# Patient Record
Sex: MALE | Race: WHITE | NOT HISPANIC OR LATINO | Employment: OTHER | URBAN - METROPOLITAN AREA
[De-identification: names, ages, dates, MRNs, and addresses within clinical notes are randomized per-mention and may not be internally consistent; named-entity substitution may affect disease eponyms.]

---

## 2017-03-01 ENCOUNTER — TELEPHONE (OUTPATIENT)
Dept: OTHER | Facility: CLINIC | Age: 52
End: 2017-03-01

## 2017-03-01 NOTE — TELEPHONE ENCOUNTER
3/1/2017    Call Regarding Onboarding [employer]    Attempt 1    Message on voicemail     Comments: SPOUSE      Outreach   KV

## 2017-03-07 NOTE — TELEPHONE ENCOUNTER
3/7/2017    Call Regarding Onboarding [employer]    Attempt 2    Message on voicemail     Comments: spouse        Outreach   [place]

## 2017-03-31 NOTE — TELEPHONE ENCOUNTER
3/31/2017    Call Regarding Onboarding [employer]    Attempt 3    Message on voicemail     Comments: Spouse      Outreach   KV

## 2020-06-26 ENCOUNTER — TRANSFERRED RECORDS (OUTPATIENT)
Dept: HEALTH INFORMATION MANAGEMENT | Facility: CLINIC | Age: 55
End: 2020-06-26

## 2020-08-17 ENCOUNTER — TRANSFERRED RECORDS (OUTPATIENT)
Dept: HEALTH INFORMATION MANAGEMENT | Facility: CLINIC | Age: 55
End: 2020-08-17

## 2021-08-20 ENCOUNTER — PATIENT OUTREACH (OUTPATIENT)
Dept: OTOLARYNGOLOGY | Facility: CLINIC | Age: 56
End: 2021-08-20

## 2021-08-20 DIAGNOSIS — C41.1 SQUAMOUS CELL CARCINOMA OF MANDIBLE (H): Primary | ICD-10-CM

## 2021-08-20 NOTE — PROGRESS NOTES
Called and left message for patient indicating that he is scheduled for Consult on Monday 8/23 at 12:00pm with a PET scan prior at 7:30am.     Left information regarding PET scan including date, time, location, and prep information. Left direct line for return call to confirm and with further questions or concerns.       Natalya Crowder, RN, BSN

## 2021-08-20 NOTE — TELEPHONE ENCOUNTER
FUTURE VISIT INFORMATION      FUTURE VISIT INFORMATION:    Date: 2021    Time: 12PM    [place] [facility] [place]  REFERRAL INFORMATION:    Referring provider:  Christ Stewart MD     Referring providers clinic:  [place] [address] [facility], [facility], and Thro[facility]      Reason for visit/diagnosis  HX: SCCA of the left mandible Christ Stewart referral     RECORDS REQUESTED FROM:       Clinic name Comments Records Status Imaging Status   [place] [address] [facility], [facility]   2021 note from Christ Stewart MD  Care everywhere    [facility] [place] [facility]  2020 note from Emily Summers MD  2020 Op report    Care everywhere    [facility]  [address]  [facility] [address]  Phone: [phone] 2021 Left cheek, mass, fine needle aspiration (Case: [id number]  )    *Trackin   Report in care everywhere    Path req 21 - received 21    [facility]   Tel [phone]   Fax [phone] 2021 CT Neck   2020 PET CT   2020 CT Neck   2020 FL Video Swallow    Care everywhere req 21 - [facility] 2020 PET Head NEck   2020 CT Neck , CT Maxial FAcial, CT Angio Abd, CT Thorax  Care [facility]  [facility]   [facility]  [address] [facility] [address]  Phone: [phone] 2020 mandible and lymph node  (Case: [id number] )    *trackin Report in care everywhere    Path req 21  - received 21     [facility]    [address]    [place], [place] [address] [phone]        2021 note from Dre Tarango MBBS   Care everywhere             2021 2:23PM sent an urgent fax to [facility] and [facility] for images to be pushed. Fax sent for path send out - Amay   *3:39PM received images from [facility], waiting for HP images - amay   2021 8AM images from  [facility] in PACS, waiting for path - Amay

## 2021-08-23 ENCOUNTER — TELEPHONE (OUTPATIENT)
Dept: OTOLARYNGOLOGY | Facility: CLINIC | Age: 56
End: 2021-08-23

## 2021-08-23 ENCOUNTER — PRE VISIT (OUTPATIENT)
Dept: OTOLARYNGOLOGY | Facility: CLINIC | Age: 56
End: 2021-08-23

## 2021-08-23 NOTE — TELEPHONE ENCOUNTER
[facility] Call Center    Phone Message    May a detailed message be left on voicemail: yes     Reason for Call: Other: Pt calling to reschedule appt to see Dr. Swartz for squamous cell carcinoma due to need of rescheduling 8/23 PET scan. Writer scheduled for first available, but sending TE as it is outside the recommended 5 business days. Pt would be comofrtable being seen Thursday afternoon or Friday following his PET scan. Please call Pt to reschedule if possible.      Action Taken: Message routed to:  [facility] ENT    Travel Screening: Not Applicable

## 2021-08-24 NOTE — TELEPHONE ENCOUNTER
Spoke with patient.Appointment on 8/30 is Dr. Swartz's next day in clinic - so patient has soonest available. Dr. Swartz is comfortable with this timing.    The patient is in understanding and agreement to this plan. Patient has no further questions at this time.    Rhea Segal RN on 8/24/2021 at 8:32 AM

## 2021-08-26 ENCOUNTER — HOSPITAL ENCOUNTER (OUTPATIENT)
Dept: PET IMAGING | Facility: CLINIC | Age: 56
End: 2021-08-26
Attending: STUDENT IN AN ORGANIZED HEALTH CARE EDUCATION/TRAINING PROGRAM
Payer: COMMERCIAL

## 2021-08-26 ENCOUNTER — LAB (OUTPATIENT)
Dept: LAB | Facility: CLINIC | Age: 56
End: 2021-08-26
Attending: STUDENT IN AN ORGANIZED HEALTH CARE EDUCATION/TRAINING PROGRAM
Payer: COMMERCIAL

## 2021-08-26 DIAGNOSIS — C41.1 SQUAMOUS CELL CARCINOMA OF MANDIBLE (H): ICD-10-CM

## 2021-08-26 DIAGNOSIS — C44.329 SQUAMOUS CELL CARCINOMA OF CHEEK: Primary | ICD-10-CM

## 2021-08-26 PROCEDURE — 70491 CT SOFT TISSUE NECK W/DYE: CPT

## 2021-08-26 PROCEDURE — 70491 CT SOFT TISSUE NECK W/DYE: CPT | Mod: 26 | Performed by: STUDENT IN AN ORGANIZED HEALTH CARE EDUCATION/TRAINING PROGRAM

## 2021-08-26 PROCEDURE — 74177 CT ABD & PELVIS W/CONTRAST: CPT

## 2021-08-26 PROCEDURE — 88321 CONSLTJ&REPRT SLD PREP ELSWR: CPT | Performed by: PATHOLOGY

## 2021-08-26 PROCEDURE — 71260 CT THORAX DX C+: CPT | Mod: 26

## 2021-08-26 PROCEDURE — 343N000001 HC RX 343: Performed by: STUDENT IN AN ORGANIZED HEALTH CARE EDUCATION/TRAINING PROGRAM

## 2021-08-26 PROCEDURE — A9552 F18 FDG: HCPCS | Performed by: STUDENT IN AN ORGANIZED HEALTH CARE EDUCATION/TRAINING PROGRAM

## 2021-08-26 PROCEDURE — 78816 PET IMAGE W/CT FULL BODY: CPT | Mod: 26

## 2021-08-26 PROCEDURE — 250N000011 HC RX IP 250 OP 636: Performed by: STUDENT IN AN ORGANIZED HEALTH CARE EDUCATION/TRAINING PROGRAM

## 2021-08-26 PROCEDURE — 78816 PET IMAGE W/CT FULL BODY: CPT | Mod: PS

## 2021-08-26 PROCEDURE — 74177 CT ABD & PELVIS W/CONTRAST: CPT | Mod: 26

## 2021-08-26 RX ORDER — IOPAMIDOL 755 MG/ML
116 INJECTION, SOLUTION INTRAVASCULAR ONCE
Status: COMPLETED | OUTPATIENT
Start: 2021-08-26 | End: 2021-08-26

## 2021-08-26 RX ADMIN — IOPAMIDOL 116 ML: 755 INJECTION, SOLUTION INTRAVENOUS at 15:15

## 2021-08-26 RX ADMIN — FLUDEOXYGLUCOSE F-18 11.3 MCI.: 500 INJECTION, SOLUTION INTRAVENOUS at 15:15

## 2021-08-30 ENCOUNTER — OFFICE VISIT (OUTPATIENT)
Dept: OTOLARYNGOLOGY | Facility: CLINIC | Age: 56
End: 2021-08-30
Payer: COMMERCIAL

## 2021-08-30 ENCOUNTER — LAB (OUTPATIENT)
Dept: LAB | Facility: CLINIC | Age: 56
End: 2021-08-30
Payer: COMMERCIAL

## 2021-08-30 ENCOUNTER — ALLIED HEALTH/NURSE VISIT (OUTPATIENT)
Dept: SPEECH THERAPY | Facility: CLINIC | Age: 56
End: 2021-08-30

## 2021-08-30 VITALS
OXYGEN SATURATION: 97 % | BODY MASS INDEX: 27.49 KG/M2 | TEMPERATURE: 97.1 F | HEIGHT: 70 IN | HEART RATE: 93 BPM | WEIGHT: 192.02 LBS

## 2021-08-30 DIAGNOSIS — C41.1 SQUAMOUS CELL CARCINOMA OF MANDIBLE (H): Primary | ICD-10-CM

## 2021-08-30 LAB
PATH REPORT.COMMENTS IMP SPEC: NORMAL
PATH REPORT.FINAL DX SPEC: NORMAL
PATH REPORT.FINAL DX SPEC: NORMAL
PATH REPORT.GROSS SPEC: NORMAL
PATH REPORT.GROSS SPEC: NORMAL
PATH REPORT.MICROSCOPIC SPEC OTHER STN: NORMAL
PATH REPORT.MICROSCOPIC SPEC OTHER STN: NORMAL
PATH REPORT.RELEVANT HX SPEC: NORMAL
PATH REPORT.SITE OF ORIGIN SPEC: NORMAL
PATH REPORT.SITE OF ORIGIN SPEC: NORMAL

## 2021-08-30 PROCEDURE — 31575 DIAGNOSTIC LARYNGOSCOPY: CPT | Performed by: STUDENT IN AN ORGANIZED HEALTH CARE EDUCATION/TRAINING PROGRAM

## 2021-08-30 PROCEDURE — 88321 CONSLTJ&REPRT SLD PREP ELSWR: CPT | Performed by: PATHOLOGY

## 2021-08-30 PROCEDURE — 99205 OFFICE O/P NEW HI 60 MIN: CPT | Mod: 25 | Performed by: STUDENT IN AN ORGANIZED HEALTH CARE EDUCATION/TRAINING PROGRAM

## 2021-08-30 RX ORDER — ACETAMINOPHEN 500 MG
500-1000 TABLET ORAL EVERY 6 HOURS PRN
COMMUNITY
End: 2023-01-01

## 2021-08-30 ASSESSMENT — PAIN SCALES - GENERAL: PAINLEVEL: NO PAIN (0)

## 2021-08-30 ASSESSMENT — MIFFLIN-ST. JEOR: SCORE: 1702.25

## 2021-08-30 NOTE — PROGRESS NOTES
August 30, 2021      Christ Stewart MD  [facility] [address] [place] [address]      Dear Dr. Stewart:    I had the pleasure of meeting Mr. Sharpe today in clinic.    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  He underwent a left marginal mandibulectomy, left buccal resection, left posterior maxillectomy, left neck dissection and reconstruction with a right radial forearm free flap by Ganga Reece and Gino Obando on April 28, 2020.  I have had a chance to review their operative note.  They had an intraoperative positive margin which was subsequently cleared.  His surgical pathology demonstrated a T4a tumor.  He had five of 33 lymph nodes positive for tumor with extranodal extension and perineural invasion.  He subsequently received adjuvant chemoradiation therapy at [facility].  He received 66 gray as well as high dose cisplatin in three doses.  He finished his chemoradiation therapy in August of 2020.  He reports that shortly after his operation, he felt a mass in the left cheek that has been persistent.  More recently, he has noticed drainage from the skin.  He was seen by Dr. Oconnell recently, and a fine needle aspirate was performed that has been reported as suspicious for squamous cell carcinoma.  He had a PET/CT scan for staging last week that actually demonstrated two sites of recurrent disease.  There is a 3.4 x 2.4 cm area of FDG avidity within the subcutaneous aspect of the left cheek abutting the masseter muscle and likely involving the skin.  There is also a second 2.8 x 2.3 cm hyperbolic mass in the left infratemporal fossa medial to the zygoma.  There was also suspicion of involvement of the zygomatic arch.  There was no evidence of regional or distant metastases.    PAST MEDICAL HISTORY:    1.  Testicular cancer.  2.  Oral cancer.    PAST  SURGICAL HISTORY:   1.  Oral resection.  2.  Orchiectomy.    MEDICATIONS:  Tylenol.    ALLERGIES:  No known drug allergies.    SOCIAL HISTORY:  He works in the [employer] industry.  He smokes about a pack and half a day and has done so for most of his life.  He drinks 6-10 beers a day.  He does not use chewing tobacco.    FAMILY HISTORY:  None.    REVIEW OF SYSTEMS:  A 10-point review of systems was performed and is negative aside from HPI.    PHYSICAL EXAMINATION:  He is alert, in no acute distress.  He has a firm, fixed mass in the left cheek with a pinpoint area of drainage.  I cannot feel the infratemporal fossa mass.  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  I do not feel any cervical lymphadenopathy.  On intraoral examination, he has a well-healed flap along the left mandibular alveolus, left buccal mucosa and posterior maxilla as well as the lateral pharynx.  I do not see any suspicious lesions in the oral cavity or oropharynx.  He has good ulnar flow in the left arm.  However, he does not have good radial flow.  His thigh is appropriate for free tissue transfer.    PROCEDURE:  Fiberoptic laryngoscopy was performed after the nose was topically decongested and anesthetized.  The scope was advanced in the nasal cavity.  The nasopharynx was clear.  The base of tongue and vallecula was clear.  The epiglottis was sharp without lesions.  The piriform sinuses were clear.  The vocal cords are mobile bilaterally.    IMAGING:  I reviewed his PET/CT with him today.    ASSESSMENT AND PLAN:  Mr. Sharpe is a 55-year-old male presenting with an aggressive early recurrence of an oral cancer.  He has two separate sites of recurrence within a year of completing chemoradiation therapy and surgery.  One lesion involves the subcutaneous aspect of the left cheek and one involves the infratemporal fossa.  At this time, I am not completely sure that this is surgically resectable.  We will obtain an MRI to further evaluate  this.  I did clearly discuss with the patient that this is an aggressive malignancy and carries a poor prognosis.  If this is not surgically resectable, we would refer him on for palliative treatment.  I answered his questions to the best of my ability today, and I will keep him updated on his scans.  We will review him at our Head and Neck Multidisciplinary Conference. His PET scan shows some findings in the stomach and rectum and we will refer him to gastroenterology for these.     Thank you for allowing me to participate in the care of this patient.  If you have any questions or concerns, please do not hesitate to contact us.    Sincerely,    Aditya Swartz MD    Otolaryngology-Head and Neck Surgery  [facility]        60 minutes spent on the date of the encounter in chart review, patient visit, review of tests, documentation and/or discussion with other providers about the issues documented above.

## 2021-08-30 NOTE — PROGRESS NOTES
Antonio Sharpe was seen for allied health care provider visit for introduction of self and SLP services. Pt has a hx of a oral cavity cancer s/p surgery and chemoXRT in Aug 2020, who now presents with an oral cavity recurrence. Swallowing is functional at this time. He participated in video swallow study at outside facility in July 2020 which was WFL. Formal swallow evaluation may be indicated pending POC. Time spent with patient 3 minutes.     JUANPABLO Knowles MA (music), CCC-SLP   Speech Language Pathologist  [place] Trained Vocologist   [facility]  Dept. of Otolaryngology  Department of Rehabilitation Services  [address] [place] [id number]  Email: [email or web address]   Phone: [phone]  Pronouns: she/her/hers

## 2021-08-30 NOTE — TUMOR CONFERENCE
Head & Neck Tumor Conference Note     Status: New  Staff: Dr. Swartz    Tumor Site: Left mandible/alveolar ridge/buccal mucosa (oral cavity)  Tumor Pathology: [id number] SCC  Tumor Stage: [id number]  Tumor Treatment:   (1) Left marginal mandibulectomy, WLE of the left buccal mucosa, left posterior maxillectomy, left partial pharyngectomy, left selective neck dissection, right radial forearm free flap, mandibular plating, tracheostomy, and feeding tube placement (4/28/2020)  (2) Adjuvant chemoradiation (6600 cGy of radiation to the left oral cavity and neck, completed 8/17/2020 with 3-doses of high-dose cisplatin)    Reason for Review: Review imaging, path, and POC.    Brief History: Antonio Sharpe is a 55-year-old man with history of a pT4aN3 [id number] SCC of the left mandible/alveolar ridge/buccal mucosa. He initially developed a lesion of the left buccal mucosa, left jaw pain, and trismus in March 2020. A biopsy by an oral surgeon was consistent with invasive SCC. Evaluation by Dr. Reece ([facility], ENT) showed an 8 cm x 5 cm lesion involving the left buccal mucosa, retromolar trigone, and mandibular alveolus. PET/CT imaging showed increased FDG uptake along the left danilo-mandible involving the anterior aspect of the oral cavity, buccal mucosa at the level of the angle of the mandible, and retromolar trigone. There was no locoregional or distant disease. On 04/28/2020, he underwent a left marginal mandibulectomy, wide resection of the carcinoma involving the buccal mucosa, left posterior maxillectomy, left partial pharyngectomy, left selective neck dissection, right radial forearm free flap, mandibular plating, tracheostomy, and feeding tube placement. Pathology showed a 5.7 cm [id number] moderately differentiated squamous cell carcinoma. No LVI and final margins negative. +5/33 LN were positive for tumor, with largest deposit 0.8 cm without evidence of MARIA ELENA. He was subsequently treated with adjuvant  chemoradiation. He received 6600 cGy of radiation to the left oral cavity and neck between 6/30 and 08/17/2020. He received 3 doses of high-dose cisplatin at 100 milligrams/meter sq on 6/30, 7/20 and 08/10/2020 respectively. He did experience some tinnitus, some difficulty swallowing and some oropharyngeal pain, but did not develop any renal insufficiency or major electrolyte imbalance. His PET scan from 11/25/2020 revealed extensive post radiation and postsurgical changes but no hypermetabolic activity in the neck, and no evidence of distant metastases.    He recently underwent a CT scan due to swelling and drainage from the left cheek that has developed over the past months. This showed a non-specific ill-defined heterogeneous mass within the left cheek within an area of extensive prior postsurgical and postradiation changes with flap reconstruction measuring approximately 3 x 2.8 x 3.6 cm in size. FNA was performed concerning for SCC.      Pertinent PMH: Prior testicular cancer (s/p left orchiectomy, postoperative radiation).    Smoking Hx: 1/2 ppd for 35 years.    Imaging:   CT (8/11/2021):  INDICATION: History of SCCA and extensive surgical reconstruction elsewhere.  Having swelling and drainage out of the left cutaneous cheek.     COMPARISON: PET/CT 11/25/2020 11/06/2020 TECHNIQUE:  Images of the neck were obtained following the administration of 75 mL IOPAMIDOL 61 % IV SOLN.    FINDINGS: Ill-defined heterogeneous mass measuring approximately 3 x 2.8 x 3.6 cm in size at the site of a reconstruction flap within the subcutaneous fat of the left cheek abutting the area of the masseter muscle medially and abutting the skin laterally. Loss of normal fat planes with nonspecific stranding of the adjacent fat. Lesion also within the area of the accessory lobe of the parotid gland which is no longer distinctly identified. Again noted loss of normal fat planes within the  space and carotid space on the left.  History of postradiation changes and neck dissection on the left. Extensive postsurgical changes again noted with partial resection of the mandible and maxilla on the left. Resection and of the submandibular gland on the left. Unchanged prominence to the right submandibular gland. Effacement of the piriform sinus on the left which appears similar to the prior study. Approximately 8 mm nodule within the thyroid on the left which appears similar to the prior study. No definite enlarged lymph nodes within the right neck. Emphysematous changes lung apices. Advanced degenerative changes cervical spine unchanged severe neural foraminal narrowing on the right at C3-C4 largely related to advanced facet arthropathy. Unchanged severe neural foraminal narrowing bilaterally at C5-C6 and C6-C7 largely related to uncinate spurring at least moderate central stenosis. Probable small mucous retention cyst left maxillary sinus.    IMPRESSION:  1. Nonspecific ill-defined heterogeneous mass within the left cheek within an area of extensive prior postsurgical and postradiation changes with flap reconstruction measuring approximately 3 x 2.8 x 3.6 cm in size. Accessory lobe of the parotid on the left no longer clearly defined. Mass may be related originating from accessory lobe. Finding most worrisome for tumor. Differential would also include inflammatory/infectious process. Consider follow-up PET/CT.      Pathology:   CASE FROM [facility], [place], [place] ([id number], OBTAINED 04/28/2020):  A.  Left mandible medial margin:  -Negative for dysplasia or malignancy  B.  Left mandible lateral margin:  -Negative for dysplasia or malignancy  C.  Left mandible anterior margin:  -Negative for dysplasia or malignancy  D.  Left mandible posterior margin:  -Negative for dysplasia or malignancy  E.  Left mandible deep margin:  -Negative for malignancy  F.  Lymph node, left level 1B, excision:  -Metastatic squamous cell carcinoma  in 1 of 1 lymph node  G.  Lymph nodes, left neck dissection:  -Metastatic squamous cell carcinoma in 4 of 28 lymph nodes  -Benign salivary tissue  H. Lymph nodes, level 2B dissection:  -No malignancy identified in 4 lymph nodes  I.  Left mandible, composite resection:  -Invasive moderately differentiated keratinizing squamous cell carcinoma  -Tumor size: 5.7 cm  -Tumor focality: Unifocal  -The depth of invasion: 1.5 cm  -Lymphovascular invasion: Not identified  -Perineural invasion: Identified  -The tumor is focally present at the inked lateral and medial margins in this specimen, final surgical resection margins free of tumor (see parts A-E)  -Metastatic squamous cell carcinoma in 5 of 33 lymph nodes  -The size of largest metastatic deposit: 0.8 cm  -Extranodal extension: Present, >2 mm    Left cheek, mass, fine needle aspiration (8/16/21):    Highly suspicious for squamous cell carcinoma    Tumor Board Recommendation:   Discussion: This is a 55-year-old man with history of a pT4aN3 [id number] SCC of the left mandible/alveolar ridge/buccal mucosa status post left marginal mandibulectomy, WLE of the left buccal mucosa, left posterior maxillectomy, left partial pharyngectomy, left selective neck dissection, right radial forearm free flap, mandibular plating, tracheostomy, and feeding tube placement (4/28/2020) followed by adjuvant chemoradiation (6600 cGy of radiation to the left oral cavity and neck, completed 8/17/2020 with 3-doses of high-dose cisplatin). He is now referred to the [place] due to concern for recurrent disease. His PET/CT and MRI are reviewed. There is a mass in the masseteric muscle and a second one deeper in the  space. One extends to the skin. The mass abuts the mandibular condyle with a small amount of signal change concerning for early invasion. There is also potential perineural invasion. Atrophic denervation changes are seen in the  space as well, which are not FDG  callie.    Plan: Repeat biopsy next week to confirm recurrence. Will get PDL-1 on new biopsy specimen. Induction chemotherapy is potentially an option, but felt not likely to get clear surgical margins with the use of induction chemotherapy.     Erinn Dai MD PGY-4  Otolaryngology - Head and Neck Surgery    Documentation / Disclaimer Cancer Tumor Board Note  Cancer tumor board recommendations do not override what is determined to be reasonable care and treatment, which is dependent on the circumstances of a patient's case; the patient's medical, social, and personal concerns; and the clinical judgment of the oncologist [physician].

## 2021-08-30 NOTE — PATIENT INSTRUCTIONS
1. Please schedule MRI scan ASAP. We will review at tumor board and call you with recommendations.   2. Please call the ENT clinic with any questions,concerns, new or worsening symptoms.    -Clinic number is [phone]   - Natalya's direct line (Dr. Swartz's nurse) [phone]

## 2021-08-30 NOTE — LETTER
8/30/2021       RE: Antonio Sharpe  [address] [place] [address]     Dear Colleague,    Thank you for referring your patient, Antonio Sharpe, to the [facility] [place] [facility]. Please see a copy of my visit note below.    August 30, 2021      Christ Stewart MD  [facility] [address] [place]   [address]      Dear Dr. Stewart:    I had the pleasure of meeting Mr. Sharpe today in clinic.    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  He underwent a left marginal mandibulectomy, left buccal resection, left posterior maxillectomy, left neck dissection and reconstruction with a right radial forearm free flap by Ganga Reece and Gino Obando on April 28, 2020.  I have had a chance to review their operative note.  They had an intraoperative positive margin which was subsequently cleared.  His surgical pathology demonstrated a T4a tumor.  He had five of 33 lymph nodes positive for tumor with extranodal extension and perineural invasion.  He subsequently received adjuvant chemoradiation therapy at [facility].  He received 66 gray as well as high dose cisplatin in three doses.  He finished his chemoradiation therapy in August of 2020.  He reports that shortly after his operation, he felt a mass in the left cheek that has been persistent.  More recently, he has noticed drainage from the skin.  He was seen by Dr. Oconnell recently, and a fine needle aspirate was performed that has been reported as suspicious for squamous cell carcinoma.  He had a PET/CT scan for staging last week that actually demonstrated two sites of recurrent disease.  There is a 3.4 x 2.4 cm area of FDG avidity within the subcutaneous aspect of the left cheek abutting the masseter muscle and likely  involving the skin.  There is also a second 2.8 x 2.3 cm hyperbolic mass in the left infratemporal fossa medial to the zygoma.  There was also suspicion of involvement of the zygomatic arch.  There was no evidence of regional or distant metastases.    PAST MEDICAL HISTORY:    1.  Testicular cancer.  2.  Oral cancer.    PAST SURGICAL HISTORY:   1.  Oral resection.  2.  Orchiectomy.    MEDICATIONS:  Tylenol.    ALLERGIES:  No known drug allergies.    SOCIAL HISTORY:  He works in the [employer] industry.  He smokes about a pack and half a day and has done so for most of his life.  He drinks 6-10 beers a day.  He does not use chewing tobacco.    FAMILY HISTORY:  None.    REVIEW OF SYSTEMS:  A 10-point review of systems was performed and is negative aside from HPI.    PHYSICAL EXAMINATION:  He is alert, in no acute distress.  He has a firm, fixed mass in the left cheek with a pinpoint area of drainage.  I cannot feel the infratemporal fossa mass.  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  I do not feel any cervical lymphadenopathy.  On intraoral examination, he has a well-healed flap along the left mandibular alveolus, left buccal mucosa and posterior maxilla as well as the lateral pharynx.  I do not see any suspicious lesions in the oral cavity or oropharynx.  He has good ulnar flow in the left arm.  However, he does not have good radial flow.  His thigh is appropriate for free tissue transfer.    PROCEDURE:  Fiberoptic laryngoscopy was performed after the nose was topically decongested and anesthetized.  The scope was advanced in the nasal cavity.  The nasopharynx was clear.  The base of tongue and vallecula was clear.  The epiglottis was sharp without lesions.  The piriform sinuses were clear.  The vocal cords are mobile bilaterally.    IMAGING:  I reviewed his PET/CT with him today.    ASSESSMENT AND PLAN:  Mr. Sharpe is a 55-year-old male presenting with an aggressive early recurrence of an oral cancer.  He  has two separate sites of recurrence within a year of completing chemoradiation therapy and surgery.  One lesion involves the subcutaneous aspect of the left cheek and one involves the infratemporal fossa.  At this time, I am not completely sure that this is surgically resectable.  We will obtain an MRI to further evaluate this.  I did clearly discuss with the patient that this is an aggressive malignancy and carries a poor prognosis.  If this is not surgically resectable, we would refer him on for palliative treatment.  I answered his questions to the best of my ability today, and I will keep him updated on his scans.  We will review him at our Head and Neck Multidisciplinary Conference. His PET scan shows some findings in the stomach and rectum and we will refer him to gastroenterology for these.     Thank you for allowing me to participate in the care of this patient.  If you have any questions or concerns, please do not hesitate to contact us.    Sincerely,    Aditya Swartz MD    Otolaryngology-Head and Neck Surgery  [facility]        60 minutes spent on the date of the encounter in chart review, patient visit, review of tests, documentation and/or discussion with other providers about the issues documented above.         Again, thank you for allowing me to participate in the care of your patient.      Sincerely,    Aditya Swartz MD

## 2021-09-01 ENCOUNTER — HOSPITAL ENCOUNTER (OUTPATIENT)
Dept: MRI IMAGING | Facility: CLINIC | Age: 56
Discharge: HOME | End: 2021-09-01
Attending: STUDENT IN AN ORGANIZED HEALTH CARE EDUCATION/TRAINING PROGRAM | Admitting: STUDENT IN AN ORGANIZED HEALTH CARE EDUCATION/TRAINING PROGRAM
Payer: COMMERCIAL

## 2021-09-01 DIAGNOSIS — C41.1 SQUAMOUS CELL CARCINOMA OF MANDIBLE (H): ICD-10-CM

## 2021-09-01 PROCEDURE — 255N000002 HC RX 255 OP 636: Performed by: STUDENT IN AN ORGANIZED HEALTH CARE EDUCATION/TRAINING PROGRAM

## 2021-09-01 PROCEDURE — 70543 MRI ORBT/FAC/NCK W/O &W/DYE: CPT

## 2021-09-01 PROCEDURE — A9585 GADOBUTROL INJECTION: HCPCS | Performed by: STUDENT IN AN ORGANIZED HEALTH CARE EDUCATION/TRAINING PROGRAM

## 2021-09-01 RX ORDER — GADOBUTROL 604.72 MG/ML
8 INJECTION INTRAVENOUS ONCE
Status: DISCONTINUED | OUTPATIENT
Start: 2021-09-01 | End: 2021-09-02 | Stop reason: HOSPADM

## 2021-09-01 RX ORDER — GADOBUTROL 604.72 MG/ML
8 INJECTION INTRAVENOUS ONCE
Status: COMPLETED | OUTPATIENT
Start: 2021-09-01 | End: 2021-09-01

## 2021-09-01 RX ADMIN — GADOBUTROL 8 ML: 604.72 INJECTION INTRAVENOUS at 15:14

## 2021-09-02 ENCOUNTER — PATIENT OUTREACH (OUTPATIENT)
Dept: OTOLARYNGOLOGY | Facility: CLINIC | Age: 56
End: 2021-09-02

## 2021-09-02 NOTE — PROGRESS NOTES
Called to touch base with patient regarding arranging repeat biopsy of left cheek mass as the FNA done at [facility] was re-read here and results were:    A: LEFT CHEEK, MASS, FINE NEEDLE ASPIRATION:  Atypical cells present  See comment.   Electronically signed by Domingo Bernstein III, MD on 8/30/2021 at  5:07 PM   Comment    The cytological evaluation and cell block material show squamous cells and both atypical and mature keratinocytes. There is mild acute and chronic inflammation with keratinous debris. There is absence of marked cytological atypia, pleomorphism, or mitosis.     Based on morphology this represents a cystic squamous lesion, the differential could be a benign squamous lined cyst, but a cystic well differentiated squamous cell carcinoma cannot be completely ruled out. Clinical and radiologic correlation is recommended.     Discussed with patient that Dr. Swartz would like to repeat FNA in clinic on Friday am. Patient indicates that he is planning on going out of town and would like to wait until next week to complete this. Writer will review with Dr. Swartz to plan for repeat FNA in clinic with pathology early next week.     Also discussed the need to see GI for Focal wall thickening of the cardiac fundus and focal uptake to the anorectal region. Patient indicates that he would like to wait on this until he figures out if his head and neck cancer is resectable and then would decide on proceeding with GI.     Patient requested results of MRI. The following results were reviewed:    IMPRESSION:  1. Multiple masses within the left  space consistent with  recurrent tumor.  2. No convincing cervical lymphadenopathy.    Advised patient that we will review MRI in detail at tumor board tomorrow and will contact him with recommendations. Patient verbalized understanding and was encouraged to call with further questions.     Natalya Crowder, RN, BSN

## 2021-09-03 ENCOUNTER — TUMOR CONFERENCE (OUTPATIENT)
Dept: ONCOLOGY | Facility: CLINIC | Age: 56
End: 2021-09-03
Payer: COMMERCIAL

## 2021-09-03 NOTE — PROGRESS NOTES
Called and spoke with patient regarding tumor board discussion. Reviewed that Dr. Swartz would be in touch to further discuss. Patient verbalized understanding and will await this call.     Patient will return for repeat FNA with pathology on Wednesday 9/8 at 2:00pm.     Patient encouraged to call with further questions or concerns.     Natalya Crowder RN, BSN

## 2021-09-08 ENCOUNTER — ALLIED HEALTH/NURSE VISIT (OUTPATIENT)
Dept: OTOLARYNGOLOGY | Facility: CLINIC | Age: 56
End: 2021-09-08
Payer: COMMERCIAL

## 2021-09-08 DIAGNOSIS — C41.1 SQUAMOUS CELL CARCINOMA OF MANDIBLE (H): Primary | ICD-10-CM

## 2021-09-08 DIAGNOSIS — R22.0 CHEEK MASS: ICD-10-CM

## 2021-09-08 PROCEDURE — 88305 TISSUE EXAM BY PATHOLOGIST: CPT | Mod: 26 | Performed by: PATHOLOGY

## 2021-09-08 PROCEDURE — 88305 TISSUE EXAM BY PATHOLOGIST: CPT | Mod: TC | Performed by: STUDENT IN AN ORGANIZED HEALTH CARE EDUCATION/TRAINING PROGRAM

## 2021-09-08 PROCEDURE — 99207 PR NO CHARGE NURSE ONLY: CPT

## 2021-09-08 PROCEDURE — 88173 CYTOPATH EVAL FNA REPORT: CPT | Mod: 26 | Performed by: PATHOLOGY

## 2021-09-08 PROCEDURE — 88172 CYTP DX EVAL FNA 1ST EA SITE: CPT | Mod: 26 | Performed by: PATHOLOGY

## 2021-09-08 PROCEDURE — 10021 FNA BX W/O IMG GDN 1ST LES: CPT | Mod: GC | Performed by: PATHOLOGY

## 2021-09-08 NOTE — PROGRESS NOTES
Patient here for repeat FNA of left cheek lesion completed by pathology. FNA was completed and pathology confirmed this is positive for squamous cell carcinoma. Discussed with patient and update sent to Dr. Swartz. We will await final results and contact patient to discuss next steps.     Patient was encouraged to call with further questions or concerns.       Natalya Crowder RN, BSN

## 2021-09-13 ENCOUNTER — PATIENT OUTREACH (OUTPATIENT)
Dept: OTOLARYNGOLOGY | Facility: CLINIC | Age: 56
End: 2021-09-13

## 2021-09-13 DIAGNOSIS — C41.1 SQUAMOUS CELL CARCINOMA OF MANDIBLE (H): Primary | ICD-10-CM

## 2021-09-13 NOTE — PROGRESS NOTES
----- Message from Alirio Villarreal MD sent at 11/6/2019 11:35 AM CST -----  All normal except globulin level is higher:  Check EMMANUELLE and serum protein electrophoresis   Received a call from patient who indicates that after talking with Dr. Swartz he would like to proceed with palliative chemotherapy option and not surgery. Discussed with patient that we will place referral for medical oncology consult and he will be called to arrange asap.     Patient was agreeable to plan and was encouraged to call with further questions or concerns.     Message sent to RN in medical oncology to help facilitate referral.      Natalya Crowder, RN, BSN

## 2021-09-17 ENCOUNTER — TELEPHONE (OUTPATIENT)
Dept: ONCOLOGY | Facility: CLINIC | Age: 56
End: 2021-09-17

## 2021-09-17 DIAGNOSIS — E83.42 HYPOMAGNESEMIA: ICD-10-CM

## 2021-09-17 DIAGNOSIS — C06.9 SQUAMOUS CELL CARCINOMA OF ORAL CAVITY (H): Primary | ICD-10-CM

## 2021-09-17 DIAGNOSIS — Z13.29 SCREENING FOR HYPOTHYROIDISM: ICD-10-CM

## 2021-09-17 NOTE — TELEPHONE ENCOUNTER
I received a message from Dr. Buckley that she would like to have PD_L1 testing done on Jeremi's tissue. Lab called and they pulled the order to start the process.

## 2021-09-21 NOTE — TELEPHONE ENCOUNTER
RECORDS STATUS - ALL OTHER DIAGNOSIS      RECORDS RECEIVED FROM: Epic, [facility]   DATE RECEIVED: 9/21   NOTES STATUS DETAILS   OFFICE NOTE from referring provider Aditya Cortes MD in [facility] ENT: 8/30/21   OFFICE NOTE from medical oncologist     DISCHARGE SUMMARY from hospital [facility] 4/28/20   DISCHARGE REPORT from the ER NA    OPERATIVE REPORT [facility] 4/28/20: Composite Resection Left Mandible   MEDICATION LIST [facility]    CLINICAL TRIAL TREATMENTS TO DATE     LABS     PATHOLOGY REPORTS [facility] 9/8/21: FNA  8/30/21, 8/26/21: Path Consult   ANYTHING RELATED TO DIAGNOSIS Epic 9/17/21   GENONOMIC TESTING     TYPE: Epic 9/20/21: PDL-1   IMAGING (NEED IMAGES & REPORT)     CT SCANS PACS [facility]   MRI     MAMMO     ULTRASOUND     PET PACS [facility]

## 2021-09-22 ENCOUNTER — ONCOLOGY VISIT (OUTPATIENT)
Dept: ONCOLOGY | Facility: CLINIC | Age: 56
End: 2021-09-22
Attending: STUDENT IN AN ORGANIZED HEALTH CARE EDUCATION/TRAINING PROGRAM
Payer: COMMERCIAL

## 2021-09-22 ENCOUNTER — LAB (OUTPATIENT)
Dept: LAB | Facility: CLINIC | Age: 56
End: 2021-09-22
Attending: INTERNAL MEDICINE
Payer: COMMERCIAL

## 2021-09-22 ENCOUNTER — PRE VISIT (OUTPATIENT)
Dept: ONCOLOGY | Facility: CLINIC | Age: 56
End: 2021-09-22

## 2021-09-22 VITALS
OXYGEN SATURATION: 99 % | HEART RATE: 77 BPM | HEIGHT: 70 IN | RESPIRATION RATE: 16 BRPM | WEIGHT: 190.8 LBS | SYSTOLIC BLOOD PRESSURE: 175 MMHG | TEMPERATURE: 97.6 F | DIASTOLIC BLOOD PRESSURE: 90 MMHG | BODY MASS INDEX: 27.32 KG/M2

## 2021-09-22 DIAGNOSIS — Z13.29 SCREENING FOR HYPOTHYROIDISM: ICD-10-CM

## 2021-09-22 DIAGNOSIS — T45.1X5A CHEMOTHERAPY-INDUCED NEUTROPENIA (H): ICD-10-CM

## 2021-09-22 DIAGNOSIS — E83.42 HYPOMAGNESEMIA: ICD-10-CM

## 2021-09-22 DIAGNOSIS — D70.1 CHEMOTHERAPY-INDUCED NEUTROPENIA (H): ICD-10-CM

## 2021-09-22 DIAGNOSIS — C06.9 SQUAMOUS CELL CARCINOMA OF ORAL CAVITY (H): ICD-10-CM

## 2021-09-22 DIAGNOSIS — C41.1 SQUAMOUS CELL CARCINOMA OF MANDIBLE (H): Primary | ICD-10-CM

## 2021-09-22 LAB
ALBUMIN SERPL-MCNC: 3.6 G/DL (ref 3.4–5)
ALP SERPL-CCNC: 68 U/L (ref 40–150)
ALT SERPL W P-5'-P-CCNC: 41 U/L (ref 0–70)
ANION GAP SERPL CALCULATED.3IONS-SCNC: 5 MMOL/L (ref 3–14)
AST SERPL W P-5'-P-CCNC: 29 U/L (ref 0–45)
BASOPHILS # BLD AUTO: 0.1 10E3/UL (ref 0–0.2)
BASOPHILS NFR BLD AUTO: 0 %
BILIRUB SERPL-MCNC: 0.3 MG/DL (ref 0.2–1.3)
BUN SERPL-MCNC: 27 MG/DL (ref 7–30)
CALCIUM SERPL-MCNC: 9.6 MG/DL (ref 8.5–10.1)
CHLORIDE BLD-SCNC: 98 MMOL/L (ref 94–109)
CO2 SERPL-SCNC: 30 MMOL/L (ref 20–32)
CREAT SERPL-MCNC: 0.95 MG/DL (ref 0.66–1.25)
EOSINOPHIL # BLD AUTO: 0 10E3/UL (ref 0–0.7)
EOSINOPHIL NFR BLD AUTO: 0 %
ERYTHROCYTE [DISTWIDTH] IN BLOOD BY AUTOMATED COUNT: 13.3 % (ref 10–15)
GFR SERPL CREATININE-BSD FRML MDRD: 88 ML/MIN/1.73M2
GLUCOSE BLD-MCNC: 105 MG/DL (ref 70–99)
HCT VFR BLD AUTO: 45.7 % (ref 40–53)
HGB BLD-MCNC: 15.3 G/DL (ref 13.3–17.7)
IMM GRANULOCYTES # BLD: 0.1 10E3/UL
IMM GRANULOCYTES NFR BLD: 1 %
LYMPHOCYTES # BLD AUTO: 0.7 10E3/UL (ref 0.8–5.3)
LYMPHOCYTES NFR BLD AUTO: 6 %
MAGNESIUM SERPL-MCNC: 2.2 MG/DL (ref 1.6–2.3)
MCH RBC QN AUTO: 32.4 PG (ref 26.5–33)
MCHC RBC AUTO-ENTMCNC: 33.5 G/DL (ref 31.5–36.5)
MCV RBC AUTO: 97 FL (ref 78–100)
MONOCYTES # BLD AUTO: 0.8 10E3/UL (ref 0–1.3)
MONOCYTES NFR BLD AUTO: 7 %
NEUTROPHILS # BLD AUTO: 9.9 10E3/UL (ref 1.6–8.3)
NEUTROPHILS NFR BLD AUTO: 86 %
NRBC # BLD AUTO: 0 10E3/UL
NRBC BLD AUTO-RTO: 0 /100
PLATELET # BLD AUTO: 256 10E3/UL (ref 150–450)
POTASSIUM BLD-SCNC: 5.1 MMOL/L (ref 3.4–5.3)
PROT SERPL-MCNC: 8.2 G/DL (ref 6.8–8.8)
RBC # BLD AUTO: 4.72 10E6/UL (ref 4.4–5.9)
SODIUM SERPL-SCNC: 133 MMOL/L (ref 133–144)
T4 FREE SERPL-MCNC: 0.78 NG/DL (ref 0.76–1.46)
TSH SERPL DL<=0.005 MIU/L-ACNC: 1.54 MU/L (ref 0.4–4)
WBC # BLD AUTO: 11.5 10E3/UL (ref 4–11)

## 2021-09-22 PROCEDURE — 36415 COLL VENOUS BLD VENIPUNCTURE: CPT | Performed by: PATHOLOGY

## 2021-09-22 PROCEDURE — 99205 OFFICE O/P NEW HI 60 MIN: CPT | Performed by: INTERNAL MEDICINE

## 2021-09-22 PROCEDURE — G0463 HOSPITAL OUTPT CLINIC VISIT: HCPCS

## 2021-09-22 PROCEDURE — 99417 PROLNG OP E/M EACH 15 MIN: CPT | Performed by: INTERNAL MEDICINE

## 2021-09-22 PROCEDURE — 84439 ASSAY OF FREE THYROXINE: CPT | Performed by: PATHOLOGY

## 2021-09-22 PROCEDURE — 80050 GENERAL HEALTH PANEL: CPT | Performed by: PATHOLOGY

## 2021-09-22 PROCEDURE — 83735 ASSAY OF MAGNESIUM: CPT | Performed by: PATHOLOGY

## 2021-09-22 ASSESSMENT — PAIN SCALES - GENERAL: PAINLEVEL: MILD PAIN (3)

## 2021-09-22 ASSESSMENT — MIFFLIN-ST. JEOR: SCORE: 1691.71

## 2021-09-22 NOTE — NURSING NOTE
"Oncology Rooming Note    September 22, 2021 10:52 AM   Antonio Sharpe is a 56 year old male who presents for:    Chief Complaint   Patient presents with     Oncology Clinic Visit     [facility] NEW - SCC MANDIBLE     Initial Vitals: BP (!) 175/90 (BP Location: Right arm, Patient Position: Chair, Cuff Size: Adult Regular)   Pulse 77   Temp 97.6  F (36.4  C)   Resp 16   Ht 1.778 m (5' 10\")   Wt 86.5 kg (190 lb 12.8 oz)   SpO2 99%   BMI 27.38 kg/m   Estimated body mass index is 27.38 kg/m  as calculated from the following:    Height as of this encounter: 1.778 m (5' 10\").    Weight as of this encounter: 86.5 kg (190 lb 12.8 oz). Body surface area is 2.07 meters squared.  Mild Pain (3) Comment: Data Unavailable   No LMP for male patient.  Allergies reviewed: Yes  Medications reviewed: Yes    Medications: Medication refills not needed today.  Pharmacy name entered into EPIC: Data Unavailable    Clinical concerns: No new concerns. Ina was notified.      Jaydon Moore LPN            "

## 2021-09-22 NOTE — LETTER
9/22/2021         RE: Antonio Sharpe  [address] [place] [address]        Dear Colleague,    Thank you for referring your patient, Antonio Sharpe, to the [facility]. Please see a copy of my visit note below.       [facility]  NEW PATIENT VISIT NOTE    PATIENT NAME: Antonio Sharpe  MRN # [id number]   DATE OF VISIT: September 21, 2021  YOB: 1965     Referring Provider: Dr. Aditya Swartz, Otolaryngology  PCP: None    CANCER TYPE: SCC L mandible/alveolar ridge, buccal mucosa  STAGE: [id number]  ECOG PS: 0    Cancer Staging  No matching staging information was found for the patient.     PD-L1: pending  NGS:     SUMMARY  4/28/20 Left marginal mandibulectomy, WLE of the left buccal mucosa, left posterior maxillectomy, left partial pharyngectomy, left selective neck dissection, right radial forearm free flap, mandibular plating, tracheostomy, and feeding tube placement (Dr. Triana at [facility]). Path: SCC, 5.7 cm moderately differentiated, DOI 1.5 cm, + PNI, no LVI, negative margins, 5/33 nodes positive, + MARIA ELENA  6/30~8/17/20 Chemoradiation 6600 cGy with HD cisplatin  8/11/21 CT neck. 3 x 2.8 x 3.6 cm mass at the reconstruction flap within the subcutaneous fat of the L cheek abutting masseter medially and skin laterally, extending to parotid, abutting carotid.   8/16/21 FNA L cheek. Path: Highly suspicious for SCC  9/8/21 FNA L cheek lesion (Dr. Swartz). Path: SCC    All prior care was at [facility] with Dr. Tarango, Dr. Christ Stewart (ENT), Dr. Kong Reece (Otolaryngology at [facility]), and Dr. Serna (Radiation Oncology)    ASSESSMENT AND PLAN   SCC oral cavity, recurrent, TPS/CPS pending: Reviewed course to date, rationale for no surgery or radiation at this time, starting with systemic therapy with palliative intent. Reviewed images with them. Dr. Amezquita reviewed the prior radiation; the recurrence on the cheek was in the prior radiation field, but the  infratemporal lesion was not, so radiation to that lesion could be possible in the future. Discussed carboplatin + 5FU + pembro vs pembro alone depending on the TPS/CPS, and reviewed the significance of PD-L1 expression in SCCHN. Reviewed the schedule and logistics of both regimens, need for a port for 5FU, etc. Reviewed potential side effects, which we will go into more detail once the plan is finalized. Would ideally like to start in the next week or two. Will schedule TTE for next week in case we need to include chemo. Will contact thoracic surgery for port, but he could also get a port through IR if needed.     Gastric and anal FDG uptake: No symptoms. EGD. Anal uptake likely hemorrhoid, will re-evaluate later.     Hearing loss: Chronic. Precludes cisplatin    Tumor erosion through the skin: Monitor for infection    Tobacco dependence, ETOH: Spent time discussing the critical importance of cessation, impact of ongoing tobacco and ETOH on carcinogenesis, treatment response and treatment tolerance, data showing that people who cannot or don't want to quit do worse than people who aren't smoking. Gave information for state's quit plan for when he's ready. Right now in the contemplative stage.     Covid vaccination: Not vaccinated, just wasn't particularly interested. Discussed that he falls into the category of one who is at high risk for serious illness should he get covid, and additionally, the vaccine will not work as well if he waits until he's on treatment to get the vaccine. He's going to think about it.     Review of the result(s) of each unique test - CMP, CBC pd, TSH  Independent interpretation of a test performed by another physician/other qualified health care professional (not separately reported) - PET/CT    100 minutes spent on the date of the encounter doing chart review, history and exam, documentation and further activities per the note     Delores Buckley MD   of  "Medicine  Hematology, Oncology and Transplantation      SUBJECTIVE  Mr. Sharpe is a 55 yo male who presents today to establish care for newly diagnosed, unresectable recurrent SCC oral cavity. He is accompanied by his wife. He's doing ok. Has really minimal symptoms from the cancer. No significant trismus although ROM is somewhat limited. No pain with eating. Feeling well otherwise. Breathing ok. Is quite active, has a physical job that requires him to be on his feet almost all day. Appetite ok. No significant weight loss. No other aches or pains. No numbness/tingling in the hands/feet. Has chronic hearing loss. No upset stomach, constipation, diarrhea, N/V. No bleeding. Urinating ok.     Didn't have too much trouble with chemoradiation except for radiation dermatitis and mucositis. Didn't think chemo caused any problems.     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 Treated at [facility] spine  Appy 1971  Hearing loss L ear after radiation s/p myringotomy  Chronic tinnitus secondary to cisplatin  Hemorrhoids    CURRENT OUTPATIENT MEDICATIONS  Current Outpatient Medications   Medication Sig Dispense Refill     acetaminophen (TYLENOL) 500 MG tablet Take 500 mg by mouth       ALLERGIES  No Known Allergies     SOCIAL HISTORY: Currently operates a gas station. Smokes, ~ 1 1/2 ~ 2 ppd. H/o regular ETOH, none recently    FAMILY HISTORY: No family history on file.     REVIEW OF SYSTEMS  As above in the HPI, o/w complete 12-point ROS was negative.    PHYSICAL EXAM  BP (!) 175/90 (BP Location: Right arm, Patient Position: Chair, Cuff Size: Adult Regular)   Pulse 77   Temp 97.6  F (36.4  C)   Resp 16   Ht 1.778 m (5' 10\")   Wt 86.5 kg (190 lb 12.8 oz)   SpO2 99%   BMI 27.38 kg/m    Wt Readings from Last 3 Encounters:   05/25/21 75 kg (165 lb 6.4 oz)   05/05/21 74.4 kg (164 lb)   05/05/21 74.8 kg " (164 lb 14.4 oz)     GEN: NAD  HEENT: EOMI, no icterus, injection or pallor. Oropharynx shows flap, cancer visible on the left. Bandage on his face over where tumor has eroded through the skin  LUNGS: clear bilaterally, distant  CV: regular, no murmurs, rubs, or gallops  ABDOMEN: soft, non-tender, non-distended  EXT: no edema  NEURO: alert  SKIN: no rashes    LABORATORY AND IMAGING STUDIES  Results for FAY LAWSON (MRN [id number]) as of 9/23/2021 12:36   9/22/2021 10:39   Sodium 133   Potassium 5.1   Chloride 98   Carbon Dioxide 30   Urea Nitrogen 27   Creatinine 0.95   GFR Estimate 88   Calcium 9.6   Anion Gap 5   Magnesium 2.2   Albumin 3.6   Protein Total 8.2   Bilirubin Total 0.3   Alkaline Phosphatase 68   ALT 41   AST 29   T4 Free 0.78   TSH 1.54   Glucose 105 (H)   WBC 11.5 (H)   Hemoglobin 15.3   Hematocrit 45.7   Platelet Count 256   RBC Count 4.72   MCV 97   MCH 32.4   MCHC 33.5   RDW 13.3   % Neutrophils 86   % Lymphocytes 6   % Monocytes 7   % Eosinophils 0   Absolute Basophils 0.1   % Basophils 0   Absolute Eosinophils 0.0   Absolute Immature Granulocytes 0.1 (H)   Absolute Lymphocytes 0.7 (L)   Absolute Monocytes 0.8   % Immature Granulocytes 1   Absolute Neutrophils 9.9 (H)   Absolute NRBCs 0.0   NRBCs per 100 WBC 0     Labs were independently reviewed and interpreted by me    MR Soft Tissue Neck w/o & w Contrast  Narrative: MRI OF THE NECK WITHOUT AND WITH CONTRAST September 1, 2021 3:12 PM     HISTORY: SCC left mandible. Squamous cell carcinoma of mandible (H).    TECHNIQUE: Multiplanar, multisequence MRI of the neck. 8 mL Gadavist.    COMPARISON: PET/CT 8/26/2021, Neck CT 8/26/2021.    FINDINGS: Multiple irregular enhancing masses are present within the  left  space. The largest is a superficial mass within the  subcutaneous tissues of the left cheek extending involving the  masseter muscle measuring up to 3.7 cm. A smaller mass (potentially in  continuity with the more superficial  mass) is present more superiorly  and medially along the inferomedial margin of the left zygomatic arch  involving the temporalis muscle.    Postoperative change of left neck dissection and soft  tissue/mandibular reconstruction. No definite abnormality involving  the anterior tongue. Slight asymmetric enhancement of the right  lateral oropharyngeal wall (series 11 image 17), presumably  inflammatory. The oral cavity, oropharynx, hypopharynx, larynx are  otherwise unremarkable. The right-sided salivary glands are  unremarkable. Left submandibular gland is absent. Left parotid gland  is partially involved by the superficial mass. There is engorgement of  the left pterygoid venous plexus.    Cervical spine degenerative change. Trace left greater than right  mastoid cavity opacification, likely inflammatory. Trace paranasal  sinus mucosal thickening.  Impression: IMPRESSION:  1. Multiple masses within the left  space consistent with  recurrent tumor.  2. No convincing cervical lymphadenopathy.    MARINA GARCIA MD         SYSTEM ID:  [id number]     PET CT fusion examination 8/26/2021 4:01 PM  1. Neck CT with contrast  2. PET study of the neck  3. PET CT fusion study of the neck     History: Squamous cell carcinoma of mandible (H).     Comparison: CT neck 8/11/2021.     Technique: Please refer to the accompanying whole body PET-CT for  report of the dose and whole body PET-CT findings.  Regarding the neck, axial images were obtained after nonionic  intravenous contrast administration, with sagittal and coronal  reconstructions performed. Neck CT images were reviewed in bone, soft  tissue, and lung windows, with review of the fused PET-CT images as  well in multiple planes.     Findings:  Postsurgical changes of the left mandible resection with free flap  reconstruction. Postradiation changes and neck dissection on the left  neck. Left submandibular and sublingual glands are surgically absent.     There is a  3.4 x 2.4 cm solid hypermetabolic mass (SUV max of 14.6) at  the site of a reconstruction flap within the subcutaneous fat of the  left cheek abutting the area of the masseter muscle?medially and  abutting the skin laterally (series 4 image 92). Additional 2.8 x 2.3  cm solid hypermetabolic mass in the left masseter/left infrazygomatic  region with SUV max of 12 (series 4 image 75) with thinning of the  adjacent bone. These are compatible with recurrent tumor.     There are foci of FDG uptake without corresponding abnormality on CT  images along the right ventral tongue or immediate anterior buccal  mucosa with SUV max of 6.3 (Series 4 image 110), and right  palatoglossal arch medial to the right mandibular neck with SUV max of  5.8 (Series 4 image 101), indeterminate.     New development of lucency about the mental portion of mandible with  increased FDG uptake in the soft tissues immediately anterior to this  lucency with heterogeneity of the soft tissues with max SUV of 8.3,  may represent early osteonecrosis versus osteomyelitis with  superimposed infection/inflammation of the adjacent mucosa (Series 4  image 122).     There are two adjacent mildly metabolic right level IIA nodes but  within normal limits by size criteria, largest measures 1 cm with SUV  max of 3.4 (Series 4 image 111) favored as reactive.     Evaluation of the mucosal space demonstrates no abnormality or  abnormal metabolic uptake on PET CT in the nasopharynx, oropharynx, or  hypopharynx. The tongue base appears normal. Unchanged 8 mm nodule  within the left thyroid without suspicious FDG uptake.     Advanced degenerative changes cervical spine with severe neural  foraminal narrowing on the right at C3-C4 largely related to advanced  facet arthropathy. Severe neural foraminal narrowing bilaterally at  C5-C6 and C6-C7 largely related to uncinate spurring with moderate  central stenosis     Small mucosal retention cysts in the left maxillary  sinus, otherwise,  the visualized paranasal sinuses and mastoid air cells are clear. The  major vascular structures in the neck are patent.     Punctate FDG uptake without CT correlate medial to the left mandibular  condyle. On noncorrected images, this finding was resolved, therefore  this is favored to represent an attenuation correction artifact.                                                                      Impression:   Primary: 4} Two recurrent hypermetabolic tumor at the site of a  reconstruction flap, the larger one within the subcutaneous fat of the  left cheek  and the smaller one medial to the left zygomatic arch with  erosion of the adjacent zygomatic bone.  Nodes: 2} Two adjacent prominent mildly metabolic right level IIA  nodes, indeterminate, favorably reactive in nature, recommend short  interval follow-up CT or PET.  Neck:   a) 2} There are 2 foci of abnormal FDG uptake to the right ventral  tongue/right ventral buccal mucosa, and right palatoglossal arch,  indeterminate, could represent radiation related mucositis versus  malignancy. Recommend direct visualization.     b) 2} new lucency in the mental portion of the mandible with  heterogeneity of the ventral adjacent soft tissue planes with FDG  uptake. Finding is indeterminant, most likely represents superimposed  radiation necrosis versus osteomyelitis with inflammation of the  adjacent soft tissues.     Please refer to the whole body PET CT performed as a separate report,  for the findings of the remainder of the body.     CECT Surveillance Legend:     Primary  1: No evidence of recurrence: routine surveillance  2: Low suspicion    a) Superficial abnormality (skin, mucosal surface): direct visual  inspection    b) Ill-defined deep abnormality: short interval follow-up* or PET  3: High suspicion (new or enlarging discrete nodule/mass): biopsy  4: Definitive recurrence (path proven or clinical progression): no  biopsy needed     Nodes  1: No  evidence of recurrence: routine surveillance  2: Low suspicion (ill-defined): short interval follow-up or PET  3: High suspicion (new or enlarging lymph node): biopsy if clinically  needed  4: Definitive recurrence (path proven or clinical progression): no  biopsy needed     *short interval follow-up: 3 months at our institution     I have personally reviewed the examination and initial interpretation  and I agree with the findings.     Combined Report of: PET and CT on  8/26/2021 4:01 PM :     1. PET of the neck, chest, abdomen, and pelvis.  2. PET CT Fusion for Attenuation Correction and Anatomical  Localization:    3. Diagnostic CT scan of the chest, abdomen, and pelvis with  intravenous contrast for interpretation.  3. CT of the chest, abdomen and pelvis obtained for diagnostic  interpretation.  4. 3D MIP and PET-CT fused images were processed on an independent  workstation and archived to PACS and reviewed by a radiologist.     Technique:     1. PET: The patient received 11.3 mCi of F-18-FDG; the serum glucose  was 111 prior to administration, body weight was 86.4 kg. Images were  evaluated in the axial, sagittal, and coronal planes as well as the  rotational whole body MIP. Images were acquired from the Vertex to the  Feet.     UPTAKE WAS MEASURED AT 68 MINUTES.      BACKGROUND:  Liver SUV max= 3.68,   Aorta Blood SUV Max: 2.62.      2. CT: Volumetric acquisition for clinical interpretation of the  chest, abdomen, and pelvis acquired at 3 mm sections . The chest,  abdomen, and pelvis were evaluated at 5 mm sections in bone, soft  tissue, and lung windows.       The patient received 116 cc of Isovue 370 intravenously for the  examination.       3. 3D MIP and PET-CT fused images were processed on an independent  workstation and archived to PACS and reviewed by a radiologist.     INDICATION: 55 year old male with SCCA of the left mandible; Squamous  cell carcinoma of mandible (H)     ADDITIONAL INFORMATION  OBTAINED FROM EMR: Squamous cell carcinoma of  mandibular alveolar ridge.     COMPARISON: PET outside 11/25/2020.     FINDINGS:      HEAD/NECK:  Please see separate dictation for the high resolution PET-CT of the  head and neck performed at the same time for discussion of findings.     CHEST:  There is no suspicious FDG uptake in the chest.      There are no pathologically enlarged mediastinal, hilar or axillary  lymph nodes. Mild paraseptal emphysema.  There are no suspicious lung nodules or evidence for infection.  Moderate atherosclerosis of the coronary arteries.     There is no significant pericardial or pleural effusions. Mild hiatal  hernia.     ABDOMEN AND PELVIS:  Focal wall thickening of the cardiac fundus with associated mild FDG  uptake (SUV max of 6.7) (Series 5 image 266).      Focal uptake to the anorectal region without corresponding soft tissue  lesion in the CT images with SUV max of 8 (series 5 image 433), likely  inflammatory/physiological in etiology but recommend direct  visualization to exclude malignancy.     There are no suspicious hepatic lesions. Scattered hypodense foci in  the liver, likely representing hepatic cysts. There is no splenomegaly  or evidence for splenic or pancreatic mass lesion.  There are no suspicious adrenal mass lesions or opaque gallbladder  calculi. Right greater than left pelvocaliectasis without dilatation  of the ureters. There is symmetric nephrographic renal phase.     There is no evidence for diverticulitis, bowel obstruction or free  fluid. Prostatomegaly. Atherosclerotic calcifications of the abdominal  aorta and major branches.     LOWER EXTREMITIES:   No abnormal masses or hypermetabolic lesions.     BONES:   There are no suspicious lytic or blastic osseous lesions.  There is no  abnormal FDG uptake in the skeleton.                                                                   IMPRESSION: In this patient with recurrent squamous cell carcinoma of  the  mandible;  1. No evidence of distant metastasis in the body.  2. Focal wall thickening of the cardiac fundus with associated mild  FDG uptake, may represent chronic gastritis, ulcer, primary gastric  malignancy versus less likely metastasis. Consider direct  visualization.  3. Focal uptake to the anorectal region without corresponding soft  tissue lesion in the CT images, likely inflammatory/physiological  though recommend direct visualization to exclude primary malignancy.  4. Right greater than left pelvocaliectasis, more prominent compared  to prior PET 11/25/2020. Consider Lasix Renogram if there is suspicion  of functional renal impairment.     I have personally reviewed the examination and initial interpretation  and I agree with the findings.     ELIOT MARTÍNEZ MD     PET/CT was personally reviewed and interpreted by me    Confirmed PD-L1 is in progress         Again, thank you for allowing me to participate in the care of your patient.        Sincerely,        Delores Buckley MD

## 2021-09-22 NOTE — PROGRESS NOTES
[facility] CANCER [facility]  NEW PATIENT VISIT NOTE    PATIENT NAME: Antonio Sharpe  MRN # [id number]   DATE OF VISIT: September 21, 2021  YOB: 1965     Referring Provider: Dr. Aditya Swartz, Otolaryngology  PCP: None    CANCER TYPE: SCC L mandible/alveolar ridge, buccal mucosa  STAGE: [id number]  ECOG PS: 0    Cancer Staging  No matching staging information was found for the patient.     PD-L1: pending  NGS:     SUMMARY  4/28/20 Left marginal mandibulectomy, WLE of the left buccal mucosa, left posterior maxillectomy, left partial pharyngectomy, left selective neck dissection, right radial forearm free flap, mandibular plating, tracheostomy, and feeding tube placement (Dr. Triana at [facility]). Path: SCC, 5.7 cm moderately differentiated, DOI 1.5 cm, + PNI, no LVI, negative margins, 5/33 nodes positive, + MARIA ELENA  6/30~8/17/20 Chemoradiation 6600 cGy with HD cisplatin  8/11/21 CT neck. 3 x 2.8 x 3.6 cm mass at the reconstruction flap within the subcutaneous fat of the L cheek abutting masseter medially and skin laterally, extending to parotid, abutting carotid.   8/16/21 FNA L cheek. Path: Highly suspicious for SCC  9/8/21 FNA L cheek lesion (Dr. Swartz). Path: SCC    All prior care was at [facility] with Dr. Tarango, Dr. Christ Stewart (ENT), Dr. Kong Reece (Otolaryngology at [facility]), and Dr. Serna (Radiation Oncology)    ASSESSMENT AND PLAN   SCC oral cavity, recurrent, TPS/CPS pending: Reviewed course to date, rationale for no surgery or radiation at this time, starting with systemic therapy with palliative intent. Reviewed images with them. Dr. Amezuqita reviewed the prior radiation; the recurrence on the cheek was in the prior radiation field, but the infratemporal lesion was not, so radiation to that lesion could be possible in the future. Discussed carboplatin + 5FU + pembro vs pembro alone depending on the TPS/CPS, and reviewed the significance of PD-L1 expression in SCCHN. Reviewed the schedule and  logistics of both regimens, need for a port for 5FU, etc. Reviewed potential side effects, which we will go into more detail once the plan is finalized. Would ideally like to start in the next week or two. Will schedule TTE for next week in case we need to include chemo. Will contact thoracic surgery for port, but he could also get a port through IR if needed.     Gastric and anal FDG uptake: No symptoms. EGD. Anal uptake likely hemorrhoid, will re-evaluate later.     Hearing loss: Chronic. Precludes cisplatin    Tumor erosion through the skin: Monitor for infection    Tobacco dependence, ETOH: Spent time discussing the critical importance of cessation, impact of ongoing tobacco and ETOH on carcinogenesis, treatment response and treatment tolerance, data showing that people who cannot or don't want to quit do worse than people who aren't smoking. Gave information for state's quit plan for when he's ready. Right now in the contemplative stage.     Covid vaccination: Not vaccinated, just wasn't particularly interested. Discussed that he falls into the category of one who is at high risk for serious illness should he get covid, and additionally, the vaccine will not work as well if he waits until he's on treatment to get the vaccine. He's going to think about it.     Review of the result(s) of each unique test - CMP, CBC pd, TSH  Independent interpretation of a test performed by another physician/other qualified health care professional (not separately reported) - PET/CT    100 minutes spent on the date of the encounter doing chart review, history and exam, documentation and further activities per the note     Delores Buckley MD  Associate Professor of Medicine  Hematology, Oncology and Transplantation      SUBJECTIVE  Mr. Sharpe is a 55 yo male who presents today to establish care for newly diagnosed, unresectable recurrent SCC oral cavity. He is accompanied by his wife. He's doing ok. Has really minimal symptoms from  "the cancer. No significant trismus although ROM is somewhat limited. No pain with eating. Feeling well otherwise. Breathing ok. Is quite active, has a physical job that requires him to be on his feet almost all day. Appetite ok. No significant weight loss. No other aches or pains. No numbness/tingling in the hands/feet. Has chronic hearing loss. No upset stomach, constipation, diarrhea, N/V. No bleeding. Urinating ok.     Didn't have too much trouble with chemoradiation except for radiation dermatitis and mucositis. Didn't think chemo caused any problems.     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 Treated at [facility] spine  Appy 1971  Hearing loss L ear after radiation s/p myringotomy  Chronic tinnitus secondary to cisplatin  Hemorrhoids    CURRENT OUTPATIENT MEDICATIONS  Current Outpatient Medications   Medication Sig Dispense Refill     acetaminophen (TYLENOL) 500 MG tablet Take 500 mg by mouth       ALLERGIES  No Known Allergies     SOCIAL HISTORY: Currently operates a gas station. Smokes, ~ 1 1/2 ~ 2 ppd. H/o regular ETOH, none recently    FAMILY HISTORY: No family history on file.     REVIEW OF SYSTEMS  As above in the HPI, o/w complete 12-point ROS was negative.    PHYSICAL EXAM  BP (!) 175/90 (BP Location: Right arm, Patient Position: Chair, Cuff Size: Adult Regular)   Pulse 77   Temp 97.6  F (36.4  C)   Resp 16   Ht 1.778 m (5' 10\")   Wt 86.5 kg (190 lb 12.8 oz)   SpO2 99%   BMI 27.38 kg/m    Wt Readings from Last 3 Encounters:   05/25/21 75 kg (165 lb 6.4 oz)   05/05/21 74.4 kg (164 lb)   05/05/21 74.8 kg (164 lb 14.4 oz)     GEN: NAD  HEENT: EOMI, no icterus, injection or pallor. Oropharynx shows flap, cancer visible on the left. Bandage on his face over where tumor has eroded through the skin  LUNGS: clear bilaterally, distant  CV: regular, no murmurs, rubs, or " gallops  ABDOMEN: soft, non-tender, non-distended  EXT: no edema  NEURO: alert  SKIN: no rashes    LABORATORY AND IMAGING STUDIES  Results for FAY LAWSON (MRN [id number]) as of 9/23/2021 12:36   9/22/2021 10:39   Sodium 133   Potassium 5.1   Chloride 98   Carbon Dioxide 30   Urea Nitrogen 27   Creatinine 0.95   GFR Estimate 88   Calcium 9.6   Anion Gap 5   Magnesium 2.2   Albumin 3.6   Protein Total 8.2   Bilirubin Total 0.3   Alkaline Phosphatase 68   ALT 41   AST 29   T4 Free 0.78   TSH 1.54   Glucose 105 (H)   WBC 11.5 (H)   Hemoglobin 15.3   Hematocrit 45.7   Platelet Count 256   RBC Count 4.72   MCV 97   MCH 32.4   MCHC 33.5   RDW 13.3   % Neutrophils 86   % Lymphocytes 6   % Monocytes 7   % Eosinophils 0   Absolute Basophils 0.1   % Basophils 0   Absolute Eosinophils 0.0   Absolute Immature Granulocytes 0.1 (H)   Absolute Lymphocytes 0.7 (L)   Absolute Monocytes 0.8   % Immature Granulocytes 1   Absolute Neutrophils 9.9 (H)   Absolute NRBCs 0.0   NRBCs per 100 WBC 0     Labs were independently reviewed and interpreted by me    MR Soft Tissue Neck w/o & w Contrast  Narrative: MRI OF THE NECK WITHOUT AND WITH CONTRAST September 1, 2021 3:12 PM     HISTORY: SCC left mandible. Squamous cell carcinoma of mandible (H).    TECHNIQUE: Multiplanar, multisequence MRI of the neck. 8 mL Gadavist.    COMPARISON: PET/CT 8/26/2021, Neck CT 8/26/2021.    FINDINGS: Multiple irregular enhancing masses are present within the  left  space. The largest is a superficial mass within the  subcutaneous tissues of the left cheek extending involving the  masseter muscle measuring up to 3.7 cm. A smaller mass (potentially in  continuity with the more superficial mass) is present more superiorly  and medially along the inferomedial margin of the left zygomatic arch  involving the temporalis muscle.    Postoperative change of left neck dissection and soft  tissue/mandibular reconstruction. No definite abnormality  involving  the anterior tongue. Slight asymmetric enhancement of the right  lateral oropharyngeal wall (series 11 image 17), presumably  inflammatory. The oral cavity, oropharynx, hypopharynx, larynx are  otherwise unremarkable. The right-sided salivary glands are  unremarkable. Left submandibular gland is absent. Left parotid gland  is partially involved by the superficial mass. There is engorgement of  the left pterygoid venous plexus.    Cervical spine degenerative change. Trace left greater than right  mastoid cavity opacification, likely inflammatory. Trace paranasal  sinus mucosal thickening.  Impression: IMPRESSION:  1. Multiple masses within the left  space consistent with  recurrent tumor.  2. No convincing cervical lymphadenopathy.    MARINA GARCIA MD         SYSTEM ID:  [id number]     PET CT fusion examination 8/26/2021 4:01 PM  1. Neck CT with contrast  2. PET study of the neck  3. PET CT fusion study of the neck     History: Squamous cell carcinoma of mandible (H).     Comparison: CT neck 8/11/2021.     Technique: Please refer to the accompanying whole body PET-CT for  report of the dose and whole body PET-CT findings.  Regarding the neck, axial images were obtained after nonionic  intravenous contrast administration, with sagittal and coronal  reconstructions performed. Neck CT images were reviewed in bone, soft  tissue, and lung windows, with review of the fused PET-CT images as  well in multiple planes.     Findings:  Postsurgical changes of the left mandible resection with free flap  reconstruction. Postradiation changes and neck dissection on the left  neck. Left submandibular and sublingual glands are surgically absent.     There is a 3.4 x 2.4 cm solid hypermetabolic mass (SUV max of 14.6) at  the site of a reconstruction flap within the subcutaneous fat of the  left cheek abutting the area of the masseter muscle?medially and  abutting the skin laterally (series 4 image 92). Additional  2.8 x 2.3  cm solid hypermetabolic mass in the left masseter/left infrazygomatic  region with SUV max of 12 (series 4 image 75) with thinning of the  adjacent bone. These are compatible with recurrent tumor.     There are foci of FDG uptake without corresponding abnormality on CT  images along the right ventral tongue or immediate anterior buccal  mucosa with SUV max of 6.3 (Series 4 image 110), and right  palatoglossal arch medial to the right mandibular neck with SUV max of  5.8 (Series 4 image 101), indeterminate.     New development of lucency about the mental portion of mandible with  increased FDG uptake in the soft tissues immediately anterior to this  lucency with heterogeneity of the soft tissues with max SUV of 8.3,  may represent early osteonecrosis versus osteomyelitis with  superimposed infection/inflammation of the adjacent mucosa (Series 4  image 122).     There are two adjacent mildly metabolic right level IIA nodes but  within normal limits by size criteria, largest measures 1 cm with SUV  max of 3.4 (Series 4 image 111) favored as reactive.     Evaluation of the mucosal space demonstrates no abnormality or  abnormal metabolic uptake on PET CT in the nasopharynx, oropharynx, or  hypopharynx. The tongue base appears normal. Unchanged 8 mm nodule  within the left thyroid without suspicious FDG uptake.     Advanced degenerative changes cervical spine with severe neural  foraminal narrowing on the right at C3-C4 largely related to advanced  facet arthropathy. Severe neural foraminal narrowing bilaterally at  C5-C6 and C6-C7 largely related to uncinate spurring with moderate  central stenosis     Small mucosal retention cysts in the left maxillary sinus, otherwise,  the visualized paranasal sinuses and mastoid air cells are clear. The  major vascular structures in the neck are patent.     Punctate FDG uptake without CT correlate medial to the left mandibular  condyle. On noncorrected images, this finding  was resolved, therefore  this is favored to represent an attenuation correction artifact.                                                                      Impression:   Primary: 4} Two recurrent hypermetabolic tumor at the site of a  reconstruction flap, the larger one within the subcutaneous fat of the  left cheek  and the smaller one medial to the left zygomatic arch with  erosion of the adjacent zygomatic bone.  Nodes: 2} Two adjacent prominent mildly metabolic right level IIA  nodes, indeterminate, favorably reactive in nature, recommend short  interval follow-up CT or PET.  Neck:   a) 2} There are 2 foci of abnormal FDG uptake to the right ventral  tongue/right ventral buccal mucosa, and right palatoglossal arch,  indeterminate, could represent radiation related mucositis versus  malignancy. Recommend direct visualization.     b) 2} new lucency in the mental portion of the mandible with  heterogeneity of the ventral adjacent soft tissue planes with FDG  uptake. Finding is indeterminant, most likely represents superimposed  radiation necrosis versus osteomyelitis with inflammation of the  adjacent soft tissues.     Please refer to the whole body PET CT performed as a separate report,  for the findings of the remainder of the body.     CECT Surveillance Legend:     Primary  1: No evidence of recurrence: routine surveillance  2: Low suspicion    a) Superficial abnormality (skin, mucosal surface): direct visual  inspection    b) Ill-defined deep abnormality: short interval follow-up* or PET  3: High suspicion (new or enlarging discrete nodule/mass): biopsy  4: Definitive recurrence (path proven or clinical progression): no  biopsy needed     Nodes  1: No evidence of recurrence: routine surveillance  2: Low suspicion (ill-defined): short interval follow-up or PET  3: High suspicion (new or enlarging lymph node): biopsy if clinically  needed  4: Definitive recurrence (path proven or clinical progression):  no  biopsy needed     *short interval follow-up: 3 months at our institution     I have personally reviewed the examination and initial interpretation  and I agree with the findings.     Combined Report of: PET and CT on  8/26/2021 4:01 PM :     1. PET of the neck, chest, abdomen, and pelvis.  2. PET CT Fusion for Attenuation Correction and Anatomical  Localization:    3. Diagnostic CT scan of the chest, abdomen, and pelvis with  intravenous contrast for interpretation.  3. CT of the chest, abdomen and pelvis obtained for diagnostic  interpretation.  4. 3D MIP and PET-CT fused images were processed on an independent  workstation and archived to PACS and reviewed by a radiologist.     Technique:     1. PET: The patient received 11.3 mCi of F-18-FDG; the serum glucose  was 111 prior to administration, body weight was 86.4 kg. Images were  evaluated in the axial, sagittal, and coronal planes as well as the  rotational whole body MIP. Images were acquired from the Vertex to the  Feet.     UPTAKE WAS MEASURED AT 68 MINUTES.      BACKGROUND:  Liver SUV max= 3.68,   Aorta Blood SUV Max: 2.62.      2. CT: Volumetric acquisition for clinical interpretation of the  chest, abdomen, and pelvis acquired at 3 mm sections . The chest,  abdomen, and pelvis were evaluated at 5 mm sections in bone, soft  tissue, and lung windows.       The patient received 116 cc of Isovue 370 intravenously for the  examination.       3. 3D MIP and PET-CT fused images were processed on an independent  workstation and archived to PACS and reviewed by a radiologist.     INDICATION: 55 year old male with SCCA of the left mandible; Squamous  cell carcinoma of mandible (H)     ADDITIONAL INFORMATION OBTAINED FROM EMR: Squamous cell carcinoma of  mandibular alveolar ridge.     COMPARISON: PET outside 11/25/2020.     FINDINGS:      HEAD/NECK:  Please see separate dictation for the high resolution PET-CT of the  head and neck performed at the same time for  discussion of findings.     CHEST:  There is no suspicious FDG uptake in the chest.      There are no pathologically enlarged mediastinal, hilar or axillary  lymph nodes. Mild paraseptal emphysema.  There are no suspicious lung nodules or evidence for infection.  Moderate atherosclerosis of the coronary arteries.     There is no significant pericardial or pleural effusions. Mild hiatal  hernia.     ABDOMEN AND PELVIS:  Focal wall thickening of the cardiac fundus with associated mild FDG  uptake (SUV max of 6.7) (Series 5 image 266).      Focal uptake to the anorectal region without corresponding soft tissue  lesion in the CT images with SUV max of 8 (series 5 image 433), likely  inflammatory/physiological in etiology but recommend direct  visualization to exclude malignancy.     There are no suspicious hepatic lesions. Scattered hypodense foci in  the liver, likely representing hepatic cysts. There is no splenomegaly  or evidence for splenic or pancreatic mass lesion.  There are no suspicious adrenal mass lesions or opaque gallbladder  calculi. Right greater than left pelvocaliectasis without dilatation  of the ureters. There is symmetric nephrographic renal phase.     There is no evidence for diverticulitis, bowel obstruction or free  fluid. Prostatomegaly. Atherosclerotic calcifications of the abdominal  aorta and major branches.     LOWER EXTREMITIES:   No abnormal masses or hypermetabolic lesions.     BONES:   There are no suspicious lytic or blastic osseous lesions.  There is no  abnormal FDG uptake in the skeleton.                                                                   IMPRESSION: In this patient with recurrent squamous cell carcinoma of  the mandible;  1. No evidence of distant metastasis in the body.  2. Focal wall thickening of the cardiac fundus with associated mild  FDG uptake, may represent chronic gastritis, ulcer, primary gastric  malignancy versus less likely metastasis. Consider  direct  visualization.  3. Focal uptake to the anorectal region without corresponding soft  tissue lesion in the CT images, likely inflammatory/physiological  though recommend direct visualization to exclude primary malignancy.  4. Right greater than left pelvocaliectasis, more prominent compared  to prior PET 11/25/2020. Consider Lasix Renogram if there is suspicion  of functional renal impairment.     I have personally reviewed the examination and initial interpretation  and I agree with the findings.     ELIOT MARTÍNEZ MD     PET/CT was personally reviewed and interpreted by me    Confirmed PD-L1 is in progress

## 2021-09-23 PROBLEM — D70.1 CHEMOTHERAPY-INDUCED NEUTROPENIA (H): Status: ACTIVE | Noted: 2021-09-23

## 2021-09-23 PROBLEM — Z13.29 SCREENING FOR HYPOTHYROIDISM: Status: ACTIVE | Noted: 2021-09-23

## 2021-09-23 PROBLEM — T45.1X5A CHEMOTHERAPY-INDUCED NEUTROPENIA (H): Status: ACTIVE | Noted: 2021-09-23

## 2021-09-27 DIAGNOSIS — C41.1 SQUAMOUS CELL CARCINOMA OF MANDIBLE (H): Primary | ICD-10-CM

## 2021-09-27 DIAGNOSIS — Z13.29 SCREENING FOR HYPOTHYROIDISM: ICD-10-CM

## 2021-09-27 DIAGNOSIS — D70.1 CHEMOTHERAPY-INDUCED NEUTROPENIA (H): ICD-10-CM

## 2021-09-27 DIAGNOSIS — T45.1X5A CHEMOTHERAPY-INDUCED NEUTROPENIA (H): ICD-10-CM

## 2021-09-27 LAB
PATH REPORT.ADDENDUM SPEC: ABNORMAL
PATH REPORT.COMMENTS IMP SPEC: ABNORMAL
PATH REPORT.COMMENTS IMP SPEC: YES
PATH REPORT.COMMENTS IMP SPEC: YES
PATH REPORT.FINAL DX SPEC: ABNORMAL
PATH REPORT.GROSS SPEC: ABNORMAL
PATH REPORT.MICROSCOPIC SPEC OTHER STN: ABNORMAL
PATH REPORT.RELEVANT HX SPEC: ABNORMAL

## 2021-09-27 PROCEDURE — 88360 TUMOR IMMUNOHISTOCHEM/MANUAL: CPT | Mod: 26 | Performed by: PATHOLOGY

## 2021-09-27 PROCEDURE — 88360 TUMOR IMMUNOHISTOCHEM/MANUAL: CPT | Mod: TC | Performed by: STUDENT IN AN ORGANIZED HEALTH CARE EDUCATION/TRAINING PROGRAM

## 2021-09-27 RX ORDER — LORAZEPAM 2 MG/ML
0.5 INJECTION INTRAMUSCULAR EVERY 4 HOURS PRN
Status: CANCELLED | OUTPATIENT
Start: 2021-10-05

## 2021-09-27 RX ORDER — DIPHENHYDRAMINE HYDROCHLORIDE 50 MG/ML
50 INJECTION INTRAMUSCULAR; INTRAVENOUS
Status: CANCELLED
Start: 2021-10-05

## 2021-09-27 RX ORDER — MEPERIDINE HYDROCHLORIDE 25 MG/ML
25 INJECTION INTRAMUSCULAR; INTRAVENOUS; SUBCUTANEOUS EVERY 30 MIN PRN
Status: CANCELLED | OUTPATIENT
Start: 2021-10-05

## 2021-09-27 RX ORDER — ALBUTEROL SULFATE 90 UG/1
1-2 AEROSOL, METERED RESPIRATORY (INHALATION)
Status: CANCELLED
Start: 2021-10-05

## 2021-09-27 RX ORDER — PALONOSETRON 0.05 MG/ML
0.25 INJECTION, SOLUTION INTRAVENOUS ONCE
Status: CANCELLED
Start: 2021-10-05

## 2021-09-27 RX ORDER — ALBUTEROL SULFATE 0.83 MG/ML
2.5 SOLUTION RESPIRATORY (INHALATION)
Status: CANCELLED | OUTPATIENT
Start: 2021-10-05

## 2021-09-27 RX ORDER — HEPARIN SODIUM,PORCINE 10 UNIT/ML
5 VIAL (ML) INTRAVENOUS
Status: CANCELLED | OUTPATIENT
Start: 2021-10-09

## 2021-09-27 RX ORDER — NALOXONE HYDROCHLORIDE 0.4 MG/ML
0.2 INJECTION, SOLUTION INTRAMUSCULAR; INTRAVENOUS; SUBCUTANEOUS
Status: CANCELLED | OUTPATIENT
Start: 2021-10-05

## 2021-09-27 RX ORDER — HEPARIN SODIUM (PORCINE) LOCK FLUSH IV SOLN 100 UNIT/ML 100 UNIT/ML
5 SOLUTION INTRAVENOUS
Status: CANCELLED | OUTPATIENT
Start: 2021-10-09

## 2021-09-27 RX ORDER — HEPARIN SODIUM,PORCINE 10 UNIT/ML
5 VIAL (ML) INTRAVENOUS
Status: CANCELLED | OUTPATIENT
Start: 2021-10-05

## 2021-09-27 RX ORDER — HEPARIN SODIUM (PORCINE) LOCK FLUSH IV SOLN 100 UNIT/ML 100 UNIT/ML
5 SOLUTION INTRAVENOUS
Status: CANCELLED | OUTPATIENT
Start: 2021-10-05

## 2021-09-27 RX ORDER — METHYLPREDNISOLONE SODIUM SUCCINATE 125 MG/2ML
125 INJECTION, POWDER, LYOPHILIZED, FOR SOLUTION INTRAMUSCULAR; INTRAVENOUS
Status: CANCELLED
Start: 2021-10-05

## 2021-09-27 RX ORDER — EPINEPHRINE 1 MG/ML
0.3 INJECTION, SOLUTION INTRAMUSCULAR; SUBCUTANEOUS EVERY 5 MIN PRN
Status: CANCELLED | OUTPATIENT
Start: 2021-10-05

## 2021-09-28 ENCOUNTER — HOSPITAL ENCOUNTER (OUTPATIENT)
Facility: AMBULATORY SURGERY CENTER | Age: 56
End: 2021-09-28
Attending: PHYSICIAN ASSISTANT
Payer: COMMERCIAL

## 2021-09-28 ENCOUNTER — LAB (OUTPATIENT)
Dept: LAB | Facility: CLINIC | Age: 56
End: 2021-09-28
Attending: INTERNAL MEDICINE
Payer: COMMERCIAL

## 2021-09-28 DIAGNOSIS — C41.1 SQUAMOUS CELL CARCINOMA OF MANDIBLE (H): Primary | ICD-10-CM

## 2021-09-28 DIAGNOSIS — C41.1 SQUAMOUS CELL CARCINOMA OF MANDIBLE (H): ICD-10-CM

## 2021-09-28 PROCEDURE — U0003 INFECTIOUS AGENT DETECTION BY NUCLEIC ACID (DNA OR RNA); SEVERE ACUTE RESPIRATORY SYNDROME CORONAVIRUS 2 (SARS-COV-2) (CORONAVIRUS DISEASE [COVID-19]), AMPLIFIED PROBE TECHNIQUE, MAKING USE OF HIGH THROUGHPUT TECHNOLOGIES AS DESCRIBED BY CMS-2020-01-R: HCPCS

## 2021-09-28 PROCEDURE — U0005 INFEC AGEN DETEC AMPLI PROBE: HCPCS

## 2021-09-29 ENCOUNTER — ANESTHESIA EVENT (OUTPATIENT)
Dept: SURGERY | Facility: AMBULATORY SURGERY CENTER | Age: 56
End: 2021-09-29

## 2021-09-29 ENCOUNTER — PATIENT OUTREACH (OUTPATIENT)
Dept: ONCOLOGY | Facility: CLINIC | Age: 56
End: 2021-09-29

## 2021-09-29 LAB — SARS-COV-2 RNA RESP QL NAA+PROBE: NEGATIVE

## 2021-09-29 RX ORDER — LIDOCAINE 40 MG/G
CREAM TOPICAL
Status: CANCELLED | OUTPATIENT
Start: 2021-09-29

## 2021-09-29 RX ORDER — CEFAZOLIN SODIUM 2 G/50ML
2 SOLUTION INTRAVENOUS
Status: CANCELLED | OUTPATIENT
Start: 2021-09-29

## 2021-09-29 NOTE — PROGRESS NOTES
Patient was initially sent to the [facility] by Christ Stewart M.D. ENT at [facility].  No formal referral was ever done through his insurance company.  As of this writing, patient does not have insurance coverage at the [facility].  We did try to secure coverage by working through financial services (Triston White).  Triston called the insurance company and was advised that [facility] needs to send a referral and prior authorization to [facility].  This will then be reviewed and a determination made regarding whether or not patient can be seen at the [facility].  Note:  We cannot do anything to secure coverage, it must be done by the referring provider's office.  Patient is aware of issue and is very angry at [facility] for sending him here without doing any referral work.  At this time, patient declines to pay out of pocket and this RN has recommended that he see an oncologist within his insurance network asap.  All appointments have been canceled.  Patient thanks us for our effort on his behalf.  This note will be updated if anything changes.      Anne SOTOA, MSN, RN, ONC  RN Care Coordinator  [facility]

## 2021-09-30 ENCOUNTER — TELEPHONE (OUTPATIENT)
Dept: GASTROENTEROLOGY | Facility: CLINIC | Age: 56
End: 2021-09-30

## 2021-09-30 ENCOUNTER — ANESTHESIA (OUTPATIENT)
Dept: SURGERY | Facility: AMBULATORY SURGERY CENTER | Age: 56
End: 2021-09-30

## 2021-09-30 NOTE — ANESTHESIA PREPROCEDURE EVALUATION
Anesthesia Pre-Procedure Evaluation    Patient: Antonio Sharpe   MRN: [id number] : 1965        Preoperative Diagnosis: Malignant neoplasm of mandible (H) [C41.1]   Procedure : Procedure(s):  INSERTION, VASCULAR ACCESS PORT     No past medical history on file.   No past surgical history on file.   No Known Allergies   Social History     Tobacco Use     Smoking status: Not on file   Substance Use Topics     Alcohol use: Not on file      Wt Readings from Last 1 Encounters:   21 86.5 kg (190 lb 12.8 oz)        Anesthesia Evaluation            ROS/MED HX  ENT/Pulmonary:       Neurologic:       Cardiovascular:       METS/Exercise Tolerance:     Hematologic:       Musculoskeletal:       GI/Hepatic:       Renal/Genitourinary:       Endo:       Psychiatric/Substance Use:       Infectious Disease:       Malignancy:   (+) Malignancy,     Other:               OUTSIDE LABS:  CBC:   Lab Results   Component Value Date    WBC 11.5 (H) 2021    HGB 15.3 2021    HCT 45.7 2021     2021     BMP:   Lab Results   Component Value Date     2021    POTASSIUM 5.1 2021    CHLORIDE 98 2021    CO2 30 2021    BUN 27 2021    CR 0.95 2021     (H) 2021     COAGS: No results found for: PTT, INR, FIBR  POC: No results found for: BGM, HCG, HCGS  HEPATIC:   Lab Results   Component Value Date    ALBUMIN 3.6 2021    PROTTOTAL 8.2 2021    ALT 41 2021    AST 29 2021    ALKPHOS 68 2021    BILITOTAL 0.3 2021     OTHER:   Lab Results   Component Value Date    PRINCE 9.6 2021    MAG 2.2 2021    TSH 1.54 2021    T4 0.78 2021       Anesthesia Plan    ASA Status:  2   NPO Status:  NPO Appropriate    Anesthesia Type: MAC.              Consents    Anesthesia Plan(s) and associated risks, benefits, and realistic alternatives discussed. Questions answered and patient/representative(s) expressed understanding.      - Discussed with:  Patient         Postoperative Care            Comments:                Gil Mcghee MD

## 2021-10-01 DIAGNOSIS — E83.42 HYPOMAGNESEMIA: ICD-10-CM

## 2021-10-01 DIAGNOSIS — T45.1X5A CHEMOTHERAPY-INDUCED NEUTROPENIA (H): ICD-10-CM

## 2021-10-01 DIAGNOSIS — C41.1 SQUAMOUS CELL CARCINOMA OF MANDIBLE (H): Primary | ICD-10-CM

## 2021-10-01 DIAGNOSIS — D70.1 CHEMOTHERAPY-INDUCED NEUTROPENIA (H): ICD-10-CM

## 2021-10-01 DIAGNOSIS — Z11.59 ENCOUNTER FOR SCREENING FOR OTHER VIRAL DISEASES: ICD-10-CM

## 2021-10-01 DIAGNOSIS — Z13.29 SCREENING FOR HYPOTHYROIDISM: ICD-10-CM

## 2021-10-04 ENCOUNTER — ANESTHESIA EVENT (OUTPATIENT)
Dept: SURGERY | Facility: CLINIC | Age: 56
End: 2021-10-04

## 2021-10-04 ENCOUNTER — HOSPITAL ENCOUNTER (OUTPATIENT)
Facility: AMBULATORY SURGERY CENTER | Age: 56
End: 2021-10-04
Attending: PHYSICIAN ASSISTANT
Payer: COMMERCIAL

## 2021-10-04 RX ORDER — SODIUM CHLORIDE, SODIUM LACTATE, POTASSIUM CHLORIDE, CALCIUM CHLORIDE 600; 310; 30; 20 MG/100ML; MG/100ML; MG/100ML; MG/100ML
INJECTION, SOLUTION INTRAVENOUS CONTINUOUS
Status: CANCELLED | OUTPATIENT
Start: 2021-10-04

## 2021-10-04 RX ORDER — ONDANSETRON 4 MG/1
4 TABLET, ORALLY DISINTEGRATING ORAL EVERY 30 MIN PRN
Status: CANCELLED | OUTPATIENT
Start: 2021-10-04

## 2021-10-04 RX ORDER — OXYCODONE HYDROCHLORIDE 5 MG/1
5 TABLET ORAL EVERY 4 HOURS PRN
Status: CANCELLED | OUTPATIENT
Start: 2021-10-04

## 2021-10-04 RX ORDER — FENTANYL CITRATE 50 UG/ML
25 INJECTION, SOLUTION INTRAMUSCULAR; INTRAVENOUS EVERY 5 MIN PRN
Status: CANCELLED | OUTPATIENT
Start: 2021-10-04

## 2021-10-04 RX ORDER — ONDANSETRON 2 MG/ML
4 INJECTION INTRAMUSCULAR; INTRAVENOUS EVERY 30 MIN PRN
Status: CANCELLED | OUTPATIENT
Start: 2021-10-04

## 2021-10-04 RX ORDER — HYDROMORPHONE HYDROCHLORIDE 1 MG/ML
0.2 INJECTION, SOLUTION INTRAMUSCULAR; INTRAVENOUS; SUBCUTANEOUS EVERY 5 MIN PRN
Status: CANCELLED | OUTPATIENT
Start: 2021-10-04

## 2021-10-04 RX ORDER — FENTANYL CITRATE 50 UG/ML
25 INJECTION, SOLUTION INTRAMUSCULAR; INTRAVENOUS
Status: CANCELLED | OUTPATIENT
Start: 2021-10-04

## 2021-10-04 RX ORDER — MEPERIDINE HYDROCHLORIDE 25 MG/ML
12.5 INJECTION INTRAMUSCULAR; INTRAVENOUS; SUBCUTANEOUS
Status: CANCELLED | OUTPATIENT
Start: 2021-10-04

## 2021-10-06 ENCOUNTER — ANESTHESIA (OUTPATIENT)
Dept: SURGERY | Facility: CLINIC | Age: 56
End: 2021-10-06

## 2021-10-06 LAB — BKR LAB AP ADDL TEST(S) ADDED: NORMAL

## 2021-10-07 ENCOUNTER — TELEPHONE (OUTPATIENT)
Dept: GASTROENTEROLOGY | Facility: CLINIC | Age: 56
End: 2021-10-07

## 2021-10-07 NOTE — TELEPHONE ENCOUNTER
"2nd attempt.   Caller: Left message for Antonio Sharpe    Procedure: EGD    Date of Procedure Cancelled:     Ordering Provider:Delores Buckley MD    Reason for cancel:     Any Staff messages sent regarding case?: YES                  Recipient and Sender: lefty dwyer & Valorie Burgos   *                  Message Details: \"Can you schedule this pt for an EGD?  There is an order entered from Dr. Buckley- let me know if you need anything else.   Thank you, Valorie--   \"I spoke with Antonio today and he is working with his insurance to see which location is going to be covered. He requested that we call back in 1 week. I will follow up with him to see what he decides. \"--Diana Hyatt          Rescheduled: Not at the time of the call     If rescheduled:    Date:    Location:    Note any change or update to original order/sedation:                    "

## 2022-01-28 ENCOUNTER — PATIENT OUTREACH (OUTPATIENT)
Dept: OTOLARYNGOLOGY | Facility: CLINIC | Age: 57
End: 2022-01-28
Payer: COMMERCIAL

## 2022-01-28 NOTE — PROGRESS NOTES
Received a call from patient's cancer clinic requesting follow-up with Dr. Swartz to discuss possible resectability after a few cycles of treatment.     Writer call and left message for patient indicating that Dr. Swartz can see him on 2/7 at 3:00pm. Left direct line for return call to confirm appointment.     Message sent to records team to gather new records from cancer clinic and request updated scans.     Natalya Crowder RN, BSN

## 2022-01-31 ENCOUNTER — TELEPHONE (OUTPATIENT)
Dept: OTOLARYNGOLOGY | Facility: CLINIC | Age: 57
End: 2022-01-31
Payer: COMMERCIAL

## 2022-01-31 NOTE — TELEPHONE ENCOUNTER
Records Requested  01/31/22    [facility] [phone]   Outcome Imaging reports in care everywhere, sent a req for images:  12/29/21 CT Neck   12/29/21 CT Chest Abd Pelvis   12/29/21 MR Neck   11/2/21 CT Neck     9:34AM images received in PACS - [facility]

## 2022-02-03 NOTE — PROGRESS NOTES
Head & Neck Tumor Conference Note        Status: New  Staff: Dr. Swartz     Tumor Site: Left mandible/alveolar ridge/buccal mucosa (oral cavity)  Tumor Pathology: [id number] SCC  Tumor Stage: [id number]  Tumor Treatment:   (1) Left marginal mandibulectomy, WLE of the left buccal mucosa, left posterior maxillectomy, left partial pharyngectomy, left selective neck dissection, right radial forearm free flap, mandibular plating, tracheostomy, and feeding tube placement (4/28/2020)  (2) Adjuvant chemoradiation (6600 cGy of radiation to the left oral cavity and neck, completed 8/17/2020 with 3-doses of high-dose cisplatin)  (3) Palliative chemotherapy 10/21/21 - Present    - Cycle 1: carboplatin, keytruda and 5 Follow-up    - Cycle 2: No carbo or 5 Follow-up due to severe mucositis    - Cycle 3: 5-Follow-up at 50% dose, normal doses of carboplatin and keytruda    - Cycle 5: 5-Follow-up discontinued. Continued with keytruda and carboplatin     Reason for Review: Review imaging, path, and POC.     Brief History: Antonio Sharpe is a 55-year-old man with history of a pT4aN3 [id number] SCC of the left mandible/alveolar ridge/buccal mucosa. He initially developed a lesion of the left buccal mucosa, left jaw pain, and trismus in March 2020. A biopsy by an oral surgeon was consistent with invasive SCC. Evaluation by Dr. Reece ([facility], ENT) showed an 8 cm x 5 cm lesion involving the left buccal mucosa, retromolar trigone, and mandibular alveolus. PET/CT imaging showed increased FDG uptake along the left danilo-mandible involving the anterior aspect of the oral cavity, buccal mucosa at the level of the angle of the mandible, and retromolar trigone. There was no locoregional or distant disease. On 04/28/2020, he underwent a left marginal mandibulectomy, wide resection of the carcinoma involving the buccal mucosa, left posterior maxillectomy, left partial pharyngectomy, left selective neck dissection, right radial forearm free  flap, mandibular plating, tracheostomy, and feeding tube placement. Pathology showed a 5.7 cm [id number] moderately differentiated squamous cell carcinoma. No LVI and final margins negative. +5/33 LN were positive for tumor, with largest deposit 0.8 cm without evidence of MARIA ELENA. He was subsequently treated with adjuvant chemoradiation. He received 6600 cGy of radiation to the left oral cavity and neck between 6/30 and 08/17/2020. He received 3 doses of high-dose cisplatin at 100 milligrams/meter sq on 6/30, 7/20 and 08/10/2020 respectively. He did experience some tinnitus, some difficulty swallowing and some oropharyngeal pain, but did not develop any renal insufficiency or major electrolyte imbalance. His PET scan from 11/25/2020 revealed extensive post radiation and postsurgical changes but no hypermetabolic activity in the neck, and no evidence of distant metastases.     He underwent a  CT scan due to swelling and drainage from the left cheek that has developed over the past months. This showed a non-specific ill-defined heterogeneous mass within the left cheek within an area of extensive prior postsurgical and postradiation changes with flap reconstruction measuring approximately 3 x 2.8 x 3.6 cm in size. FNA was performed concerning for SCC. He was referred the [facility] due to concerns for recurrence. He was discussed at tumor conference on 9/3/21 with imaging concerning for a mass in the masseteric muscle and a second one deeper in the  space. One extends to the skin. The mass abuts the mandibular condyle with a small amount of signal change concerning for early invasion. There is also potential perineural invasion. Atrophic denervation changes are seen in the  space as well, which are not FDG avid. It was the boards recommendation at that time to repeat a biopsy and get PDL-1 on the new biopsy. A repeat FNA of his cheek lesion was positive for squamous cell carcinoma PDL1 expression.  After  discussions with Dr. Swartz he opted to proceed with palliative chemotherapy. Due to insurance coverage issues this could not be performed at [facility] so he opted to receive care at [facility]. He underwent treatment and had new imaging performed on 12/29/21.  He is being presented today at tumor conference to review this new imaging and determine the resectability of his cancer s/p a few cycles of treatment.         Pertinent PMH: Prior testicular cancer (s/p left orchiectomy, postoperative radiation).     Smoking Hx: 1/2 ppd for 35 years.     Imaging:   CT neck and chest 12/29/21.  IMPRESSION:   1. Posttreatment related changes secondary to SCC resection/chemoradiation treatment are present.   2. Previously demonstrated heterogeneous, peripherally enhancing focus primarily involving the left  space and perizygomatic/perimandibular region has decreased in size during the interim.   3. No gross cervical lymphadenopathy.      IMPRESSION:     1. No convincing evidence for metastatic disease in the chest, abdomen, or pelvis.   2. Mildly prominent but not pathologically enlarged bilateral axillary lymph nodes are slightly increased in size compared to 11/25/2020. These are of doubtful clinical significance, but technically indeterminate. Recommend attention on follow-up imaging.    MR neck soft tissue   IMPRESSION:     1. Posttreatment related changes secondary to previous SCC resection and chemoradiation treatment are again noted.   2. Previously demonstrated peripherally enhancing focus involving the left  space and perizygomatic/perimandibular region has decreased in size during the interim.   3. No gross cervical lymphadenopathy.   4. The aerodigestive tract appears patent.      Pathology:   CASE FROM [facility], [place], [place] ([id number], OBTAINED 04/28/2020):  A.  Left mandible medial margin:  -Negative for dysplasia or malignancy  B.  Left mandible lateral margin:  -Negative  for dysplasia or malignancy  C.  Left mandible anterior margin:  -Negative for dysplasia or malignancy  D.  Left mandible posterior margin:  -Negative for dysplasia or malignancy  E.  Left mandible deep margin:  -Negative for malignancy  F.  Lymph node, left level 1B, excision:  -Metastatic squamous cell carcinoma in 1 of 1 lymph node  G.  Lymph nodes, left neck dissection:  -Metastatic squamous cell carcinoma in 4 of 28 lymph nodes  -Benign salivary tissue  H. Lymph nodes, level 2B dissection:  -No malignancy identified in 4 lymph nodes  I.  Left mandible, composite resection:  -Invasive moderately differentiated keratinizing squamous cell carcinoma  -Tumor size: 5.7 cm  -Tumor focality: Unifocal  -The depth of invasion: 1.5 cm  -Lymphovascular invasion: Not identified  -Perineural invasion: Identified  -The tumor is focally present at the inked lateral and medial margins in this specimen, final surgical resection margins free of tumor (see parts A-E)  -Metastatic squamous cell carcinoma in 5 of 33 lymph nodes  -The size of largest metastatic deposit: 0.8 cm  -Extranodal extension: Present, >2 mm     Left cheek, mass, fine needle aspiration (8/16/21):    Highly suspicious for squamous cell carcinoma     Tumor Board Recommendation:   Discussion: 57yo male with history of left mandibular alveolus/buccal mucosa SCC s/p Left marginal mandibulectomy, WLE of the left buccal mucosa, left posterior maxillectomy, left partial pharyngectomy, left selective neck dissection, right radial forearm free flap (04/2020) and adjuvant chemoradiation (08/2020) who developed a recurrence in the left buccal mucosa and infratemporal fossa who has been on palliative chemotherapy since 10/2021.     Surveillance imaging shows a centrally necrotic mass around the zygoma. There is erosion of the left maxillary tubercle that has been present on prior studies and is unlikely to be due to tumor erosion, especially as the mass does not extend to  that region. The prior disease in the buccal mucosa is not visible.    Plan:  -Follow-up PET to define disease extent  -Will discuss merits of excision of the residual disease    Anthony Em PGY4  Otolaryngology- Head and Neck Surgery    Documentation / Disclaimer Cancer Tumor Board Note  Cancer tumor board recommendations do not override what is determined to be reasonable care and treatment, which is dependent on the circumstances of a patient's case; the patient's medical, social, and personal concerns; and the clinical judgment of the oncologist [physician].

## 2022-02-04 ENCOUNTER — TUMOR CONFERENCE (OUTPATIENT)
Dept: ONCOLOGY | Facility: CLINIC | Age: 57
End: 2022-02-04
Payer: COMMERCIAL

## 2022-02-07 ENCOUNTER — OFFICE VISIT (OUTPATIENT)
Dept: OTOLARYNGOLOGY | Facility: CLINIC | Age: 57
End: 2022-02-07
Payer: COMMERCIAL

## 2022-02-07 VITALS
SYSTOLIC BLOOD PRESSURE: 171 MMHG | TEMPERATURE: 97.2 F | OXYGEN SATURATION: 100 % | HEART RATE: 82 BPM | HEIGHT: 70 IN | DIASTOLIC BLOOD PRESSURE: 93 MMHG | WEIGHT: 190 LBS | BODY MASS INDEX: 27.2 KG/M2

## 2022-02-07 DIAGNOSIS — C06.9 MALIGNANT NEOPLASM OF MOUTH (H): Primary | ICD-10-CM

## 2022-02-07 PROCEDURE — 99215 OFFICE O/P EST HI 40 MIN: CPT | Performed by: STUDENT IN AN ORGANIZED HEALTH CARE EDUCATION/TRAINING PROGRAM

## 2022-02-07 ASSESSMENT — MIFFLIN-ST. JEOR: SCORE: 1688.08

## 2022-02-07 ASSESSMENT — PAIN SCALES - GENERAL: PAINLEVEL: NO PAIN (0)

## 2022-02-07 NOTE — LETTER
2/7/2022       RE: Antonio Sharpe  [address] [place] [address]     Dear Colleague,    Thank you for referring your patient, Antonio Sharpe, to the [facility] [place] at [facility]. Please see a copy of my visit note below.    February 7, 2022      Dre Tarango M.D.   [facility] [address]  [address]      Dear Dr. Tarango:     I had the pleasure of meeting Mr. Sharpe back today in clinic.    HISTORY OF PRESENT ILLNESS:  As you know, he is a pleasant 56-year-old male who I met in the fall of 2021.  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  He underwent a left marginal mandibulectomy, left buccal resection, left posterior maxillectomy, left neck dissection and reconstruction with a right radial forearm free flap by Dr. Kong Reece and Dr. Gino Obando on 04/28/2020.  He had an intraoperative positive margin, which was subsequently cleared and had aggressive kenn disease with five positive nodes out of 33 lymph nodes in the specimen, some of which had external extension and perineural invasion.  He received adjuvant chemoradiation therapy at [facility] which he completed in August 2020.  Shortly after that operation, he felt a mass in the left cheek that had persisted.  On his three three-month PET scan, there were two areas of recurrent disease.  One was within the cutaneous and subcutaneous aspect of the left cheek abutting the masseter muscle and a second mass in the left infratemporal fossa with concern for invasion of the zygomatic arch.  At that time, I discussed with him and the low likelihood of cure given the early recurrence.  We reviewed the anticipated surgical approach, which would be a resection of the left cheek and infratemporal fossa with certain resulting facial nerve paralysis  and need for free tissue reconstruction.  He elected not to go forward with this operation and has been maintained on palliative chemotherapy.  He was initially prescribed 5-FU, pembrolizumab and carboplatin.  Due side effects to 5-FU and carboplatin, have been omitted or reduced on several of the cycles.  He presents again today for reevaluation regarding surgical resectability.  His most recent imaging was on 12/29/2021.  The 12/29/2021 scan shows what appears to have resolution of the left cheek portion of the disease.  However, there is significant subcutaneous stranding, which makes it difficult to interpret.  There continues to be disease present in the infratemporal fossa.    PHYSICAL EXAMINATION:  On examination, he is alert.  His prior area of cancer on the left cheek is no longer draining and has epithelialized.  There is no obvious malignancy seen on exam today.  I am not able to feel the infratemporal fossa segment of the disease.  There is no palpable neck masses.    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  He presented with early recurrence/residual disease and given the lower rate of cure, elected to proceed with palliative chemotherapy.      At this point, I think we should restage him with a PET/CT.  This will allow for evaluation of any distant metastases and also hopefully allow us to more clearly delineate the residual area of malignancy.  He will proceed with cycle 6 of his chemotherapy and we will obtain a PET/CT approximately one month after this.  Once we have that scan, we can further discuss surgical management of this disease and if that is an option he would like to pursue.  I will see him back sometime in mid March.    Thank you for allowing me to participate in the care of this patient. If you have any further  questions, please do not hesitate to contact me.      Sincerely,      Aditya Swartz M.D.      Head and Neck Surgical Oncology and Microvascular Reconstruction  Department of Otolaryngology - Head and Neck Surgery  [facility]        40 minutes spent on the date of the encounter in chart review, patient visit, review of tests, documentation and/or discussion with other providers about the issues documented above.         Again, thank you for allowing me to participate in the care of your patient.      Sincerely,    Aditya Swartz MD

## 2022-02-07 NOTE — NURSING NOTE
"Chief Complaint   Patient presents with     RECHECK     discuss possiblity of resection       Blood pressure (!) 171/93, pulse 82, temperature 97.2  F (36.2  C), temperature source Temporal, height 1.778 m (5' 10\"), weight 86.2 kg (190 lb), SpO2 100 %.    Melanie Wilkerson, EMT    "

## 2022-02-07 NOTE — PATIENT INSTRUCTIONS
1. We will discuss with oncologist to determine timing of PET scan and contact you to arrange.   2. Please call the ENT clinic with any questions,concerns, new or worsening symptoms.    -Clinic number is [phone]   - Natalya's direct line (Dr. Swartz's nurse) [phone]

## 2022-02-14 NOTE — PROGRESS NOTES
February 7, 2022      Dre Tarango M.D.   [facility]  [address] [place] [address]      Dear Dr. Tarango:     I had the pleasure of meeting Mr. Sharpe back today in clinic.    HISTORY OF PRESENT ILLNESS:  As you know, he is a pleasant 56-year-old male who I met in the fall of 2021.  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  He underwent a left marginal mandibulectomy, left buccal resection, left posterior maxillectomy, left neck dissection and reconstruction with a right radial forearm free flap by Dr. Kong Reece and Dr. Gino Obando on 04/28/2020.  He had an intraoperative positive margin, which was subsequently cleared and had aggressive kenn disease with five positive nodes out of 33 lymph nodes in the specimen, some of which had external extension and perineural invasion.  He received adjuvant chemoradiation therapy at [facility] which he completed in August 2020.  Shortly after that operation, he felt a mass in the left cheek that had persisted.  On his three three-month PET scan, there were two areas of recurrent disease.  One was within the cutaneous and subcutaneous aspect of the left cheek abutting the masseter muscle and a second mass in the left infratemporal fossa with concern for invasion of the zygomatic arch.  At that time, I discussed with him and the low likelihood of cure given the early recurrence.  We reviewed the anticipated surgical approach, which would be a resection of the left cheek and infratemporal fossa with certain resulting facial nerve paralysis and need for free tissue reconstruction.  He elected not to go forward with this operation and has been maintained on palliative chemotherapy.  He was initially prescribed 5-FU, pembrolizumab and carboplatin.  Due side effects to 5-FU and carboplatin, have been omitted or reduced on several of the cycles.  He presents  again today for reevaluation regarding surgical resectability.  His most recent imaging was on 12/29/2021.  The 12/29/2021 scan shows what appears to have resolution of the left cheek portion of the disease.  However, there is significant subcutaneous stranding, which makes it difficult to interpret.  There continues to be disease present in the infratemporal fossa.    PHYSICAL EXAMINATION:  On examination, he is alert.  His prior area of cancer on the left cheek is no longer draining and has epithelialized.  There is no obvious malignancy seen on exam today.  I am not able to feel the infratemporal fossa segment of the disease.  There is no palpable neck masses.    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  He presented with early recurrence/residual disease and given the lower rate of cure, elected to proceed with palliative chemotherapy.      At this point, I think we should restage him with a PET/CT.  This will allow for evaluation of any distant metastases and also hopefully allow us to more clearly delineate the residual area of malignancy.  He will proceed with cycle 6 of his chemotherapy and we will obtain a PET/CT approximately one month after this.  Once we have that scan, we can further discuss surgical management of this disease and if that is an option he would like to pursue.  I will see him back sometime in mid March.    Thank you for allowing me to participate in the care of this patient. If you have any further questions, please do not hesitate to contact me.      Sincerely,      Aditya Swartz M.D.      Head and Neck Surgical Oncology and Microvascular Reconstruction  Department of Otolaryngology - Head and Neck Surgery  [facility]        40 minutes spent on the date of the encounter in chart  review, patient visit, review of tests, documentation and/or discussion with other providers about the issues documented above.

## 2022-02-15 ENCOUNTER — TRANSFERRED RECORDS (OUTPATIENT)
Dept: HEALTH INFORMATION MANAGEMENT | Facility: CLINIC | Age: 57
End: 2022-02-15
Payer: COMMERCIAL

## 2022-02-18 DIAGNOSIS — C06.0 BUCCAL MUCOSA SQUAMOUS CELL CARCINOMA (H): Primary | ICD-10-CM

## 2022-02-18 NOTE — PROGRESS NOTES
Patient advised to schedule PET for Mid March. Schedulers will call patient to arrange date.     Natalya Crowder, RN, BSN

## 2022-02-21 ENCOUNTER — TELEPHONE (OUTPATIENT)
Dept: OTOLARYNGOLOGY | Facility: CLINIC | Age: 57
End: 2022-02-21
Payer: COMMERCIAL

## 2022-03-08 ENCOUNTER — HOSPITAL ENCOUNTER (OUTPATIENT)
Dept: PET IMAGING | Facility: CLINIC | Age: 57
Setting detail: NUCLEAR MEDICINE
Discharge: HOME | End: 2022-03-08
Attending: STUDENT IN AN ORGANIZED HEALTH CARE EDUCATION/TRAINING PROGRAM | Admitting: STUDENT IN AN ORGANIZED HEALTH CARE EDUCATION/TRAINING PROGRAM
Payer: COMMERCIAL

## 2022-03-08 DIAGNOSIS — C06.0 BUCCAL MUCOSA SQUAMOUS CELL CARCINOMA (H): ICD-10-CM

## 2022-03-08 PROCEDURE — 250N000011 HC RX IP 250 OP 636: Performed by: STUDENT IN AN ORGANIZED HEALTH CARE EDUCATION/TRAINING PROGRAM

## 2022-03-08 PROCEDURE — 343N000001 HC RX 343: Performed by: STUDENT IN AN ORGANIZED HEALTH CARE EDUCATION/TRAINING PROGRAM

## 2022-03-08 PROCEDURE — 74177 CT ABD & PELVIS W/CONTRAST: CPT | Mod: 26 | Performed by: RADIOLOGY

## 2022-03-08 PROCEDURE — 70491 CT SOFT TISSUE NECK W/DYE: CPT | Mod: 26 | Performed by: RADIOLOGY

## 2022-03-08 PROCEDURE — 74177 CT ABD & PELVIS W/CONTRAST: CPT | Mod: PS

## 2022-03-08 PROCEDURE — 70491 CT SOFT TISSUE NECK W/DYE: CPT

## 2022-03-08 PROCEDURE — A9552 F18 FDG: HCPCS | Performed by: STUDENT IN AN ORGANIZED HEALTH CARE EDUCATION/TRAINING PROGRAM

## 2022-03-08 PROCEDURE — 78816 PET IMAGE W/CT FULL BODY: CPT | Mod: 26 | Performed by: RADIOLOGY

## 2022-03-08 PROCEDURE — 71260 CT THORAX DX C+: CPT | Mod: 26 | Performed by: RADIOLOGY

## 2022-03-08 RX ORDER — IOPAMIDOL 755 MG/ML
116 INJECTION, SOLUTION INTRAVASCULAR ONCE
Status: COMPLETED | OUTPATIENT
Start: 2022-03-08 | End: 2022-03-08

## 2022-03-08 RX ADMIN — IOPAMIDOL 116 ML: 755 INJECTION, SOLUTION INTRAVENOUS at 13:34

## 2022-03-08 RX ADMIN — FLUDEOXYGLUCOSE F-18 11.38 MCI.: 500 INJECTION, SOLUTION INTRAVENOUS at 13:33

## 2022-03-10 ENCOUNTER — TELEPHONE (OUTPATIENT)
Dept: OTOLARYNGOLOGY | Facility: CLINIC | Age: 57
End: 2022-03-10
Payer: COMMERCIAL

## 2022-03-10 NOTE — TELEPHONE ENCOUNTER
[facility]    Phone Message    May a detailed message be left on voicemail: yes     Reason for Call: Requesting Results   Name/type of test: CT and PET scan  Date of test: 3/8/22  Was test done at a location other than [facility] (Please fill in the location if not [facility])?: No    Please call pt with CT and PET scan results. Thank you.      Action Taken: Message routed to:  [facility] ([facility] ENT    Travel Screening: Not Applicable

## 2022-03-11 NOTE — TELEPHONE ENCOUNTER
Dr. Swartz called and spoke with patient regarding PET scan results. We will plan to review at tumor board on Friday to discuss next steps and plan.     Natalya Crowder, RN, BSN

## 2022-03-18 ENCOUNTER — TUMOR CONFERENCE (OUTPATIENT)
Dept: ONCOLOGY | Facility: CLINIC | Age: 57
End: 2022-03-18
Payer: COMMERCIAL

## 2022-03-18 NOTE — TUMOR CONFERENCE
Head & Neck Tumor Conference Note     Status:Esatblished (last presented 2/4/2022)   Staff: Dr. Swartz     Tumor Site: Left mandible/alveolar ridge/buccal mucosa (oral cavity)  Tumor Pathology: [id number] SCC  Tumor Stage: [id number]  Tumor Treatment:   - Left marginal mandibulectomy, WLE of the left buccal mucosa, left posterior maxillectomy, left partial pharyngectomy, left selective neck dissection, right radial forearm free flap, mandibular plating, tracheostomy, and feeding tube placement (4/28/2020)  - Adjuvant chemoradiation (6600 cGy of radiation to the left oral cavity and neck, completed 8/17/2020 with 3-doses of high-dose cisplatin)  - Palliative chemotherapy 10/21/21 - Present                   Reason for Review: Review imaging and POC.     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 He initially developed a lesion of the left buccal mucosa, left jaw pain, and trismus in March 2020. A biopsy by an oral surgeon was consistent with invasive SCC. Evaluation by Dr. Reece ([facility], ENT) showed an 8 cm x 5 cm lesion involving the left buccal mucosa, retromolar trigone, and mandibular alveolus. PET/CT imaging showed increased FDG uptake along the left danilo-mandible involving the anterior aspect of the oral cavity, buccal mucosa at the level of the angle of the mandible, and retromolar trigone. There was no locoregional or distant disease. On 04/28/2020, he underwent a left marginal mandibulectomy, wide resection of the carcinoma involving the buccal mucosa, left posterior maxillectomy, left partial pharyngectomy, left selective neck dissection, right radial forearm free flap, mandibular plating, tracheostomy, and feeding tube placement. Pathology showed a 5.7 cm [id number] moderately differentiated squamous cell carcinoma. No LVI and final margins negative. +5/33 LN were positive for tumor, with largest  deposit 0.8 cm without evidence of MARIA ELENA. He was subsequently treated with adjuvant chemoradiation. He received 6600 cGy of radiation to the left oral cavity and neck between 6/30 and 08/17/2020. He received 3 doses of high-dose cisplatin at 100 milligrams/meter sq on 6/30, 7/20 and 08/10/2020 respectively. He did experience some tinnitus, some difficulty swallowing and some oropharyngeal pain, but did not develop any renal insufficiency or major electrolyte imbalance. His PET scan from 11/25/2020 revealed extensive post radiation and postsurgical changes but no hypermetabolic activity in the neck, and no evidence of distant metastases.     He underwent a  CT scan due to swelling and drainage from the left cheek that has developed over the past months. This showed a non-specific ill-defined heterogeneous mass within the left cheek within an area of extensive prior postsurgical and postradiation changes with flap reconstruction measuring approximately 3 x 2.8 x 3.6 cm in size. FNA was performed concerning for SCC. He was referred the [facility] due to concerns for recurrence. He was discussed at tumor conference on 9/3/21 with imaging concerning for a mass in the masseteric muscle and a second one deeper in the  space. One extends to the skin. The mass abuts the mandibular condyle with a small amount of signal change concerning for early invasion. There is also potential perineural invasion. Atrophic denervation changes are seen in the  space as well, which are not FDG avid. It was the boards recommendation at that time to repeat a biopsy and get PDL-1 on the new biopsy. A repeat FNA of his cheek lesion was positive for squamous cell carcinoma PDL1 expression.  After discussions with Dr. Swartz he opted to proceed with palliative chemotherapy. Due to insurance coverage issues this could not be performed at [facility] so he opted to receive care at [facility]. He underwent treatment and had new imaging  performed on 12/29/21.  He is being presented today at tumor conference to review this new imaging and determine the resectability of his cancer s/p a few cycles of treatment.     - 5FU prembuliu      Pertinent PMH: Prior testicular cancer (s/p left orchiectomy, postoperative radiation).  Smoking Hx: 1/2 ppd for 35 years.    Imaging:   PET Oncology Whole Body  3/8/2022     COMPARISON: PET/CT 8/26/2021.    FINDINGS:    HEAD/NECK:  See dedicated neuroradiology report for the results of the high  resolution PET CT of the neck.    CHEST:  There is no suspicious FDG uptake in the chest.    There are no pathologically enlarged mediastinal, hilar or axillary  lymph nodes.  There are no suspicious lung nodules or evidence for infection. Mild  paraseptal emphysema.    There is no significant pericardial or pleural effusions. Moderate  atherosclerosis of the coronary arteries. Mild hiatal hernia. Right  chest wall Port-A-Cath with its tip in the cavoatrial junction.    ABDOMEN AND PELVIS:  There is no suspicious FDG uptake in the abdomen or pelvis.    Diffuse FDG uptake by the gastric fundus wall (SUV max of 7.1) (Series  3 image 173).    Focal uptake to the anorectal region without corresponding soft tissue  lesion in the CT images with SUV max of 13.8 (series 3 image 286),  likely inflammatory/physiological in etiology but recommend direct  visualization to exclude malignancy.    There are no suspicious hepatic lesions. Scattered hypodense foci in  the liver, likely representing hepatic cysts. There is no splenomegaly  or evidence for splenic or pancreatic mass lesion.  There are no suspicious adrenal mass lesions or opaque gallbladder  calculi. There is symmetric nephrographic renal phase without  hydronephrosis. Right extrarenal pelvis.  There is no evidence for diverticulitis, bowel obstruction or free  fluid. Prostatomegaly. Mild right hydrocele.    LOWER EXTREMITIES:  No abnormal masses or hypermetabolic  lesions.    BONES:  There are no suspicious lytic or blastic osseous lesions.  There is no  abnormal FDG uptake in the skeleton.    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 No evidence of distant metastasis in the body.  2. Gastric fundus FDG avid wall thickening can be seen with gastritis.  Consider direct visualization.  3. Focal uptake to the anorectal region without corresponding soft  tissue lesion in the CT images, probably inflammatory/physiologic.  Clinical correlation is advised.  4. See dedicated neuroradiology report for the results of the high  resolution PET CT of the neck.    I have personally reviewed the examination and initial interpretation  and I agree with the findings.    LANCE NEWMAN MD      Pathology:   No new pathology     Tumor Board Recommendation:   Discussion: This is a 56 year old male with [id number] SCC of Left mandible/alveolar ridge/buccal mucosa (oral cavity).    Imaging was reviewed and demonstrates:  PET/CT 3/8/2022 vs 12/2021  - Cheek mass has decreased in size   - Infratemporal mass 1.4 x 1.5cm   - There is residual FDG mass deep to zygomatic arch along temporalis muscle     Discussion:  - The disease has decreased in size.  - Chemotherapy may loose efficacy overtime. So the question is do we  surgically resect infratemporal or cheek mass while it is at it current smaller size.   - patient did not agree with surgery before and may not agree now.   - Even with surgical resection there is still a high risk of local and regional recurrence.   - Radiation can be a treatment modality to mass in  infratemporal fossa. This area was not previously treated with radiation.     Plan:   - Discuss with patient treatment options     Suri Butterfield MD, PGY-3  Otolaryngology- Head and Neck Surgery    Documentation / Disclaimer Cancer Tumor Board Note  Cancer tumor board recommendations do not override what is  determined to be reasonable care and treatment, which is dependent on the circumstances of a patient's case; the patient's medical, social, and personal concerns; and the clinical judgment of the oncologist [physician].

## 2022-03-22 NOTE — TUMOR CONFERENCE
Dr. Swartz called and spoke with patient regarding tumor board discussion and recommendations. Dr. Swartz and Dr. Amezquita spoke and patient is not interested in surgery at this time. He would like to proceed with radiation. Dr. Amezquita's team will reach out to arrange.     Natalya Crowder, RN, BSN

## 2022-03-29 NOTE — PROGRESS NOTES
Attending addendum:   I saw and examined the patient with the resident and agree with the documented plan of care.    Briefly, Mr. 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 He subsequently developed a local disease failure at the superior margin of the previous radiation field which has responded nicely to systemic therapy with a small residual tumor along the temporalis muscle. He has previously talked with Dr. Swartz about salvage options and has declined to proceed with surgical resection. He is referred to me today to discuss salvage radiation therapy with potential curative intent.    I proposed a treatment plan consisting of conventionally fractionated radiotherapy to approximately 70 Gy encompassing both the residual disease as well as the more inferior pre-chemo disease extent along the left cheek. Given that he is less than a year out from his previous chemoradiation, I did discuss that he would be at increased risk of toxicities with additional radiation therapy given the overlap with my intended field. These potential side effects were discussed at length with Mr. Sharpe and he ultimately was amenable to proceeding with therapy, signed informed consent to that effect.    I will get Mr. Sharpe set up with Dr. Buckley whom he has seen in the past to discuss the role of possible concurrent chemotherapy for radiosensitization purposes. I have also placed a referral to our dental colleagues for pre-radiation therapy evaluation and clearance especially important given that Mr. Sharpe's reports that he has not seen the dentist in some time and is not currently on any fluoride prophylaxis following his previous radiation therapy treatment course. Finally, I will arrange for Mr. Sharpe to connect with our oncology dietitian and SLP teams here at the [facility] for  cares during and following head and neck reirradiation.    Bc Amezquita MD/PhD    Dept of Radiation Oncology  [facility]             Department of Radiation Oncology  [address] [address]  [phone]       Consultation Note    Name: Antonio Sharpe MRN: [id number]   : 1965   Date of Service: Mar 31, 2022 Referring: Dr. 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 He previously has received care outside [facility] at [facility] and [facility].     The patient reportedly had had an oral lesion for about a year beginning in . 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 He underwent left marginal mandibulectomy, left buccal resection, left posterior maxillectomy, left neck dissection, and reconstruction with right radial forearm free flap on 2020. Pathology demonstrated a 5.7 cm focus of poorly differentiated squamous cell carcinoma eroding the mandible. Depth of invasion was reported at 15 mm. The lateral and medial margins were noted to be involved, however additional margin resections were negative for tumor. There was perineural invasion but no lymphovascular space invasion. A total of 5/33 lymph nodes were positive for tumor (3/6 level I, 0/8 level II, 2/10 level III, 0/9 level IV), the largest measuring 0.8 cm. There was 2.0 mm MARIA ELENA present.  Pathologic staging was therefore pT4a N3b M0.    He underwent adjuvant chemoradiotherapy under the care of Dr. Devyn Serna at [facility], 6600 cGy to the left oral cavity and neck  from 6/30/2020 to 8/17/2020. He received 3 cycles of high-dose cisplatin. Treatment was tolerated as expected. The patient reportedly noticed a persistent mass in his left cheek shortly after surgery. Surveillance imaging demonstrated postsurgical and post radiation changes that were stable over time.     CT neck on 8/11/2021 demonstrated an ill-defined heterogenous mass measuring 3 x 2.8 x 3.6 cm at the site of the reconstruction flap within the subcutaneous fat of the left cheek abutting the masseter muscle and skin with loss of fat planes. There was also a lesion within the accessory lobe of the left parotid gland with loss of normal fat planes in the  space and carotid space. Outside FNA of the left cheek mass on 8/16/2021 was highly suspicious for squamous cell carcinoma.    He was seen by Dr. Swartz in head and neck surgery at [facility] on 8/30/2021. He recommended repeat biopsy with multidisciplinary discussion. FNA at [facility] of the left cheek mass on 9/8/2021 was consistent with squamous cell carcinoma. The patient declined resection and saw Dr. Buckley in medical oncology. Dr. Buckley recommended palliative chemotherapy, but due to insurance reasons he received to this at [facility]. He received pembrolizumab, 5-fluorouracil, and carboplatin with dose reductions due to toxicity. The patient reports sometime after starting chemotherapy he had spontaneous drainage of his left cheek mass.    PET/CT and CT neck on 3/8/2022 demonstrated decreased size and FDG uptake of the mass along the temporalis muscle (no measuring 1.5 x 1.2 cm) and resolution of the other more inferior mass. The patient continued to decline surgery, so he was referred to radiation oncology for consideration of palliative radiotherapy.    On interview, he reports chronic knee pain is his biggest complaint. He reports ongoing fibrosis in his left neck, inability to fully close his lips, xerostomia, taste changes, tinnitus, and decreased  sensation on his left chin since his prior surgery and adjuvant radiotherapy. He denies new lumps or bumps, weight loss, pains, or other concerns. He continues to smoke one pack of cigarettes daily and drink about 4 beers daily.     Past Medical History:    Testicular cancer status post orchiectomy, radiotherapy to periaortic and left pelvic nodes in 1996    Oral cavity cancer as per HPI    Hearing loss    Tobacco use    Degenerative disk disease    Past Surgical History:    Left oral cavity resection, neck dissection, and reconstruction as per HPI 4/28/2020    Appendectomy    Chemotherapy History:  As per HPI    Radiation History:  1. Radiation to the periaortic and L pelvic nodes, 2550 cGy at [facility] in 1996  2. 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 Pertinent findings are noted in the HPI.    Physical Exam   ECOG Status: 1    Vitals:  BP (!) 164/92   Pulse 76   Resp 16   Wt 87.5 kg (193 lb)   SpO2 98%   BMI 27.69 kg/m      Gen: Alert, in NAD  Head: NC/AT. Small epithelialized opening on left cheek to prior cystic area, no drainage or tenderness  Eyes: PERRL, EOMI, sclera anicteric  Ears: No external auricular lesions  Nose/sinus: No rhinorrhea or epistaxis  Oral cavity/oropharynx: Most teeth have been extracted. Mucous membranes dry, well-healed flap in left oral cavity. No visible or palpable nodularity   Neck: Moderate fibrosis in the left neck without palpable adenopathy  Skin: Mild hyperpigmentation of left  neck  Neuro: Motor grossly intact  Cranial Nerve Exam    I: Not tested  II: Not tested  III/IV/VI: PERRL. EOMI.   V: Diminished sensation to light touch in left V3 distribution  VII: Mild weakness of the left marginal mandibular branch. HB II/VI.  VIII: Hearing is grossly intact and symmetric.  IX/X: Palate elevates symmetrically. Normal phonation.  XI: Strength is 5/5 in bilateral trapezius musculature.  XII: Tongue protrudes in the midline. No atrophy or fasciculations.    Imaging/Path/Labs   Imaging:   CT Neck 3/8/2022  Impression:  Primary: 4} One of the 2 previously seen FDG avid masses along the left side of the face is resolved. Interval decrease in the size and FDG metabolism of the more cranially located mass seen along the temporalis muscle deep to the left zygomatic arch. The residual mass at this location measures 1.5 x 1.2 cm and demonstrates hypermetabolism.   Neck: 1}  No abnormal lymph node.     PET/CT 3/8/2022  IMPRESSION: In this patient with recurrent squamous cell carcinoma of the mandible;  1. No evidence of distant metastasis in the body.  2. Gastric fundus FDG avid wall thickening can be seen with gastritis. Consider direct visualization.  3. Focal uptake to the anorectal region without corresponding soft tissue lesion in the CT images, probably inflammatory/physiologic. Clinical correlation is advised.   4. See dedicated neuroradiology report for the results of the high resolution PET CT of the neck.     Path: Reviewed outside pathology reports from 2020 and FNA from 9/8/2021    Labs: Reviewed    Assessment    Mr. 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 He is status post salvage chemotherapy with decreased size and uptake of the two apparent lesions: one within the subcutaneous space of the left cheek and one in the left  and carotid space. The former lies within his previous  radiotherapy field, but the latter did not receive a significant amount of radiotherapy per review.    We discussed his diagnosis and management options. The patient would still like to aggressively pursue local control and potentially curative options. Therefore, we discussed local radiotherapy to the gross residual tumors with a small amount of margin with potentially curative intent. We discussed that he has at high risk of relapse given the aggressive nature of his disease, but that we would limit the radiotherapy field to decrease potential toxicities of reirradiation. We discussed that decreasing radiotherapy dose would decrease the anticipated local control, so we recommended 70 Gy in 35 fractions. We also discussed the potential for concurrent chemotherapy, and he was amenable to referral to Dr. Buckley. He reports that his previous insurance issues with [facility] have been resolved.    His questions were answered to his stated satisfaction, and informed consent was obtained.    Plan   CT simulation for radiotherapy planning performed today  Tentatively plan to begin radiotherapy on 4/11/2022, 7000 cGy in 35 daily fractions  Medical oncology will be contacted to discuss potential concurrent systemic therapy options  Referral made to [facility] dentistry for preradiotherapy evaluation    The patient was seen and evaluated with staff, Dr. Amezquita.    Benito Finley MD PGY-5  Radiation Oncology, [facility] [place]  [phone] clinic  Pager [phone]

## 2022-03-31 ENCOUNTER — OFFICE VISIT (OUTPATIENT)
Dept: RADIATION ONCOLOGY | Facility: CLINIC | Age: 57
End: 2022-03-31
Attending: RADIOLOGY
Payer: COMMERCIAL

## 2022-03-31 VITALS
BODY MASS INDEX: 27.69 KG/M2 | RESPIRATION RATE: 16 BRPM | WEIGHT: 193 LBS | OXYGEN SATURATION: 98 % | DIASTOLIC BLOOD PRESSURE: 92 MMHG | SYSTOLIC BLOOD PRESSURE: 164 MMHG | HEART RATE: 76 BPM

## 2022-03-31 DIAGNOSIS — C41.1 SQUAMOUS CELL CARCINOMA OF MANDIBLE (H): Primary | ICD-10-CM

## 2022-03-31 DIAGNOSIS — C14.8 CANCER OF LIP AND ORAL CAVITY (H): ICD-10-CM

## 2022-03-31 DIAGNOSIS — R13.10 DYSPHAGIA: ICD-10-CM

## 2022-03-31 PROCEDURE — G0463 HOSPITAL OUTPT CLINIC VISIT: HCPCS | Mod: 25 | Performed by: RADIOLOGY

## 2022-03-31 PROCEDURE — 77334 RADIATION TREATMENT AID(S): CPT | Mod: 26 | Performed by: RADIOLOGY

## 2022-03-31 PROCEDURE — 77334 RADIATION TREATMENT AID(S): CPT | Performed by: RADIOLOGY

## 2022-03-31 PROCEDURE — 77263 THER RADIOLOGY TX PLNG CPLX: CPT | Performed by: RADIOLOGY

## 2022-03-31 ASSESSMENT — ENCOUNTER SYMPTOMS
NAUSEA: 1
DEPRESSION: 1
SENSORY CHANGE: 1
HEADACHES: 1

## 2022-03-31 ASSESSMENT — PAIN SCALES - GENERAL: PAINLEVEL: EXTREME PAIN (9)

## 2022-03-31 NOTE — PROGRESS NOTES
Radiation Therapy Patient Education    Person involved with teaching: Patient    Patient educational needs for self management of treatment-related side effects assessment completed.  [facility] Patient Ed tab contains Patient Learning Assessment    Education Materials Given  Radiation Therapy for Head and Neck    Educational Topics Discussed  Side effects expected, Pain management, Skin care, Activity, Nutrition and weight loss and When to call MD/RN    Response To Teaching  Verbalizes understanding    GYN Only  Vaginal Dilator-given and educated: N/A    Referrals sent: Dental and Nutrition    Chemotherapy?  No- referral to [facility]

## 2022-03-31 NOTE — PROGRESS NOTES
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 Devyn Serna at [facility],  Dates: 6/30/2020 to 8/17/2020  Dose:6600 cGy    Prior chemotherapy: yes    Prior hormonal therapy:No    Pain Eval:  Denies    Psychosocial  Living arrangements: with wife  Fall Risk: independent   referral needs: Not needed    Advanced Directive: No  Implantable Cardiac Device? No    LMP: No LMP for male patient.    Nurse face-to-face time: Level 3:  10 min face to face time          Review of Systems   HENT: Positive for hearing loss.         Xerostomia     Gastrointestinal: Positive for nausea.   Musculoskeletal: Positive for joint pain.   Neurological: Positive for sensory change and headaches.   Psychiatric/Behavioral: Positive for depression.

## 2022-03-31 NOTE — LETTER
3/31/2022         RE: Antonio Sharpe  [address] [place] [address]        Dear Colleague,    Thank you for referring your patient, Antonio Sharpe, to the [facility] RADIATION ONCOLOGY. Please see a copy of my visit note below.    Radiation Therapy Patient Education    Person involved with teaching: Patient    Patient educational needs for self management of treatment-related side effects assessment completed.  [facility] Patient Ed tab contains Patient Learning Assessment    Education Materials Given  Radiation Therapy for Head and Neck    Educational Topics Discussed  Side effects expected, Pain management, Skin care, Activity, Nutrition and weight loss and When to call MD/RN    Response To Teaching  Verbalizes understanding    GYN Only  Vaginal Dilator-given and educated: N/A    Referrals sent: Dental and Nutrition    Chemotherapy?  No- referral to [facility]           Again, thank you for allowing me to participate in the care of your patient.        Sincerely,        Bc Amezquita MD

## 2022-03-31 NOTE — LETTER
3/31/2022     RE: Antonio Sharpe  [address] [place] [address]    Dear Colleague,    Thank you for referring your patient, Antonio Sharpe, to the [facility] RADIATION ONCOLOGY. Please see a copy of my visit note below.    Attending addendum:   I saw and examined the patient with the resident and agree with the documented plan of care.    Briefly, Mr. 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 He subsequently developed a local disease failure at the superior margin of the previous radiation field which has responded nicely to systemic therapy with a small residual tumor along the temporalis muscle. He has previously talked with Dr. Swartz about salvage options and has declined to proceed with surgical resection. He is referred to me today to discuss salvage radiation therapy with potential curative intent.    I proposed a treatment plan consisting of conventionally fractionated radiotherapy to approximately 70 Gy encompassing both the residual disease as well as the more inferior pre-chemo disease extent along the left cheek. Given that he is less than a year out from his previous chemoradiation, I did discuss that he would be at increased risk of toxicities with additional radiation therapy given the overlap with my intended field. These potential side effects were discussed at length with Mr. Sharpe and he ultimately was amenable to proceeding with therapy, signed informed consent to that effect.    I will get Mr. Sharpe set up with Dr. Buckley whom he has seen in the past to discuss the role of possible concurrent chemotherapy for radiosensitization purposes. I have also placed a referral to our dental colleagues for pre-radiation therapy evaluation and clearance especially important given that Mr. Sharpe's reports that he has not seen the  dentist in some time and is not currently on any fluoride prophylaxis following his previous radiation therapy treatment course. Finally, I will arrange for Mr. Sharpe to connect with our oncology dietitian and [facility] teams [facility] for cares during and following head and neck reirradiation.    Bc Amezquita MD/PhD    Dept of Radiation Oncology  [facility]             Department of Radiation Oncology  [address] [address]  [phone]       Consultation Note    Name: Antonio Sharpe MRN: [id number]   : 1965   Date of Service: Mar 31, 2022 Referring: Dr. 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 He previously has received care outside [facility] at [facility] and [facility].     The patient reportedly had had an oral lesion for about a year beginning in . 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 He underwent left marginal mandibulectomy, left buccal resection, left posterior maxillectomy, left neck dissection, and reconstruction with right radial forearm free flap on 2020. Pathology demonstrated a 5.7 cm focus of poorly differentiated squamous cell carcinoma eroding the mandible. Depth of invasion was reported at 15 mm. The lateral and medial margins were noted to be involved, however additional margin resections were negative for tumor. There was perineural invasion but no lymphovascular space invasion. A total of 5/33 lymph nodes were positive for tumor (3/6 level I, 0/8 level II, 2/10 level III, 0/9 level IV),  the largest measuring 0.8 cm. There was 2.0 mm MARIA ELENA present.  Pathologic staging was therefore pT4a N3b M0.    He underwent adjuvant chemoradiotherapy under the care of Dr. Devyn Serna at [facility], 6600 cGy to the left oral cavity and neck from 6/30/2020 to 8/17/2020. He received 3 cycles of high-dose cisplatin. Treatment was tolerated as expected. The patient reportedly noticed a persistent mass in his left cheek shortly after surgery. Surveillance imaging demonstrated postsurgical and post radiation changes that were stable over time.     CT neck on 8/11/2021 demonstrated an ill-defined heterogenous mass measuring 3 x 2.8 x 3.6 cm at the site of the reconstruction flap within the subcutaneous fat of the left cheek abutting the masseter muscle and skin with loss of fat planes. There was also a lesion within the accessory lobe of the left parotid gland with loss of normal fat planes in the  space and carotid space. Outside FNA of the left cheek mass on 8/16/2021 was highly suspicious for squamous cell carcinoma.    He was seen by Dr. Swartz in head and neck surgery at [facility] on 8/30/2021. He recommended repeat biopsy with multidisciplinary discussion. FNA at [facility] of the left cheek mass on 9/8/2021 was consistent with squamous cell carcinoma. The patient declined resection and saw Dr. Buckley in medical oncology. Dr. Buckley recommended palliative chemotherapy, but due to insurance reasons he received to this at [facility]. He received pembrolizumab, 5-fluorouracil, and carboplatin with dose reductions due to toxicity. The patient reports sometime after starting chemotherapy he had spontaneous drainage of his left cheek mass.    PET/CT and CT neck on 3/8/2022 demonstrated decreased size and FDG uptake of the mass along the temporalis muscle (no measuring 1.5 x 1.2 cm) and resolution of the other more inferior mass. The patient continued to decline surgery, so he was referred to radiation oncology  for consideration of palliative radiotherapy.    On interview, he reports chronic knee pain is his biggest complaint. He reports ongoing fibrosis in his left neck, inability to fully close his lips, xerostomia, taste changes, tinnitus, and decreased sensation on his left chin since his prior surgery and adjuvant radiotherapy. He denies new lumps or bumps, weight loss, pains, or other concerns. He continues to smoke one pack of cigarettes daily and drink about 4 beers daily.     Past Medical History:    Testicular cancer status post orchiectomy, radiotherapy to periaortic and left pelvic nodes in 1996    Oral cavity cancer as per HPI    Hearing loss    Tobacco use    Degenerative disk disease    Past Surgical History:    Left oral cavity resection, neck dissection, and reconstruction as per HPI 4/28/2020    Appendectomy    Chemotherapy History:  As per HPI    Radiation History:  1. Radiation to the periaortic and L pelvic nodes, 2550 cGy at [facility] in 1996  2. 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 Pertinent findings are noted in the HPI.    Physical Exam   ECOG Status: 1    Vitals:  BP (!) 164/92   Pulse 76   Resp 16   Wt 87.5 kg (193 lb)   SpO2 98%   BMI 27.69 kg/m      Gen: Alert, in NAD  Head: NC/AT. Small epithelialized opening on left cheek to prior cystic area, no drainage or tenderness  Eyes: PERRL, EOMI, sclera anicteric  Ears: No external auricular lesions  Nose/sinus: No rhinorrhea or epistaxis  Oral  cavity/oropharynx: Most teeth have been extracted. Mucous membranes dry, well-healed flap in left oral cavity. No visible or palpable nodularity   Neck: Moderate fibrosis in the left neck without palpable adenopathy  Skin: Mild hyperpigmentation of left neck  Neuro: Motor grossly intact  Cranial Nerve Exam    I: Not tested  II: Not tested  III/IV/VI: PERRL. EOMI.   V: Diminished sensation to light touch in left V3 distribution  VII: Mild weakness of the left marginal mandibular branch. HB II/VI.  VIII: Hearing is grossly intact and symmetric.  IX/X: Palate elevates symmetrically. Normal phonation.  XI: Strength is 5/5 in bilateral trapezius musculature.  XII: Tongue protrudes in the midline. No atrophy or fasciculations.    Imaging/Path/Labs   Imaging:   CT Neck 3/8/2022  Impression:  Primary: 4} One of the 2 previously seen FDG avid masses along the left side of the face is resolved. Interval decrease in the size and FDG metabolism of the more cranially located mass seen along the temporalis muscle deep to the left zygomatic arch. The residual mass at this location measures 1.5 x 1.2 cm and demonstrates hypermetabolism.   Neck: 1}  No abnormal lymph node.     PET/CT 3/8/2022  IMPRESSION: In this patient with recurrent squamous cell carcinoma of the mandible;  1. No evidence of distant metastasis in the body.  2. Gastric fundus FDG avid wall thickening can be seen with gastritis. Consider direct visualization.  3. Focal uptake to the anorectal region without corresponding soft tissue lesion in the CT images, probably inflammatory/physiologic. Clinical correlation is advised.   4. See dedicated neuroradiology report for the results of the high resolution PET CT of the neck.     Path: Reviewed outside pathology reports from 2020 and FNA from 9/8/2021    Labs: Reviewed    Assessment    Mr. Sharpe is a 56 year old male with recurrent squamous cell carcinoma of the left oral cavity, presenting for discussion of salvage  radiotherapy. He is status post salvage chemotherapy with decreased size and uptake of the two apparent lesions: one within the subcutaneous space of the left cheek and one in the left  and carotid space. The former lies within his previous radiotherapy field, but the latter did not receive a significant amount of radiotherapy per review.    We discussed his diagnosis and management options. The patient would still like to aggressively pursue local control and potentially curative options. Therefore, we discussed local radiotherapy to the gross residual tumors with a small amount of margin with potentially curative intent. We discussed that he has at high risk of relapse given the aggressive nature of his disease, but that we would limit the radiotherapy field to decrease potential toxicities of reirradiation. We discussed that decreasing radiotherapy dose would decrease the anticipated local control, so we recommended 70 Gy in 35 fractions. We also discussed the potential for concurrent chemotherapy, and he was amenable to referral to Dr. Buckley. He reports that his previous insurance issues with [facility] have been resolved.    His questions were answered to his stated satisfaction, and informed consent was obtained.    Plan   CT simulation for radiotherapy planning performed today  Tentatively plan to begin radiotherapy on 4/11/2022, 7000 cGy in 35 daily fractions  Medical oncology will be contacted to discuss potential concurrent systemic therapy options  Referral made to [facility] dentistry for preradiotherapy evaluation    The patient was seen and evaluated with staff, Dr. Amezquita.    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 Shepard  [facility] at [facility],  Dates: 6/30/2020 to 8/17/2020  Dose:6600 cGy    Prior chemotherapy: yes    Prior hormonal therapy:No    Pain Eval:  Denies    Psychosocial  Living arrangements: with wife  Fall Risk: independent   referral needs: Not needed    Advanced Directive: No  Implantable Cardiac Device? No    LMP: No LMP for male patient.    Nurse face-to-face time: Level 3:  10 min face to face time          Review of Systems   HENT: Positive for hearing loss.         Xerostomia     Gastrointestinal: Positive for nausea.   Musculoskeletal: Positive for joint pain.   Neurological: Positive for sensory change and headaches.   Psychiatric/Behavioral: Positive for depression.     Again, thank you for allowing me to participate in the care of your patient.        Sincerely,        Bc Amezquita MD

## 2022-04-07 ENCOUNTER — ONCOLOGY VISIT (OUTPATIENT)
Dept: ONCOLOGY | Facility: CLINIC | Age: 57
End: 2022-04-07
Attending: INTERNAL MEDICINE
Payer: COMMERCIAL

## 2022-04-07 VITALS
RESPIRATION RATE: 16 BRPM | OXYGEN SATURATION: 100 % | TEMPERATURE: 98.2 F | DIASTOLIC BLOOD PRESSURE: 83 MMHG | WEIGHT: 195.3 LBS | HEIGHT: 70 IN | SYSTOLIC BLOOD PRESSURE: 171 MMHG | HEART RATE: 78 BPM | BODY MASS INDEX: 27.96 KG/M2

## 2022-04-07 DIAGNOSIS — C41.1 SQUAMOUS CELL CARCINOMA OF MANDIBLE (H): Primary | ICD-10-CM

## 2022-04-07 PROCEDURE — G0463 HOSPITAL OUTPT CLINIC VISIT: HCPCS

## 2022-04-07 PROCEDURE — 99215 OFFICE O/P EST HI 40 MIN: CPT | Performed by: INTERNAL MEDICINE

## 2022-04-07 ASSESSMENT — PAIN SCALES - GENERAL: PAINLEVEL: NO PAIN (0)

## 2022-04-07 NOTE — NURSING NOTE
"Oncology Rooming Note    April 7, 2022 11:24 AM   Antonio Sharpe is a 56 year old male who presents for:    Chief Complaint   Patient presents with     Oncology Clinic Visit     [facility] RETURN - [facility] MANDIBLE     Initial Vitals: BP (!) 171/83   Pulse 78   Temp 98.2  F (36.8  C) (Oral)   Resp 16   Ht 1.778 m (5' 10\")   Wt 88.6 kg (195 lb 4.8 oz)   SpO2 100%   BMI 28.02 kg/m   Estimated body mass index is 28.02 kg/m  as calculated from the following:    Height as of this encounter: 1.778 m (5' 10\").    Weight as of this encounter: 88.6 kg (195 lb 4.8 oz). Body surface area is 2.09 meters squared.  No Pain (0) Comment: Data Unavailable   No LMP for male patient.  Allergies reviewed: Yes  Medications reviewed: Yes    Medications: Medication refills not needed today.  Pharmacy name entered into [employer]: [id number] - [place], [place] - [address] [facility] AT [facility] [place]    Clinical concerns: No new concerns. Ina was notified.      Jaydon Moore LPN            "

## 2022-04-07 NOTE — PROGRESS NOTES
[facility] CANCER [facility]    PATIENT NAME: Antonio Sharpe  MRN # [id number]   DATE OF VISIT: April 7, 2022  YOB: 1965     Referring Provider: Dr. Aditya Swartz, Otolaryngology  PCP: None    CANCER TYPE: SCC L mandible/alveolar ridge, buccal mucosa  STAGE: [id number]  ECOG PS: 0    PD-L1: 2% on L cheek lesion 9/2021  NGS:     SUMMARY  4/28/20 Left marginal mandibulectomy, WLE of the left buccal mucosa, left posterior maxillectomy, left partial pharyngectomy, left selective neck dissection, right radial forearm free flap, mandibular plating, tracheostomy, and feeding tube placement (Dr. Triana at [facility]). Path: SCC, 5.7 cm moderately differentiated, DOI 1.5 cm, + PNI, no LVI, negative margins, 5/33 nodes positive, + MARIA ELENA  6/30~8/17/20 Chemoradiation 6600 cGy with HD cisplatin (Dr. Serna at [facility])  8/11/21 CT neck. 3 x 2.8 x 3.6 cm mass at the reconstruction flap within the subcutaneous fat of the L cheek abutting masseter medially and skin laterally, extending to parotid, abutting carotid.   8/16/21 FNA L cheek. Path: Highly suspicious for SCC  8/27/21 PET/CT.   9/2/21 MRI neck.   9/8/21 FNA L cheek lesion (Dr. Swartz). Path: SCC  10/21/21 C1 carboplatin 5FU pembrolizumab c/b severe mucositis. C2 pembro alone due to ongoing resolving mucositis. DYPD testing negative  12/14/21~1/1/22 C3-4 carboplatin 5FU pembrolizumab. 5FU dose reduced 50%  1/25~2/15/22 C5-6 carboplatin pembrolizumab, no FU  3/8/22 PET/CT. Decreased size and FDG uptake of the mass along the temporalis muscle, deep to the L zygomatic arch, 1.5  1.3 cm (SUV 10.7), peristent linear lucency on CT immediately underneath the anterior cortical bone of the anterior mandible at the  Midline (SUV 10.5), extending to the medullary cavity of the anterior mandible where there is subtle corresponding lucency, similar to prior, likely post surgical. Unchanged thyroid nodule. Gastric fundus thickening which can be seen with gastritis, recommend  direct visualization, focal anorectal uptake, likely physiologic, attention on follow up    All prior care was at [facility] with Dr. Tarango, Dr. Christ Stewart (ENT), Dr. Kong Reece (Otolaryngology at [facility]), and Dr. Serna (Radiation Oncology)    ASSESSMENT AND PLAN   SCC oral cavity, recurrent: SD. Plan for concurrent chemoradiation to the residual disease. Dr. Amezquita's note reviewed. Not a candidate for cisplatin. Will go with weekly carboplatin paclitaxel. Potential side effects reviewed, including rare but very serious and even potentially life threatening infusion reactions. Discussed necessity of using relatively high doses of dexamethosone, which was a huge problem with insomnia before. Discussed we can try to decrease it prior to the 3rd dose assuming no infusion reactions before that. I will see    Gastric and anal FDG uptake: No symptoms. EGD. Anal uptake likely hemorrhoid, will re-evaluate later.     Hearing loss: Chronic. Precludes cisplatin    Tumor erosion through the skin: Monitor for infection    Tobacco dependence, ETOH: Spent time discussing the critical importance of cessation, impact of ongoing tobacco and ETOH on carcinogenesis, treatment response and treatment tolerance, data showing that people who cannot or don't want to quit do worse than people who aren't smoking. Gave information for state's quit plan for when he's ready. Right now in the contemplative stage.                   Delores Buckley MD  Associate Professor of Medicine  Hematology, Oncology and Transplantation      KESHIA Alvarez returns for discussion of chemo in conjunction with repeat radiation - one of the lesions was in the prior treatment field, the other not.    Has chronic hearing loss. No better than after chemoradiation. Ringing  Has port  Went to [place] in early [place] - beautiful, had a good time.   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 Treated at [facility] C spine  Appy 1971  Hearing loss L ear after radiation s/p myringotomy  Chronic tinnitus secondary to cisplatin  Hemorrhoids    CURRENT OUTPATIENT MEDICATIONS  Current Outpatient Medications   Medication Sig Dispense Refill     acetaminophen (TYLENOL) 500 MG tablet Take 500 mg by mouth (Patient not taking: Reported on 2/7/2022)       ALLERGIES  No Known Allergies     REVIEW OF SYSTEMS  As above in the HPI, o/w complete 12-point ROS was negative.    PHYSICAL EXAM  BP (!) 171/83   Pulse 78   Temp 98.2  F (36.8  C) (Oral)   Resp 16   Ht 1.778 m (5' 10\")   Wt 88.6 kg (195 lb 4.8 oz)   SpO2 100%   BMI 28.02 kg/m    GEN: NAD  HEENT: EOMI, no icterus, injection or pallor  LUNGS: clear bilaterally  CV: regular, no murmurs, rubs, or gallops  EXT: no edema  NEURO: alert    LABORATORY AND IMAGING STUDIES  Labs from [facility] reviewed and independently interpreted by me    CT Soft Tissue Neck w Contrast  Narrative: PET CT fusion examination 3/8/2022 2:13 PM  1. Neck CT with contrast  2. PET study of the neck  3. PET CT fusion study of the neck    History:  Buccal mucosa squamous cell carcinoma.    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 He presented with early  recurrence/residual disease and given the lower rate of cure, elected  to proceed with palliative chemotherapy. ?    Comparison: Neck CT 8/26/2021; outside neck CT 12/29/2021, 11/2/2021;  outside neck MRI 12/29/2021.    Technique: Please refer to the accompanying whole body PET-CT for  report of the dose and whole body PET-CT findings.  Regarding the neck, axial " images were obtained after nonionic  intravenous contrast administration, with sagittal and coronal  reconstructions performed. Neck CT images were reviewed in bone, soft  tissue, and lung windows, with review of the fused PET-CT images as  well in multiple planes.    Findings:  Postsurgical changes of left mandible resection with free flap  reconstruction and left neck dissection. Postradiation changes. Left  submandibular gland is surgically absent. Left parotid gland is  atrophic. Right parotid gland and right submandibular gland are  normal. Thyroid gland is normal.    Resolution of the previously seen hypermetabolic mass overlying the  left masseter muscle.     Decreased size and FDG uptake of mass along the temporalis muscle,  deep to left zygomatic arch. It now measures 1.5 x 1.3 cm an with max  SUV of 10.7, previously 2.7 cm x 2.1 cm with max SUV of 11.9.  On CT there is persistent linear lucency on CT immediately underneath  the anterior cortical bone of the anterior mandible at the midline  with corresponding FDG uptake of 10.5 (Series 4, image 81). Uptake  extends to the subjacent medullary cavity of the anterior mandible  where there is subtle corresponding lucency. This appearance also is  similar to prior imaging and is felt to be postsurgical in etiology.  No associated soft tissue component.    Evaluation of the mucosal space demonstrates no abnormality or  abnormal metabolic uptake on PET CT in the nasopharynx, oropharynx,  hypopharynx or the glottis. The tongue base appears normal. Unchanged  non-FDG avid 0.6 cm hypoattenuating thyroid nodule within the left  thyroid lobe. There is no evident cervical lymphadenopathy, and the  cervical lymph nodes are within normal limits by size criteria.     Limited evaluation of the cervical vertebral column demonstrates no  evident spinal canal stenosis. The visualized paranasal sinuses and  mastoid air cells are clear. The major vascular structures in the  neck  are patent.    The visualized lung apices appear clear. Mild paraseptal emphysema and  minimal dependent bibasilar atelectasis. Partially visualized right IJ  Port-A-Cath.    Please refer to the whole body PET CT performed as a separate report,  for the findings of the remainder of the body.   Impression: Impression:    Primary: 4} One of the 2 previously seen FDG avid masses along the  left side of the face is resolved. Interval decrease in the size and  FDG metabolism of the more cranially located mass seen along the  temporalis muscle deep to the left zygomatic arch. The residual mass  at this location measures 1.5 x 1.2 cm and demonstrates  hypermetabolism.     Neck: 1}  No abnormal lymph node.     CECT Surveillance Legend:    Primary  1: No evidence of recurrence: routine surveillance  2: Low suspicion    a) Superficial abnormality (skin, mucosal surface): direct visual  inspection    b) Ill-defined deep abnormality: short interval follow-up* or PET  3: High suspicion (new or enlarging discrete nodule/mass): biopsy  4: Definitive recurrence (path proven or clinical progression): no  biopsy needed    Nodes  1: No evidence of recurrence: routine surveillance  2: Low suspicion (ill-defined): short interval follow-up or PET  3: High suspicion (new or enlarging lymph node): biopsy if clinically  needed  4: Definitive recurrence (path proven or clinical progression): no  biopsy needed    *short interval follow-up: 3 months at our institution    I have personally reviewed the examination and initial interpretation  and I agree with the findings.    LANCE NEWMAN MD         SYSTEM ID:  [id number]  PET Oncology Whole Body  Narrative: Combined Report of:    PET and CT on  3/8/2022 2:09 PM :    1. PET of the neck, chest, abdomen, and pelvis.  2. PET CT Fusion for Attenuation Correction and Anatomical  Localization:    3. Diagnostic CT scan of the chest, abdomen, and pelvis with  intravenous contrast for  interpretation.  3. CT of the chest, abdomen and pelvis obtained for diagnostic  interpretation.  4. 3D MIP and PET-CT fused images were processed on an independent  workstation and archived to PACS and reviewed by a radiologist.    Technique:    1. PET: The patient received 11.38 mCi of F-18-FDG; the serum glucose  was 102 prior to administration, body weight was 86.2 kg. Images were  evaluated in the axial, sagittal, and coronal planes as well as the  rotational whole body MIP. Images were acquired from the Vertex to the  Feet.    UPTAKE WAS MEASURED AT 67 MINUTES.     BACKGROUND:  Liver SUV max= 3.65,   Aorta Blood SUV Max: 2.58.     2. CT: Volumetric acquisition for clinical interpretation of the  chest, abdomen, and pelvis acquired at 3 mm sections . The chest,  abdomen, and pelvis were evaluated at 5 mm sections in bone, soft  tissue, and lung windows.      The patient received 116 cc of Isovue 370 intravenously for the  examination.      3. 3D MIP and PET-CT fused images were processed on an independent  workstation and archived to PACS and reviewed by a radiologist.    INDICATION: T4a N3b squamous cell carcinoma of the left buccal mucosa  and alveolus; Buccal mucosa squamous cell carcinoma (H)    ADDITIONAL INFORMATION OBTAINED FROM EMR:     He had a prior history of T4a N3b squamous cell carcinoma of the left  buccal mucosa and alveolus. He underwent a left marginal  mandibulectomy, left buccal resection, left posterior maxillectomy,  left neck dissection and reconstruction with a right radial forearm  free flap. ?He had an intraoperative positive margin, which was  subsequently cleared and had aggressive kenn disease with five  positive nodes out of 33 lymph nodes in the specimen, some of which  had external extension and perineural invasion. ?He received adjuvant  chemoradiation therapy at [facility] which he completed in August 2020. ?Shortly after that operation, he felt a mass in the left  cheek  that had persisted. ?On his three three-month PET scan, there were two  areas of recurrent disease. ?One was within the cutaneous and  subcutaneous aspect of the left cheek abutting the masseter muscle and  a second mass in the left infratemporal fossa with concern for  invasion of the zygomatic arch. We reviewed the anticipated surgical  approach, which would be a resection of the left cheek and  infratemporal fossa with certain resulting facial nerve paralysis and  need for free tissue reconstruction. ?He elected not to go forward  with this operation and has been maintained on palliative  chemotherapy. ?He was initially prescribed 5-FU, pembrolizumab and  carboplatin. ?Due side effects to 5-FU and carboplatin, have been  omitted or reduced on several of the cycles. ?He presents again today  for reevaluation regarding surgical resectability.     COMPARISON: PET/CT 8/26/2021.    FINDINGS:     HEAD/NECK:  See dedicated neuroradiology report for the results of the high  resolution PET CT of the neck.     CHEST:  There is no suspicious FDG uptake in the chest.     There are no pathologically enlarged mediastinal, hilar or axillary  lymph nodes.  There are no suspicious lung nodules or evidence for infection. Mild  paraseptal emphysema.     There is no significant pericardial or pleural effusions. Moderate  atherosclerosis of the coronary arteries. Mild hiatal hernia. Right  chest wall Port-A-Cath with its tip in the cavoatrial junction.    ABDOMEN AND PELVIS:  There is no suspicious FDG uptake in the abdomen or pelvis.    Diffuse FDG uptake by the gastric fundus wall (SUV max of 7.1) (Series  3 image 173).    Focal uptake to the anorectal region without corresponding soft tissue  lesion in the CT images with SUV max of 13.8 (series 3 image 286),  likely inflammatory/physiological in etiology but recommend direct  visualization to exclude malignancy.    There are no suspicious hepatic lesions. Scattered  hypodense foci in  the liver, likely representing hepatic cysts. There is no splenomegaly  or evidence for splenic or pancreatic mass lesion.  There are no suspicious adrenal mass lesions or opaque gallbladder  calculi. There is symmetric nephrographic renal phase without  hydronephrosis. Right extrarenal pelvis.   There is no evidence for diverticulitis, bowel obstruction or free  fluid. Prostatomegaly. Mild right hydrocele.    LOWER EXTREMITIES:   No abnormal masses or hypermetabolic lesions.    BONES:   There are no suspicious lytic or blastic osseous lesions.  There is no  abnormal FDG uptake in the skeleton.  Impression: IMPRESSION: In this patient with recurrent squamous cell carcinoma of  the mandible;  1. No evidence of distant metastasis in the body.  2. Gastric fundus FDG avid wall thickening can be seen with gastritis.  Consider direct visualization.  3. Focal uptake to the anorectal region without corresponding soft  tissue lesion in the CT images, probably inflammatory/physiologic.  Clinical correlation is advised.   4. See dedicated neuroradiology report for the results of the high  resolution PET CT of the neck.     I have personally reviewed the examination and initial interpretation  and I agree with the findings.    LANCE NEWMAN MD         SYSTEM ID:  [id number]     Imaging was personally reviewed and interpreted by me

## 2022-04-07 NOTE — LETTER
4/7/2022         RE: Antonio Sharpe  [address] [place] [address]        Dear Colleague,    Thank you for referring your patient, Antonio Sharpe, to the [facility]. Please see a copy of my visit note below.       [facility]    PATIENT NAME: Antonio Sharpe  MRN # [id number]   DATE OF VISIT: April 7, 2022  YOB: 1965     Referring Provider: Dr. Aditya Swartz, Otolaryngology  PCP: None    CANCER TYPE: SCC L mandible/alveolar ridge, buccal mucosa  STAGE: [id number]  ECOG PS: 0    PD-L1: 2% on L cheek lesion 9/2021  NGS:     SUMMARY  4/28/20 Left marginal mandibulectomy, WLE of the left buccal mucosa, left posterior maxillectomy, left partial pharyngectomy, left selective neck dissection, right radial forearm free flap, mandibular plating, tracheostomy, and feeding tube placement (Dr. Triana at [facility]). Path: SCC, 5.7 cm moderately differentiated, DOI 1.5 cm, + PNI, no LVI, negative margins, 5/33 nodes positive, + MARIA ELENA  6/30~8/17/20 Chemoradiation 6600 cGy with HD cisplatin (Dr. Serna at [facility])  8/11/21 CT neck. 3 x 2.8 x 3.6 cm mass at the reconstruction flap within the subcutaneous fat of the L cheek abutting masseter medially and skin laterally, extending to parotid, abutting carotid.   8/16/21 FNA L cheek. Path: Highly suspicious for SCC  8/27/21 PET/CT.   9/2/21 MRI neck.   9/8/21 FNA L cheek lesion (Dr. Swartz). Path: SCC  10/21/21 C1 carboplatin 5FU pembrolizumab c/b severe mucositis. C2 pembro alone due to ongoing resolving mucositis. DYPD testing negative  12/14/21~1/1/22 C3-4 carboplatin 5FU pembrolizumab. 5FU dose reduced 50%  1/25~2/15/22 C5-6 carboplatin pembrolizumab, no FU  3/8/22 PET/CT. Decreased size and FDG uptake of the mass along the temporalis muscle, deep to the L zygomatic arch, 1.5  1.3 cm (SUV 10.7), peristent linear lucency on CT immediately underneath the anterior cortical bone of the anterior mandible at the  Midline  (SUV 10.5), extending to the medullary cavity of the anterior mandible where there is subtle corresponding lucency, similar to prior, likely post surgical. Unchanged thyroid nodule. Gastric fundus thickening which can be seen with gastritis, recommend direct visualization, focal anorectal uptake, likely physiologic, attention on follow up    All prior care was at [facility] with Dr. Tarango, Dr. Christ Stewart (ENT), Dr. Kong Reece (Otolaryngology at [facility]), and Dr. Serna (Radiation Oncology)    ASSESSMENT AND PLAN   SCC oral cavity, recurrent: SD. Plan for concurrent chemoradiation to the residual disease. Dr. Amezquita's note reviewed. Not a candidate for cisplatin. Will go with weekly carboplatin paclitaxel. Potential side effects reviewed, including rare but very serious and even potentially life threatening infusion reactions. Discussed necessity of using relatively high doses of dexamethosone, which was a huge problem with insomnia before. Discussed we can try to decrease it prior to the 3rd dose assuming no infusion reactions before that. I will see    Gastric and anal FDG uptake: No symptoms. EGD. Anal uptake likely hemorrhoid, will re-evaluate later.     Hearing loss: Chronic. Precludes cisplatin    Tumor erosion through the skin: Monitor for infection    Tobacco dependence, ETOH: Spent time discussing the critical importance of cessation, impact of ongoing tobacco and ETOH on carcinogenesis, treatment response and treatment tolerance, data showing that people who cannot or don't want to quit do worse than people who aren't smoking. Gave information for state's quit plan for when he's ready. Right now in the contemplative stage.                   Delores Buckley MD  Associate Professor of Medicine  Hematology, Oncology and Transplantation      [facility] nena for discussion of chemo in conjunction with repeat radiation - one of the lesions was in the prior treatment field, the other not.  "   Has chronic hearing loss. No better than after chemoradiation. Ringing  Has port  Went to [place] in early [place] - beautiful, had a good time.   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 Treated at [facility] C spine  Appy 1971  Hearing loss L ear after radiation s/p myringotomy  Chronic tinnitus secondary to cisplatin  Hemorrhoids    CURRENT OUTPATIENT MEDICATIONS  Current Outpatient Medications   Medication Sig Dispense Refill     acetaminophen (TYLENOL) 500 MG tablet Take 500 mg by mouth (Patient not taking: Reported on 2/7/2022)       ALLERGIES  No Known Allergies     REVIEW OF SYSTEMS  As above in the HPI, o/w complete 12-point ROS was negative.    PHYSICAL EXAM  BP (!) 171/83   Pulse 78   Temp 98.2  F (36.8  C) (Oral)   Resp 16   Ht 1.778 m (5' 10\")   Wt 88.6 kg (195 lb 4.8 oz)   SpO2 100%   BMI 28.02 kg/m    GEN: NAD  HEENT: EOMI, no icterus, injection or pallor  LUNGS: clear bilaterally  CV: regular, no murmurs, rubs, or gallops  EXT: no edema  NEURO: alert    LABORATORY AND IMAGING STUDIES  Labs from [facility] reviewed and independently interpreted by me    CT Soft Tissue Neck w Contrast  Narrative: PET CT fusion examination 3/8/2022 2:13 PM  1. Neck CT with contrast  2. PET study of the neck  3. PET CT fusion study of the neck    History:  Buccal mucosa squamous cell carcinoma.    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 He presented with early  recurrence/residual disease and given the lower rate of cure, elected  to proceed with palliative chemotherapy. " ?    Comparison: Neck CT 8/26/2021; outside neck CT 12/29/2021, 11/2/2021;  outside neck MRI 12/29/2021.    Technique: Please refer to the accompanying whole body PET-CT for  report of the dose and whole body PET-CT findings.  Regarding the neck, axial images were obtained after nonionic  intravenous contrast administration, with sagittal and coronal  reconstructions performed. Neck CT images were reviewed in bone, soft  tissue, and lung windows, with review of the fused PET-CT images as  well in multiple planes.    Findings:  Postsurgical changes of left mandible resection with free flap  reconstruction and left neck dissection. Postradiation changes. Left  submandibular gland is surgically absent. Left parotid gland is  atrophic. Right parotid gland and right submandibular gland are  normal. Thyroid gland is normal.    Resolution of the previously seen hypermetabolic mass overlying the  left masseter muscle.     Decreased size and FDG uptake of mass along the temporalis muscle,  deep to left zygomatic arch. It now measures 1.5 x 1.3 cm an with max  SUV of 10.7, previously 2.7 cm x 2.1 cm with max SUV of 11.9.  On CT there is persistent linear lucency on CT immediately underneath  the anterior cortical bone of the anterior mandible at the midline  with corresponding FDG uptake of 10.5 (Series 4, image 81). Uptake  extends to the subjacent medullary cavity of the anterior mandible  where there is subtle corresponding lucency. This appearance also is  similar to prior imaging and is felt to be postsurgical in etiology.  No associated soft tissue component.    Evaluation of the mucosal space demonstrates no abnormality or  abnormal metabolic uptake on PET CT in the nasopharynx, oropharynx,  hypopharynx or the glottis. The tongue base appears normal. Unchanged  non-FDG avid 0.6 cm hypoattenuating thyroid nodule within the left  thyroid lobe. There is no evident cervical lymphadenopathy, and the  cervical lymph nodes  are within normal limits by size criteria.     Limited evaluation of the cervical vertebral column demonstrates no  evident spinal canal stenosis. The visualized paranasal sinuses and  mastoid air cells are clear. The major vascular structures in the neck  are patent.    The visualized lung apices appear clear. Mild paraseptal emphysema and  minimal dependent bibasilar atelectasis. Partially visualized right IJ  Port-A-Cath.    Please refer to the whole body PET CT performed as a separate report,  for the findings of the remainder of the body.   Impression: Impression:    Primary: 4} One of the 2 previously seen FDG avid masses along the  left side of the face is resolved. Interval decrease in the size and  FDG metabolism of the more cranially located mass seen along the  temporalis muscle deep to the left zygomatic arch. The residual mass  at this location measures 1.5 x 1.2 cm and demonstrates  hypermetabolism.     Neck: 1}  No abnormal lymph node.     CECT Surveillance Legend:    Primary  1: No evidence of recurrence: routine surveillance  2: Low suspicion    a) Superficial abnormality (skin, mucosal surface): direct visual  inspection    b) Ill-defined deep abnormality: short interval follow-up* or PET  3: High suspicion (new or enlarging discrete nodule/mass): biopsy  4: Definitive recurrence (path proven or clinical progression): no  biopsy needed    Nodes  1: No evidence of recurrence: routine surveillance  2: Low suspicion (ill-defined): short interval follow-up or PET  3: High suspicion (new or enlarging lymph node): biopsy if clinically  needed  4: Definitive recurrence (path proven or clinical progression): no  biopsy needed    *short interval follow-up: 3 months at our institution    I have personally reviewed the examination and initial interpretation  and I agree with the findings.    LANCE NEWMAN MD         SYSTEM ID:  [id number]  PET Oncology Whole Body  Narrative: Combined Report of:    PET and CT on   3/8/2022 2:09 PM :    1. PET of the neck, chest, abdomen, and pelvis.  2. PET CT Fusion for Attenuation Correction and Anatomical  Localization:    3. Diagnostic CT scan of the chest, abdomen, and pelvis with  intravenous contrast for interpretation.  3. CT of the chest, abdomen and pelvis obtained for diagnostic  interpretation.  4. 3D MIP and PET-CT fused images were processed on an independent  workstation and archived to PACS and reviewed by a radiologist.    Technique:    1. PET: The patient received 11.38 mCi of F-18-FDG; the serum glucose  was 102 prior to administration, body weight was 86.2 kg. Images were  evaluated in the axial, sagittal, and coronal planes as well as the  rotational whole body MIP. Images were acquired from the Vertex to the  Feet.    UPTAKE WAS MEASURED AT 67 MINUTES.     BACKGROUND:  Liver SUV max= 3.65,   Aorta Blood SUV Max: 2.58.     2. CT: Volumetric acquisition for clinical interpretation of the  chest, abdomen, and pelvis acquired at 3 mm sections . The chest,  abdomen, and pelvis were evaluated at 5 mm sections in bone, soft  tissue, and lung windows.      The patient received 116 cc of Isovue 370 intravenously for the  examination.      3. 3D MIP and PET-CT fused images were processed on an independent  workstation and archived to PACS and reviewed by a radiologist.    INDICATION: T4a N3b squamous cell carcinoma of the left buccal mucosa  and alveolus; Buccal mucosa squamous cell carcinoma (H)    ADDITIONAL INFORMATION OBTAINED FROM EMR:     He had a prior history of T4a N3b squamous cell carcinoma of the left  buccal mucosa and alveolus. He underwent a left marginal  mandibulectomy, left buccal resection, left posterior maxillectomy,  left neck dissection and reconstruction with a right radial forearm  free flap. ?He had an intraoperative positive margin, which was  subsequently cleared and had aggressive kenn disease with five  positive nodes out of 33 lymph nodes in the  specimen, some of which  had external extension and perineural invasion. ?He received adjuvant  chemoradiation therapy at [facility] which he completed in August 2020. ?Shortly after that operation, he felt a mass in the left cheek  that had persisted. ?On his three three-month PET scan, there were two  areas of recurrent disease. ?One was within the cutaneous and  subcutaneous aspect of the left cheek abutting the masseter muscle and  a second mass in the left infratemporal fossa with concern for  invasion of the zygomatic arch. We reviewed the anticipated surgical  approach, which would be a resection of the left cheek and  infratemporal fossa with certain resulting facial nerve paralysis and  need for free tissue reconstruction. ?He elected not to go forward  with this operation and has been maintained on palliative  chemotherapy. ?He was initially prescribed 5-FU, pembrolizumab and  carboplatin. ?Due side effects to 5-FU and carboplatin, have been  omitted or reduced on several of the cycles. ?He presents again today  for reevaluation regarding surgical resectability.     COMPARISON: PET/CT 8/26/2021.    FINDINGS:     HEAD/NECK:  See dedicated neuroradiology report for the results of the high  resolution PET CT of the neck.     CHEST:  There is no suspicious FDG uptake in the chest.     There are no pathologically enlarged mediastinal, hilar or axillary  lymph nodes.  There are no suspicious lung nodules or evidence for infection. Mild  paraseptal emphysema.     There is no significant pericardial or pleural effusions. Moderate  atherosclerosis of the coronary arteries. Mild hiatal hernia. Right  chest wall Port-A-Cath with its tip in the cavoatrial junction.    ABDOMEN AND PELVIS:  There is no suspicious FDG uptake in the abdomen or pelvis.    Diffuse FDG uptake by the gastric fundus wall (SUV max of 7.1) (Series  3 image 173).    Focal uptake to the anorectal region without corresponding soft  tissue  lesion in the CT images with SUV max of 13.8 (series 3 image 286),  likely inflammatory/physiological in etiology but recommend direct  visualization to exclude malignancy.    There are no suspicious hepatic lesions. Scattered hypodense foci in  the liver, likely representing hepatic cysts. There is no splenomegaly  or evidence for splenic or pancreatic mass lesion.  There are no suspicious adrenal mass lesions or opaque gallbladder  calculi. There is symmetric nephrographic renal phase without  hydronephrosis. Right extrarenal pelvis.   There is no evidence for diverticulitis, bowel obstruction or free  fluid. Prostatomegaly. Mild right hydrocele.    LOWER EXTREMITIES:   No abnormal masses or hypermetabolic lesions.    BONES:   There are no suspicious lytic or blastic osseous lesions.  There is no  abnormal FDG uptake in the skeleton.  Impression: IMPRESSION: In this patient with recurrent squamous cell carcinoma of  the mandible;  1. No evidence of distant metastasis in the body.  2. Gastric fundus FDG avid wall thickening can be seen with gastritis.  Consider direct visualization.  3. Focal uptake to the anorectal region without corresponding soft  tissue lesion in the CT images, probably inflammatory/physiologic.  Clinical correlation is advised.   4. See dedicated neuroradiology report for the results of the high  resolution PET CT of the neck.     I have personally reviewed the examination and initial interpretation  and I agree with the findings.    LANCE NEWMAN MD     SYSTEM ID:  [id number]     Imaging was personally reviewed and interpreted by me         Again, thank you for allowing me to participate in the care of your patient.      Sincerely,    Delores Buckley MD

## 2022-04-10 RX ORDER — LORAZEPAM 0.5 MG/1
0.5 TABLET ORAL EVERY 6 HOURS PRN
Qty: 30 TABLET | Refills: 0 | Status: SHIPPED | OUTPATIENT
Start: 2022-04-10 | End: 2022-04-11

## 2022-04-10 RX ORDER — ONDANSETRON 8 MG/1
8 TABLET, FILM COATED ORAL EVERY 8 HOURS PRN
Qty: 30 TABLET | Refills: 11 | Status: SHIPPED | OUTPATIENT
Start: 2022-04-10 | End: 2022-04-11

## 2022-04-10 RX ORDER — PROCHLORPERAZINE MALEATE 10 MG
10 TABLET ORAL EVERY 6 HOURS PRN
Qty: 30 TABLET | Refills: 5 | Status: SHIPPED | OUTPATIENT
Start: 2022-04-10 | End: 2022-04-11

## 2022-04-11 ENCOUNTER — APPOINTMENT (OUTPATIENT)
Dept: RADIATION ONCOLOGY | Facility: CLINIC | Age: 57
End: 2022-04-11
Attending: RADIOLOGY
Payer: COMMERCIAL

## 2022-04-11 ENCOUNTER — TELEPHONE (OUTPATIENT)
Dept: ONCOLOGY | Facility: CLINIC | Age: 57
End: 2022-04-11
Payer: COMMERCIAL

## 2022-04-11 DIAGNOSIS — D70.1 CHEMOTHERAPY-INDUCED NEUTROPENIA (H): ICD-10-CM

## 2022-04-11 DIAGNOSIS — Z13.29 SCREENING FOR HYPOTHYROIDISM: ICD-10-CM

## 2022-04-11 DIAGNOSIS — C41.1 SQUAMOUS CELL CARCINOMA OF MANDIBLE (H): Primary | ICD-10-CM

## 2022-04-11 DIAGNOSIS — T45.1X5A CHEMOTHERAPY-INDUCED NEUTROPENIA (H): ICD-10-CM

## 2022-04-11 PROCEDURE — 77386 HC IMRT TREATMENT DELIVERY, COMPLEX: CPT | Performed by: RADIOLOGY

## 2022-04-11 PROCEDURE — 77014 PR CT GUIDE FOR PLACEMENT RADIATION THERAPY FIELDS: CPT | Mod: 26 | Performed by: RADIOLOGY

## 2022-04-11 PROCEDURE — 77333 RADIATION TREATMENT AID(S): CPT | Mod: 26 | Performed by: RADIOLOGY

## 2022-04-11 PROCEDURE — 77333 RADIATION TREATMENT AID(S): CPT | Performed by: RADIOLOGY

## 2022-04-11 RX ORDER — LORAZEPAM 2 MG/ML
0.5 INJECTION INTRAMUSCULAR EVERY 4 HOURS PRN
Status: CANCELLED | OUTPATIENT
Start: 2022-04-12

## 2022-04-11 RX ORDER — EPINEPHRINE 1 MG/ML
0.3 INJECTION, SOLUTION INTRAMUSCULAR; SUBCUTANEOUS EVERY 5 MIN PRN
Status: CANCELLED | OUTPATIENT
Start: 2022-04-12

## 2022-04-11 RX ORDER — NALOXONE HYDROCHLORIDE 0.4 MG/ML
0.2 INJECTION, SOLUTION INTRAMUSCULAR; INTRAVENOUS; SUBCUTANEOUS
Status: CANCELLED | OUTPATIENT
Start: 2022-04-12

## 2022-04-11 RX ORDER — ALBUTEROL SULFATE 90 UG/1
1-2 AEROSOL, METERED RESPIRATORY (INHALATION)
Status: CANCELLED
Start: 2022-04-12

## 2022-04-11 RX ORDER — ALBUTEROL SULFATE 0.83 MG/ML
2.5 SOLUTION RESPIRATORY (INHALATION)
Status: CANCELLED | OUTPATIENT
Start: 2022-04-12

## 2022-04-11 RX ORDER — DIPHENHYDRAMINE HYDROCHLORIDE 50 MG/ML
50 INJECTION INTRAMUSCULAR; INTRAVENOUS
Status: CANCELLED
Start: 2022-04-12

## 2022-04-11 RX ORDER — MEPERIDINE HYDROCHLORIDE 25 MG/ML
25 INJECTION INTRAMUSCULAR; INTRAVENOUS; SUBCUTANEOUS EVERY 30 MIN PRN
Status: CANCELLED | OUTPATIENT
Start: 2022-04-12

## 2022-04-11 RX ORDER — DIPHENHYDRAMINE HCL 25 MG
50 CAPSULE ORAL ONCE
Status: CANCELLED
Start: 2022-04-12

## 2022-04-11 RX ORDER — HEPARIN SODIUM (PORCINE) LOCK FLUSH IV SOLN 100 UNIT/ML 100 UNIT/ML
5 SOLUTION INTRAVENOUS
Status: CANCELLED | OUTPATIENT
Start: 2022-04-12

## 2022-04-11 RX ORDER — HEPARIN SODIUM,PORCINE 10 UNIT/ML
5 VIAL (ML) INTRAVENOUS
Status: CANCELLED | OUTPATIENT
Start: 2022-04-12

## 2022-04-11 RX ORDER — METHYLPREDNISOLONE SODIUM SUCCINATE 125 MG/2ML
125 INJECTION, POWDER, LYOPHILIZED, FOR SOLUTION INTRAMUSCULAR; INTRAVENOUS
Status: CANCELLED
Start: 2022-04-12

## 2022-04-11 NOTE — TELEPHONE ENCOUNTER
PA Initiation    Medication:   Insurance Company: [employer] - Phone [phone] Fax [phone]  Pharmacy Filling the Rx: [facility] [place], [place] - [address] [place] [address] [phone]  Filling Pharmacy Phone:    Filling Pharmacy Fax:    Start Date: 4/11/2022

## 2022-04-12 ENCOUNTER — APPOINTMENT (OUTPATIENT)
Dept: RADIATION ONCOLOGY | Facility: CLINIC | Age: 57
End: 2022-04-12
Attending: RADIOLOGY
Payer: COMMERCIAL

## 2022-04-12 ENCOUNTER — INFUSION THERAPY VISIT (OUTPATIENT)
Dept: ONCOLOGY | Facility: CLINIC | Age: 57
End: 2022-04-12
Attending: INTERNAL MEDICINE
Payer: COMMERCIAL

## 2022-04-12 ENCOUNTER — APPOINTMENT (OUTPATIENT)
Dept: LAB | Facility: CLINIC | Age: 57
End: 2022-04-12
Attending: INTERNAL MEDICINE
Payer: COMMERCIAL

## 2022-04-12 ENCOUNTER — PATIENT OUTREACH (OUTPATIENT)
Dept: ONCOLOGY | Facility: CLINIC | Age: 57
End: 2022-04-12

## 2022-04-12 VITALS
WEIGHT: 193.6 LBS | HEART RATE: 80 BPM | SYSTOLIC BLOOD PRESSURE: 152 MMHG | BODY MASS INDEX: 27.78 KG/M2 | RESPIRATION RATE: 14 BRPM | OXYGEN SATURATION: 98 % | DIASTOLIC BLOOD PRESSURE: 73 MMHG | TEMPERATURE: 97.7 F

## 2022-04-12 DIAGNOSIS — C41.1 SQUAMOUS CELL CARCINOMA OF MANDIBLE (H): Primary | ICD-10-CM

## 2022-04-12 DIAGNOSIS — E83.42 HYPOMAGNESEMIA: ICD-10-CM

## 2022-04-12 DIAGNOSIS — D70.1 CHEMOTHERAPY-INDUCED NEUTROPENIA (H): ICD-10-CM

## 2022-04-12 DIAGNOSIS — T45.1X5A CHEMOTHERAPY-INDUCED NEUTROPENIA (H): ICD-10-CM

## 2022-04-12 DIAGNOSIS — Z13.29 SCREENING FOR HYPOTHYROIDISM: ICD-10-CM

## 2022-04-12 LAB
ALBUMIN SERPL-MCNC: 3.4 G/DL (ref 3.4–5)
ALP SERPL-CCNC: 61 U/L (ref 40–150)
ALT SERPL W P-5'-P-CCNC: 54 U/L (ref 0–70)
ANION GAP SERPL CALCULATED.3IONS-SCNC: 4 MMOL/L (ref 3–14)
AST SERPL W P-5'-P-CCNC: 41 U/L (ref 0–45)
BASOPHILS # BLD AUTO: 0.1 10E3/UL (ref 0–0.2)
BASOPHILS NFR BLD AUTO: 1 %
BILIRUB SERPL-MCNC: 0.4 MG/DL (ref 0.2–1.3)
BUN SERPL-MCNC: 22 MG/DL (ref 7–30)
CALCIUM SERPL-MCNC: 9.1 MG/DL (ref 8.5–10.1)
CHLORIDE BLD-SCNC: 105 MMOL/L (ref 94–109)
CO2 SERPL-SCNC: 28 MMOL/L (ref 20–32)
CREAT SERPL-MCNC: 0.9 MG/DL (ref 0.66–1.25)
EOSINOPHIL # BLD AUTO: 0.1 10E3/UL (ref 0–0.7)
EOSINOPHIL NFR BLD AUTO: 1 %
ERYTHROCYTE [DISTWIDTH] IN BLOOD BY AUTOMATED COUNT: 13.3 % (ref 10–15)
GFR SERPL CREATININE-BSD FRML MDRD: >90 ML/MIN/1.73M2
GLUCOSE BLD-MCNC: 98 MG/DL (ref 70–99)
HCT VFR BLD AUTO: 42.5 % (ref 40–53)
HGB BLD-MCNC: 14.1 G/DL (ref 13.3–17.7)
IMM GRANULOCYTES # BLD: 0.1 10E3/UL
IMM GRANULOCYTES NFR BLD: 1 %
LYMPHOCYTES # BLD AUTO: 0.8 10E3/UL (ref 0.8–5.3)
LYMPHOCYTES NFR BLD AUTO: 9 %
MAGNESIUM SERPL-MCNC: 2 MG/DL (ref 1.6–2.3)
MCH RBC QN AUTO: 34.6 PG (ref 26.5–33)
MCHC RBC AUTO-ENTMCNC: 33.2 G/DL (ref 31.5–36.5)
MCV RBC AUTO: 104 FL (ref 78–100)
MONOCYTES # BLD AUTO: 0.9 10E3/UL (ref 0–1.3)
MONOCYTES NFR BLD AUTO: 10 %
NEUTROPHILS # BLD AUTO: 6.5 10E3/UL (ref 1.6–8.3)
NEUTROPHILS NFR BLD AUTO: 78 %
NRBC # BLD AUTO: 0 10E3/UL
NRBC BLD AUTO-RTO: 0 /100
PLATELET # BLD AUTO: 194 10E3/UL (ref 150–450)
POTASSIUM BLD-SCNC: 4.6 MMOL/L (ref 3.4–5.3)
PROT SERPL-MCNC: 7 G/DL (ref 6.8–8.8)
RBC # BLD AUTO: 4.08 10E6/UL (ref 4.4–5.9)
SODIUM SERPL-SCNC: 137 MMOL/L (ref 133–144)
WBC # BLD AUTO: 8.4 10E3/UL (ref 4–11)

## 2022-04-12 PROCEDURE — 96367 TX/PROPH/DG ADDL SEQ IV INF: CPT

## 2022-04-12 PROCEDURE — 80053 COMPREHEN METABOLIC PANEL: CPT

## 2022-04-12 PROCEDURE — 250N000011 HC RX IP 250 OP 636

## 2022-04-12 PROCEDURE — 250N000009 HC RX 250: Performed by: INTERNAL MEDICINE

## 2022-04-12 PROCEDURE — 77386 HC IMRT TREATMENT DELIVERY, COMPLEX: CPT | Performed by: RADIOLOGY

## 2022-04-12 PROCEDURE — 85004 AUTOMATED DIFF WBC COUNT: CPT

## 2022-04-12 PROCEDURE — 36415 COLL VENOUS BLD VENIPUNCTURE: CPT

## 2022-04-12 PROCEDURE — 36593 DECLOT VASCULAR DEVICE: CPT

## 2022-04-12 PROCEDURE — 96375 TX/PRO/DX INJ NEW DRUG ADDON: CPT

## 2022-04-12 PROCEDURE — 258N000003 HC RX IP 258 OP 636: Performed by: INTERNAL MEDICINE

## 2022-04-12 PROCEDURE — 96413 CHEMO IV INFUSION 1 HR: CPT

## 2022-04-12 PROCEDURE — 83735 ASSAY OF MAGNESIUM: CPT

## 2022-04-12 PROCEDURE — 250N000011 HC RX IP 250 OP 636: Performed by: INTERNAL MEDICINE

## 2022-04-12 PROCEDURE — 96417 CHEMO IV INFUS EACH ADDL SEQ: CPT

## 2022-04-12 PROCEDURE — 258N000003 HC RX IP 258 OP 636

## 2022-04-12 RX ORDER — PROCHLORPERAZINE MALEATE 10 MG
10 TABLET ORAL EVERY 6 HOURS PRN
Qty: 30 TABLET | Refills: 11 | Status: SHIPPED | OUTPATIENT
Start: 2022-04-12 | End: 2022-12-19

## 2022-04-12 RX ORDER — HEPARIN SODIUM (PORCINE) LOCK FLUSH IV SOLN 100 UNIT/ML 100 UNIT/ML
5 SOLUTION INTRAVENOUS
Status: DISCONTINUED | OUTPATIENT
Start: 2022-04-12 | End: 2022-04-12 | Stop reason: HOSPADM

## 2022-04-12 RX ORDER — ONDANSETRON 8 MG/1
8 TABLET, FILM COATED ORAL EVERY 8 HOURS PRN
Qty: 30 TABLET | Refills: 11 | Status: SHIPPED | OUTPATIENT
Start: 2022-04-12 | End: 2022-12-19

## 2022-04-12 RX ORDER — IBUPROFEN 200 MG
600 TABLET ORAL EVERY 6 HOURS PRN
Status: ON HOLD | COMMUNITY
End: 2024-01-01

## 2022-04-12 RX ADMIN — ALTEPLASE 2 MG: 2.2 INJECTION, POWDER, LYOPHILIZED, FOR SOLUTION INTRAVENOUS at 07:55

## 2022-04-12 RX ADMIN — FAMOTIDINE 20 MG: 10 INJECTION INTRAVENOUS at 08:42

## 2022-04-12 RX ADMIN — DEXAMETHASONE SODIUM PHOSPHATE: 10 INJECTION, SOLUTION INTRAMUSCULAR; INTRAVENOUS at 08:44

## 2022-04-12 RX ADMIN — PACLITAXEL 94 MG: 6 INJECTION, SOLUTION INTRAVENOUS at 09:42

## 2022-04-12 RX ADMIN — CARBOPLATIN 200 MG: 10 INJECTION, SOLUTION INTRAVENOUS at 10:44

## 2022-04-12 RX ADMIN — Medication 5 ML: at 11:18

## 2022-04-12 RX ADMIN — SODIUM CHLORIDE 250 ML: 9 INJECTION, SOLUTION INTRAVENOUS at 08:41

## 2022-04-12 ASSESSMENT — PAIN SCALES - GENERAL: PAINLEVEL: NO PAIN (0)

## 2022-04-12 NOTE — TELEPHONE ENCOUNTER
Prior Authorization Approval    Authorization Effective Date: 3/12/2022  Authorization Expiration Date: 10/8/2022  Medication:   Approved Dose/Quantity: 30/10ds  Reference #: Key: [id number]   Insurance Company: [employer] - Phone [phone] Fax [phone]  Expected CoPay:       CoPay Card Available:      Foundation Assistance Needed:    Which Pharmacy is filling the prescription (Not needed for infusion/clinic administered): [facility] [place], [place] - [address] [place] [address] [phone]  Pharmacy Notified: Yes  Patient Notified: Yes

## 2022-04-12 NOTE — NURSING NOTE
Chief Complaint   Patient presents with     Port Draw     Labs drawn via  by RN in lab. Port access by RN, no blood return. VS taken.       Port accessed using 20g flat needle. No blood return. Line flushed and Heparin locked. Labs drawn via  by RN in lab. Vital signs taken. Checked into next appointment.     Mahogany Treadwell RN

## 2022-04-12 NOTE — PATIENT INSTRUCTIONS
[facility] [place] Main Line: [phone]    Call triage nurse with chills and/or temperature greater than or equal to 100.4, uncontrolled nausea/vomiting, diarrhea, constipation, dizziness, shortness of breath, chest pain, bleeding, unexplained bruising, or any new/concerning symptoms, questions/concerns.   If you are having any concerning symptoms or wish to speak to a provider before your next infusion visit, please call your care coordinator or triage to notify them so we can adequately serve you.   Nurse Triage line:  [phone]    If after hours, weekends, or holidays, call main hospital  and ask for Oncology doctor on call @ [phone]      Latest Reference Range & Units 04/12/22 07:24   Sodium 133 - 144 mmol/L 137   Potassium 3.4 - 5.3 mmol/L 4.6   Chloride 94 - 109 mmol/L 105   Carbon Dioxide 20 - 32 mmol/L 28   Urea Nitrogen 7 - 30 mg/dL 22   Creatinine 0.66 - 1.25 mg/dL 0.90   GFR Estimate >60 mL/min/1.73m2 >90 [1]   Calcium 8.5 - 10.1 mg/dL 9.1   Anion Gap 3 - 14 mmol/L 4   Magnesium 1.6 - 2.3 mg/dL 2.0   Albumin 3.4 - 5.0 g/dL 3.4   Protein Total 6.8 - 8.8 g/dL 7.0   Bilirubin Total 0.2 - 1.3 mg/dL 0.4   Alkaline Phosphatase 40 - 150 U/L 61   ALT 0 - 70 U/L 54   AST 0 - 45 U/L 41   Glucose 70 - 99 mg/dL 98   WBC 4.0 - 11.0 10e3/uL 8.4   Hemoglobin 13.3 - 17.7 g/dL 14.1   Hematocrit 40.0 - 53.0 % 42.5   Platelet Count 150 - 450 10e3/uL 194   RBC Count 4.40 - 5.90 10e6/uL 4.08 (L)   MCV 78 - 100 fL 104 (H)   MCH 26.5 - 33.0 pg 34.6 (H)   MCHC 31.5 - 36.5 g/dL 33.2   RDW 10.0 - 15.0 % 13.3   % Neutrophils % 78   % Lymphocytes % 9   % Monocytes % 10   % Eosinophils % 1   % Basophils % 1   Absolute Basophils 0.0 - 0.2 10e3/uL 0.1   Absolute Eosinophils 0.0 - 0.7 10e3/uL 0.1   Absolute Immature Granulocytes <=0.4 10e3/uL 0.1   Absolute Lymphocytes 0.8 - 5.3 10e3/uL 0.8   Absolute Monocytes 0.0 - 1.3 10e3/uL 0.9   % Immature Granulocytes % 1   Absolute Neutrophils 1.6 - 8.3 10e3/uL 6.5    Absolute NRBCs 10e3/uL 0.0   NRBCs per 100 WBC <1 /100 0   (L): Data is abnormally low  (H): Data is abnormally high  [1] Effective December 21, 2021 eGFRcr in adults is calculated using the 2021 CKD-EPI creatinine equation which includes age and gender (Dorothy glasgow al., NEJM, DOI: 10.1056/MNQDij4588844)

## 2022-04-12 NOTE — PROGRESS NOTES
[facility]: Cancer Care Plan of Care Education Note                                    Discussion with Patient:                                                      Carbo/taxol     Antiemetics: N/V  Education concerns: Fever     Goals        General     Being Active (pt-stated)           Assessment:                                                      Assessment completed with:: Patient         Plan of Care Education   Yearly learning assessment completed?: Yes (see Education tab)  Diagnosis:: Head and neck cancer  Does patient understand diagnosis?: Yes  Tx plan/regimen:: Carbo/taxol  Does patient understand treatment plan/regimen?: Yes  Preparing for treatment:: Reviewed treatment preparation information with patient (vascular access, day of chemo, visitor policy, what to bring, etc.)  Side effect education:: Diarrhea/Constipation, Fatigue, Hair loss, Infection, Lab value monitoring (anemia, neutropenia, thrombocytopenia), Mouth sores, Nausea/Vomiting, Neuropathy  Transportation means:: Accessible car  Plan of Care:: Treatment schedule  When to call provider:: Increased shortness of breath, New/worsening pain, Shaking chills, Temperature >100.4F, Uncontrolled diarrhea/constipation, Uncontrolled nausea/vomiting  Reasons for deferring treatment reviewed with patient:: Yes    Evaluation of Learning  Patient Education Provided: Yes  Readiness:: Acceptance  Method:: Explanation  Response:: Verbalizes understanding        Intervention/Education provided during outreach:                                                       Chemo teaching completed. Questions answered.    4/14.    Signature:  Anne SOTOA, MSN, RN, ONC  RN Care Coordinator  [facility]

## 2022-04-12 NOTE — PROGRESS NOTES
Infusion Nursing Note:  Antonio Sharpe presents today for C1D1 Taxol/Carbo (#7 had at another site).    Patient seen by provider today: No   present during visit today: Not Applicable.    Note: Patient reported to clinic today with no new complaints or concerns.    Patient new to oncology infusion room and is receiving Taxol for the first time and Carboplatin #7. Pt oriented to infusion room and call light. Chemotherapy teaching done previously by RNCC.  Reinforced chemotherapy teaching/side effects and schedule during infusion visit.     Copy of AVS reviewed with patient. Pt instructed to call care coordinator, triage (or MD on call if after hours/weekends) with chills/temp >=100.4, questions/concerns. Pt stated understanding of plan.     Patient also has received in the past at  3 high dose Cisplatin.    Intravenous Access:  Implanted Port.  Labs drawn peripherally unable to get blood return from port.  Port TPA'd at 0755 for 39 minutes    Treatment Conditions:  Lab Results   Component Value Date    HGB 14.1 04/12/2022    WBC 8.4 04/12/2022    ANEUTAUTO 6.5 04/12/2022     04/12/2022      Lab Results   Component Value Date     04/12/2022    POTASSIUM 4.6 04/12/2022    MAG 2.0 04/12/2022    CR 0.90 04/12/2022    PRINCE 9.1 04/12/2022    BILITOTAL 0.4 04/12/2022    ALBUMIN 3.4 04/12/2022    ALT 54 04/12/2022    AST 41 04/12/2022     Results reviewed, labs MET treatment parameters, ok to proceed with treatment.    Post Infusion Assessment:  Patient tolerated infusion without incident.  Blood return noted pre and post infusion.  Site patent and intact, free from redness, edema or discomfort.  No evidence of extravasations.  Access discontinued per protocol.     Discharge Plan:   Prescription refills given for Compazine and Zofran.  Discharge instructions reviewed with: Patient.  Patient and/or family verbalized understanding of discharge instructions and all questions answered.  Copy of AVS  reviewed with patient and/or family.  Patient will return 4/19/22 for next appointment.  Patient discharged in stable condition accompanied by: self.  Departure Mode: Ambulatory.  Face to Face time: 20+ minutes.    Amilcar Sr RN

## 2022-04-13 ENCOUNTER — APPOINTMENT (OUTPATIENT)
Dept: RADIATION ONCOLOGY | Facility: CLINIC | Age: 57
End: 2022-04-13
Attending: RADIOLOGY
Payer: COMMERCIAL

## 2022-04-13 PROCEDURE — 77014 PR CT GUIDE FOR PLACEMENT RADIATION THERAPY FIELDS: CPT | Mod: 26 | Performed by: RADIOLOGY

## 2022-04-13 PROCEDURE — 77386 HC IMRT TREATMENT DELIVERY, COMPLEX: CPT | Performed by: RADIOLOGY

## 2022-04-14 ENCOUNTER — OFFICE VISIT (OUTPATIENT)
Dept: RADIATION ONCOLOGY | Facility: CLINIC | Age: 57
End: 2022-04-14
Attending: RADIOLOGY
Payer: COMMERCIAL

## 2022-04-14 VITALS
DIASTOLIC BLOOD PRESSURE: 83 MMHG | SYSTOLIC BLOOD PRESSURE: 164 MMHG | HEART RATE: 79 BPM | BODY MASS INDEX: 28.47 KG/M2 | WEIGHT: 198.4 LBS

## 2022-04-14 DIAGNOSIS — C41.1 SQUAMOUS CELL CARCINOMA OF MANDIBLE (H): Primary | ICD-10-CM

## 2022-04-14 PROCEDURE — 77386 HC IMRT TREATMENT DELIVERY, COMPLEX: CPT | Performed by: RADIOLOGY

## 2022-04-14 NOTE — PROGRESS NOTES
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 Current dose of 800 cGy. Tolerating treatment well. No issues.     ED visits/Hospitalizations:  None    Missed Treatments:  None    Subjective: Mr. Sharpe presents to clinic today for his weekly on-treatment visit. He has tolerated the initiation of salvage chemoradiotherapy very well and has no pressing concerns or complaints on examination. He denies any significant oral cavity or oropharyngeal pain and is eating a regular diet without difficulty. His remaining ROS are likewise negative.    ROS:   Constitutional  Pain (0-10): 3 (mouth), 0 (throat), 0 (skin)   Fatigue: None    CNS  Headaches: None    ENT  Mucositis: None    Cardiopulmonary  Dyspnea: None    GI  Nausea/vomiting: None    Nutrition  PEG: No  Diet: Regular    Integumentary  Dermatitis: None    Objective:   Current weight: 90.0 kg    BP: 164/83 (sitting), 148/86 (standing)  Pulse: 79 (sitting), 82 (standing)    General: 56-year-old gentleman seated comfortably in an examination chair in no acute distress  HEENT: NC/AT. EOMI. No rhinorrhea or epistaxis. Moist mucous membranes. No visible oral cavity lesions or thrush. No palpable abnormalities along the left buccal mucosa.  Pulmonary: No wheezing, stridor or respiratory distress  Skin: Normal color and turgor. No erythema.    Treatment-related toxicities (CTCAE v5.0):  None    Assessment:    Mr. Sharpe is a 56 year old male with a  recurrent squamous cell carcinoma of the left buccal mucosa. He is receiving salvage chemoradiotherapy with curative intent. He is tolerating treatment well with no acute radiation-induced toxicities.    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

## 2022-04-15 ENCOUNTER — APPOINTMENT (OUTPATIENT)
Dept: RADIATION ONCOLOGY | Facility: CLINIC | Age: 57
End: 2022-04-15
Attending: RADIOLOGY
Payer: COMMERCIAL

## 2022-04-15 PROCEDURE — 77336 RADIATION PHYSICS CONSULT: CPT | Performed by: RADIOLOGY

## 2022-04-15 PROCEDURE — 77386 HC IMRT TREATMENT DELIVERY, COMPLEX: CPT | Performed by: RADIOLOGY

## 2022-04-15 PROCEDURE — 77427 RADIATION TX MANAGEMENT X5: CPT | Performed by: RADIOLOGY

## 2022-04-15 PROCEDURE — 77014 PR CT GUIDE FOR PLACEMENT RADIATION THERAPY FIELDS: CPT | Mod: 26 | Performed by: RADIOLOGY

## 2022-04-18 ENCOUNTER — APPOINTMENT (OUTPATIENT)
Dept: RADIATION ONCOLOGY | Facility: CLINIC | Age: 57
End: 2022-04-18
Attending: RADIOLOGY
Payer: COMMERCIAL

## 2022-04-18 PROCEDURE — 77014 PR CT GUIDE FOR PLACEMENT RADIATION THERAPY FIELDS: CPT | Mod: 26 | Performed by: RADIOLOGY

## 2022-04-18 PROCEDURE — 77386 HC IMRT TREATMENT DELIVERY, COMPLEX: CPT | Performed by: RADIOLOGY

## 2022-04-18 NOTE — PROGRESS NOTES
[facility] CANCER [facility]    PATIENT NAME: Antonio Sharpe  MRN # [id number]   DATE OF VISIT: April 19, 2022  YOB: 1965     Referring Provider: Dr. Aditya Swartz, Otolaryngology  PCP: None    CANCER TYPE: SCC L mandible/alveolar ridge, buccal mucosa  STAGE: [id number]  ECOG PS: 0    PD-L1: 2% on L cheek lesion 9/2021    SUMMARY  4/28/20 Left marginal mandibulectomy, WLE of the left buccal mucosa, left posterior maxillectomy, left partial pharyngectomy, left selective neck dissection, right radial forearm free flap, mandibular plating, tracheostomy, and feeding tube placement (Dr. Triana at [facility]). Path: SCC, 5.7 cm moderately differentiated, DOI 1.5 cm, + PNI, no LVI, negative margins, 5/33 nodes positive, + MARIA ELENA  6/30~8/17/20 Chemoradiation 6600 cGy with HD cisplatin (Dr. Serna at [facility])  8/11/21 CT neck. 3 x 2.8 x 3.6 cm mass at the reconstruction flap within the subcutaneous fat of the L cheek abutting masseter medially and skin laterally, extending to parotid, abutting carotid.   8/16/21 FNA L cheek. Path: Highly suspicious for SCC  8/27/21 PET/CT.   9/2/21 MRI neck.   9/8/21 FNA L cheek lesion (Dr. Swartz). Path: SCC  10/21/21 C1 carboplatin 5FU pembrolizumab c/b severe mucositis. C2 pembro alone due to ongoing resolving mucositis. DYPD testing negative  12/14/21~1/1/22 C3-4 carboplatin 5FU pembrolizumab. 5FU dose reduced 50%  1/25~2/15/22 C5-6 carboplatin pembrolizumab, no FU  3/8/22 PET/CT. Decreased size and FDG uptake of the mass along the temporalis muscle, deep to the L zygomatic arch, 1.5  1.3 cm (SUV 10.7), peristent linear lucency on CT immediately underneath the anterior cortical bone of the anterior mandible at the  Midline (SUV 10.5), extending to the medullary cavity of the anterior mandible where there is subtle corresponding lucency, similar to prior, likely post surgical. Unchanged thyroid nodule. Gastric fundus thickening which can be seen with gastritis, recommend direct  visualization, focal anorectal uptake, likely physiologic, attention on follow up    All prior care was at [facility] with Dr. Tarango, Dr. Christ Stewart (ENT), Dr. Kong Reece (Otolaryngology at [facility]), and Dr. Serna (Radiation Oncology)    SUBJECTIVE  Antonio returns for visit prior to week two chemo (carbo/taxol) in conjunction with repeat radiation. He has no new concerns today. Denies any SE from chemo. Notes dex leaves him with insomnia. Eating and drinking well. Still drinking and smoking but has cut back. Denies neuropathy/f/c.     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  Chronic tinnitus secondary to cisplatin  Hemorrhoids    CURRENT OUTPATIENT MEDICATIONS  Current Outpatient Medications   Medication Sig Dispense Refill     acetaminophen (TYLENOL) 500 MG tablet Take 500 mg by mouth (Patient not taking: No sig reported)       ibuprofen (ADVIL/MOTRIN) 200 MG tablet Take 600 mg by mouth every 6 hours as needed for mild pain       ondansetron (ZOFRAN) 8 MG tablet Take 1 tablet (8 mg) by mouth every 8 hours as needed for nausea (vomiting) (Patient not taking: Reported on 4/12/2022) 30 tablet 11     prochlorperazine (COMPAZINE) 10 MG tablet Take 1 tablet (10 mg) by mouth every 6 hours as needed (Nausea/Vomiting) (Patient not taking: Reported on 4/12/2022) 30 tablet 11     ALLERGIES  No Known Allergies     REVIEW OF SYSTEMS  As above in the HPI, o/w complete 12-point ROS was negative.    PHYSICAL EXAM  There were no vitals taken for this visit.  GEN: NAD  HEENT: EOMI, no icterus, injection or pallor  LUNGS: clear bilaterally  CV: regular, no murmurs, rubs, or gallops  EXT: no edema  NEURO: alert    LABORATORY AND IMAGING STUDIES  Labs from [facility] reviewed and independently  interpreted by me    CT Soft Tissue Neck w Contrast  Narrative: PET CT fusion examination 3/8/2022 2:13 PM  1. Neck CT with contrast  2. PET study of the neck  3. PET CT fusion study of the neck    History:  Buccal mucosa squamous cell carcinoma.    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 He presented with early  recurrence/residual disease and given the lower rate of cure, elected  to proceed with palliative chemotherapy. ?    Comparison: Neck CT 8/26/2021; outside neck CT 12/29/2021, 11/2/2021;  outside neck MRI 12/29/2021.    Technique: Please refer to the accompanying whole body PET-CT for  report of the dose and whole body PET-CT findings.  Regarding the neck, axial images were obtained after nonionic  intravenous contrast administration, with sagittal and coronal  reconstructions performed. Neck CT images were reviewed in bone, soft  tissue, and lung windows, with review of the fused PET-CT images as  well in multiple planes.    Findings:  Postsurgical changes of left mandible resection with free flap  reconstruction and left neck dissection. Postradiation changes. Left  submandibular gland is surgically absent. Left parotid gland is  atrophic. Right parotid gland and right submandibular gland are  normal. Thyroid gland is normal.    Resolution of the previously seen hypermetabolic mass overlying the  left masseter muscle.     Decreased size and FDG uptake of mass along the temporalis muscle,  deep to left zygomatic arch. It now measures 1.5 x 1.3 cm an with max  SUV of 10.7, previously 2.7 cm x 2.1 cm with max SUV of 11.9.  On CT there is persistent linear lucency on CT immediately underneath  the anterior cortical bone of the anterior mandible at the midline  with corresponding FDG uptake of 10.5 (Series 4,  image 81). Uptake  extends to the subjacent medullary cavity of the anterior mandible  where there is subtle corresponding lucency. This appearance also is  similar to prior imaging and is felt to be postsurgical in etiology.  No associated soft tissue component.    Evaluation of the mucosal space demonstrates no abnormality or  abnormal metabolic uptake on PET CT in the nasopharynx, oropharynx,  hypopharynx or the glottis. The tongue base appears normal. Unchanged  non-FDG avid 0.6 cm hypoattenuating thyroid nodule within the left  thyroid lobe. There is no evident cervical lymphadenopathy, and the  cervical lymph nodes are within normal limits by size criteria.     Limited evaluation of the cervical vertebral column demonstrates no  evident spinal canal stenosis. The visualized paranasal sinuses and  mastoid air cells are clear. The major vascular structures in the neck  are patent.    The visualized lung apices appear clear. Mild paraseptal emphysema and  minimal dependent bibasilar atelectasis. Partially visualized right IJ  Port-A-Cath.    Please refer to the whole body PET CT performed as a separate report,  for the findings of the remainder of the body.   Impression: Impression:    Primary: 4} One of the 2 previously seen FDG avid masses along the  left side of the face is resolved. Interval decrease in the size and  FDG metabolism of the more cranially located mass seen along the  temporalis muscle deep to the left zygomatic arch. The residual mass  at this location measures 1.5 x 1.2 cm and demonstrates  hypermetabolism.     Neck: 1}  No abnormal lymph node.     CECT Surveillance Legend:    Primary  1: No evidence of recurrence: routine surveillance  2: Low suspicion    a) Superficial abnormality (skin, mucosal surface): direct visual  inspection    b) Ill-defined deep abnormality: short interval follow-up* or PET  3: High suspicion (new or enlarging discrete nodule/mass): biopsy  4: Definitive recurrence  (path proven or clinical progression): no  biopsy needed    Nodes  1: No evidence of recurrence: routine surveillance  2: Low suspicion (ill-defined): short interval follow-up or PET  3: High suspicion (new or enlarging lymph node): biopsy if clinically  needed  4: Definitive recurrence (path proven or clinical progression): no  biopsy needed    *short interval follow-up: 3 months at our institution    I have personally reviewed the examination and initial interpretation  and I agree with the findings.    LANCE NEWMAN MD         SYSTEM ID:  [id number]  PET Oncology Whole Body  Narrative: Combined Report of:    PET and CT on  3/8/2022 2:09 PM :    1. PET of the neck, chest, abdomen, and pelvis.  2. PET CT Fusion for Attenuation Correction and Anatomical  Localization:    3. Diagnostic CT scan of the chest, abdomen, and pelvis with  intravenous contrast for interpretation.  3. CT of the chest, abdomen and pelvis obtained for diagnostic  interpretation.  4. 3D MIP and PET-CT fused images were processed on an independent  workstation and archived to PACS and reviewed by a radiologist.    Technique:    1. PET: The patient received 11.38 mCi of F-18-FDG; the serum glucose  was 102 prior to administration, body weight was 86.2 kg. Images were  evaluated in the axial, sagittal, and coronal planes as well as the  rotational whole body MIP. Images were acquired from the Vertex to the  Feet.    UPTAKE WAS MEASURED AT 67 MINUTES.     BACKGROUND:  Liver SUV max= 3.65,   Aorta Blood SUV Max: 2.58.     2. CT: Volumetric acquisition for clinical interpretation of the  chest, abdomen, and pelvis acquired at 3 mm sections . The chest,  abdomen, and pelvis were evaluated at 5 mm sections in bone, soft  tissue, and lung windows.      The patient received 116 cc of Isovue 370 intravenously for the  examination.      3. 3D MIP and PET-CT fused images were processed on an independent  workstation and archived to PACS and reviewed by a  radiologist.    INDICATION: T4a N3b squamous cell carcinoma of the left buccal mucosa  and alveolus; Buccal mucosa squamous cell carcinoma (H)    ADDITIONAL INFORMATION OBTAINED FROM EMR:     He had a prior history of T4a N3b squamous cell carcinoma of the left  buccal mucosa and alveolus. He underwent a left marginal  mandibulectomy, left buccal resection, left posterior maxillectomy,  left neck dissection and reconstruction with a right radial forearm  free flap. ?He had an intraoperative positive margin, which was  subsequently cleared and had aggressive kenn disease with five  positive nodes out of 33 lymph nodes in the specimen, some of which  had external extension and perineural invasion. ?He received adjuvant  chemoradiation therapy at [facility] which he completed in August 2020. ?Shortly after that operation, he felt a mass in the left cheek  that had persisted. ?On his three three-month PET scan, there were two  areas of recurrent disease. ?One was within the cutaneous and  subcutaneous aspect of the left cheek abutting the masseter muscle and  a second mass in the left infratemporal fossa with concern for  invasion of the zygomatic arch. We reviewed the anticipated surgical  approach, which would be a resection of the left cheek and  infratemporal fossa with certain resulting facial nerve paralysis and  need for free tissue reconstruction. ?He elected not to go forward  with this operation and has been maintained on palliative  chemotherapy. ?He was initially prescribed 5-FU, pembrolizumab and  carboplatin. ?Due side effects to 5-FU and carboplatin, have been  omitted or reduced on several of the cycles. ?He presents again today  for reevaluation regarding surgical resectability.     COMPARISON: PET/CT 8/26/2021.    FINDINGS:     HEAD/NECK:  See dedicated neuroradiology report for the results of the high  resolution PET CT of the neck.     CHEST:  There is no suspicious FDG uptake in the chest.      There are no pathologically enlarged mediastinal, hilar or axillary  lymph nodes.  There are no suspicious lung nodules or evidence for infection. Mild  paraseptal emphysema.     There is no significant pericardial or pleural effusions. Moderate  atherosclerosis of the coronary arteries. Mild hiatal hernia. Right  chest wall Port-A-Cath with its tip in the cavoatrial junction.    ABDOMEN AND PELVIS:  There is no suspicious FDG uptake in the abdomen or pelvis.    Diffuse FDG uptake by the gastric fundus wall (SUV max of 7.1) (Series  3 image 173).    Focal uptake to the anorectal region without corresponding soft tissue  lesion in the CT images with SUV max of 13.8 (series 3 image 286),  likely inflammatory/physiological in etiology but recommend direct  visualization to exclude malignancy.    There are no suspicious hepatic lesions. Scattered hypodense foci in  the liver, likely representing hepatic cysts. There is no splenomegaly  or evidence for splenic or pancreatic mass lesion.  There are no suspicious adrenal mass lesions or opaque gallbladder  calculi. There is symmetric nephrographic renal phase without  hydronephrosis. Right extrarenal pelvis.   There is no evidence for diverticulitis, bowel obstruction or free  fluid. Prostatomegaly. Mild right hydrocele.    LOWER EXTREMITIES:   No abnormal masses or hypermetabolic lesions.    BONES:   There are no suspicious lytic or blastic osseous lesions.  There is no  abnormal FDG uptake in the skeleton.  Impression: IMPRESSION: In this patient with recurrent squamous cell carcinoma of  the mandible;  1. No evidence of distant metastasis in the body.  2. Gastric fundus FDG avid wall thickening can be seen with gastritis.  Consider direct visualization.  3. Focal uptake to the anorectal region without corresponding soft  tissue lesion in the CT images, probably inflammatory/physiologic.  Clinical correlation is advised.   4. See dedicated neuroradiology report for  the results of the high  resolution PET CT of the neck.     I have personally reviewed the examination and initial interpretation  and I agree with the findings.    LANCE NEWMAN MD         SYSTEM ID:  [id number]     Imaging was personally reviewed and interpreted by me     ASSESSMENT AND PLAN   SCC oral cavity, recurrent: Plan for concurrent chemoradiation to the residual disease. Tolerating weekly carboplatin paclitaxel thus far, today is week 2. Given severe insomnia with dexamethasone, will try to decrease it prior to the 3rd dose assuming no infusion reactions before that.   -RTC weekly    Gastric and anal FDG uptake: No symptoms. EGD. Anal uptake likely hemorrhoid, will re-evaluate later.     Hearing loss: Chronic. Precludes cisplatin    Tumor erosion through the skin: Monitor for infection, no concerns.     Tobacco dependence, ETOH: See Dr. Richard note from 4/7 that reflects discussion with him. States he has cut back, which I applauded him for.     30 minutes spent on the date of the encounter doing chart review, review of test results, interpretation of tests, patient visit, documentation and discussion with other provider(s)     Mahogany Torres CNP on 4/19/2022 at 9:06 AM

## 2022-04-19 ENCOUNTER — ONCOLOGY VISIT (OUTPATIENT)
Dept: ONCOLOGY | Facility: CLINIC | Age: 57
End: 2022-04-19
Attending: INTERNAL MEDICINE
Payer: COMMERCIAL

## 2022-04-19 ENCOUNTER — APPOINTMENT (OUTPATIENT)
Dept: LAB | Facility: CLINIC | Age: 57
End: 2022-04-19
Attending: INTERNAL MEDICINE
Payer: COMMERCIAL

## 2022-04-19 ENCOUNTER — APPOINTMENT (OUTPATIENT)
Dept: RADIATION ONCOLOGY | Facility: CLINIC | Age: 57
End: 2022-04-19
Attending: RADIOLOGY
Payer: COMMERCIAL

## 2022-04-19 VITALS
HEART RATE: 79 BPM | OXYGEN SATURATION: 99 % | RESPIRATION RATE: 16 BRPM | WEIGHT: 193.3 LBS | TEMPERATURE: 98.2 F | SYSTOLIC BLOOD PRESSURE: 146 MMHG | DIASTOLIC BLOOD PRESSURE: 72 MMHG | BODY MASS INDEX: 27.74 KG/M2

## 2022-04-19 DIAGNOSIS — T45.1X5A CHEMOTHERAPY-INDUCED NEUTROPENIA (H): ICD-10-CM

## 2022-04-19 DIAGNOSIS — D70.1 CHEMOTHERAPY-INDUCED NEUTROPENIA (H): ICD-10-CM

## 2022-04-19 DIAGNOSIS — C41.1 SQUAMOUS CELL CARCINOMA OF MANDIBLE (H): Primary | ICD-10-CM

## 2022-04-19 DIAGNOSIS — Z13.29 SCREENING FOR HYPOTHYROIDISM: ICD-10-CM

## 2022-04-19 DIAGNOSIS — E83.42 HYPOMAGNESEMIA: ICD-10-CM

## 2022-04-19 LAB
ALBUMIN SERPL-MCNC: 3.3 G/DL (ref 3.4–5)
ALP SERPL-CCNC: 60 U/L (ref 40–150)
ALT SERPL W P-5'-P-CCNC: 54 U/L (ref 0–70)
ANION GAP SERPL CALCULATED.3IONS-SCNC: 6 MMOL/L (ref 3–14)
AST SERPL W P-5'-P-CCNC: 38 U/L (ref 0–45)
BASOPHILS # BLD AUTO: 0 10E3/UL (ref 0–0.2)
BASOPHILS NFR BLD AUTO: 1 %
BILIRUB SERPL-MCNC: 0.3 MG/DL (ref 0.2–1.3)
BUN SERPL-MCNC: 20 MG/DL (ref 7–30)
CALCIUM SERPL-MCNC: 9.3 MG/DL (ref 8.5–10.1)
CHLORIDE BLD-SCNC: 102 MMOL/L (ref 94–109)
CO2 SERPL-SCNC: 27 MMOL/L (ref 20–32)
CREAT SERPL-MCNC: 0.84 MG/DL (ref 0.66–1.25)
EOSINOPHIL # BLD AUTO: 0.1 10E3/UL (ref 0–0.7)
EOSINOPHIL NFR BLD AUTO: 3 %
ERYTHROCYTE [DISTWIDTH] IN BLOOD BY AUTOMATED COUNT: 12.6 % (ref 10–15)
GFR SERPL CREATININE-BSD FRML MDRD: >90 ML/MIN/1.73M2
GLUCOSE BLD-MCNC: 100 MG/DL (ref 70–99)
HCT VFR BLD AUTO: 41.1 % (ref 40–53)
HGB BLD-MCNC: 14 G/DL (ref 13.3–17.7)
IMM GRANULOCYTES # BLD: 0.1 10E3/UL
IMM GRANULOCYTES NFR BLD: 1 %
LYMPHOCYTES # BLD AUTO: 0.5 10E3/UL (ref 0.8–5.3)
LYMPHOCYTES NFR BLD AUTO: 10 %
MAGNESIUM SERPL-MCNC: 2 MG/DL (ref 1.6–2.3)
MCH RBC QN AUTO: 35.4 PG (ref 26.5–33)
MCHC RBC AUTO-ENTMCNC: 34.1 G/DL (ref 31.5–36.5)
MCV RBC AUTO: 104 FL (ref 78–100)
MONOCYTES # BLD AUTO: 0.6 10E3/UL (ref 0–1.3)
MONOCYTES NFR BLD AUTO: 11 %
NEUTROPHILS # BLD AUTO: 4.3 10E3/UL (ref 1.6–8.3)
NEUTROPHILS NFR BLD AUTO: 74 %
NRBC # BLD AUTO: 0 10E3/UL
NRBC BLD AUTO-RTO: 0 /100
PLATELET # BLD AUTO: 177 10E3/UL (ref 150–450)
POTASSIUM BLD-SCNC: 4.6 MMOL/L (ref 3.4–5.3)
PROT SERPL-MCNC: 6.8 G/DL (ref 6.8–8.8)
RBC # BLD AUTO: 3.95 10E6/UL (ref 4.4–5.9)
SODIUM SERPL-SCNC: 135 MMOL/L (ref 133–144)
WBC # BLD AUTO: 5.6 10E3/UL (ref 4–11)

## 2022-04-19 PROCEDURE — 99214 OFFICE O/P EST MOD 30 MIN: CPT | Performed by: NURSE PRACTITIONER

## 2022-04-19 PROCEDURE — 85004 AUTOMATED DIFF WBC COUNT: CPT | Performed by: NURSE PRACTITIONER

## 2022-04-19 PROCEDURE — 83735 ASSAY OF MAGNESIUM: CPT

## 2022-04-19 PROCEDURE — 96415 CHEMO IV INFUSION ADDL HR: CPT

## 2022-04-19 PROCEDURE — 250N000009 HC RX 250: Performed by: NURSE PRACTITIONER

## 2022-04-19 PROCEDURE — 80053 COMPREHEN METABOLIC PANEL: CPT

## 2022-04-19 PROCEDURE — 96375 TX/PRO/DX INJ NEW DRUG ADDON: CPT

## 2022-04-19 PROCEDURE — 250N000011 HC RX IP 250 OP 636: Performed by: NURSE PRACTITIONER

## 2022-04-19 PROCEDURE — 96413 CHEMO IV INFUSION 1 HR: CPT

## 2022-04-19 PROCEDURE — 96367 TX/PROPH/DG ADDL SEQ IV INF: CPT

## 2022-04-19 PROCEDURE — 250N000013 HC RX MED GY IP 250 OP 250 PS 637: Performed by: NURSE PRACTITIONER

## 2022-04-19 PROCEDURE — 77386 HC IMRT TREATMENT DELIVERY, COMPLEX: CPT | Performed by: RADIOLOGY

## 2022-04-19 PROCEDURE — 96417 CHEMO IV INFUS EACH ADDL SEQ: CPT

## 2022-04-19 PROCEDURE — 36591 DRAW BLOOD OFF VENOUS DEVICE: CPT

## 2022-04-19 PROCEDURE — 258N000003 HC RX IP 258 OP 636: Performed by: NURSE PRACTITIONER

## 2022-04-19 RX ORDER — HEPARIN SODIUM,PORCINE 10 UNIT/ML
5 VIAL (ML) INTRAVENOUS
Status: CANCELLED | OUTPATIENT
Start: 2022-04-19

## 2022-04-19 RX ORDER — DIPHENHYDRAMINE HCL 25 MG
50 CAPSULE ORAL ONCE
Status: COMPLETED | OUTPATIENT
Start: 2022-04-19 | End: 2022-04-19

## 2022-04-19 RX ORDER — HEPARIN SODIUM (PORCINE) LOCK FLUSH IV SOLN 100 UNIT/ML 100 UNIT/ML
5 SOLUTION INTRAVENOUS
Status: CANCELLED | OUTPATIENT
Start: 2022-04-19

## 2022-04-19 RX ORDER — METHYLPREDNISOLONE SODIUM SUCCINATE 125 MG/2ML
125 INJECTION, POWDER, LYOPHILIZED, FOR SOLUTION INTRAMUSCULAR; INTRAVENOUS
Status: CANCELLED
Start: 2022-04-19

## 2022-04-19 RX ORDER — DIPHENHYDRAMINE HYDROCHLORIDE 50 MG/ML
50 INJECTION INTRAMUSCULAR; INTRAVENOUS
Status: CANCELLED
Start: 2022-04-19

## 2022-04-19 RX ORDER — MEPERIDINE HYDROCHLORIDE 25 MG/ML
25 INJECTION INTRAMUSCULAR; INTRAVENOUS; SUBCUTANEOUS EVERY 30 MIN PRN
Status: CANCELLED | OUTPATIENT
Start: 2022-04-19

## 2022-04-19 RX ORDER — ALBUTEROL SULFATE 0.83 MG/ML
2.5 SOLUTION RESPIRATORY (INHALATION)
Status: CANCELLED | OUTPATIENT
Start: 2022-04-19

## 2022-04-19 RX ORDER — NALOXONE HYDROCHLORIDE 0.4 MG/ML
0.2 INJECTION, SOLUTION INTRAMUSCULAR; INTRAVENOUS; SUBCUTANEOUS
Status: CANCELLED | OUTPATIENT
Start: 2022-04-19

## 2022-04-19 RX ORDER — LORAZEPAM 2 MG/ML
0.5 INJECTION INTRAMUSCULAR EVERY 4 HOURS PRN
Status: CANCELLED | OUTPATIENT
Start: 2022-04-19

## 2022-04-19 RX ORDER — DIPHENHYDRAMINE HCL 25 MG
50 CAPSULE ORAL ONCE
Status: CANCELLED
Start: 2022-04-19

## 2022-04-19 RX ORDER — HEPARIN SODIUM (PORCINE) LOCK FLUSH IV SOLN 100 UNIT/ML 100 UNIT/ML
5 SOLUTION INTRAVENOUS
Status: DISCONTINUED | OUTPATIENT
Start: 2022-04-19 | End: 2022-04-19 | Stop reason: HOSPADM

## 2022-04-19 RX ORDER — EPINEPHRINE 1 MG/ML
0.3 INJECTION, SOLUTION INTRAMUSCULAR; SUBCUTANEOUS EVERY 5 MIN PRN
Status: CANCELLED | OUTPATIENT
Start: 2022-04-19

## 2022-04-19 RX ORDER — ALBUTEROL SULFATE 90 UG/1
1-2 AEROSOL, METERED RESPIRATORY (INHALATION)
Status: CANCELLED
Start: 2022-04-19

## 2022-04-19 RX ADMIN — SODIUM CHLORIDE 94 MG: 9 INJECTION, SOLUTION INTRAVENOUS at 09:49

## 2022-04-19 RX ADMIN — DIPHENHYDRAMINE HYDROCHLORIDE 50 MG: 25 CAPSULE ORAL at 09:28

## 2022-04-19 RX ADMIN — Medication 5 ML: at 11:33

## 2022-04-19 RX ADMIN — SODIUM CHLORIDE 250 ML: 9 INJECTION, SOLUTION INTRAVENOUS at 09:33

## 2022-04-19 RX ADMIN — DEXAMETHASONE SODIUM PHOSPHATE: 10 INJECTION, SOLUTION INTRAMUSCULAR; INTRAVENOUS at 09:28

## 2022-04-19 RX ADMIN — CARBOPLATIN 200 MG: 10 INJECTION, SOLUTION INTRAVENOUS at 10:59

## 2022-04-19 RX ADMIN — FAMOTIDINE 20 MG: 10 INJECTION INTRAVENOUS at 09:28

## 2022-04-19 ASSESSMENT — PAIN SCALES - GENERAL: PAINLEVEL: NO PAIN (0)

## 2022-04-19 NOTE — PROGRESS NOTES
Infusion Nursing Note:  Antonio Sharpe presents today for C1D8 Taxol/Carboplatin.    Patient seen by provider today: Yes: Mahogany Torres, RAMON in infusion   present during visit today: Not Applicable.    Note: Pt denied any questions or concerns following visit with provider. Ready to proceed with treatment today.    Of note, pt has received carboplatin for previous chemotherapy treatment.    Intravenous Access:  Implanted Port.    Treatment Conditions:  Lab Results   Component Value Date    HGB 14.0 04/19/2022    WBC 5.6 04/19/2022    ANEUTAUTO 4.3 04/19/2022     04/19/2022      Lab Results   Component Value Date     04/19/2022    POTASSIUM 4.6 04/19/2022    MAG 2.0 04/19/2022    CR 0.84 04/19/2022    PRINCE 9.3 04/19/2022    BILITOTAL 0.3 04/19/2022    ALBUMIN 3.3 (L) 04/19/2022    ALT 54 04/19/2022    AST 38 04/19/2022     Results reviewed, labs MET treatment parameters, ok to proceed with treatment.      Post Infusion Assessment:  Patient tolerated infusion without incident.  Blood return noted pre and post infusion.  Site patent and intact, free from redness, edema or discomfort.  No evidence of extravasations.  Access discontinued per protocol.       Discharge Plan:   Patient declined prescription refills.  Discharge instructions reviewed with: Patient.  Patient and/or family verbalized understanding of discharge instructions and all questions answered.  Copy of AVS reviewed with patient and/or family.  Patient will return 4/26 for next appointment. [employer] not yet activated for patient. Code resent to pt's email per his request.  Patient discharged in stable condition accompanied by: self.  Departure Mode: Ambulatory.  Face to Face time: 0.      Fauzia Khan RN

## 2022-04-19 NOTE — PATIENT INSTRUCTIONS
[facility] Triage and after hours / weekends / holidays:  [phone]    Please call the triage or after hours line if you experience a temperature greater than or equal to 100.4, shaking chills, have uncontrolled nausea, vomiting and/or diarrhea, dizziness, shortness of breath, chest pain, bleeding, unexplained bruising, or if you have any other new/concerning symptoms, questions or concerns.      If you are having any concerning symptoms or wish to speak to a provider before your next infusion visit, please call your care coordinator or triage to notify them so we can adequately serve you.     If you need a refill on a narcotic prescription or other medication, please call before your infusion appointment.            Latest Reference Range & Units 04/19/22 08:48   Sodium 133 - 144 mmol/L 135   Potassium 3.4 - 5.3 mmol/L 4.6   Chloride 94 - 109 mmol/L 102   Carbon Dioxide 20 - 32 mmol/L 27   Urea Nitrogen 7 - 30 mg/dL 20   Creatinine 0.66 - 1.25 mg/dL 0.84   GFR Estimate >60 mL/min/1.73m2 >90 [1]   Calcium 8.5 - 10.1 mg/dL 9.3   Anion Gap 3 - 14 mmol/L 6   Magnesium 1.6 - 2.3 mg/dL 2.0   Albumin 3.4 - 5.0 g/dL 3.3 (L)   Protein Total 6.8 - 8.8 g/dL 6.8   Bilirubin Total 0.2 - 1.3 mg/dL 0.3   Alkaline Phosphatase 40 - 150 U/L 60   ALT 0 - 70 U/L 54   AST 0 - 45 U/L 38   Glucose 70 - 99 mg/dL 100 (H)   WBC 4.0 - 11.0 10e3/uL 5.6   Hemoglobin 13.3 - 17.7 g/dL 14.0   Hematocrit 40.0 - 53.0 % 41.1   Platelet Count 150 - 450 10e3/uL 177   RBC Count 4.40 - 5.90 10e6/uL 3.95 (L)   MCV 78 - 100 fL 104 (H)   MCH 26.5 - 33.0 pg 35.4 (H)   MCHC 31.5 - 36.5 g/dL 34.1   RDW 10.0 - 15.0 % 12.6   % Neutrophils % 74   % Lymphocytes % 10   % Monocytes % 11   % Eosinophils % 3   % Basophils % 1   Absolute Basophils 0.0 - 0.2 10e3/uL 0.0   Absolute Eosinophils 0.0 - 0.7 10e3/uL 0.1   Absolute Immature Granulocytes <=0.4 10e3/uL 0.1   Absolute Lymphocytes 0.8 - 5.3 10e3/uL 0.5 (L)   Absolute Monocytes 0.0 - 1.3 10e3/uL 0.6   % Immature  Granulocytes % 1   Absolute Neutrophils 1.6 - 8.3 10e3/uL 4.3   Absolute NRBCs 10e3/uL 0.0   NRBCs per 100 WBC <1 /100 0

## 2022-04-20 ENCOUNTER — APPOINTMENT (OUTPATIENT)
Dept: RADIATION ONCOLOGY | Facility: CLINIC | Age: 57
End: 2022-04-20
Attending: RADIOLOGY
Payer: COMMERCIAL

## 2022-04-20 PROCEDURE — 77386 HC IMRT TREATMENT DELIVERY, COMPLEX: CPT | Performed by: RADIOLOGY

## 2022-04-20 PROCEDURE — 77014 PR CT GUIDE FOR PLACEMENT RADIATION THERAPY FIELDS: CPT | Mod: 26 | Performed by: RADIOLOGY

## 2022-04-21 ENCOUNTER — APPOINTMENT (OUTPATIENT)
Dept: RADIATION ONCOLOGY | Facility: CLINIC | Age: 57
End: 2022-04-21
Attending: RADIOLOGY
Payer: COMMERCIAL

## 2022-04-21 ENCOUNTER — DOCUMENTATION ONLY (OUTPATIENT)
Dept: RADIATION ONCOLOGY | Facility: CLINIC | Age: 57
End: 2022-04-21
Payer: COMMERCIAL

## 2022-04-21 VITALS
SYSTOLIC BLOOD PRESSURE: 129 MMHG | BODY MASS INDEX: 27.26 KG/M2 | WEIGHT: 190 LBS | HEART RATE: 75 BPM | DIASTOLIC BLOOD PRESSURE: 68 MMHG

## 2022-04-21 DIAGNOSIS — C41.1 SQUAMOUS CELL CARCINOMA OF MANDIBLE (H): Primary | ICD-10-CM

## 2022-04-21 PROCEDURE — 77386 HC IMRT TREATMENT DELIVERY, COMPLEX: CPT | Performed by: RADIOLOGY

## 2022-04-21 NOTE — PROGRESS NOTES
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 Current dose of 800 cGy. Tolerating treatment well. No issues.     22: Second infusion of weekly carboplatin/paclitaxel    22: Weekly RT visit. Current dose of 1800 cGy. Approximately 4 kg weight loss over the past week.    ED visits/Hospitalizations:  None    Missed Treatments:  None    Subjective: Mr. Sharpe presents to clinic today for his weekly on-treatment visit. Overall, he reports he is doing well and has no pressing concerns or complaints. His ROS are positive for mild headaches as well as mild intermittent nausea without vomiting which are both not bothersome to him. He is eating regular diet without difficulty although does his caloric intake is slightly suppressed with his weight down approximately 4 kg over the past week.    ROS:   Constitutional  Pain (0-10): 3 (mouth), 0 (throat), 0 (skin)   Fatigue: None    CNS  Headaches: Mild    ENT  Mucositis: None    Cardiopulmonary  Dyspnea: None    GI  Nausea/vomiting: Mild intermittent nausea without vomiting    Nutrition  PEG: No  Diet: Regular    Integumentary  Dermatitis: None    Objective:   Current weight: 86.2 kg  Last week's weight: 90 kg  Starting weight: 90 kg    BP: 129/68 (sitting), 141/82 (standing)  Pulse: 75 (sitting), 82 (standing)     General: 56-year-old gentleman seated in a chair in no acute distress  HEENT: NC/AT. EOMI. No rhinorrhea or epistaxis. Moist  mucous membranes. No mucositis or thrush.  Pulmonary: No wheezing, stridor or respiratory distress  Skin: Normal color and turgor. No erythema.    Treatment-related toxicities (CTCAE v5.0):  None    Assessment:    Mr. Sharpe is a 56 year old male with a recurrent squamous cell carcinoma of the left buccal mucosa. He is receiving salvage chemoradiotherapy with curative intent. He is tolerating treatment well with no significant acute radiation-induced toxicities.    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

## 2022-04-21 NOTE — PROGRESS NOTES
Oncology Nutrition Services:   Patient refused to see RD today.     RD available upon request from RNCC and patient.     Jessy Mallory RD, [facility]  [phone]

## 2022-04-22 ENCOUNTER — APPOINTMENT (OUTPATIENT)
Dept: RADIATION ONCOLOGY | Facility: CLINIC | Age: 57
End: 2022-04-22
Attending: RADIOLOGY
Payer: COMMERCIAL

## 2022-04-22 PROCEDURE — 77336 RADIATION PHYSICS CONSULT: CPT | Performed by: RADIOLOGY

## 2022-04-22 PROCEDURE — 77014 PR CT GUIDE FOR PLACEMENT RADIATION THERAPY FIELDS: CPT | Mod: 26 | Performed by: RADIOLOGY

## 2022-04-22 PROCEDURE — 77386 HC IMRT TREATMENT DELIVERY, COMPLEX: CPT | Performed by: RADIOLOGY

## 2022-04-22 PROCEDURE — 77427 RADIATION TX MANAGEMENT X5: CPT | Performed by: RADIOLOGY

## 2022-04-24 NOTE — PROGRESS NOTES
[facility] CANCER [facility]    PATIENT NAME: Antonio Sharpe  MRN # [id number]   DATE OF VISIT: April 25, 2022  YOB: 1965     Referring Provider: Dr. Aditya Swartz, Otolaryngology  PCP: None    CANCER TYPE: SCC L mandible/alveolar ridge, buccal mucosa  STAGE: [id number]  ECOG PS: 0    PD-L1: 2% on L cheek lesion 9/2021    SUMMARY  4/28/20 Left marginal mandibulectomy, WLE of the left buccal mucosa, left posterior maxillectomy, left partial pharyngectomy, left selective neck dissection, right radial forearm free flap, mandibular plating, tracheostomy, and feeding tube placement (Dr. Triana at [facility]). Path: SCC, 5.7 cm moderately differentiated, DOI 1.5 cm, + PNI, no LVI, negative margins, 5/33 nodes positive, + MARIA ELENA  6/30~8/17/20 Chemoradiation 6600 cGy with HD cisplatin (Dr. Serna at [facility])  8/11/21 CT neck. 3 x 2.8 x 3.6 cm mass at the reconstruction flap within the subcutaneous fat of the L cheek abutting masseter medially and skin laterally, extending to parotid, abutting carotid.   8/16/21 FNA L cheek. Path: Highly suspicious for SCC  8/27/21 PET/CT.   9/2/21 MRI neck.   9/8/21 FNA L cheek lesion (Dr. Swartz). Path: SCC  10/21/21 C1 carboplatin 5FU pembrolizumab c/b severe mucositis. C2 pembro alone due to ongoing resolving mucositis. DYPD testing negative  12/14/21~1/1/22 C3-4 carboplatin 5FU pembrolizumab. 5FU dose reduced 50%  1/25~2/15/22 C5-6 carboplatin pembrolizumab, no FU  3/8/22 PET/CT. Decreased size and FDG uptake of the mass along the temporalis muscle, deep to the L zygomatic arch, 1.5  1.3 cm (SUV 10.7), peristent linear lucency on CT immediately underneath the anterior cortical bone of the anterior mandible at the  Midline (SUV 10.5), extending to the medullary cavity of the anterior mandible where there is subtle corresponding lucency, similar to prior, likely post surgical. Unchanged thyroid nodule. Gastric fundus thickening which can be seen with gastritis, recommend direct  visualization, focal anorectal uptake, likely physiologic, attention on follow up    All prior care was at [facility] with Dr. Tarango, Dr. Christ Stewart (ENT), Dr. Kong Reece (Otolaryngology at [facility]), and Dr. Serna (Radiation Oncology)    SUBJECTIVE  Antonio is seen prior to day 15 carbo/taxol (scheduled tomorrow, 4/26). He's having increased burning to his left lateral tongue. Not taking anything for pain. Continues to use frequent salt/soda rinses. No issues with eating/swallowing. Dexamethasone again caused insomnia following week 2 for 2-3 days following infusion. Nausea was very minimal. Did not require antiemetics at home.     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  Chronic tinnitus secondary to cisplatin  Hemorrhoids    CURRENT OUTPATIENT MEDICATIONS  Current Outpatient Medications   Medication Sig Dispense Refill     acetaminophen (TYLENOL) 500 MG tablet Take 500 mg by mouth       ibuprofen (ADVIL/MOTRIN) 200 MG tablet Take 600 mg by mouth every 6 hours as needed for mild pain       ondansetron (ZOFRAN) 8 MG tablet Take 1 tablet (8 mg) by mouth every 8 hours as needed for nausea (vomiting) (Patient not taking: No sig reported) 30 tablet 11     prochlorperazine (COMPAZINE) 10 MG tablet Take 1 tablet (10 mg) by mouth every 6 hours as needed (Nausea/Vomiting) (Patient not taking: No sig reported) 30 tablet 11     ALLERGIES  No Known Allergies     REVIEW OF SYSTEMS  As above in the HPI, o/w complete 12-point ROS was negative.    PHYSICAL EXAM  There were no vitals taken for this visit.  GEN: NAD  HEENT: EOMI, no icterus, injection or pallor, oral mucosa moist, erythematous to left lateral tongue and posterior oropharynx  LUNGS: clear bilaterally  CV: regular, no murmurs, rubs, or  gallops  EXT: no edema  NEURO: alert, no focal deficits    LABORATORY AND IMAGING STUDIES   Latest Reference Range & Units 04/25/22 10:12   Sodium 133 - 144 mmol/L 135   Potassium 3.4 - 5.3 mmol/L 4.4   Chloride 94 - 109 mmol/L 101   Carbon Dioxide 20 - 32 mmol/L 26   Urea Nitrogen 7 - 30 mg/dL 18   Creatinine 0.66 - 1.25 mg/dL 0.84   GFR Estimate >60 mL/min/1.73m2 >90 [1]   Calcium 8.5 - 10.1 mg/dL 9.0   Anion Gap 3 - 14 mmol/L 8   Magnesium 1.6 - 2.3 mg/dL 1.9   Albumin 3.4 - 5.0 g/dL 3.4   Protein Total 6.8 - 8.8 g/dL 7.0   Bilirubin Total 0.2 - 1.3 mg/dL 0.5   Alkaline Phosphatase 40 - 150 U/L 60   ALT 0 - 70 U/L 40   AST 0 - 45 U/L 29   Glucose 70 - 99 mg/dL 133 (H)   WBC 4.0 - 11.0 10e3/uL 4.5   Hemoglobin 13.3 - 17.7 g/dL 13.9   Hematocrit 40.0 - 53.0 % 40.5   Platelet Count 150 - 450 10e3/uL 183   RBC Count 4.40 - 5.90 10e6/uL 3.93 (L)   MCV 78 - 100 fL 103 (H)   MCH 26.5 - 33.0 pg 35.4 (H)   MCHC 31.5 - 36.5 g/dL 34.3   RDW 10.0 - 15.0 % 12.3   % Neutrophils % 71   % Lymphocytes % 14   % Monocytes % 11   % Eosinophils % 1   % Basophils % 1   Absolute Basophils 0.0 - 0.2 10e3/uL 0.0   Absolute Eosinophils 0.0 - 0.7 10e3/uL 0.1   Absolute Immature Granulocytes <=0.4 10e3/uL 0.1   Absolute Lymphocytes 0.8 - 5.3 10e3/uL 0.6 (L)   Absolute Monocytes 0.0 - 1.3 10e3/uL 0.5   % Immature Granulocytes % 2   Absolute Neutrophils 1.6 - 8.3 10e3/uL 3.2   Absolute NRBCs 10e3/uL 0.0   NRBCs per 100 WBC <1 /100 0   Labs independently reviewed and interpreted by me today.    ASSESSMENT AND PLAN   SCC oral cavity, recurrent: Plan for concurrent chemoradiation to the residual disease. Tolerating weekly carboplatin paclitaxel thus far. Given severe insomnia with dexamethasone and no issues with reactions after the first 2 doses, will decrease dexamethasone pre-med to 12 mg with week 3 infusion tomorrow  -RTC weekly    Mucositis: continue salt/soda rinses    Gastric and anal FDG uptake: No symptoms. EGD. Anal uptake likely  hemorrhoid, will re-evaluate later.     Hearing loss: Chronic. Precludes cisplatin    Tumor erosion through the skin: Monitor for infection, no concerns.     Tobacco dependence, ETOH: See Dr. Richard note from 4/7 that reflects discussion with him. Did not discuss in detail today.     23 minutes spent on the date of the encounter doing chart review, review of test results, interpretation of tests, patient visit and documentation     Yaquelin Tovar CNP  [facility]  [address] [place] [address] [id number] [phone]

## 2022-04-25 ENCOUNTER — ONCOLOGY VISIT (OUTPATIENT)
Dept: ONCOLOGY | Facility: CLINIC | Age: 57
End: 2022-04-25
Attending: INTERNAL MEDICINE
Payer: COMMERCIAL

## 2022-04-25 ENCOUNTER — APPOINTMENT (OUTPATIENT)
Dept: RADIATION ONCOLOGY | Facility: CLINIC | Age: 57
End: 2022-04-25
Attending: RADIOLOGY
Payer: COMMERCIAL

## 2022-04-25 ENCOUNTER — APPOINTMENT (OUTPATIENT)
Dept: LAB | Facility: CLINIC | Age: 57
End: 2022-04-25
Attending: INTERNAL MEDICINE
Payer: COMMERCIAL

## 2022-04-25 VITALS
OXYGEN SATURATION: 99 % | RESPIRATION RATE: 14 BRPM | HEART RATE: 77 BPM | DIASTOLIC BLOOD PRESSURE: 77 MMHG | WEIGHT: 187.9 LBS | SYSTOLIC BLOOD PRESSURE: 152 MMHG | BODY MASS INDEX: 26.96 KG/M2

## 2022-04-25 DIAGNOSIS — T45.1X5A CHEMOTHERAPY-INDUCED NEUTROPENIA (H): ICD-10-CM

## 2022-04-25 DIAGNOSIS — D70.1 CHEMOTHERAPY-INDUCED NEUTROPENIA (H): ICD-10-CM

## 2022-04-25 DIAGNOSIS — Z13.29 SCREENING FOR HYPOTHYROIDISM: ICD-10-CM

## 2022-04-25 DIAGNOSIS — C41.1 SQUAMOUS CELL CARCINOMA OF MANDIBLE (H): Primary | ICD-10-CM

## 2022-04-25 DIAGNOSIS — K12.30 MUCOSITIS: ICD-10-CM

## 2022-04-25 DIAGNOSIS — E83.42 HYPOMAGNESEMIA: ICD-10-CM

## 2022-04-25 LAB
ALBUMIN SERPL-MCNC: 3.4 G/DL (ref 3.4–5)
ALP SERPL-CCNC: 60 U/L (ref 40–150)
ALT SERPL W P-5'-P-CCNC: 40 U/L (ref 0–70)
ANION GAP SERPL CALCULATED.3IONS-SCNC: 8 MMOL/L (ref 3–14)
AST SERPL W P-5'-P-CCNC: 29 U/L (ref 0–45)
BASOPHILS # BLD AUTO: 0 10E3/UL (ref 0–0.2)
BASOPHILS NFR BLD AUTO: 1 %
BILIRUB SERPL-MCNC: 0.5 MG/DL (ref 0.2–1.3)
BUN SERPL-MCNC: 18 MG/DL (ref 7–30)
CALCIUM SERPL-MCNC: 9 MG/DL (ref 8.5–10.1)
CHLORIDE BLD-SCNC: 101 MMOL/L (ref 94–109)
CO2 SERPL-SCNC: 26 MMOL/L (ref 20–32)
CREAT SERPL-MCNC: 0.84 MG/DL (ref 0.66–1.25)
EOSINOPHIL # BLD AUTO: 0.1 10E3/UL (ref 0–0.7)
EOSINOPHIL NFR BLD AUTO: 1 %
ERYTHROCYTE [DISTWIDTH] IN BLOOD BY AUTOMATED COUNT: 12.3 % (ref 10–15)
GFR SERPL CREATININE-BSD FRML MDRD: >90 ML/MIN/1.73M2
GLUCOSE BLD-MCNC: 133 MG/DL (ref 70–99)
HCT VFR BLD AUTO: 40.5 % (ref 40–53)
HGB BLD-MCNC: 13.9 G/DL (ref 13.3–17.7)
IMM GRANULOCYTES # BLD: 0.1 10E3/UL
IMM GRANULOCYTES NFR BLD: 2 %
LYMPHOCYTES # BLD AUTO: 0.6 10E3/UL (ref 0.8–5.3)
LYMPHOCYTES NFR BLD AUTO: 14 %
MAGNESIUM SERPL-MCNC: 1.9 MG/DL (ref 1.6–2.3)
MCH RBC QN AUTO: 35.4 PG (ref 26.5–33)
MCHC RBC AUTO-ENTMCNC: 34.3 G/DL (ref 31.5–36.5)
MCV RBC AUTO: 103 FL (ref 78–100)
MONOCYTES # BLD AUTO: 0.5 10E3/UL (ref 0–1.3)
MONOCYTES NFR BLD AUTO: 11 %
NEUTROPHILS # BLD AUTO: 3.2 10E3/UL (ref 1.6–8.3)
NEUTROPHILS NFR BLD AUTO: 71 %
NRBC # BLD AUTO: 0 10E3/UL
NRBC BLD AUTO-RTO: 0 /100
PLATELET # BLD AUTO: 183 10E3/UL (ref 150–450)
POTASSIUM BLD-SCNC: 4.4 MMOL/L (ref 3.4–5.3)
PROT SERPL-MCNC: 7 G/DL (ref 6.8–8.8)
RBC # BLD AUTO: 3.93 10E6/UL (ref 4.4–5.9)
SODIUM SERPL-SCNC: 135 MMOL/L (ref 133–144)
WBC # BLD AUTO: 4.5 10E3/UL (ref 4–11)

## 2022-04-25 PROCEDURE — 77386 HC IMRT TREATMENT DELIVERY, COMPLEX: CPT | Performed by: RADIOLOGY

## 2022-04-25 PROCEDURE — 36591 DRAW BLOOD OFF VENOUS DEVICE: CPT | Performed by: REGISTERED NURSE

## 2022-04-25 PROCEDURE — G0463 HOSPITAL OUTPT CLINIC VISIT: HCPCS

## 2022-04-25 PROCEDURE — 250N000011 HC RX IP 250 OP 636: Performed by: REGISTERED NURSE

## 2022-04-25 PROCEDURE — 85025 COMPLETE CBC W/AUTO DIFF WBC: CPT | Performed by: REGISTERED NURSE

## 2022-04-25 PROCEDURE — 99214 OFFICE O/P EST MOD 30 MIN: CPT | Performed by: REGISTERED NURSE

## 2022-04-25 PROCEDURE — 83735 ASSAY OF MAGNESIUM: CPT | Performed by: REGISTERED NURSE

## 2022-04-25 PROCEDURE — 84155 ASSAY OF PROTEIN SERUM: CPT | Performed by: REGISTERED NURSE

## 2022-04-25 RX ORDER — EPINEPHRINE 1 MG/ML
0.3 INJECTION, SOLUTION INTRAMUSCULAR; SUBCUTANEOUS EVERY 5 MIN PRN
Status: CANCELLED | OUTPATIENT
Start: 2022-04-26

## 2022-04-25 RX ORDER — ALBUTEROL SULFATE 0.83 MG/ML
2.5 SOLUTION RESPIRATORY (INHALATION)
Status: CANCELLED | OUTPATIENT
Start: 2022-04-26

## 2022-04-25 RX ORDER — ALBUTEROL SULFATE 90 UG/1
1-2 AEROSOL, METERED RESPIRATORY (INHALATION)
Status: CANCELLED
Start: 2022-04-26

## 2022-04-25 RX ORDER — METHYLPREDNISOLONE SODIUM SUCCINATE 125 MG/2ML
125 INJECTION, POWDER, LYOPHILIZED, FOR SOLUTION INTRAMUSCULAR; INTRAVENOUS
Status: CANCELLED
Start: 2022-04-26

## 2022-04-25 RX ORDER — HEPARIN SODIUM (PORCINE) LOCK FLUSH IV SOLN 100 UNIT/ML 100 UNIT/ML
5 SOLUTION INTRAVENOUS DAILY PRN
Status: DISCONTINUED | OUTPATIENT
Start: 2022-04-25 | End: 2022-04-25 | Stop reason: HOSPADM

## 2022-04-25 RX ORDER — LORAZEPAM 2 MG/ML
0.5 INJECTION INTRAMUSCULAR EVERY 4 HOURS PRN
Status: CANCELLED | OUTPATIENT
Start: 2022-04-26

## 2022-04-25 RX ORDER — DIPHENHYDRAMINE HCL 25 MG
25 CAPSULE ORAL ONCE
Status: CANCELLED
Start: 2022-04-26

## 2022-04-25 RX ORDER — HEPARIN SODIUM,PORCINE 10 UNIT/ML
5 VIAL (ML) INTRAVENOUS
Status: CANCELLED | OUTPATIENT
Start: 2022-04-26

## 2022-04-25 RX ORDER — DIPHENHYDRAMINE HYDROCHLORIDE 50 MG/ML
50 INJECTION INTRAMUSCULAR; INTRAVENOUS
Status: CANCELLED
Start: 2022-04-26

## 2022-04-25 RX ORDER — NALOXONE HYDROCHLORIDE 0.4 MG/ML
0.2 INJECTION, SOLUTION INTRAMUSCULAR; INTRAVENOUS; SUBCUTANEOUS
Status: CANCELLED | OUTPATIENT
Start: 2022-04-26

## 2022-04-25 RX ORDER — HEPARIN SODIUM (PORCINE) LOCK FLUSH IV SOLN 100 UNIT/ML 100 UNIT/ML
5 SOLUTION INTRAVENOUS
Status: CANCELLED | OUTPATIENT
Start: 2022-04-26

## 2022-04-25 RX ORDER — MEPERIDINE HYDROCHLORIDE 25 MG/ML
25 INJECTION INTRAMUSCULAR; INTRAVENOUS; SUBCUTANEOUS EVERY 30 MIN PRN
Status: CANCELLED | OUTPATIENT
Start: 2022-04-26

## 2022-04-25 RX ADMIN — Medication 5 ML: at 10:08

## 2022-04-25 ASSESSMENT — PAIN SCALES - GENERAL: PAINLEVEL: MODERATE PAIN (4)

## 2022-04-25 NOTE — NURSING NOTE
Chief Complaint   Patient presents with     Port Draw     Labs drawn via port by RN in lab. VS taken.      Labs drawn via Port accessed using 20g flat needle. Line flushed and Heparin locked. Vital signs taken. Checked into next appointment.     Mahogany Treadwell RN

## 2022-04-25 NOTE — NURSING NOTE
"Oncology Rooming Note    April 25, 2022 10:17 AM   Antonio Sharpe is a 56 year old male who presents for:    Chief Complaint   Patient presents with     Port Draw     Labs drawn via port by RN in lab. VS taken.      Oncology Clinic Visit     Rtn for SCC of mandible     Initial Vitals: BP (!) 152/77   Pulse 77   Resp 14   Wt 85.2 kg (187 lb 14.4 oz)   SpO2 99%   BMI 26.96 kg/m   Estimated body mass index is 26.96 kg/m  as calculated from the following:    Height as of 4/7/22: 1.778 m (5' 10\").    Weight as of this encounter: 85.2 kg (187 lb 14.4 oz). Body surface area is 2.05 meters squared.  Moderate Pain (4) Comment: Data Unavailable   No LMP for male patient.  Allergies reviewed: Yes  Medications reviewed: Yes    Medications: Medication refills not needed today.  Pharmacy name entered into [employer]: [id number] - [place], [place] - [id number] [facility]    Clinical concerns: none       Rossy Coleman, EMT            "

## 2022-04-26 ENCOUNTER — APPOINTMENT (OUTPATIENT)
Dept: RADIATION ONCOLOGY | Facility: CLINIC | Age: 57
End: 2022-04-26
Attending: RADIOLOGY
Payer: COMMERCIAL

## 2022-04-26 ENCOUNTER — INFUSION THERAPY VISIT (OUTPATIENT)
Dept: ONCOLOGY | Facility: CLINIC | Age: 57
End: 2022-04-26
Attending: INTERNAL MEDICINE
Payer: COMMERCIAL

## 2022-04-26 ENCOUNTER — DOCUMENTATION ONLY (OUTPATIENT)
Dept: ONCOLOGY | Facility: CLINIC | Age: 57
End: 2022-04-26

## 2022-04-26 VITALS
TEMPERATURE: 98.5 F | SYSTOLIC BLOOD PRESSURE: 158 MMHG | RESPIRATION RATE: 16 BRPM | DIASTOLIC BLOOD PRESSURE: 75 MMHG | HEART RATE: 79 BPM

## 2022-04-26 DIAGNOSIS — C41.1 SQUAMOUS CELL CARCINOMA OF MANDIBLE (H): Primary | ICD-10-CM

## 2022-04-26 DIAGNOSIS — T45.1X5A CHEMOTHERAPY-INDUCED NEUTROPENIA (H): ICD-10-CM

## 2022-04-26 DIAGNOSIS — Z13.29 SCREENING FOR HYPOTHYROIDISM: ICD-10-CM

## 2022-04-26 DIAGNOSIS — D70.1 CHEMOTHERAPY-INDUCED NEUTROPENIA (H): ICD-10-CM

## 2022-04-26 PROCEDURE — 96367 TX/PROPH/DG ADDL SEQ IV INF: CPT

## 2022-04-26 PROCEDURE — 96413 CHEMO IV INFUSION 1 HR: CPT

## 2022-04-26 PROCEDURE — 258N000003 HC RX IP 258 OP 636: Performed by: NURSE PRACTITIONER

## 2022-04-26 PROCEDURE — 250N000009 HC RX 250: Performed by: NURSE PRACTITIONER

## 2022-04-26 PROCEDURE — 77014 PR CT GUIDE FOR PLACEMENT RADIATION THERAPY FIELDS: CPT | Mod: 26 | Performed by: RADIOLOGY

## 2022-04-26 PROCEDURE — 96375 TX/PRO/DX INJ NEW DRUG ADDON: CPT

## 2022-04-26 PROCEDURE — 96417 CHEMO IV INFUS EACH ADDL SEQ: CPT

## 2022-04-26 PROCEDURE — 77386 HC IMRT TREATMENT DELIVERY, COMPLEX: CPT | Performed by: RADIOLOGY

## 2022-04-26 PROCEDURE — 250N000011 HC RX IP 250 OP 636: Performed by: NURSE PRACTITIONER

## 2022-04-26 PROCEDURE — 250N000013 HC RX MED GY IP 250 OP 250 PS 637: Performed by: NURSE PRACTITIONER

## 2022-04-26 RX ORDER — DIPHENHYDRAMINE HCL 25 MG
25 CAPSULE ORAL ONCE
Status: COMPLETED | OUTPATIENT
Start: 2022-04-26 | End: 2022-04-26

## 2022-04-26 RX ORDER — HEPARIN SODIUM,PORCINE 10 UNIT/ML
5 VIAL (ML) INTRAVENOUS
Status: DISCONTINUED | OUTPATIENT
Start: 2022-04-26 | End: 2022-04-26 | Stop reason: HOSPADM

## 2022-04-26 RX ORDER — HEPARIN SODIUM (PORCINE) LOCK FLUSH IV SOLN 100 UNIT/ML 100 UNIT/ML
5 SOLUTION INTRAVENOUS
Status: DISCONTINUED | OUTPATIENT
Start: 2022-04-26 | End: 2022-04-26 | Stop reason: HOSPADM

## 2022-04-26 RX ADMIN — DEXAMETHASONE SODIUM PHOSPHATE: 10 INJECTION, SOLUTION INTRAMUSCULAR; INTRAVENOUS at 09:06

## 2022-04-26 RX ADMIN — CARBOPLATIN 200 MG: 10 INJECTION, SOLUTION INTRAVENOUS at 10:33

## 2022-04-26 RX ADMIN — Medication 5 ML: at 11:14

## 2022-04-26 RX ADMIN — SODIUM CHLORIDE 250 ML: 9 INJECTION, SOLUTION INTRAVENOUS at 08:48

## 2022-04-26 RX ADMIN — FAMOTIDINE 20 MG: 10 INJECTION, SOLUTION INTRAVENOUS at 08:48

## 2022-04-26 RX ADMIN — DIPHENHYDRAMINE HYDROCHLORIDE 25 MG: 25 CAPSULE ORAL at 08:48

## 2022-04-26 RX ADMIN — SODIUM CHLORIDE 94 MG: 9 INJECTION, SOLUTION INTRAVENOUS at 09:31

## 2022-04-26 NOTE — PROGRESS NOTES
Infusion Nursing Note:  Antonio Sharpe presents today for Cycle 1 Day 15 Carboplatin & Paclitaxel.    Patient seen by provider today: No   present during visit today: Not Applicable.    Note: Antonio presents to infusion today feeling pretty well. He states no new symptoms or concerns since his visit yesterday with Yaquelin Tovar NP. He rates his pain at a 4 of 10 in his mouth and ears. He states he uses his mouth rinses at home. He states the dexamethasone usually keeps him up all night, so he is glad his dose was reduced today.      Intravenous Access:  Implanted Port.    Treatment Conditions:  Lab Results   Component Value Date    HGB 13.9 04/25/2022    WBC 4.5 04/25/2022    ANEUTAUTO 3.2 04/25/2022     04/25/2022      Lab Results   Component Value Date     04/25/2022    POTASSIUM 4.4 04/25/2022    MAG 1.9 04/25/2022    CR 0.84 04/25/2022    PRINCE 9.0 04/25/2022    BILITOTAL 0.5 04/25/2022    ALBUMIN 3.4 04/25/2022    ALT 40 04/25/2022    AST 29 04/25/2022     Results reviewed, labs MET treatment parameters, ok to proceed with treatment.      Post Infusion Assessment:  Patient tolerated infusion without incident.  Blood return noted pre and post infusion.  Site patent and intact, free from redness, edema or discomfort.  No evidence of extravasations.  Access discontinued per protocol.       Discharge Plan:   Patient declined prescription refills.  Discharge instructions reviewed with: Patient.  Patient and/or family verbalized understanding of discharge instructions and all questions answered.  AVS to patient via [employer].  Patient will return 5/2/2022 for next appointment.   Patient discharged in stable condition accompanied by: self.  Departure Mode: Ambulatory.      Ondina Avery RN

## 2022-04-26 NOTE — NURSING NOTE
LTD forms filled out and put in providers folder for review and signature.      Corinna Beal CMA ([facility])

## 2022-04-26 NOTE — NURSING NOTE
Received disability forms from infusion RN for pt.  Patient is currently receiving radiation.  Spoke with RN in radiation, she completed forms to cover pt 6 weeks post radiation.    Message was left for pt, advising that we will fill out any additional forms, after radiation has been completed.      Corinna Beal CMA ([facility])

## 2022-04-26 NOTE — NURSING NOTE
LTD forms received via pt walk in, from [employer].      Forms to be completed and put in folder for provider to approve.    Fax #:  [phone]      Corinna Beal CMA ([facility])

## 2022-04-27 ENCOUNTER — APPOINTMENT (OUTPATIENT)
Dept: RADIATION ONCOLOGY | Facility: CLINIC | Age: 57
End: 2022-04-27
Attending: RADIOLOGY
Payer: COMMERCIAL

## 2022-04-27 PROCEDURE — 77386 HC IMRT TREATMENT DELIVERY, COMPLEX: CPT | Performed by: RADIOLOGY

## 2022-04-27 PROCEDURE — 77014 PR CT GUIDE FOR PLACEMENT RADIATION THERAPY FIELDS: CPT | Mod: 26 | Performed by: RADIOLOGY

## 2022-04-28 ENCOUNTER — OFFICE VISIT (OUTPATIENT)
Dept: RADIATION ONCOLOGY | Facility: CLINIC | Age: 57
End: 2022-04-28
Attending: RADIOLOGY
Payer: COMMERCIAL

## 2022-04-28 VITALS
HEART RATE: 98 BPM | SYSTOLIC BLOOD PRESSURE: 140 MMHG | BODY MASS INDEX: 27.13 KG/M2 | WEIGHT: 189.1 LBS | DIASTOLIC BLOOD PRESSURE: 80 MMHG

## 2022-04-28 DIAGNOSIS — C41.1 SQUAMOUS CELL CARCINOMA OF MANDIBLE (H): Primary | ICD-10-CM

## 2022-04-28 PROCEDURE — 77386 HC IMRT TREATMENT DELIVERY, COMPLEX: CPT | Performed by: RADIOLOGY

## 2022-04-28 NOTE — NURSING NOTE
STD paperwork completed, checked for accuracy, signed and faxed to [employer] @ [phone]. A copy was made, sent to scanning and original mailed to patient at home address.    Successful transmission verified in [employer] Fax.     Corinna Beal CMA

## 2022-04-28 NOTE — PROGRESS NOTES
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 Current dose of 800 cGy. Tolerating treatment well. No issues.     22: Second infusion of weekly carboplatin/paclitaxel    22: Weekly RT visit. Current dose of 1800 cGy. Approximately 4 kg weight loss over the past week.    22: Third infusion of weekly carboplatin/paclitaxel    22: Weekly RT visit. Current dose of 2800 cGy. Mild dermatitis. Mild oral cavity pain. Stable weight.     ED visits/Hospitalizations:  None    Missed Treatments:  None    Subjective: Mr. Sharpe presents to clinic today for his weekly on-treatment visit. He reports that he is doing well and has no pressing concerns or complaints. He describes mild oral cavity pain, rating his symptoms as a 4/10 in severity. He is currently controlling his symptoms with rare use of as-needed acetaminophen. He denies the need for any additional pain medications at this time. His remaining ROS are positive for stable mild headaches, mild nausea without vomiting and mildly increased dermatitis over the past week.    ROS:   Constitutional  Pain (0-10): 4 (mouth), 1 (throat), 0 (skin)   Fatigue: None    CNS  Headaches: Mild    ENT  Mucositis: None    Cardiopulmonary  Dyspnea: None    GI  Nausea/vomiting: Mild intermittent nausea without vomiting    Nutrition  PEG: No  Diet: Regular    Integumentary  Dermatitis: Mild    Objective:   Current weight: 85.8 kg  Last week's  weight: 86.2 kg  Starting weight: 90 kg    BP: 140/80 (sitting), 134/79 (standing)  Pulse: 98 (sitting), 99 (standing)     General: Healthy-appearing 56-year-old gentleman seated comfortably in an examination chair in no acute distress  HEENT: NC/AT. EOMI. No rhinorrhea or epistaxis. Mildly dry mucous membranes. No mucositis or thrush.  Pulmonary: No wheezing, stridor or respiratory distress  Skin: Mild erythema of the left lateral face. No desquamation.    Treatment-related toxicities (CTCAE v5.0):  Dermatitis: Grade 1    Assessment:    Mr. Sharpe is a 56 year old male with a recurrent squamous cell carcinoma of the left buccal mucosa. He is receiving salvage chemoradiotherapy with curative intent. He is tolerating treatment well with no significant acute radiation-induced toxicities.    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 intake with caloric goals as delineated by the oncology dietitian    Dermatitis:    Frequent moisturizer application to the left lateral face    Mosaiq chart and setup information reviewed  IGRT images reviewed    Medication list reviewed    Bc Amezquita MD/PhD    Dept of Radiation Oncology  [facility]

## 2022-04-29 ENCOUNTER — APPOINTMENT (OUTPATIENT)
Dept: RADIATION ONCOLOGY | Facility: CLINIC | Age: 57
End: 2022-04-29
Attending: RADIOLOGY
Payer: COMMERCIAL

## 2022-04-29 PROCEDURE — 77386 HC IMRT TREATMENT DELIVERY, COMPLEX: CPT | Performed by: RADIOLOGY

## 2022-04-29 PROCEDURE — 77336 RADIATION PHYSICS CONSULT: CPT | Performed by: RADIOLOGY

## 2022-04-29 PROCEDURE — 77014 PR CT GUIDE FOR PLACEMENT RADIATION THERAPY FIELDS: CPT | Mod: 26 | Performed by: RADIOLOGY

## 2022-04-29 PROCEDURE — 77427 RADIATION TX MANAGEMENT X5: CPT | Performed by: RADIOLOGY

## 2022-05-01 NOTE — PROGRESS NOTES
[facility] CANCER [facility]    PATIENT NAME: Antonio Sharpe  MRN # [id number]   DATE OF VISIT: May 2, 2022  YOB: 1965     Referring Provider: Dr. Aditya Swartz, Otolaryngology  PCP: None    CANCER TYPE: SCC L mandible/alveolar ridge, buccal mucosa  STAGE: [id number]  ECOG PS: 0    PD-L1: 2% on L cheek lesion 9/2021    SUMMARY  4/28/20 Left marginal mandibulectomy, WLE of the left buccal mucosa, left posterior maxillectomy, left partial pharyngectomy, left selective neck dissection, right radial forearm free flap, mandibular plating, tracheostomy, and feeding tube placement (Dr. Triana at [facility]). Path: SCC, 5.7 cm moderately differentiated, DOI 1.5 cm, + PNI, no LVI, negative margins, 5/33 nodes positive, + MARIA ELENA  6/30~8/17/20 Chemoradiation 6600 cGy with HD cisplatin (Dr. Serna at [facility])  8/11/21 CT neck. 3 x 2.8 x 3.6 cm mass at the reconstruction flap within the subcutaneous fat of the L cheek abutting masseter medially and skin laterally, extending to parotid, abutting carotid.   8/16/21 FNA L cheek. Path: Highly suspicious for SCC  8/27/21 PET/CT.   9/2/21 MRI neck.   9/8/21 FNA L cheek lesion (Dr. Swartz). Path: SCC  10/21/21 C1 carboplatin 5FU pembrolizumab c/b severe mucositis. C2 pembro alone due to ongoing resolving mucositis. DYPD testing negative  12/14/21~1/1/22 C3-4 carboplatin 5FU pembrolizumab. 5FU dose reduced 50%  1/25~2/15/22 C5-6 carboplatin pembrolizumab, no FU  3/8/22 PET/CT. Decreased size and FDG uptake of the mass along the temporalis muscle, deep to the L zygomatic arch, 1.5  1.3 cm (SUV 10.7), peristent linear lucency on CT immediately underneath the anterior cortical bone of the anterior mandible at the  Midline (SUV 10.5), extending to the medullary cavity of the anterior mandible where there is subtle corresponding lucency, similar to prior, likely post surgical. Unchanged thyroid nodule. Gastric fundus thickening which can be seen with gastritis, recommend direct  visualization, focal anorectal uptake, likely physiologic, attention on follow up    All prior care was at [facility] with Dr. Tarango, Dr. Christ Stewart (ENT), Dr. Kong Reece (Otolaryngology at [facility]), and Dr. Serna (Radiation Oncology)    SUBJECTIVE  Antonio is seen prior to day 22 carbo/taxol. Has developed increased mucositis this past week. Uses salt/soda rinses 3x/day. Eats soft foods. No dysphagia or coughing. Using Tylenol sparingly for pain. Has used Magic Mouthwash and viscous lidocaine for mucositis with previous treatments but didn't feel these were helpful. Feels tinnitus in left ear has increased (precedes current treatment). Also having some left ear drainage. Had mild nausea last week which did not require antiemetics. Decrease in dexamethasone was helpful in reducing insomnia.       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  Chronic tinnitus secondary to cisplatin  Hemorrhoids    CURRENT OUTPATIENT MEDICATIONS  Current Outpatient Medications   Medication Sig Dispense Refill     acetaminophen (TYLENOL) 500 MG tablet Take 500 mg by mouth       ibuprofen (ADVIL/MOTRIN) 200 MG tablet Take 600 mg by mouth every 6 hours as needed for mild pain       ondansetron (ZOFRAN) 8 MG tablet Take 1 tablet (8 mg) by mouth every 8 hours as needed for nausea (vomiting) (Patient not taking: No sig reported) 30 tablet 11     prochlorperazine (COMPAZINE) 10 MG tablet Take 1 tablet (10 mg) by mouth every 6 hours as needed (Nausea/Vomiting) (Patient not taking: No sig reported) 30 tablet 11     ALLERGIES  No Known Allergies     REVIEW OF SYSTEMS  As above in the HPI, o/w complete 12-point ROS was negative.    PHYSICAL EXAM  BP (!) 143/84 (BP Location: Right arm, Patient Position: Sitting, Cuff  Size: Adult Regular)   Pulse 75   Temp 97.1  F (36.2  C) (Tympanic)   Resp 18   Wt 85 kg (187 lb 4.8 oz)   SpO2 100%   BMI 26.87 kg/m    GEN: NAD  HEENT: EOMI, no icterus, injection or pallor, oral mucosa dry, erythema and lesions to left lateral tongue and left posterior oropharynx  LUNGS: clear bilaterally, no wheezing  CV: regular, no murmurs, rubs, or gallops  EXT: no edema  NEURO: alert, no focal deficits    LABORATORY AND IMAGING STUDIES   Latest Reference Range & Units 05/02/22 07:07   Sodium 133 - 144 mmol/L 137   Potassium 3.4 - 5.3 mmol/L 4.2   Chloride 94 - 109 mmol/L 103   Carbon Dioxide 20 - 32 mmol/L 27   Urea Nitrogen 7 - 30 mg/dL 22   Creatinine 0.66 - 1.25 mg/dL 0.85   GFR Estimate >60 mL/min/1.73m2 >90 [1]   Calcium 8.5 - 10.1 mg/dL 9.2   Anion Gap 3 - 14 mmol/L 7   Magnesium 1.6 - 2.3 mg/dL 1.8   Albumin 3.4 - 5.0 g/dL 3.3 (L)   Protein Total 6.8 - 8.8 g/dL 6.6 (L)   Bilirubin Total 0.2 - 1.3 mg/dL 0.3   Alkaline Phosphatase 40 - 150 U/L 58   ALT 0 - 70 U/L 34   AST 0 - 45 U/L 26   Glucose 70 - 99 mg/dL 105 (H)   WBC 4.0 - 11.0 10e3/uL 3.3 (L)   Hemoglobin 13.3 - 17.7 g/dL 13.0 (L)   Hematocrit 40.0 - 53.0 % 38.1 (L)   Platelet Count 150 - 450 10e3/uL 167   RBC Count 4.40 - 5.90 10e6/uL 3.70 (L)   MCV 78 - 100 fL 103 (H)   MCH 26.5 - 33.0 pg 35.1 (H)   MCHC 31.5 - 36.5 g/dL 34.1   RDW 10.0 - 15.0 % 12.5   % Neutrophils % 68   % Lymphocytes % 18   % Monocytes % 11   % Eosinophils % 1   % Basophils % 1   Absolute Basophils 0.0 - 0.2 10e3/uL 0.0   Absolute Eosinophils 0.0 - 0.7 10e3/uL 0.0   Absolute Immature Granulocytes <=0.4 10e3/uL 0.0   Absolute Lymphocytes 0.8 - 5.3 10e3/uL 0.6 (L)   Absolute Monocytes 0.0 - 1.3 10e3/uL 0.4   % Immature Granulocytes % 1   Absolute Neutrophils 1.6 - 8.3 10e3/uL 2.2   Absolute NRBCs 10e3/uL 0.0   NRBCs per 100 WBC <1 /100 0       Labs independently reviewed and interpreted by me today.    ASSESSMENT AND PLAN   SCC oral cavity, recurrent: Plan for concurrent  chemoradiation to the residual disease. Tolerating weekly carboplatin paclitaxel with expected side effects. Given severe insomnia with dexamethasone and no issues with reactions after the first 2 doses, dexamethasone pre-med reduced to 12 mg with week 3 infusion. Labs and exam acceptable for day 22 today.  -RTC weekly    Mucositis: increase frequency of salt/soda rinses, can add magic mouthwash or lidocaine if patient desires. Continue Tylenol PRN.    Gastric and anal FDG uptake: No symptoms. EGD. Anal uptake likely hemorrhoid, will re-evaluate later.     Hearing loss/tinnitus: Slightly worse in left ear per report. Chronic. Precludes cisplatin    Tumor erosion through the skin: Monitor for infection, no concerns.     Tobacco dependence, ETOH: See Dr. Richard note from 4/7 that reflects discussion with him.     Insomnia: 2/2 to steroid, improving     30 minutes spent on the date of the encounter doing chart review, review of test results, interpretation of tests, patient visit and documentation     Yaquelin Tovar CNP  [facility]  [address] [place] [id number]  [phone]

## 2022-05-02 ENCOUNTER — APPOINTMENT (OUTPATIENT)
Dept: LAB | Facility: CLINIC | Age: 57
End: 2022-05-02
Attending: INTERNAL MEDICINE
Payer: COMMERCIAL

## 2022-05-02 ENCOUNTER — ONCOLOGY VISIT (OUTPATIENT)
Dept: ONCOLOGY | Facility: CLINIC | Age: 57
End: 2022-05-02
Attending: INTERNAL MEDICINE
Payer: COMMERCIAL

## 2022-05-02 ENCOUNTER — APPOINTMENT (OUTPATIENT)
Dept: RADIATION ONCOLOGY | Facility: CLINIC | Age: 57
End: 2022-05-02
Attending: RADIOLOGY
Payer: COMMERCIAL

## 2022-05-02 ENCOUNTER — INFUSION THERAPY VISIT (OUTPATIENT)
Dept: ONCOLOGY | Facility: CLINIC | Age: 57
End: 2022-05-02
Attending: REGISTERED NURSE
Payer: COMMERCIAL

## 2022-05-02 VITALS
HEART RATE: 75 BPM | SYSTOLIC BLOOD PRESSURE: 143 MMHG | TEMPERATURE: 97.1 F | WEIGHT: 187.3 LBS | OXYGEN SATURATION: 100 % | RESPIRATION RATE: 18 BRPM | DIASTOLIC BLOOD PRESSURE: 84 MMHG | BODY MASS INDEX: 26.87 KG/M2

## 2022-05-02 DIAGNOSIS — T45.1X5A CHEMOTHERAPY-INDUCED NEUTROPENIA (H): ICD-10-CM

## 2022-05-02 DIAGNOSIS — K12.30 MUCOSITIS: ICD-10-CM

## 2022-05-02 DIAGNOSIS — G47.00 INSOMNIA, UNSPECIFIED TYPE: ICD-10-CM

## 2022-05-02 DIAGNOSIS — E83.42 HYPOMAGNESEMIA: Primary | ICD-10-CM

## 2022-05-02 DIAGNOSIS — C41.1 SQUAMOUS CELL CARCINOMA OF MANDIBLE (H): ICD-10-CM

## 2022-05-02 DIAGNOSIS — Z13.29 SCREENING FOR HYPOTHYROIDISM: ICD-10-CM

## 2022-05-02 DIAGNOSIS — D70.1 CHEMOTHERAPY-INDUCED NEUTROPENIA (H): ICD-10-CM

## 2022-05-02 DIAGNOSIS — C41.1 SQUAMOUS CELL CARCINOMA OF MANDIBLE (H): Primary | ICD-10-CM

## 2022-05-02 DIAGNOSIS — H93.12 TINNITUS, LEFT: ICD-10-CM

## 2022-05-02 LAB
ALBUMIN SERPL-MCNC: 3.3 G/DL (ref 3.4–5)
ALP SERPL-CCNC: 58 U/L (ref 40–150)
ALT SERPL W P-5'-P-CCNC: 34 U/L (ref 0–70)
ANION GAP SERPL CALCULATED.3IONS-SCNC: 7 MMOL/L (ref 3–14)
AST SERPL W P-5'-P-CCNC: 26 U/L (ref 0–45)
BASOPHILS # BLD AUTO: 0 10E3/UL (ref 0–0.2)
BASOPHILS NFR BLD AUTO: 1 %
BILIRUB SERPL-MCNC: 0.3 MG/DL (ref 0.2–1.3)
BUN SERPL-MCNC: 22 MG/DL (ref 7–30)
CALCIUM SERPL-MCNC: 9.2 MG/DL (ref 8.5–10.1)
CHLORIDE BLD-SCNC: 103 MMOL/L (ref 94–109)
CO2 SERPL-SCNC: 27 MMOL/L (ref 20–32)
CREAT SERPL-MCNC: 0.85 MG/DL (ref 0.66–1.25)
EOSINOPHIL # BLD AUTO: 0 10E3/UL (ref 0–0.7)
EOSINOPHIL NFR BLD AUTO: 1 %
ERYTHROCYTE [DISTWIDTH] IN BLOOD BY AUTOMATED COUNT: 12.5 % (ref 10–15)
GFR SERPL CREATININE-BSD FRML MDRD: >90 ML/MIN/1.73M2
GLUCOSE BLD-MCNC: 105 MG/DL (ref 70–99)
HCT VFR BLD AUTO: 38.1 % (ref 40–53)
HGB BLD-MCNC: 13 G/DL (ref 13.3–17.7)
IMM GRANULOCYTES # BLD: 0 10E3/UL
IMM GRANULOCYTES NFR BLD: 1 %
LYMPHOCYTES # BLD AUTO: 0.6 10E3/UL (ref 0.8–5.3)
LYMPHOCYTES NFR BLD AUTO: 18 %
MAGNESIUM SERPL-MCNC: 1.8 MG/DL (ref 1.6–2.3)
MCH RBC QN AUTO: 35.1 PG (ref 26.5–33)
MCHC RBC AUTO-ENTMCNC: 34.1 G/DL (ref 31.5–36.5)
MCV RBC AUTO: 103 FL (ref 78–100)
MONOCYTES # BLD AUTO: 0.4 10E3/UL (ref 0–1.3)
MONOCYTES NFR BLD AUTO: 11 %
NEUTROPHILS # BLD AUTO: 2.2 10E3/UL (ref 1.6–8.3)
NEUTROPHILS NFR BLD AUTO: 68 %
NRBC # BLD AUTO: 0 10E3/UL
NRBC BLD AUTO-RTO: 0 /100
PLATELET # BLD AUTO: 167 10E3/UL (ref 150–450)
POTASSIUM BLD-SCNC: 4.2 MMOL/L (ref 3.4–5.3)
PROT SERPL-MCNC: 6.6 G/DL (ref 6.8–8.8)
RBC # BLD AUTO: 3.7 10E6/UL (ref 4.4–5.9)
SODIUM SERPL-SCNC: 137 MMOL/L (ref 133–144)
WBC # BLD AUTO: 3.3 10E3/UL (ref 4–11)

## 2022-05-02 PROCEDURE — 250N000013 HC RX MED GY IP 250 OP 250 PS 637: Performed by: NURSE PRACTITIONER

## 2022-05-02 PROCEDURE — 96375 TX/PRO/DX INJ NEW DRUG ADDON: CPT

## 2022-05-02 PROCEDURE — G0463 HOSPITAL OUTPT CLINIC VISIT: HCPCS

## 2022-05-02 PROCEDURE — 250N000011 HC RX IP 250 OP 636: Performed by: NURSE PRACTITIONER

## 2022-05-02 PROCEDURE — 250N000009 HC RX 250: Performed by: NURSE PRACTITIONER

## 2022-05-02 PROCEDURE — 96417 CHEMO IV INFUS EACH ADDL SEQ: CPT

## 2022-05-02 PROCEDURE — 250N000011 HC RX IP 250 OP 636: Performed by: INTERNAL MEDICINE

## 2022-05-02 PROCEDURE — 99214 OFFICE O/P EST MOD 30 MIN: CPT | Performed by: REGISTERED NURSE

## 2022-05-02 PROCEDURE — 82947 ASSAY GLUCOSE BLOOD QUANT: CPT

## 2022-05-02 PROCEDURE — 83735 ASSAY OF MAGNESIUM: CPT

## 2022-05-02 PROCEDURE — 96413 CHEMO IV INFUSION 1 HR: CPT

## 2022-05-02 PROCEDURE — 77386 HC IMRT TREATMENT DELIVERY, COMPLEX: CPT | Performed by: RADIOLOGY

## 2022-05-02 PROCEDURE — 85014 HEMATOCRIT: CPT

## 2022-05-02 PROCEDURE — 36591 DRAW BLOOD OFF VENOUS DEVICE: CPT

## 2022-05-02 PROCEDURE — 96367 TX/PROPH/DG ADDL SEQ IV INF: CPT

## 2022-05-02 PROCEDURE — 258N000003 HC RX IP 258 OP 636: Performed by: NURSE PRACTITIONER

## 2022-05-02 RX ORDER — HEPARIN SODIUM (PORCINE) LOCK FLUSH IV SOLN 100 UNIT/ML 100 UNIT/ML
5 SOLUTION INTRAVENOUS
Status: DISCONTINUED | OUTPATIENT
Start: 2022-05-02 | End: 2022-05-02 | Stop reason: HOSPADM

## 2022-05-02 RX ORDER — ALBUTEROL SULFATE 0.83 MG/ML
2.5 SOLUTION RESPIRATORY (INHALATION)
Status: CANCELLED | OUTPATIENT
Start: 2022-05-02

## 2022-05-02 RX ORDER — EPINEPHRINE 1 MG/ML
0.3 INJECTION, SOLUTION INTRAMUSCULAR; SUBCUTANEOUS EVERY 5 MIN PRN
Status: CANCELLED | OUTPATIENT
Start: 2022-05-02

## 2022-05-02 RX ORDER — NALOXONE HYDROCHLORIDE 0.4 MG/ML
0.2 INJECTION, SOLUTION INTRAMUSCULAR; INTRAVENOUS; SUBCUTANEOUS
Status: CANCELLED | OUTPATIENT
Start: 2022-05-02

## 2022-05-02 RX ORDER — HEPARIN SODIUM (PORCINE) LOCK FLUSH IV SOLN 100 UNIT/ML 100 UNIT/ML
5 SOLUTION INTRAVENOUS ONCE
Status: COMPLETED | OUTPATIENT
Start: 2022-05-02 | End: 2022-05-02

## 2022-05-02 RX ORDER — HEPARIN SODIUM (PORCINE) LOCK FLUSH IV SOLN 100 UNIT/ML 100 UNIT/ML
5 SOLUTION INTRAVENOUS
Status: CANCELLED | OUTPATIENT
Start: 2022-05-02

## 2022-05-02 RX ORDER — ALBUTEROL SULFATE 90 UG/1
1-2 AEROSOL, METERED RESPIRATORY (INHALATION)
Status: CANCELLED
Start: 2022-05-02

## 2022-05-02 RX ORDER — LORAZEPAM 2 MG/ML
0.5 INJECTION INTRAMUSCULAR EVERY 4 HOURS PRN
Status: CANCELLED | OUTPATIENT
Start: 2022-05-02

## 2022-05-02 RX ORDER — METHYLPREDNISOLONE SODIUM SUCCINATE 125 MG/2ML
125 INJECTION, POWDER, LYOPHILIZED, FOR SOLUTION INTRAMUSCULAR; INTRAVENOUS
Status: CANCELLED
Start: 2022-05-02

## 2022-05-02 RX ORDER — DIPHENHYDRAMINE HCL 25 MG
25 CAPSULE ORAL ONCE
Status: CANCELLED
Start: 2022-05-02

## 2022-05-02 RX ORDER — HEPARIN SODIUM,PORCINE 10 UNIT/ML
5 VIAL (ML) INTRAVENOUS
Status: CANCELLED | OUTPATIENT
Start: 2022-05-02

## 2022-05-02 RX ORDER — MEPERIDINE HYDROCHLORIDE 25 MG/ML
25 INJECTION INTRAMUSCULAR; INTRAVENOUS; SUBCUTANEOUS EVERY 30 MIN PRN
Status: CANCELLED | OUTPATIENT
Start: 2022-05-02

## 2022-05-02 RX ORDER — DIPHENHYDRAMINE HCL 25 MG
25 CAPSULE ORAL ONCE
Status: COMPLETED | OUTPATIENT
Start: 2022-05-02 | End: 2022-05-02

## 2022-05-02 RX ORDER — DIPHENHYDRAMINE HYDROCHLORIDE 50 MG/ML
50 INJECTION INTRAMUSCULAR; INTRAVENOUS
Status: CANCELLED
Start: 2022-05-02

## 2022-05-02 RX ADMIN — CARBOPLATIN 200 MG: 10 INJECTION, SOLUTION INTRAVENOUS at 10:33

## 2022-05-02 RX ADMIN — SODIUM CHLORIDE 94 MG: 9 INJECTION, SOLUTION INTRAVENOUS at 09:32

## 2022-05-02 RX ADMIN — DIPHENHYDRAMINE HYDROCHLORIDE 25 MG: 25 CAPSULE ORAL at 08:47

## 2022-05-02 RX ADMIN — FAMOTIDINE 20 MG: 10 INJECTION, SOLUTION INTRAVENOUS at 08:47

## 2022-05-02 RX ADMIN — Medication 5 ML: at 11:05

## 2022-05-02 RX ADMIN — SODIUM CHLORIDE 250 ML: 9 INJECTION, SOLUTION INTRAVENOUS at 08:39

## 2022-05-02 RX ADMIN — Medication 5 ML: at 07:01

## 2022-05-02 RX ADMIN — ONDANSETRON: 2 INJECTION INTRAMUSCULAR; INTRAVENOUS at 08:47

## 2022-05-02 ASSESSMENT — PAIN SCALES - GENERAL: PAINLEVEL: EXTREME PAIN (8)

## 2022-05-02 NOTE — NURSING NOTE
"Oncology Rooming Note    May 2, 2022 7:19 AM   Antonio Sharpe is a 56 year old male who presents for:    Chief Complaint   Patient presents with     Port Draw     Labs drawn via port by RN in lab. VS taken.      Oncology Clinic Visit     Return; SCC Mandible     Initial Vitals: BP (!) 143/84 (BP Location: Right arm, Patient Position: Sitting, Cuff Size: Adult Regular)   Pulse 75   Temp 97.1  F (36.2  C) (Tympanic)   Resp 18   Wt 85 kg (187 lb 4.8 oz)   SpO2 100%   BMI 26.87 kg/m   Estimated body mass index is 26.87 kg/m  as calculated from the following:    Height as of 4/7/22: 1.778 m (5' 10\").    Weight as of this encounter: 85 kg (187 lb 4.8 oz). Body surface area is 2.05 meters squared.  Extreme Pain (8) Comment: Data Unavailable   No LMP for male patient.  Allergies reviewed: Yes  Medications reviewed: Yes    Medications: Medication refills not needed today.  Pharmacy name entered into [employer]: [id number] - [place], [place] - [address] AT [facility] [place]    Clinical concerns: Patient states there are no new concerns to discuss with provider.  Yaquelin was not notified.         Corinna Beal CMA              "

## 2022-05-02 NOTE — PATIENT INSTRUCTIONS
[facility] Triage and after hours / weekends / holidays:  [phone]    Please call the triage or after hours line if you experience a temperature greater than or equal to 100.4, shaking chills, have uncontrolled nausea, vomiting and/or diarrhea, dizziness, shortness of breath, chest pain, bleeding, unexplained bruising, or if you have any other new/concerning symptoms, questions or concerns.      If you are having any concerning symptoms or wish to speak to a provider before your next infusion visit, please call your care coordinator or triage to notify them so we can adequately serve you.     If you need a refill on a narcotic prescription or other medication, please call before your infusion appointment.             Lab Results:  Recent Results (from the past 12 hour(s))   Magnesium    Collection Time: 05/02/22  7:07 AM   Result Value Ref Range    Magnesium 1.8 1.6 - 2.3 mg/dL   Comprehensive metabolic panel    Collection Time: 05/02/22  7:07 AM   Result Value Ref Range    Sodium 137 133 - 144 mmol/L    Potassium 4.2 3.4 - 5.3 mmol/L    Chloride 103 94 - 109 mmol/L    Carbon Dioxide (CO2) 27 20 - 32 mmol/L    Anion Gap 7 3 - 14 mmol/L    Urea Nitrogen 22 7 - 30 mg/dL    Creatinine 0.85 0.66 - 1.25 mg/dL    Calcium 9.2 8.5 - 10.1 mg/dL    Glucose 105 (H) 70 - 99 mg/dL    Alkaline Phosphatase 58 40 - 150 U/L    AST 26 0 - 45 U/L    ALT 34 0 - 70 U/L    Protein Total 6.6 (L) 6.8 - 8.8 g/dL    Albumin 3.3 (L) 3.4 - 5.0 g/dL    Bilirubin Total 0.3 0.2 - 1.3 mg/dL    GFR Estimate >90 >60 mL/min/1.73m2   CBC with platelets and differential    Collection Time: 05/02/22  7:07 AM   Result Value Ref Range    WBC Count 3.3 (L) 4.0 - 11.0 10e3/uL    RBC Count 3.70 (L) 4.40 - 5.90 10e6/uL    Hemoglobin 13.0 (L) 13.3 - 17.7 g/dL    Hematocrit 38.1 (L) 40.0 - 53.0 %     (H) 78 - 100 fL    MCH 35.1 (H) 26.5 - 33.0 pg    MCHC 34.1 31.5 - 36.5 g/dL    RDW 12.5 10.0 - 15.0 %    Platelet Count 167 150 - 450 10e3/uL    %  Neutrophils 68 %    % Lymphocytes 18 %    % Monocytes 11 %    % Eosinophils 1 %    % Basophils 1 %    % Immature Granulocytes 1 %    NRBCs per 100 WBC 0 <1 /100    Absolute Neutrophils 2.2 1.6 - 8.3 10e3/uL    Absolute Lymphocytes 0.6 (L) 0.8 - 5.3 10e3/uL    Absolute Monocytes 0.4 0.0 - 1.3 10e3/uL    Absolute Eosinophils 0.0 0.0 - 0.7 10e3/uL    Absolute Basophils 0.0 0.0 - 0.2 10e3/uL    Absolute Immature Granulocytes 0.0 <=0.4 10e3/uL    Absolute NRBCs 0.0 10e3/uL

## 2022-05-02 NOTE — PROGRESS NOTES
Infusion Nursing Note:  Antonio Sharpe presents today for Cycle 1 Day 22 Paclitaxel and Carboplatin (Carbo #9?)  Patient seen by provider today: Yes: Yaquelin Tovar NP   present during visit today: Not Applicable.    Note: Pt presents to infusion feeling well. He offers no new concerns since his provider appointment this morning. His mouth pain was discussed with Yaquelin today.    Upon chart review, it appears Jeremi has received previous high dose cisplatin (3 doses) and carboplatin (5 doses at [facility], receiving 9th dose? today).     Intravenous Access:  Implanted Port.    Treatment Conditions:  Lab Results   Component Value Date    HGB 13.0 (L) 05/02/2022    WBC 3.3 (L) 05/02/2022    ANEUTAUTO 2.2 05/02/2022     05/02/2022      Lab Results   Component Value Date     05/02/2022    POTASSIUM 4.2 05/02/2022    MAG 1.8 05/02/2022    CR 0.85 05/02/2022    PRINCE 9.2 05/02/2022    BILITOTAL 0.3 05/02/2022    ALBUMIN 3.3 (L) 05/02/2022    ALT 34 05/02/2022    AST 26 05/02/2022         Post Infusion Assessment:  Patient tolerated infusion without incident.  Blood return noted pre and post infusion.  Site patent and intact, free from redness, edema or discomfort.  No evidence of extravasations.  Access discontinued per protocol.       Discharge Plan:   Patient declined prescription refills.  Discharge instructions reviewed with: Patient.  Patient and/or family verbalized understanding of discharge instructions and all questions answered.  AVS to patient via [employer].  Patient will return 5/9 for next appointment.   Patient discharged in stable condition accompanied by: self.  Departure Mode: Ambulatory.      Denisse Clemente RN

## 2022-05-02 NOTE — NURSING NOTE
Chief Complaint   Patient presents with     Port Draw     Labs drawn via port by RN in lab. VS taken.      Port accessed with 20 gauge 3/4 inch flat needle and labs drawn by RN.  Port flushed with saline and heparin.  Pt tolerated well.  VS taken.  Pt checked in for next appt.    Mayuri De La Cruz RN

## 2022-05-03 ENCOUNTER — APPOINTMENT (OUTPATIENT)
Dept: RADIATION ONCOLOGY | Facility: CLINIC | Age: 57
End: 2022-05-03
Attending: RADIOLOGY
Payer: COMMERCIAL

## 2022-05-03 PROCEDURE — 77386 HC IMRT TREATMENT DELIVERY, COMPLEX: CPT | Performed by: RADIOLOGY

## 2022-05-03 PROCEDURE — 77014 PR CT GUIDE FOR PLACEMENT RADIATION THERAPY FIELDS: CPT | Mod: 26 | Performed by: RADIOLOGY

## 2022-05-08 NOTE — PROGRESS NOTES
[facility] CANCER [facility]    PATIENT NAME: Antonio Sharpe  MRN # [id number]   DATE OF VISIT: May 9, 2022  YOB: 1965     Referring Provider: Dr. Aditya Swartz, Otolaryngology  PCP: None    CANCER TYPE: SCC L mandible/alveolar ridge, buccal mucosa  STAGE: [id number]  ECOG PS: 0    PD-L1: 2% on L cheek lesion 9/2021    SUMMARY  4/28/20 Left marginal mandibulectomy, WLE of the left buccal mucosa, left posterior maxillectomy, left partial pharyngectomy, left selective neck dissection, right radial forearm free flap, mandibular plating, tracheostomy, and feeding tube placement (Dr. Triana at [facility]). Path: SCC, 5.7 cm moderately differentiated, DOI 1.5 cm, + PNI, no LVI, negative margins, 5/33 nodes positive, + MARIA ELENA  6/30~8/17/20 Chemoradiation 6600 cGy with HD cisplatin (Dr. Serna at [facility])  8/11/21 CT neck. 3 x 2.8 x 3.6 cm mass at the reconstruction flap within the subcutaneous fat of the L cheek abutting masseter medially and skin laterally, extending to parotid, abutting carotid.   8/16/21 FNA L cheek. Path: Highly suspicious for SCC  8/27/21 PET/CT.   9/2/21 MRI neck.   9/8/21 FNA L cheek lesion (Dr. Swartz). Path: SCC  10/21/21 C1 carboplatin 5FU pembrolizumab c/b severe mucositis. C2 pembro alone due to ongoing resolving mucositis. DYPD testing negative  12/14/21~1/1/22 C3-4 carboplatin 5FU pembrolizumab. 5FU dose reduced 50%  1/25~2/15/22 C5-6 carboplatin pembrolizumab, no FU  3/8/22 PET/CT. Decreased size and FDG uptake of the mass along the temporalis muscle, deep to the L zygomatic arch, 1.5  1.3 cm (SUV 10.7), peristent linear lucency on CT immediately underneath the anterior cortical bone of the anterior mandible at the  Midline (SUV 10.5), extending to the medullary cavity of the anterior mandible where there is subtle corresponding lucency, similar to prior, likely post surgical. Unchanged thyroid nodule. Gastric fundus thickening which can be seen with gastritis, recommend direct  "visualization, focal anorectal uptake, likely physiologic, attention on follow up    All prior care was at [facility] with Dr. Tarango, Dr. Christ Stewart (ENT), Dr. Kong Reece (Otolaryngology at [facility]), and Dr. Serna (Radiation Oncology)    SUBJECTIVE  Jeremi is seen today for consideration of day 29 carbo/taxol. Mucositis has increased this week and is causing significant discomfort. This is primarily along his left upper lip and left lateral tongue. He is using salt/soda rinses 5-6 times per day. Uses Tylenol as needed, frequency varies but usually a few times per day. Eating soft foods only (protein shakes, eggs, macaroni and cheese) due to the mucositis and odynophagia. He's been able to drink fluids without difficulty. Denies dizziness or lightheadedness. Has very mild nausea following chemo infusions which is usually relieved by eating. He has intermittent drainage of clear/tan liquid from his left ear as well as persistent tinnitus. Continues to smoke and drink alcohol. Alcohol causes throat burning. Reports he has \"cut down\" on frequency of both ETOH and tobacco.    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  Chronic tinnitus secondary to cisplatin  Hemorrhoids    CURRENT OUTPATIENT MEDICATIONS  Current Outpatient Medications   Medication Sig Dispense Refill     acetaminophen (TYLENOL) 500 MG tablet Take 500 mg by mouth       ibuprofen (ADVIL/MOTRIN) 200 MG tablet Take 600 mg by mouth every 6 hours as needed for mild pain       ondansetron (ZOFRAN) 8 MG tablet Take 1 tablet (8 mg) by mouth every 8 hours as needed for nausea (vomiting) (Patient not taking: No sig reported) 30 tablet 11     prochlorperazine (COMPAZINE) 10 MG tablet Take 1 tablet (10 mg) by mouth every 6 hours as needed " (Nausea/Vomiting) (Patient not taking: No sig reported) 30 tablet 11     ALLERGIES  No Known Allergies     REVIEW OF SYSTEMS  As above in the HPI, o/w complete 12-point ROS was negative.    PHYSICAL EXAM  /79 (BP Location: Right arm)   Pulse 71   Temp 97.6  F (36.4  C) (Oral)   Resp 16   Wt 84.5 kg (186 lb 3.2 oz)   SpO2 100%   BMI 26.72 kg/m    GEN: NAD  HEENT: EOMI, no icterus, injection or pallor, oral mucosa dry, erythema and confluent mucositis to left upper lip and left lateral tongue  LUNGS: clear bilaterally, no wheezing  CV: regular, no murmurs, rubs, or gallops  EXT: no edema  NEURO: alert, no focal deficits    LABORATORY AND IMAGING STUDIES   Latest Reference Range & Units 05/09/22 08:36   Sodium 133 - 144 mmol/L 136   Potassium 3.4 - 5.3 mmol/L 4.5   Chloride 94 - 109 mmol/L 105   Carbon Dioxide 20 - 32 mmol/L 27   Urea Nitrogen 7 - 30 mg/dL 18   Creatinine 0.66 - 1.25 mg/dL 0.82   GFR Estimate >60 mL/min/1.73m2 >90 [1]   Calcium 8.5 - 10.1 mg/dL 9.0   Anion Gap 3 - 14 mmol/L 4   Magnesium 1.6 - 2.3 mg/dL 1.8   Albumin 3.4 - 5.0 g/dL 3.2 (L)   Protein Total 6.8 - 8.8 g/dL 6.6 (L)   Bilirubin Total 0.2 - 1.3 mg/dL 0.3   Alkaline Phosphatase 40 - 150 U/L 53   ALT 0 - 70 U/L 31   AST 0 - 45 U/L 25   Glucose 70 - 99 mg/dL 88   WBC 4.0 - 11.0 10e3/uL 3.4 (L)   Hemoglobin 13.3 - 17.7 g/dL 12.7 (L)   Hematocrit 40.0 - 53.0 % 37.1 (L)   Platelet Count 150 - 450 10e3/uL 157   RBC Count 4.40 - 5.90 10e6/uL 3.63 (L)   MCV 78 - 100 fL 102 (H)   MCH 26.5 - 33.0 pg 35.0 (H)   MCHC 31.5 - 36.5 g/dL 34.2   RDW 10.0 - 15.0 % 12.4   % Neutrophils % 70   % Lymphocytes % 15   % Monocytes % 13   % Eosinophils % 1   % Basophils % 1   Absolute Basophils 0.0 - 0.2 10e3/uL 0.0   Absolute Eosinophils 0.0 - 0.7 10e3/uL 0.0   Absolute Immature Granulocytes <=0.4 10e3/uL 0.0   Absolute Lymphocytes 0.8 - 5.3 10e3/uL 0.5 (L)   Absolute Monocytes 0.0 - 1.3 10e3/uL 0.5   % Immature Granulocytes % 0   Absolute Neutrophils 1.6  - 8.3 10e3/uL 2.4   Absolute NRBCs 10e3/uL 0.0   NRBCs per 100 WBC <1 /100 0     Labs independently reviewed and interpreted by me today.    ASSESSMENT AND PLAN   SCC oral cavity, recurrent: Plan for concurrent chemoradiation to the residual disease. Treatment now complicated primarily by mucositis. Patient declining both chemotherapy and radiation today in attempt to facilitate further healing to his mouth sores. I counseled Jeremi on the benefit of continuing chemoradiation without interruption and increasing supportive care for his mucositis symptoms. At this point he is in agreement to return in 1 week for consideration of further carboplatin and paclitaxel. Dr. Amezquita and Dayanara Corral notified.    Mucositis: increase frequency of salt/soda rinses, schedule Tylenol TID. He is hesitant to add in additional oral rinses but advised that we add either Magic Mouthwash or viscous lidocaine. Agreeable to magic mouthwash, rx sent.    Gastric and anal FDG uptake: No symptoms. EGD. Anal uptake likely hemorrhoid, will re-evaluate later.     Hearing loss/tinnitus: Persistent in left ear. Chronic. Precludes cisplatin    Tumor erosion through the skin: Monitor for infection, no concerns.     Tobacco dependence, ETOH: See Dr. Richard note from 4/7 that reflects discussion with him. Continues to use both alcohol although frequency is decreased as it causes throat burning. Also smoking but has cut down. Encouraged abstinence, especially in setting of mouth sores.     Insomnia: 2/2 to steroid, improved    40 minutes spent on the date of the encounter doing chart review, review of test results, interpretation of tests, patient visit and documentation     Yaquelin Tvoar, CNP  [facility] [address] [id number] [phone]

## 2022-05-09 ENCOUNTER — ONCOLOGY VISIT (OUTPATIENT)
Dept: ONCOLOGY | Facility: CLINIC | Age: 57
End: 2022-05-09
Attending: INTERNAL MEDICINE
Payer: COMMERCIAL

## 2022-05-09 ENCOUNTER — APPOINTMENT (OUTPATIENT)
Dept: LAB | Facility: CLINIC | Age: 57
End: 2022-05-09
Attending: INTERNAL MEDICINE
Payer: COMMERCIAL

## 2022-05-09 VITALS
WEIGHT: 186.2 LBS | DIASTOLIC BLOOD PRESSURE: 79 MMHG | RESPIRATION RATE: 16 BRPM | HEART RATE: 71 BPM | TEMPERATURE: 97.6 F | SYSTOLIC BLOOD PRESSURE: 135 MMHG | OXYGEN SATURATION: 100 % | BODY MASS INDEX: 26.72 KG/M2

## 2022-05-09 DIAGNOSIS — K12.30 MUCOSITIS: ICD-10-CM

## 2022-05-09 DIAGNOSIS — C41.1 SQUAMOUS CELL CARCINOMA OF MANDIBLE (H): Primary | ICD-10-CM

## 2022-05-09 DIAGNOSIS — E83.42 HYPOMAGNESEMIA: ICD-10-CM

## 2022-05-09 DIAGNOSIS — G47.00 INSOMNIA, UNSPECIFIED TYPE: ICD-10-CM

## 2022-05-09 DIAGNOSIS — H93.12 TINNITUS, LEFT: ICD-10-CM

## 2022-05-09 LAB — MAGNESIUM SERPL-MCNC: 1.8 MG/DL (ref 1.6–2.3)

## 2022-05-09 PROCEDURE — 99215 OFFICE O/P EST HI 40 MIN: CPT | Performed by: REGISTERED NURSE

## 2022-05-09 PROCEDURE — 250N000011 HC RX IP 250 OP 636: Performed by: REGISTERED NURSE

## 2022-05-09 PROCEDURE — G0463 HOSPITAL OUTPT CLINIC VISIT: HCPCS

## 2022-05-09 PROCEDURE — 83735 ASSAY OF MAGNESIUM: CPT | Performed by: REGISTERED NURSE

## 2022-05-09 RX ORDER — HEPARIN SODIUM (PORCINE) LOCK FLUSH IV SOLN 100 UNIT/ML 100 UNIT/ML
500 SOLUTION INTRAVENOUS ONCE
Status: COMPLETED | OUTPATIENT
Start: 2022-05-09 | End: 2022-05-09

## 2022-05-09 RX ADMIN — Medication 500 UNITS: at 08:28

## 2022-05-09 ASSESSMENT — PAIN SCALES - GENERAL: PAINLEVEL: EXTREME PAIN (8)

## 2022-05-09 NOTE — NURSING NOTE
"Chief Complaint   Patient presents with     Oncology Clinic Visit     SCC of mandible     Port Draw     Labs drawn via port by RN. Vitals taken.     Port accessed with 20G 3/4\" gripper needle by RN, labs collected, line flushed with saline and heparin.  Vitals taken. Pt checked in for appointment(s).    Uma Beltran RN    "

## 2022-05-18 ENCOUNTER — PATIENT OUTREACH (OUTPATIENT)
Dept: CARE COORDINATION | Facility: CLINIC | Age: 57
End: 2022-05-18

## 2022-05-27 ENCOUNTER — TELEPHONE (OUTPATIENT)
Dept: ONCOLOGY | Facility: CLINIC | Age: 57
End: 2022-05-27
Payer: COMMERCIAL

## 2022-05-27 NOTE — TELEPHONE ENCOUNTER
Disability paperwork received viz fax from [employer]. Will be placed in provider folder for signature upon completion.     Fax: [phone]      Myles Sanchez

## 2022-06-01 ENCOUNTER — TELEPHONE (OUTPATIENT)
Dept: OTOLARYNGOLOGY | Facility: CLINIC | Age: 57
End: 2022-06-01
Payer: COMMERCIAL

## 2022-06-01 NOTE — TELEPHONE ENCOUNTER
LVM after being unable to reach patient on multiple attempts. Left ENt scheduling number for patient to call and make appointment.    SCHEDULING INSTRUCTIONS: Please schedule patient for [facility] RETURN visit w/Dr. Swartz in [facility] [place] [place] ENT department. Please schedule around 06/30. Ok'd per Natalya ANGULO RN to overbook w/another return to get patient in for appointment w/in desired range.

## 2022-06-02 ENCOUNTER — TELEPHONE (OUTPATIENT)
Dept: OTOLARYNGOLOGY | Facility: CLINIC | Age: 57
End: 2022-06-02
Payer: COMMERCIAL

## 2022-06-02 NOTE — TELEPHONE ENCOUNTER
LVM after being unable to reach patient on multiple attempts. Left ENt scheduling number for patient to call and make appointment.     SCHEDULING INSTRUCTIONS: Please schedule patient for [facility] RETURN visit w/Dr. Swartz in [facility] ENT department. Please schedule around 06/30. Ok'd per Natalya ANGULO RN to overbook w/another return to get patient in for appointment w/in desired range.

## 2022-06-08 ENCOUNTER — TELEPHONE (OUTPATIENT)
Dept: OTOLARYNGOLOGY | Facility: CLINIC | Age: 57
End: 2022-06-08
Payer: COMMERCIAL

## 2022-06-08 NOTE — TELEPHONE ENCOUNTER
Spoke with patient about follow up w/Dr. Swartz. Stated clinic request it be scheduled towards the end of June. Patient stated he stopped radiation due to complications w/his tongue and also that he decided not to do the surgery. Patient stated he would like to speak to someone as to why he needs the appointment if he has stopped radiation and not getting surgery.

## 2022-06-11 ENCOUNTER — ONCOLOGY VISIT (OUTPATIENT)
Dept: RADIATION ONCOLOGY | Facility: CLINIC | Age: 57
End: 2022-06-11
Payer: COMMERCIAL

## 2022-06-13 ENCOUNTER — PATIENT OUTREACH (OUTPATIENT)
Dept: CARE COORDINATION | Facility: CLINIC | Age: 57
End: 2022-06-13
Payer: COMMERCIAL

## 2022-06-13 NOTE — PROCEDURES
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     Treatment Summary:  Treatment Site  Dose  Modality From  To  Days Fx.  Residual tumor 3,400 cGy 06 X   4/11/2022 2022  22 17  High risk tissues 3,060 cGy 06 X   4/11/2022 2022  22 17          Dose per Fraction:   Residual tumor:   200 cGy  High risk tissues:  180 cGy     COMMENTS:  Mr. 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 He subsequently developed a local disease failure at the superior margin of the previous radiation field. He received 6 cycles of carboplatin, 5FU and Pembrolizumab with a good partial response with a residual 1.5 cm tumor within the temporalis musculature. He was then referred to radiation oncology for consideration of re-irradiation for local control purposes.     The residual disease was planned for a treatment course of 70 Gy in 35 once-daily fractions using 6 MV photons delivered via a 2 coplanar arc volumetric modulated arc therapy technique. The pre-chemotherapy disease extent was planned for a dose of 63 Gy using a simultaneous integrated boost technique. Radiotherapy was delivered with concurrent weekly carboplatin/paclitaxel.     Mr. Sharpe tolerated the initiation of treatment very well with no significant acute treatment-related toxicities. At the start of his 4th week of therapy, however, he no-showed for multiple radiation and  chemotherapy infusion visits. When he was finally able to be contacted by our clinic, he indicated that he had lost his insurance through his job and was financially unable to continue with treatment. We arranged for our financial counselors to contact Mr. Sharpe to explore alternative coverage options however we were not successful in this endeavor. Mr. Sharpe's dose at the time of treatment discontinuation was 34 Gy to the residual tumor with 30.6 Gy delivered to the surrounding inferior soft tissues along the cheek. He received a total of 4 infusions of carboplatin/paclitaxel.     Acute Toxicity Profile (CTCAE v5.0)  Dermatitis: Grade 1     PAIN MANAGEMENT:   Acetaminophen as needed for mild to moderate pain     FOLLOW UP PLAN:   Follow-up in radiation oncology clinic on an as-needed basis     Resident Physician: Radha Brush MD  Staff Physician: Bc Amezquita MD, PhD  Physicist: Reji Masterson, PhD     CC:   Aditya Buckley MD                                 Radiation Oncology:  [facility] [address] [place] [address]

## 2022-06-14 NOTE — PROGRESS NOTES
Social Work Follow-Up Encounter Visit  Oncology Clinic    Data/Intervention:  Patient Name:  Antonio Sharpe DOB/Age:  1965 (56 year old)    Reason for Follow-Up:  Social supports    Collaborated With:    -patient  -  Assessment:  Sw called to check in with patient about previous resources provided regarding insurance options. There was no answer. Sw left a message with reason for call, the number to [employer] and encouraged patient to call SW as needed for questions or supports.     Plan:  Previously provided patient/family with writer's contact information and availability.   SW will remain available  As needed for on going supports.     Jessy SWEET, LGMATT  - Oncology  Phone : [phone]  Pager: [phone]

## 2022-06-14 NOTE — PROGRESS NOTES
Social Work Follow-Up Encounter Visit  Oncology Clinic    Data/Intervention:  Patient Name:  Antonio Sharpe  /Age:  1965 (56 year old)    Reason for Follow-Up:  Social supports    Collaborated With:    -patient  -patient's care team      Resources Provided:  [employer]  [employer] website and phone number  Cancer Care sonny information     Assessment:  SW spoke with patient regarding questions about insurance. Patient explained they no longer have insurance through their employer as they are unable to work. Patient stated they are concerned about their ability to pay cobra rates for their insurance and asked about other resources for insurance options. Patient is over income for MA. SW discussed [employer] and provided patient with information about [employer], where they can check out other insurance options as well as [facility]. SW talk with them about other financial assistance options. [employer] general sonny. Patient reported they have already applied for [employer] general sonny, which can only be applied for once.SW discussed the availability of  [employer] annual sonny for 200. SW with patient's permission applied for this sonny on pt's behalf. SW discussed Cancer Care sonny, pateint has already checked in about and there are currently no grants available for their diagnosis. SW also discussed [employer] application as well. They may be eligible for 50% coverage through [employer] care, if their income is not too high. SW provided patient with the billing department's number to request an application.       Plan:  Previously provided patient/family with writer's contact information and availability.   Patient agreed to follow up with [employer] to explore other insurance options.   SW will remain available as needed.     Jessy SWEET, ANMOL  - Oncology  Phone : [phone]  Pager: [phone]

## 2022-06-16 NOTE — TELEPHONE ENCOUNTER
Signed form received and faxed to [employer]: [employer], fax # [phone]. Successful transmission verified via rightfax. Copy of forms sent to scanning, original forms mailed to patient's home address on file.    Myles Sanchez on 6/16/2022 at 10:52 AM

## 2022-11-09 NOTE — TUMOR CONFERENCE
Tumor Conference Information  Tumor Conference: ENT  Specialties Present: Medical oncology, Radiation oncology, Pathology, Radiology, Surgery  Patient Status: Prospective  Stage: [id number]  Treatment to Date: Surgery, Chemotherapy, Radiation  Clinical Trials: Not discussed  Genetic Testing Discussed/Recommended?: No  Supportive Care Services Discussed/Recommended?: No  Recommended Plan: Follows evidence-based guidelines  Did the review exceed 30 minutes?: did not           Documentation / Disclaimer Cancer Tumor Board Note  Cancer tumor board recommendations do not override what is determined to be reasonable care and treatment, which is dependent on the circumstances of a patient's case; the patient's medical, social, and personal concerns; and the clinical judgment of the oncologist [physician].

## 2022-12-16 ENCOUNTER — PATIENT OUTREACH (OUTPATIENT)
Dept: OTOLARYNGOLOGY | Facility: CLINIC | Age: 57
End: 2022-12-16

## 2022-12-16 NOTE — TELEPHONE ENCOUNTER
Called and left message for patient after receiving a message from medical oncology care coordinator indicating patient called with concerns of hardware infection. Writer called and left message for patient indicating he can return to see Dr. Swartz on Monday at 2:40pm. Left direct line for return call to further discuss.     Natalya Crowder, RN, BSN

## 2022-12-19 ENCOUNTER — ANCILLARY PROCEDURE (OUTPATIENT)
Dept: CT IMAGING | Facility: CLINIC | Age: 57
End: 2022-12-19
Attending: STUDENT IN AN ORGANIZED HEALTH CARE EDUCATION/TRAINING PROGRAM
Payer: COMMERCIAL

## 2022-12-19 ENCOUNTER — OFFICE VISIT (OUTPATIENT)
Dept: OTOLARYNGOLOGY | Facility: CLINIC | Age: 57
End: 2022-12-19
Payer: COMMERCIAL

## 2022-12-19 VITALS
BODY MASS INDEX: 24.97 KG/M2 | OXYGEN SATURATION: 97 % | TEMPERATURE: 97.3 F | SYSTOLIC BLOOD PRESSURE: 156 MMHG | DIASTOLIC BLOOD PRESSURE: 93 MMHG | WEIGHT: 174 LBS | HEART RATE: 69 BPM

## 2022-12-19 DIAGNOSIS — C06.0 BUCCAL MUCOSA SQUAMOUS CELL CARCINOMA (H): Primary | ICD-10-CM

## 2022-12-19 DIAGNOSIS — C06.0 BUCCAL MUCOSA SQUAMOUS CELL CARCINOMA (H): ICD-10-CM

## 2022-12-19 PROCEDURE — 71250 CT THORAX DX C-: CPT | Mod: GC | Performed by: RADIOLOGY

## 2022-12-19 PROCEDURE — 99215 OFFICE O/P EST HI 40 MIN: CPT | Performed by: STUDENT IN AN ORGANIZED HEALTH CARE EDUCATION/TRAINING PROGRAM

## 2022-12-19 PROCEDURE — 70491 CT SOFT TISSUE NECK W/DYE: CPT | Mod: GC | Performed by: RADIOLOGY

## 2022-12-19 RX ORDER — IOPAMIDOL 755 MG/ML
100 INJECTION, SOLUTION INTRAVASCULAR ONCE
Status: COMPLETED | OUTPATIENT
Start: 2022-12-19 | End: 2022-12-19

## 2022-12-19 RX ORDER — AMOXICILLIN AND CLAVULANATE POTASSIUM 500; 125 MG/1; MG/1
TABLET, FILM COATED ORAL
COMMUNITY
Start: 2022-12-13 | End: 2023-01-01

## 2022-12-19 RX ADMIN — IOPAMIDOL 100 ML: 755 INJECTION, SOLUTION INTRAVASCULAR at 15:54

## 2022-12-19 ASSESSMENT — PAIN SCALES - GENERAL: PAINLEVEL: NO PAIN (1)

## 2022-12-19 NOTE — LETTER
12/19/2022       RE: Antonio Sharpe  [address] [place] [address]     Dear Colleague,    Thank you for referring your patient, Antonio Sharpe, to the [facility] [place] [facility]. Please see a copy of my visit note below.    December 19, 2022      Dre Tarango M.D.   Counts include 234 beds at the [facility] [address]  [address]      Dear Dr. Tarango,    I had the pleasure of seeing Mr. Sharpe back today in clinic.    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  He underwent a left marginal mandibulectomy, left buccal resection, left posterior maxillectomy, left neck dissection and reconstruction with a right radial forearm free flap by Dr. Kong Reece and Dr. Gino Obando on April 28, 2020 at [facility].  He received an adjuvant chemoradiation therapy completed in August of 2020.  Shortly after that, a recurrence/residual disease was identified.  He was subsequently  referred to me.  I discussed the low likelihood of cure and once we discussed the anticipated surgical plan.  The patient elected to proceed with palliative chemotherapy.  He was initially treated with 5FU pembrolizumab and carboplatin, but due to side effects, the 5FU and carboplatin have been omitted on several of the cycles.  He had some resolution of disease and I reevaluated him in February 2020 to reevaluate surgical resectability.  The patient remained uninterested in surgery and proceeded with chemoradiation therapy.  He proceeded with approximately 20 fractions of radiation with carboplatin and paclitaxel.  Due side effects, he stopped his treatment before completion.  He has not been seen in our hospital since last June.  Over the last few weeks, he had noticed some pain in the right jaw, and  was seen in the Emergency Department at [facility].  He was treated with penicillin, which did not resolve this.  He was subsequently seen by a dentist who started him on Augmentin and suggested that he be seen by an oral surgeon.  Given his history at [facility], he requested not to be seen by their team and instead wanted to be seen in our clinic.  He says that since he has been on the Augmentin, the right jaw pain has gone away.  He has noticed over the last five days, some swelling in the left neck with some overlying skin change.  He currently remains on Augmentin.    PHYSICAL EXAMINATION:  On examination, he is alert, in no acute distress. In the left upper neck, he has an area of fluctuance and some inflamed appearing skin.  There is no obvious palpable lymphadenopathy.  However, his neck exam is difficult given the extensive fibrosis.  On intraoral examination, I do not see any breakdown of the flap or exposed bone laterally.  In the midline, there is a small area of exposed bone adjacent to the flap and native mandibular alveolus.  There are no obvious signs of tumor in the oral cavity today.    ASSESSMENT AND PLAN:  A 57-year-old male with a history of recurrent oral cancer, basically on palliative treatment.  He presents today for pain in the right jaw on the left neck.  He has had no imaging.  We will arrange for a CT scan of the neck and chest.  I did discuss with him that given the skin findings in the left neck, it does raise my suspicion for an underlying malignancy.  We will get the CT scan results and determine next steps in management.  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  We will follow up on his  imaging and determine next steps in management.    Thank you for allowing me to participate in the care of this patient. If you have any further questions, please do not hesitate to contact me.      Sincerely,      Aditya Swartz M.D.      Head and Neck Surgical Oncology and Microvascular Reconstruction  Department of Otolaryngology - Head and Neck Surgery  [facility]       60 minutes spent on the date of the encounter in chart review, patient visit, review of tests, documentation and/or discussion with other providers about the issues documented above.

## 2022-12-19 NOTE — PROGRESS NOTES
December 19, 2022      Dre Tarango M.D.   [facility]  [address] [place] [address]      Dear Dr. Tarango,    I had the pleasure of seeing Mr. Sharpe back today in clinic.    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  He underwent a left marginal mandibulectomy, left buccal resection, left posterior maxillectomy, left neck dissection and reconstruction with a right radial forearm free flap by Dr. Kong Reece and Dr. Gino Obando on April 28, 2020 at [facility].  He received an adjuvant chemoradiation therapy completed in August of 2020.  Shortly after that, a recurrence/residual disease was identified.  He was subsequently  referred to me.  I discussed the low likelihood of cure and once we discussed the anticipated surgical plan.  The patient elected to proceed with palliative chemotherapy.  He was initially treated with 5FU pembrolizumab and carboplatin, but due to side effects, the 5FU and carboplatin have been omitted on several of the cycles.  He had some resolution of disease and I reevaluated him in February 2020 to reevaluate surgical resectability.  The patient remained uninterested in surgery and proceeded with chemoradiation therapy.  He proceeded with approximately 20 fractions of radiation with carboplatin and paclitaxel.  Due side effects, he stopped his treatment before completion.  He has not been seen in our hospital since last June.  Over the last few weeks, he had noticed some pain in the right jaw, and was seen in the Emergency Department at [facility].  He was treated with penicillin, which did not resolve this.  He was subsequently seen by a dentist who started him on Augmentin and suggested that he be seen by an oral surgeon.  Given his history at [facility], he requested not to be seen by their team and  instead wanted to be seen in our clinic.  He says that since he has been on the Augmentin, the right jaw pain has gone away.  He has noticed over the last five days, some swelling in the left neck with some overlying skin change.  He currently remains on Augmentin.    PHYSICAL EXAMINATION:  On examination, he is alert, in no acute distress. In the left upper neck, he has an area of fluctuance and some inflamed appearing skin.  There is no obvious palpable lymphadenopathy.  However, his neck exam is difficult given the extensive fibrosis.  On intraoral examination, I do not see any breakdown of the flap or exposed bone laterally.  In the midline, there is a small area of exposed bone adjacent to the flap and native mandibular alveolus.  There are no obvious signs of tumor in the oral cavity today.    ASSESSMENT AND PLAN:  A 57-year-old male with a history of recurrent oral cancer, basically on palliative treatment.  He presents today for pain in the right jaw on the left neck.  He has had no imaging.  We will arrange for a CT scan of the neck and chest.  I did discuss with him that given the skin findings in the left neck, it does raise my suspicion for an underlying malignancy.  We will get the CT scan results and determine next steps in management.  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  We will follow up on his imaging and determine next steps in management.    Thank you for allowing me to participate in the care of this patient. If you have any further questions, please do not hesitate to contact me.      Sincerely,      Aditya Swartz M.D.      Head and Neck Surgical Oncology and Microvascular  Reconstruction  Department of Otolaryngology - Head and Neck Surgery  [facility]       60 minutes spent on the date of the encounter in chart review, patient visit, review of tests, documentation and/or discussion with other providers about the issues documented above.

## 2022-12-19 NOTE — NURSING NOTE
Chief Complaint   Patient presents with     RECHECK     Follow up     Blood pressure (!) 156/93, pulse 69, temperature 97.3  F (36.3  C), weight 78.9 kg (174 lb), SpO2 97 %.    Dustin Hogue LPN

## 2022-12-19 NOTE — PATIENT INSTRUCTIONS
1. Please complete CT neck and chest today.   2. Please call the ENT clinic with any questions,concerns, new or worsening symptoms.    -Clinic number is [phone]   - Natalya's direct line (Dr. Swartz's nurse) [phone]

## 2022-12-20 DIAGNOSIS — M87.9 OSTEONECROSIS (H): Primary | ICD-10-CM

## 2022-12-20 RX ORDER — PENTOXIFYLLINE 400 MG/1
400 TABLET, EXTENDED RELEASE ORAL 2 TIMES DAILY
Qty: 120 TABLET | Refills: 11 | Status: SHIPPED | OUTPATIENT
Start: 2022-12-20 | End: 2023-01-01

## 2022-12-23 ENCOUNTER — TUMOR CONFERENCE (OUTPATIENT)
Dept: ONCOLOGY | Facility: CLINIC | Age: 57
End: 2022-12-23
Payer: COMMERCIAL

## 2022-12-23 NOTE — TUMOR CONFERENCE
Called patient to discuss recommendations. Patient will plan of following up with Dr. Swartz in about a month. Patient taking medications as prescribed. Will follow up as scheduled.

## 2022-12-23 NOTE — TUMOR CONFERENCE
Head & Neck Tumor Conference Note     Status: Established  Staff: Dr. Aditya Swartz    Tumor Site: Left mandible/alveolar ridge/buccal mucosa (oral cavity)  Tumor Pathology: [id number] SCC  Tumor Stage: [id number]  Tumor Treatment:   - Left marginal mandibulectomy, WLE of the left buccal mucosa, left posterior maxillectomy, left partial pharyngectomy, left selective neck dissection, right radial forearm free flap, mandibular plating, tracheostomy, and feeding tube placement (4/28/2020)  - Adjuvant chemoradiation (6600 cGy of radiation to the left oral cavity and neck, completed 8/17/2020 with 3-doses of high-dose cisplatin)  - Palliative chemotherapy 10/21/21 - carboplatin, taxol, radiation therapy, with pembrolizumab October 2021 - February 2022    Reason for Review: Review imaging and POC    Brief History: This is a 57 year old male with a prior history of a 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  He underwent a left marginal mandibulectomy, left buccal resection, left posterior maxillectomy, left neck dissection and reconstruction with a right radial forearm free flap by Dr. Kong Reece and Dr. Gino Obando on April 28, 2020 at [facility].  He received an adjuvant chemoradiation therapy completed in August of 2020.  Shortly after that, a recurrence/residual disease was identified.  He was subsequently  referred to me.  I discussed the low likelihood of cure and once we discussed the anticipated surgical plan.  The patient elected to proceed with palliative chemotherapy.  He was initially treated with 5FU pembrolizumab and carboplatin, but due to side effects, the 5FU and carboplatin have been omitted on several of the cycles.  He had some resolution of disease and I reevaluated him in February 2020 to reevaluate surgical resectability.  The patient remained uninterested in surgery and proceeded with chemoradiation therapy.  He proceeded with approximately 20 fractions of radiation  with carboplatin and paclitaxel.  Due side effects, he stopped his treatment before completion.  He has not been seen in our hospital since last June.  Over the last few weeks, he had noticed some pain in the right jaw, and was seen in the Emergency Department at [facility].  He was treated with penicillin, which did not resolve this.  He was subsequently seen by a dentist who started him on Augmentin and suggested that he be seen by an oral surgeon.  Given his history at [facility], he requested not to be seen by their team and instead wanted to be seen in our clinic.  He says that since he has been on the Augmentin, the right jaw pain has gone away.  He has noticed over the last five days, some swelling in the left neck with some overlying skin change.  He currently remains on Augmentin.     Pertinent PMH:   Past Medical History:   Diagnosis Date     Squamous cell carcinoma of mandible (H)      Testicular cancer (H)       Social Hx:   Social History     Tobacco Use     Smoking status: Every Day     Packs/day: 1.00     Types: Cigarettes     Start date: 1976     Smokeless tobacco: Never   Substance Use Topics     Alcohol use: Yes     Comment: case a week     Drug use: Not Currently       Imaging:     Results for orders placed in visit on 12/19/22    CT Soft tissue neck w contrast    Narrative  CT SOFT TISSUE NECK W CONTRAST 12/19/2022 4:09 PM    History:  Buccal mucosa squamous cell carcinoma (H)  ICD-10: Buccal mucosa squamous cell carcinoma (H)    Additional information obtained from EMR: 56-year-old gentleman with a  previous pT4 N3b M0 squamous cell carcinoma of the left buccal mucosa  status post surgical resection followed by adjuvant chemoradiotherapy  in mid-2020. He subsequently developed a local disease failure at the  superior margin of the previous radiation field. He received 6 cycles  of carboplatin, 5FU and?Pembrolizumab?with a good partial response  with a residual 1.5 cm tumor within the  temporalis musculature. He was  then referred to radiation oncology for consideration of  re-irradiation for local control purposes. Radiotherapy was delivered  with concurrent weekly carboplatin/paclitaxel. He received a total of  4 infusions of carboplatin/paclitaxel, did not complete the entire  planned XRT.  ?    Comparison:  3/8/22, 8/26/2020    Technique: Following intravenous administration of nonionic iodinated  contrast medium, thin section helical CT images were obtained from the  skull base down to the level of the aortic arch.  Axial, coronal and  sagittal reformations were performed with 2-3 mm slice thickness  reconstruction. Images were reviewed in soft tissue, lung and bone  windows.    Contrast: Isovue 370 100cc    Findings:    Posttreatment changes in the face and neck including left sided  hemimandibulectomy with free flap reconstruction and left neck  dissection. Surgical plate and screws are in place with no loosening.    1.0 cm peripherally enhancing focus just deep to the left zygomatic  arch which previously measured 1.5 cm, was more solid and showed FDG  uptake on prior PET.  There is increased erosive change at the anterior aspect of the left  greater than right midline mandible seen on series 3 image 78. The  length of the segment that is involved measures 2.0 cm. There are  areas of bony dehiscence of the outer table.    There is increased subcutaneous edema along the lower left face and  submandibular space. Effaced fat planes.  Questionable 3.4 (ap) x 1.4 (tr) cm rim enhancing area in the left  submandibular space seen on series 3 image 81, suboptimally evaluated  due to streak artifact.    Mild mucosal edema along the epiglottis. Pharyngeal mucosal spaces of  the rest of the hypopharynx, larynx, nasopharynx are normal. Major  vasculature appears patent with coarse calcifications at the carotid  bulbs. Partially visualized right chest wall Port-A-Cath. Thyroid  gland is unremarkable.  Fatty atrophy of the left parotid gland. Either  absence or fatty atrophy of the left submandibular gland.    Lung apices are clear. Degenerative changes of the cervical spine with  multilevel facet arthropathy and posterior disc osteophyte complexes.  Paranasal sinuses and mastoid air cells are clear.    Impression  Impression:    1. Since 3/8/2022 dated neck PET/CT interval slight decrease in size  of the residual tumor deposit within the left  space deep to  the left zygomatic arch, now measures 1 cm, previously 1.5 cm.  2. Increased inflammatory changes about the left face and neck with a  questionable rim enhancing fluid density in the left submandibular  space. Clinical correlation is recommended. If patient has signs of  infection, a follow up PET CT following treatment of the infection can  be considered.  3. Increased erosive changes at the anterior aspect of the mandible.  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 No associated soft tissue  tumor that would be expected to be seen with recurrent tumor, however  the latter diagnosis can not be entirely excluded.    I have personally reviewed the examination and initial interpretation  and I agree with the findings.    LANCE NEWMAN MD      SYSTEM ID:  [id number]    No results found for this or any previous visit.    CT Chest, 12/19/22    IMPRESSION:   1. No evidence of metastatic disease in the chest.  2. No acute airspace disease.     I have personally reviewed the examination and initial interpretation  and I agree with the findings.     ELAINE HELLER MD      Pathology: Not reviewed      Tumor Board Recommendation:   Discussion: This is a 57 year old male with [id number] of the mandible/alveolar ridge/buccal mucosa s/p above treatment, who has established care here. He has persistent pain along the mandible and his imaging is more consistent with ORN. His imaging shows progressive lucencies of the anterior mandible, osteolysis, without  adjacent soft tissue thickening to suggest tumor recurrence. He is on vitamin E and pentoxyfylline, and antibiotics. There is a question of a necrotic lesion vs. rim enhancing fluid collection. This can be evaluated by ultrasound if needed.    Plan: Treat for ORN    Neela Woods MD, PGY-4  Otolaryngology- Head and Neck Surgery    Documentation / Disclaimer Cancer Tumor Board Note  Cancer tumor board recommendations do not override what is determined to be reasonable care and treatment, which is dependent on the circumstances of a patient's case; the patient's medical, social, and personal concerns; and the clinical judgment of the oncologist [physician].

## 2022-12-28 ENCOUNTER — TELEPHONE (OUTPATIENT)
Dept: ONCOLOGY | Facility: CLINIC | Age: 57
End: 2022-12-28
Payer: COMMERCIAL

## 2022-12-28 NOTE — TELEPHONE ENCOUNTER
P.S. paperwork received via fax from [employer]. Will be placed in provider folder for signature upon completion.     Fax: [phone]      Myles Sanchez

## 2023-01-01 ENCOUNTER — TELEPHONE (OUTPATIENT)
Dept: WOUND CARE | Facility: CLINIC | Age: 58
End: 2023-01-01
Payer: COMMERCIAL

## 2023-01-01 ENCOUNTER — OFFICE VISIT (OUTPATIENT)
Dept: OTOLARYNGOLOGY | Facility: CLINIC | Age: 58
End: 2023-01-01
Payer: COMMERCIAL

## 2023-01-01 ENCOUNTER — PRE VISIT (OUTPATIENT)
Dept: SURGERY | Facility: CLINIC | Age: 58
End: 2023-01-01

## 2023-01-01 ENCOUNTER — ANCILLARY PROCEDURE (OUTPATIENT)
Dept: CT IMAGING | Facility: CLINIC | Age: 58
End: 2023-01-01
Attending: INTERNAL MEDICINE
Payer: COMMERCIAL

## 2023-01-01 ENCOUNTER — PATIENT OUTREACH (OUTPATIENT)
Dept: OTOLARYNGOLOGY | Facility: CLINIC | Age: 58
End: 2023-01-01
Payer: COMMERCIAL

## 2023-01-01 ENCOUNTER — TELEPHONE (OUTPATIENT)
Dept: OTOLARYNGOLOGY | Facility: CLINIC | Age: 58
End: 2023-01-01
Payer: COMMERCIAL

## 2023-01-01 ENCOUNTER — TELEPHONE (OUTPATIENT)
Dept: PULMONOLOGY | Facility: CLINIC | Age: 58
End: 2023-01-01
Payer: COMMERCIAL

## 2023-01-01 ENCOUNTER — ANESTHESIA (OUTPATIENT)
Dept: SURGERY | Facility: CLINIC | Age: 58
End: 2023-01-01
Payer: COMMERCIAL

## 2023-01-01 ENCOUNTER — ANCILLARY PROCEDURE (OUTPATIENT)
Dept: CT IMAGING | Facility: CLINIC | Age: 58
End: 2023-01-01
Attending: STUDENT IN AN ORGANIZED HEALTH CARE EDUCATION/TRAINING PROGRAM
Payer: COMMERCIAL

## 2023-01-01 ENCOUNTER — NURSE TRIAGE (OUTPATIENT)
Dept: ONCOLOGY | Facility: CLINIC | Age: 58
End: 2023-01-01
Payer: COMMERCIAL

## 2023-01-01 ENCOUNTER — TELEPHONE (OUTPATIENT)
Dept: PULMONOLOGY | Facility: CLINIC | Age: 58
End: 2023-01-01

## 2023-01-01 ENCOUNTER — TELEPHONE (OUTPATIENT)
Dept: OTOLARYNGOLOGY | Facility: CLINIC | Age: 58
End: 2023-01-01

## 2023-01-01 ENCOUNTER — ONCOLOGY VISIT (OUTPATIENT)
Dept: ONCOLOGY | Facility: CLINIC | Age: 58
End: 2023-01-01
Attending: INTERNAL MEDICINE
Payer: COMMERCIAL

## 2023-01-01 ENCOUNTER — HEALTH MAINTENANCE LETTER (OUTPATIENT)
Age: 58
End: 2023-01-01

## 2023-01-01 ENCOUNTER — HOSPITAL ENCOUNTER (INPATIENT)
Facility: CLINIC | Age: 58
LOS: 1 days | Discharge: HOME | End: 2023-05-12
Attending: STUDENT IN AN ORGANIZED HEALTH CARE EDUCATION/TRAINING PROGRAM | Admitting: STUDENT IN AN ORGANIZED HEALTH CARE EDUCATION/TRAINING PROGRAM
Payer: COMMERCIAL

## 2023-01-01 ENCOUNTER — HOSPITAL ENCOUNTER (OUTPATIENT)
Dept: PET IMAGING | Facility: CLINIC | Age: 58
Discharge: HOME | End: 2023-06-05
Attending: STUDENT IN AN ORGANIZED HEALTH CARE EDUCATION/TRAINING PROGRAM
Payer: COMMERCIAL

## 2023-01-01 ENCOUNTER — PATIENT OUTREACH (OUTPATIENT)
Dept: ONCOLOGY | Facility: CLINIC | Age: 58
End: 2023-01-01
Payer: COMMERCIAL

## 2023-01-01 ENCOUNTER — CARE COORDINATION (OUTPATIENT)
Dept: PULMONOLOGY | Facility: CLINIC | Age: 58
End: 2023-01-01
Payer: COMMERCIAL

## 2023-01-01 ENCOUNTER — TELEPHONE (OUTPATIENT)
Dept: GASTROENTEROLOGY | Facility: CLINIC | Age: 58
End: 2023-01-01
Payer: COMMERCIAL

## 2023-01-01 ENCOUNTER — TUMOR CONFERENCE (OUTPATIENT)
Dept: ONCOLOGY | Facility: CLINIC | Age: 58
End: 2023-01-01
Payer: COMMERCIAL

## 2023-01-01 ENCOUNTER — PREP FOR PROCEDURE (OUTPATIENT)
Dept: PULMONOLOGY | Facility: CLINIC | Age: 58
End: 2023-01-01

## 2023-01-01 ENCOUNTER — ANESTHESIA EVENT (OUTPATIENT)
Dept: SURGERY | Facility: CLINIC | Age: 58
End: 2023-01-01
Payer: COMMERCIAL

## 2023-01-01 ENCOUNTER — HOSPITAL ENCOUNTER (OUTPATIENT)
Facility: CLINIC | Age: 58
Discharge: HOME | End: 2023-08-17
Attending: INTERNAL MEDICINE | Admitting: INTERNAL MEDICINE
Payer: COMMERCIAL

## 2023-01-01 ENCOUNTER — HOSPITAL ENCOUNTER (OUTPATIENT)
Facility: CLINIC | Age: 58
End: 2023-01-01
Attending: INTERNAL MEDICINE | Admitting: INTERNAL MEDICINE
Payer: COMMERCIAL

## 2023-01-01 ENCOUNTER — HOSPITAL ENCOUNTER (OUTPATIENT)
Facility: CLINIC | Age: 58
Discharge: HOME | End: 2023-07-19
Attending: STUDENT IN AN ORGANIZED HEALTH CARE EDUCATION/TRAINING PROGRAM | Admitting: STUDENT IN AN ORGANIZED HEALTH CARE EDUCATION/TRAINING PROGRAM
Payer: COMMERCIAL

## 2023-01-01 ENCOUNTER — ANCILLARY PROCEDURE (OUTPATIENT)
Dept: GENERAL RADIOLOGY | Facility: CLINIC | Age: 58
End: 2023-01-01
Payer: COMMERCIAL

## 2023-01-01 ENCOUNTER — TELEPHONE (OUTPATIENT)
Facility: CLINIC | Age: 58
End: 2023-01-01
Payer: COMMERCIAL

## 2023-01-01 ENCOUNTER — VIRTUAL VISIT (OUTPATIENT)
Dept: ONCOLOGY | Facility: CLINIC | Age: 58
End: 2023-01-01
Attending: REGISTERED NURSE
Payer: COMMERCIAL

## 2023-01-01 ENCOUNTER — OFFICE VISIT (OUTPATIENT)
Dept: PULMONOLOGY | Facility: CLINIC | Age: 58
End: 2023-01-01
Attending: STUDENT IN AN ORGANIZED HEALTH CARE EDUCATION/TRAINING PROGRAM
Payer: COMMERCIAL

## 2023-01-01 ENCOUNTER — NURSE TRIAGE (OUTPATIENT)
Dept: NURSING | Facility: CLINIC | Age: 58
End: 2023-01-01
Payer: COMMERCIAL

## 2023-01-01 ENCOUNTER — LAB (OUTPATIENT)
Dept: LAB | Facility: CLINIC | Age: 58
End: 2023-01-01
Payer: COMMERCIAL

## 2023-01-01 VITALS
HEART RATE: 74 BPM | TEMPERATURE: 98.2 F | OXYGEN SATURATION: 98 % | SYSTOLIC BLOOD PRESSURE: 174 MMHG | HEIGHT: 70 IN | BODY MASS INDEX: 26.2 KG/M2 | WEIGHT: 183 LBS | DIASTOLIC BLOOD PRESSURE: 84 MMHG

## 2023-01-01 VITALS
WEIGHT: 181 LBS | SYSTOLIC BLOOD PRESSURE: 166 MMHG | HEIGHT: 70 IN | DIASTOLIC BLOOD PRESSURE: 93 MMHG | HEART RATE: 64 BPM | BODY MASS INDEX: 25.91 KG/M2

## 2023-01-01 VITALS
DIASTOLIC BLOOD PRESSURE: 80 MMHG | SYSTOLIC BLOOD PRESSURE: 137 MMHG | HEART RATE: 65 BPM | WEIGHT: 181 LBS | OXYGEN SATURATION: 97 % | HEIGHT: 70 IN | BODY MASS INDEX: 25.91 KG/M2

## 2023-01-01 VITALS
WEIGHT: 178.79 LBS | BODY MASS INDEX: 25.03 KG/M2 | OXYGEN SATURATION: 95 % | SYSTOLIC BLOOD PRESSURE: 132 MMHG | RESPIRATION RATE: 16 BRPM | TEMPERATURE: 97.1 F | HEIGHT: 71 IN | HEART RATE: 63 BPM | DIASTOLIC BLOOD PRESSURE: 76 MMHG

## 2023-01-01 VITALS — HEIGHT: 70 IN | WEIGHT: 179.2 LBS | BODY MASS INDEX: 25.65 KG/M2

## 2023-01-01 VITALS
HEART RATE: 85 BPM | OXYGEN SATURATION: 99 % | DIASTOLIC BLOOD PRESSURE: 87 MMHG | RESPIRATION RATE: 22 BRPM | SYSTOLIC BLOOD PRESSURE: 161 MMHG

## 2023-01-01 VITALS — BODY MASS INDEX: 25.05 KG/M2 | WEIGHT: 175 LBS | HEIGHT: 70 IN

## 2023-01-01 VITALS
HEIGHT: 70 IN | DIASTOLIC BLOOD PRESSURE: 87 MMHG | HEART RATE: 80 BPM | WEIGHT: 189.7 LBS | OXYGEN SATURATION: 96 % | BODY MASS INDEX: 27.16 KG/M2 | SYSTOLIC BLOOD PRESSURE: 154 MMHG

## 2023-01-01 VITALS
SYSTOLIC BLOOD PRESSURE: 138 MMHG | BODY MASS INDEX: 26.05 KG/M2 | OXYGEN SATURATION: 97 % | HEIGHT: 70 IN | HEART RATE: 68 BPM | DIASTOLIC BLOOD PRESSURE: 70 MMHG | RESPIRATION RATE: 18 BRPM | WEIGHT: 182 LBS | TEMPERATURE: 97.6 F

## 2023-01-01 VITALS
HEIGHT: 70 IN | SYSTOLIC BLOOD PRESSURE: 150 MMHG | OXYGEN SATURATION: 99 % | HEART RATE: 70 BPM | WEIGHT: 182 LBS | DIASTOLIC BLOOD PRESSURE: 88 MMHG | BODY MASS INDEX: 26.05 KG/M2

## 2023-01-01 VITALS
RESPIRATION RATE: 16 BRPM | OXYGEN SATURATION: 96 % | DIASTOLIC BLOOD PRESSURE: 84 MMHG | HEART RATE: 60 BPM | SYSTOLIC BLOOD PRESSURE: 145 MMHG | WEIGHT: 181.4 LBS | BODY MASS INDEX: 25.97 KG/M2 | TEMPERATURE: 97.8 F | HEIGHT: 70 IN

## 2023-01-01 DIAGNOSIS — Z13.29 SCREENING FOR HYPOTHYROIDISM: ICD-10-CM

## 2023-01-01 DIAGNOSIS — C06.9 SQUAMOUS CELL CARCINOMA OF ORAL CAVITY (H): Primary | ICD-10-CM

## 2023-01-01 DIAGNOSIS — R59.0 MEDIASTINAL LYMPHADENOPATHY: ICD-10-CM

## 2023-01-01 DIAGNOSIS — C06.9 MALIGNANT NEOPLASM OF MOUTH (H): ICD-10-CM

## 2023-01-01 DIAGNOSIS — C41.1 SQUAMOUS CELL CARCINOMA OF MANDIBLE (H): Primary | ICD-10-CM

## 2023-01-01 DIAGNOSIS — L02.01 FACIAL ABSCESS: ICD-10-CM

## 2023-01-01 DIAGNOSIS — R59.0 MEDIASTINAL LYMPHADENOPATHY: Primary | ICD-10-CM

## 2023-01-01 DIAGNOSIS — M87.9 OSTEONECROSIS (H): ICD-10-CM

## 2023-01-01 DIAGNOSIS — C06.9 SQUAMOUS CELL CARCINOMA OF ORAL CAVITY (H): ICD-10-CM

## 2023-01-01 DIAGNOSIS — C06.9 MALIGNANT NEOPLASM OF MOUTH (H): Primary | ICD-10-CM

## 2023-01-01 DIAGNOSIS — C06.9 ORAL CANCER (H): Primary | ICD-10-CM

## 2023-01-01 DIAGNOSIS — M87.9 OSTEONECROSIS (H): Primary | ICD-10-CM

## 2023-01-01 DIAGNOSIS — C06.9 CANCER OF BUCCAL CAVITY (H): ICD-10-CM

## 2023-01-01 DIAGNOSIS — L02.01 FACIAL ABSCESS: Primary | ICD-10-CM

## 2023-01-01 DIAGNOSIS — E03.9 ACQUIRED HYPOTHYROIDISM: Primary | ICD-10-CM

## 2023-01-01 DIAGNOSIS — C41.1 SQUAMOUS CELL CARCINOMA OF MANDIBLE (H): ICD-10-CM

## 2023-01-01 DIAGNOSIS — Z20.822 LAB TEST NEGATIVE FOR COVID-19 VIRUS: ICD-10-CM

## 2023-01-01 DIAGNOSIS — E03.9 ACQUIRED HYPOTHYROIDISM: ICD-10-CM

## 2023-01-01 LAB
ANION GAP SERPL CALCULATED.3IONS-SCNC: 11 MMOL/L (ref 7–15)
ANION GAP SERPL CALCULATED.3IONS-SCNC: 12 MMOL/L (ref 7–15)
BACTERIA ABSC ANAEROBE+AEROBE CULT: ABNORMAL
BASOPHILS # BLD AUTO: 0.1 10E3/UL (ref 0–0.2)
BASOPHILS NFR BLD AUTO: 1 %
BKR LAB AP ADDL TEST(S) ADDED: NORMAL
BUN SERPL-MCNC: 12.8 MG/DL (ref 8–23)
BUN SERPL-MCNC: 16.9 MG/DL (ref 8–23)
CALCIUM SERPL-MCNC: 10.3 MG/DL (ref 8.6–10)
CALCIUM SERPL-MCNC: 9.5 MG/DL (ref 8.6–10)
CHLORIDE SERPL-SCNC: 94 MMOL/L (ref 98–107)
CHLORIDE SERPL-SCNC: 97 MMOL/L (ref 98–107)
CREAT BLD-MCNC: 1 MG/DL (ref 0.7–1.3)
CREAT SERPL-MCNC: 0.78 MG/DL (ref 0.67–1.17)
CREAT SERPL-MCNC: 0.88 MG/DL (ref 0.67–1.17)
CRP SERPL-MCNC: 125 MG/L
CRP SERPL-MCNC: 167 MG/L
DEPRECATED HCO3 PLAS-SCNC: 25 MMOL/L (ref 22–29)
DEPRECATED HCO3 PLAS-SCNC: 26 MMOL/L (ref 22–29)
EOSINOPHIL # BLD AUTO: 0 10E3/UL (ref 0–0.7)
EOSINOPHIL NFR BLD AUTO: 0 %
ERYTHROCYTE [DISTWIDTH] IN BLOOD BY AUTOMATED COUNT: 13.6 % (ref 10–15)
ERYTHROCYTE [DISTWIDTH] IN BLOOD BY AUTOMATED COUNT: 13.8 % (ref 10–15)
ERYTHROCYTE [SEDIMENTATION RATE] IN BLOOD BY WESTERGREN METHOD: 25 MM/HR (ref 0–20)
ERYTHROCYTE [SEDIMENTATION RATE] IN BLOOD BY WESTERGREN METHOD: 25 MM/HR (ref 0–20)
GFR SERPL CREATININE-BSD FRML MDRD: >60 ML/MIN/1.73M2
GFR SERPL CREATININE-BSD FRML MDRD: >90 ML/MIN/1.73M2
GFR SERPL CREATININE-BSD FRML MDRD: >90 ML/MIN/1.73M2
GLUCOSE BLDC GLUCOMTR-MCNC: 121 MG/DL (ref 70–99)
GLUCOSE SERPL-MCNC: 110 MG/DL (ref 70–99)
GLUCOSE SERPL-MCNC: 112 MG/DL (ref 70–99)
GRAM STAIN RESULT: ABNORMAL
GRAM STAIN RESULT: ABNORMAL
HCT VFR BLD AUTO: 41.1 % (ref 40–53)
HCT VFR BLD AUTO: 45.7 % (ref 40–53)
HGB BLD-MCNC: 13.3 G/DL (ref 13.3–17.7)
HGB BLD-MCNC: 15.2 G/DL (ref 13.3–17.7)
HOLD SPECIMEN: NORMAL
HOLD SPECIMEN: NORMAL
IMM GRANULOCYTES # BLD: 0.1 10E3/UL
IMM GRANULOCYTES NFR BLD: 1 %
LACTATE SERPL-SCNC: 0.8 MMOL/L (ref 0.7–2)
LYMPHOCYTES # BLD AUTO: 0.6 10E3/UL (ref 0.8–5.3)
LYMPHOCYTES NFR BLD AUTO: 7 %
MAGNESIUM SERPL-MCNC: 1.9 MG/DL (ref 1.7–2.3)
MCH RBC QN AUTO: 31.5 PG (ref 26.5–33)
MCH RBC QN AUTO: 31.7 PG (ref 26.5–33)
MCHC RBC AUTO-ENTMCNC: 32.4 G/DL (ref 31.5–36.5)
MCHC RBC AUTO-ENTMCNC: 33.3 G/DL (ref 31.5–36.5)
MCV RBC AUTO: 95 FL (ref 78–100)
MCV RBC AUTO: 97 FL (ref 78–100)
MONOCYTES # BLD AUTO: 0.9 10E3/UL (ref 0–1.3)
MONOCYTES NFR BLD AUTO: 9 %
MRSA DNA SPEC QL NAA+PROBE: NEGATIVE
NEUTROPHILS # BLD AUTO: 8 10E3/UL (ref 1.6–8.3)
NEUTROPHILS NFR BLD AUTO: 82 %
NRBC # BLD AUTO: 0 10E3/UL
NRBC BLD AUTO-RTO: 0 /100
PATH REPORT.ADDENDUM SPEC: ABNORMAL
PATH REPORT.COMMENTS IMP SPEC: ABNORMAL
PATH REPORT.COMMENTS IMP SPEC: NORMAL
PATH REPORT.COMMENTS IMP SPEC: YES
PATH REPORT.FINAL DX SPEC: ABNORMAL
PATH REPORT.FINAL DX SPEC: NORMAL
PATH REPORT.GROSS SPEC: ABNORMAL
PATH REPORT.GROSS SPEC: NORMAL
PATH REPORT.MICROSCOPIC SPEC OTHER STN: ABNORMAL
PATH REPORT.MICROSCOPIC SPEC OTHER STN: NORMAL
PATH REPORT.RELEVANT HX SPEC: ABNORMAL
PATH REPORT.RELEVANT HX SPEC: NORMAL
PHOSPHATE SERPL-MCNC: 3 MG/DL (ref 2.5–4.5)
PLATELET # BLD AUTO: 207 10E3/UL (ref 150–450)
PLATELET # BLD AUTO: 248 10E3/UL (ref 150–450)
POTASSIUM SERPL-SCNC: 4 MMOL/L (ref 3.4–5.3)
POTASSIUM SERPL-SCNC: 4.9 MMOL/L (ref 3.4–5.3)
RBC # BLD AUTO: 4.22 10E6/UL (ref 4.4–5.9)
RBC # BLD AUTO: 4.8 10E6/UL (ref 4.4–5.9)
SA TARGET DNA: NEGATIVE
SARS-COV-2 RNA RESP QL NAA+PROBE: NEGATIVE
SODIUM SERPL-SCNC: 131 MMOL/L (ref 136–145)
SODIUM SERPL-SCNC: 134 MMOL/L (ref 136–145)
TSH SERPL DL<=0.005 MIU/L-ACNC: 1.51 UIU/ML (ref 0.3–4.2)
WBC # BLD AUTO: 6.1 10E3/UL (ref 4–11)
WBC # BLD AUTO: 9.7 10E3/UL (ref 4–11)

## 2023-01-01 PROCEDURE — 360N000083 HC SURGERY LEVEL 3 W/ FLUORO, PER MIN: Performed by: INTERNAL MEDICINE

## 2023-01-01 PROCEDURE — 88305 TISSUE EXAM BY PATHOLOGIST: CPT | Mod: 26 | Performed by: PATHOLOGY

## 2023-01-01 PROCEDURE — 70491 CT SOFT TISSUE NECK W/DYE: CPT | Mod: GC | Performed by: RADIOLOGY

## 2023-01-01 PROCEDURE — 71260 CT THORAX DX C+: CPT | Mod: 26 | Performed by: RADIOLOGY

## 2023-01-01 PROCEDURE — 88173 CYTOPATH EVAL FNA REPORT: CPT | Mod: 26 | Performed by: PATHOLOGY

## 2023-01-01 PROCEDURE — 710N000012 HC RECOVERY PHASE 2, PER MINUTE: Performed by: INTERNAL MEDICINE

## 2023-01-01 PROCEDURE — 250N000011 HC RX IP 250 OP 636: Performed by: STUDENT IN AN ORGANIZED HEALTH CARE EDUCATION/TRAINING PROGRAM

## 2023-01-01 PROCEDURE — 250N000013 HC RX MED GY IP 250 OP 250 PS 637

## 2023-01-01 PROCEDURE — 83735 ASSAY OF MAGNESIUM: CPT

## 2023-01-01 PROCEDURE — 86140 C-REACTIVE PROTEIN: CPT | Performed by: STUDENT IN AN ORGANIZED HEALTH CARE EDUCATION/TRAINING PROGRAM

## 2023-01-01 PROCEDURE — 78816 PET IMAGE W/CT FULL BODY: CPT | Mod: PS

## 2023-01-01 PROCEDURE — 120N000002 HC R&B MED SURG/OB UMMC

## 2023-01-01 PROCEDURE — 36415 COLL VENOUS BLD VENIPUNCTURE: CPT | Performed by: STUDENT IN AN ORGANIZED HEALTH CARE EDUCATION/TRAINING PROGRAM

## 2023-01-01 PROCEDURE — 88305 TISSUE EXAM BY PATHOLOGIST: CPT | Mod: TC | Performed by: STUDENT IN AN ORGANIZED HEALTH CARE EDUCATION/TRAINING PROGRAM

## 2023-01-01 PROCEDURE — 87075 CULTR BACTERIA EXCEPT BLOOD: CPT

## 2023-01-01 PROCEDURE — 36415 COLL VENOUS BLD VENIPUNCTURE: CPT

## 2023-01-01 PROCEDURE — 99215 OFFICE O/P EST HI 40 MIN: CPT | Performed by: INTERNAL MEDICINE

## 2023-01-01 PROCEDURE — 250N000009 HC RX 250: Performed by: PHYSICIAN ASSISTANT

## 2023-01-01 PROCEDURE — G0500 MOD SEDAT ENDO SERVICE >5YRS: HCPCS | Performed by: STUDENT IN AN ORGANIZED HEALTH CARE EDUCATION/TRAINING PROGRAM

## 2023-01-01 PROCEDURE — 250N000011 HC RX IP 250 OP 636

## 2023-01-01 PROCEDURE — 250N000009 HC RX 250: Performed by: ANESTHESIOLOGY

## 2023-01-01 PROCEDURE — 99152 MOD SED SAME PHYS/QHP 5/>YRS: CPT | Performed by: STUDENT IN AN ORGANIZED HEALTH CARE EDUCATION/TRAINING PROGRAM

## 2023-01-01 PROCEDURE — 96376 TX/PRO/DX INJ SAME DRUG ADON: CPT | Performed by: STUDENT IN AN ORGANIZED HEALTH CARE EDUCATION/TRAINING PROGRAM

## 2023-01-01 PROCEDURE — 250N000011 HC RX IP 250 OP 636: Mod: JZ | Performed by: ANESTHESIOLOGY

## 2023-01-01 PROCEDURE — 84443 ASSAY THYROID STIM HORMONE: CPT | Performed by: PATHOLOGY

## 2023-01-01 PROCEDURE — 99285 EMERGENCY DEPT VISIT HI MDM: CPT | Performed by: STUDENT IN AN ORGANIZED HEALTH CARE EDUCATION/TRAINING PROGRAM

## 2023-01-01 PROCEDURE — 70491 CT SOFT TISSUE NECK W/DYE: CPT

## 2023-01-01 PROCEDURE — 250N000009 HC RX 250: Performed by: STUDENT IN AN ORGANIZED HEALTH CARE EDUCATION/TRAINING PROGRAM

## 2023-01-01 PROCEDURE — 85652 RBC SED RATE AUTOMATED: CPT

## 2023-01-01 PROCEDURE — 999N000141 HC STATISTIC PRE-PROCEDURE NURSING ASSESSMENT: Performed by: INTERNAL MEDICINE

## 2023-01-01 PROCEDURE — 87077 CULTURE AEROBIC IDENTIFY: CPT

## 2023-01-01 PROCEDURE — 99214 OFFICE O/P EST MOD 30 MIN: CPT | Performed by: STUDENT IN AN ORGANIZED HEALTH CARE EDUCATION/TRAINING PROGRAM

## 2023-01-01 PROCEDURE — 99221 1ST HOSP IP/OBS SF/LOW 40: CPT | Mod: 25 | Performed by: STUDENT IN AN ORGANIZED HEALTH CARE EDUCATION/TRAINING PROGRAM

## 2023-01-01 PROCEDURE — 343N000001 HC RX 343: Performed by: STUDENT IN AN ORGANIZED HEALTH CARE EDUCATION/TRAINING PROGRAM

## 2023-01-01 PROCEDURE — C9803 HOPD COVID-19 SPEC COLLECT: HCPCS | Performed by: STUDENT IN AN ORGANIZED HEALTH CARE EDUCATION/TRAINING PROGRAM

## 2023-01-01 PROCEDURE — 80048 BASIC METABOLIC PNL TOTAL CA: CPT

## 2023-01-01 PROCEDURE — 99285 EMERGENCY DEPT VISIT HI MDM: CPT | Mod: 25 | Performed by: STUDENT IN AN ORGANIZED HEALTH CARE EDUCATION/TRAINING PROGRAM

## 2023-01-01 PROCEDURE — 99443 PR PHYSICIAN TELEPHONE EVALUATION 21-30 MIN: CPT | Mod: 93 | Performed by: REGISTERED NURSE

## 2023-01-01 PROCEDURE — 710N000010 HC RECOVERY PHASE 1, LEVEL 2, PER MIN: Performed by: INTERNAL MEDICINE

## 2023-01-01 PROCEDURE — 70491 CT SOFT TISSUE NECK W/DYE: CPT | Mod: 26 | Performed by: RADIOLOGY

## 2023-01-01 PROCEDURE — 99204 OFFICE O/P NEW MOD 45 MIN: CPT | Performed by: INTERNAL MEDICINE

## 2023-01-01 PROCEDURE — 88360 TUMOR IMMUNOHISTOCHEM/MANUAL: CPT | Mod: 26 | Performed by: PATHOLOGY

## 2023-01-01 PROCEDURE — 74177 CT ABD & PELVIS W/CONTRAST: CPT

## 2023-01-01 PROCEDURE — 272N000001 HC OR GENERAL SUPPLY STERILE: Performed by: INTERNAL MEDICINE

## 2023-01-01 PROCEDURE — 96365 THER/PROPH/DIAG IV INF INIT: CPT | Performed by: STUDENT IN AN ORGANIZED HEALTH CARE EDUCATION/TRAINING PROGRAM

## 2023-01-01 PROCEDURE — 96366 THER/PROPH/DIAG IV INF ADDON: CPT | Performed by: STUDENT IN AN ORGANIZED HEALTH CARE EDUCATION/TRAINING PROGRAM

## 2023-01-01 PROCEDURE — 83605 ASSAY OF LACTIC ACID: CPT | Performed by: STUDENT IN AN ORGANIZED HEALTH CARE EDUCATION/TRAINING PROGRAM

## 2023-01-01 PROCEDURE — 10060 I&D ABSCESS SIMPLE/SINGLE: CPT | Performed by: STUDENT IN AN ORGANIZED HEALTH CARE EDUCATION/TRAINING PROGRAM

## 2023-01-01 PROCEDURE — 31652 BRONCH EBUS SAMPLNG 1/2 NODE: CPT | Performed by: STUDENT IN AN ORGANIZED HEALTH CARE EDUCATION/TRAINING PROGRAM

## 2023-01-01 PROCEDURE — 88172 CYTP DX EVAL FNA 1ST EA SITE: CPT | Mod: 26 | Performed by: PATHOLOGY

## 2023-01-01 PROCEDURE — A9552 F18 FDG: HCPCS | Performed by: STUDENT IN AN ORGANIZED HEALTH CARE EDUCATION/TRAINING PROGRAM

## 2023-01-01 PROCEDURE — 78816 PET IMAGE W/CT FULL BODY: CPT | Mod: 26 | Performed by: RADIOLOGY

## 2023-01-01 PROCEDURE — 88172 CYTP DX EVAL FNA 1ST EA SITE: CPT | Mod: TC | Performed by: INTERNAL MEDICINE

## 2023-01-01 PROCEDURE — 71260 CT THORAX DX C+: CPT | Performed by: RADIOLOGY

## 2023-01-01 PROCEDURE — G0463 HOSPITAL OUTPT CLINIC VISIT: HCPCS | Performed by: INTERNAL MEDICINE

## 2023-01-01 PROCEDURE — 96375 TX/PRO/DX INJ NEW DRUG ADDON: CPT | Performed by: STUDENT IN AN ORGANIZED HEALTH CARE EDUCATION/TRAINING PROGRAM

## 2023-01-01 PROCEDURE — 272N000002 HC OR SUPPLY OTHER OPNP: Performed by: INTERNAL MEDICINE

## 2023-01-01 PROCEDURE — 71260 CT THORAX DX C+: CPT | Mod: GC | Performed by: RADIOLOGY

## 2023-01-01 PROCEDURE — 250N000025 HC SEVOFLURANE, PER MIN: Performed by: INTERNAL MEDICINE

## 2023-01-01 PROCEDURE — 74160 CT ABDOMEN W/CONTRAST: CPT | Performed by: RADIOLOGY

## 2023-01-01 PROCEDURE — 87635 SARS-COV-2 COVID-19 AMP PRB: CPT

## 2023-01-01 PROCEDURE — 80048 BASIC METABOLIC PNL TOTAL CA: CPT | Performed by: STUDENT IN AN ORGANIZED HEALTH CARE EDUCATION/TRAINING PROGRAM

## 2023-01-01 PROCEDURE — 99202 OFFICE O/P NEW SF 15 MIN: CPT | Performed by: REGISTERED NURSE

## 2023-01-01 PROCEDURE — 31652 BRONCH EBUS SAMPLNG 1/2 NODE: CPT | Mod: GC | Performed by: INTERNAL MEDICINE

## 2023-01-01 PROCEDURE — 74177 CT ABD & PELVIS W/CONTRAST: CPT | Mod: 26 | Performed by: RADIOLOGY

## 2023-01-01 PROCEDURE — 99212 OFFICE O/P EST SF 10 MIN: CPT | Performed by: STUDENT IN AN ORGANIZED HEALTH CARE EDUCATION/TRAINING PROGRAM

## 2023-01-01 PROCEDURE — 99153 MOD SED SAME PHYS/QHP EA: CPT | Performed by: STUDENT IN AN ORGANIZED HEALTH CARE EDUCATION/TRAINING PROGRAM

## 2023-01-01 PROCEDURE — 86140 C-REACTIVE PROTEIN: CPT

## 2023-01-01 PROCEDURE — 82962 GLUCOSE BLOOD TEST: CPT

## 2023-01-01 PROCEDURE — 370N000017 HC ANESTHESIA TECHNICAL FEE, PER MIN: Performed by: INTERNAL MEDICINE

## 2023-01-01 PROCEDURE — 82565 ASSAY OF CREATININE: CPT

## 2023-01-01 PROCEDURE — 85025 COMPLETE CBC W/AUTO DIFF WBC: CPT | Performed by: STUDENT IN AN ORGANIZED HEALTH CARE EDUCATION/TRAINING PROGRAM

## 2023-01-01 PROCEDURE — 36415 COLL VENOUS BLD VENIPUNCTURE: CPT | Performed by: PATHOLOGY

## 2023-01-01 PROCEDURE — 87641 MR-STAPH DNA AMP PROBE: CPT | Performed by: STUDENT IN AN ORGANIZED HEALTH CARE EDUCATION/TRAINING PROGRAM

## 2023-01-01 PROCEDURE — 99213 OFFICE O/P EST LOW 20 MIN: CPT | Performed by: STUDENT IN AN ORGANIZED HEALTH CARE EDUCATION/TRAINING PROGRAM

## 2023-01-01 PROCEDURE — 0W920ZZ DRAINAGE OF FACE, OPEN APPROACH: ICD-10-PCS | Performed by: STUDENT IN AN ORGANIZED HEALTH CARE EDUCATION/TRAINING PROGRAM

## 2023-01-01 PROCEDURE — 84100 ASSAY OF PHOSPHORUS: CPT

## 2023-01-01 PROCEDURE — 258N000003 HC RX IP 258 OP 636: Performed by: ANESTHESIOLOGY

## 2023-01-01 PROCEDURE — 85027 COMPLETE CBC AUTOMATED: CPT

## 2023-01-01 RX ORDER — ACETAMINOPHEN 325 MG/1
650 TABLET ORAL EVERY 4 HOURS PRN
Status: DISCONTINUED | OUTPATIENT
Start: 2023-01-01 | End: 2023-01-01 | Stop reason: HOSPADM

## 2023-01-01 RX ORDER — LIDOCAINE HYDROCHLORIDE 40 MG/ML
INJECTION, SOLUTION RETROBULBAR PRN
Status: DISCONTINUED | OUTPATIENT
Start: 2023-01-01 | End: 2023-01-01 | Stop reason: HOSPADM

## 2023-01-01 RX ORDER — MORPHINE SULFATE 4 MG/ML
4 INJECTION, SOLUTION INTRAMUSCULAR; INTRAVENOUS ONCE
Status: COMPLETED | OUTPATIENT
Start: 2023-01-01 | End: 2023-01-01

## 2023-01-01 RX ORDER — OXYCODONE HCL 5 MG/5 ML
5 SOLUTION, ORAL ORAL EVERY 4 HOURS PRN
Qty: 100 ML | Refills: 0 | Status: SHIPPED | OUTPATIENT
Start: 2023-01-01 | End: 2023-01-01

## 2023-01-01 RX ORDER — LIDOCAINE HYDROCHLORIDE AND EPINEPHRINE 10; 10 MG/ML; UG/ML
5 INJECTION, SOLUTION INFILTRATION; PERINEURAL ONCE
Status: COMPLETED | OUTPATIENT
Start: 2023-01-01 | End: 2023-01-01

## 2023-01-01 RX ORDER — LIDOCAINE 40 MG/G
CREAM TOPICAL
Status: DISCONTINUED | OUTPATIENT
Start: 2023-01-01 | End: 2023-01-01 | Stop reason: HOSPADM

## 2023-01-01 RX ORDER — ESMOLOL HYDROCHLORIDE 10 MG/ML
INJECTION INTRAVENOUS PRN
Status: DISCONTINUED | OUTPATIENT
Start: 2023-01-01 | End: 2023-01-01

## 2023-01-01 RX ORDER — CHLORHEXIDINE GLUCONATE ORAL RINSE 1.2 MG/ML
10 SOLUTION DENTAL 4 TIMES DAILY
Qty: 1200 ML | Refills: 4 | Status: SHIPPED | OUTPATIENT
Start: 2023-01-01 | End: 2024-01-01

## 2023-01-01 RX ORDER — NALOXONE HYDROCHLORIDE 0.4 MG/ML
0.4 INJECTION, SOLUTION INTRAMUSCULAR; INTRAVENOUS; SUBCUTANEOUS
Status: DISCONTINUED | OUTPATIENT
Start: 2023-01-01 | End: 2023-01-01 | Stop reason: HOSPADM

## 2023-01-01 RX ORDER — VANCOMYCIN HYDROCHLORIDE 1 G/200ML
1000 INJECTION, SOLUTION INTRAVENOUS EVERY 12 HOURS
Status: DISCONTINUED | OUTPATIENT
Start: 2023-01-01 | End: 2023-01-01 | Stop reason: HOSPADM

## 2023-01-01 RX ORDER — FENTANYL CITRATE 50 UG/ML
25 INJECTION, SOLUTION INTRAMUSCULAR; INTRAVENOUS EVERY 5 MIN PRN
Status: DISCONTINUED | OUTPATIENT
Start: 2023-01-01 | End: 2023-01-01 | Stop reason: HOSPADM

## 2023-01-01 RX ORDER — OXYCODONE HYDROCHLORIDE 10 MG/1
10 TABLET ORAL
Status: DISCONTINUED | OUTPATIENT
Start: 2023-01-01 | End: 2023-01-01 | Stop reason: HOSPADM

## 2023-01-01 RX ORDER — AMPICILLIN AND SULBACTAM 2; 1 G/1; G/1
3 INJECTION, POWDER, FOR SOLUTION INTRAMUSCULAR; INTRAVENOUS ONCE
Status: COMPLETED | OUTPATIENT
Start: 2023-01-01 | End: 2023-01-01

## 2023-01-01 RX ORDER — DEXAMETHASONE SODIUM PHOSPHATE 4 MG/ML
INJECTION, SOLUTION INTRA-ARTICULAR; INTRALESIONAL; INTRAMUSCULAR; INTRAVENOUS; SOFT TISSUE PRN
Status: DISCONTINUED | OUTPATIENT
Start: 2023-01-01 | End: 2023-01-01

## 2023-01-01 RX ORDER — SODIUM CHLORIDE, SODIUM LACTATE, POTASSIUM CHLORIDE, CALCIUM CHLORIDE 600; 310; 30; 20 MG/100ML; MG/100ML; MG/100ML; MG/100ML
INJECTION, SOLUTION INTRAVENOUS CONTINUOUS PRN
Status: DISCONTINUED | OUTPATIENT
Start: 2023-01-01 | End: 2023-01-01

## 2023-01-01 RX ORDER — ONDANSETRON 2 MG/ML
4 INJECTION INTRAMUSCULAR; INTRAVENOUS EVERY 30 MIN PRN
Status: DISCONTINUED | OUTPATIENT
Start: 2023-01-01 | End: 2023-01-01 | Stop reason: HOSPADM

## 2023-01-01 RX ORDER — IOPAMIDOL 755 MG/ML
100 INJECTION, SOLUTION INTRAVASCULAR ONCE
Status: COMPLETED | OUTPATIENT
Start: 2023-01-01 | End: 2023-01-01

## 2023-01-01 RX ORDER — PROPOFOL 10 MG/ML
INJECTION, EMULSION INTRAVENOUS CONTINUOUS PRN
Status: DISCONTINUED | OUTPATIENT
Start: 2023-01-01 | End: 2023-01-01

## 2023-01-01 RX ORDER — AMOXICILLIN 250 MG
1 CAPSULE ORAL 2 TIMES DAILY PRN
Status: DISCONTINUED | OUTPATIENT
Start: 2023-01-01 | End: 2023-01-01 | Stop reason: HOSPADM

## 2023-01-01 RX ORDER — OXYCODONE HYDROCHLORIDE 10 MG/1
10 TABLET ORAL EVERY 4 HOURS PRN
Status: DISCONTINUED | OUTPATIENT
Start: 2023-01-01 | End: 2023-01-01 | Stop reason: HOSPADM

## 2023-01-01 RX ORDER — SODIUM CHLORIDE, SODIUM LACTATE, POTASSIUM CHLORIDE, CALCIUM CHLORIDE 600; 310; 30; 20 MG/100ML; MG/100ML; MG/100ML; MG/100ML
INJECTION, SOLUTION INTRAVENOUS CONTINUOUS
Status: DISCONTINUED | OUTPATIENT
Start: 2023-01-01 | End: 2023-01-01 | Stop reason: HOSPADM

## 2023-01-01 RX ORDER — FENTANYL CITRATE 50 UG/ML
INJECTION, SOLUTION INTRAMUSCULAR; INTRAVENOUS PRN
Status: DISCONTINUED | OUTPATIENT
Start: 2023-01-01 | End: 2023-01-01 | Stop reason: HOSPADM

## 2023-01-01 RX ORDER — CHLORHEXIDINE GLUCONATE ORAL RINSE 1.2 MG/ML
10 SOLUTION DENTAL 4 TIMES DAILY
Qty: 1200 ML | Refills: 0 | Status: SHIPPED | OUTPATIENT
Start: 2023-01-01 | End: 2023-01-01

## 2023-01-01 RX ORDER — VITAMIN E 268 MG
400 CAPSULE ORAL DAILY
COMMUNITY
End: 2024-01-01

## 2023-01-01 RX ORDER — LIDOCAINE HYDROCHLORIDE 20 MG/ML
INJECTION, SOLUTION INFILTRATION; PERINEURAL PRN
Status: DISCONTINUED | OUTPATIENT
Start: 2023-01-01 | End: 2023-01-01

## 2023-01-01 RX ORDER — ONDANSETRON 4 MG/1
4 TABLET, ORALLY DISINTEGRATING ORAL EVERY 6 HOURS PRN
Status: DISCONTINUED | OUTPATIENT
Start: 2023-01-01 | End: 2023-01-01 | Stop reason: HOSPADM

## 2023-01-01 RX ORDER — PIPERACILLIN SODIUM, TAZOBACTAM SODIUM 3; .375 G/15ML; G/15ML
3.38 INJECTION, POWDER, LYOPHILIZED, FOR SOLUTION INTRAVENOUS ONCE
Status: COMPLETED | OUTPATIENT
Start: 2023-01-01 | End: 2023-01-01

## 2023-01-01 RX ORDER — OXYCODONE HYDROCHLORIDE 5 MG/1
5 TABLET ORAL EVERY 4 HOURS PRN
Status: DISCONTINUED | OUTPATIENT
Start: 2023-01-01 | End: 2023-01-01 | Stop reason: HOSPADM

## 2023-01-01 RX ORDER — ONDANSETRON 2 MG/ML
4 INJECTION INTRAMUSCULAR; INTRAVENOUS EVERY 6 HOURS PRN
Status: DISCONTINUED | OUTPATIENT
Start: 2023-01-01 | End: 2023-01-01 | Stop reason: HOSPADM

## 2023-01-01 RX ORDER — BISACODYL 10 MG
10 SUPPOSITORY, RECTAL RECTAL DAILY PRN
Status: DISCONTINUED | OUTPATIENT
Start: 2023-01-01 | End: 2023-01-01 | Stop reason: HOSPADM

## 2023-01-01 RX ORDER — NALOXONE HYDROCHLORIDE 0.4 MG/ML
0.2 INJECTION, SOLUTION INTRAMUSCULAR; INTRAVENOUS; SUBCUTANEOUS
Status: DISCONTINUED | OUTPATIENT
Start: 2023-01-01 | End: 2023-01-01 | Stop reason: HOSPADM

## 2023-01-01 RX ORDER — FENTANYL CITRATE 50 UG/ML
50 INJECTION, SOLUTION INTRAMUSCULAR; INTRAVENOUS EVERY 5 MIN PRN
Status: DISCONTINUED | OUTPATIENT
Start: 2023-01-01 | End: 2023-01-01 | Stop reason: HOSPADM

## 2023-01-01 RX ORDER — PROCHLORPERAZINE MALEATE 10 MG
10 TABLET ORAL EVERY 6 HOURS PRN
Status: DISCONTINUED | OUTPATIENT
Start: 2023-01-01 | End: 2023-01-01 | Stop reason: HOSPADM

## 2023-01-01 RX ORDER — PROPOFOL 10 MG/ML
INJECTION, EMULSION INTRAVENOUS PRN
Status: DISCONTINUED | OUTPATIENT
Start: 2023-01-01 | End: 2023-01-01

## 2023-01-01 RX ORDER — ACETAMINOPHEN 325 MG/1
975 TABLET ORAL EVERY 8 HOURS
Status: DISCONTINUED | OUTPATIENT
Start: 2023-01-01 | End: 2023-01-01 | Stop reason: HOSPADM

## 2023-01-01 RX ORDER — HYDROMORPHONE HYDROCHLORIDE 1 MG/ML
0.4 INJECTION, SOLUTION INTRAMUSCULAR; INTRAVENOUS; SUBCUTANEOUS EVERY 5 MIN PRN
Status: DISCONTINUED | OUTPATIENT
Start: 2023-01-01 | End: 2023-01-01 | Stop reason: HOSPADM

## 2023-01-01 RX ORDER — ONDANSETRON 4 MG/1
4 TABLET, ORALLY DISINTEGRATING ORAL EVERY 30 MIN PRN
Status: DISCONTINUED | OUTPATIENT
Start: 2023-01-01 | End: 2023-01-01 | Stop reason: HOSPADM

## 2023-01-01 RX ORDER — VANCOMYCIN HYDROCHLORIDE 1 G/200ML
1000 INJECTION, SOLUTION INTRAVENOUS EVERY 12 HOURS
Status: DISCONTINUED | OUTPATIENT
Start: 2023-01-01 | End: 2023-01-01

## 2023-01-01 RX ORDER — ACETAMINOPHEN 325 MG/1
650 TABLET ORAL EVERY 4 HOURS PRN
Qty: 90 TABLET | Refills: 0 | Status: SHIPPED | OUTPATIENT
Start: 2023-01-01

## 2023-01-01 RX ORDER — HYDROMORPHONE HYDROCHLORIDE 1 MG/ML
0.2 INJECTION, SOLUTION INTRAMUSCULAR; INTRAVENOUS; SUBCUTANEOUS EVERY 5 MIN PRN
Status: DISCONTINUED | OUTPATIENT
Start: 2023-01-01 | End: 2023-01-01 | Stop reason: HOSPADM

## 2023-01-01 RX ORDER — AMOXICILLIN 250 MG
1 CAPSULE ORAL 2 TIMES DAILY
Status: DISCONTINUED | OUTPATIENT
Start: 2023-01-01 | End: 2023-01-01 | Stop reason: HOSPADM

## 2023-01-01 RX ORDER — IOPAMIDOL 755 MG/ML
10-135 INJECTION, SOLUTION INTRAVASCULAR ONCE
Status: COMPLETED | OUTPATIENT
Start: 2023-01-01 | End: 2023-01-01

## 2023-01-01 RX ORDER — FENTANYL CITRATE 50 UG/ML
INJECTION, SOLUTION INTRAMUSCULAR; INTRAVENOUS PRN
Status: DISCONTINUED | OUTPATIENT
Start: 2023-01-01 | End: 2023-01-01

## 2023-01-01 RX ORDER — OXYCODONE HYDROCHLORIDE 5 MG/1
5 TABLET ORAL EVERY 6 HOURS PRN
Qty: 30 TABLET | Refills: 0 | Status: SHIPPED | OUTPATIENT
Start: 2023-01-01 | End: 2023-01-01

## 2023-01-01 RX ORDER — IOPAMIDOL 755 MG/ML
93 INJECTION, SOLUTION INTRAVASCULAR ONCE
Status: COMPLETED | OUTPATIENT
Start: 2023-01-01 | End: 2023-01-01

## 2023-01-01 RX ORDER — PENTOXIFYLLINE 400 MG/1
400 TABLET, EXTENDED RELEASE ORAL 2 TIMES DAILY
Qty: 120 TABLET | Refills: 11 | Status: SHIPPED | OUTPATIENT
Start: 2023-01-01 | End: 2024-01-01

## 2023-01-01 RX ORDER — IOPAMIDOL 755 MG/ML
89 INJECTION, SOLUTION INTRAVASCULAR ONCE
Status: COMPLETED | OUTPATIENT
Start: 2023-01-01 | End: 2023-01-01

## 2023-01-01 RX ORDER — OXYCODONE HYDROCHLORIDE 5 MG/1
5 TABLET ORAL
Status: DISCONTINUED | OUTPATIENT
Start: 2023-01-01 | End: 2023-01-01 | Stop reason: HOSPADM

## 2023-01-01 RX ORDER — SACCHAROMYCES BOULARDII 250 MG
250 CAPSULE ORAL 2 TIMES DAILY
Qty: 20 CAPSULE | Refills: 0 | Status: SHIPPED | OUTPATIENT
Start: 2023-01-01 | End: 2023-01-01

## 2023-01-01 RX ORDER — CHLORHEXIDINE GLUCONATE ORAL RINSE 1.2 MG/ML
10 SOLUTION DENTAL 4 TIMES DAILY
Status: DISCONTINUED | OUTPATIENT
Start: 2023-01-01 | End: 2023-01-01 | Stop reason: HOSPADM

## 2023-01-01 RX ORDER — AMOXICILLIN 250 MG
2 CAPSULE ORAL 2 TIMES DAILY PRN
Status: DISCONTINUED | OUTPATIENT
Start: 2023-01-01 | End: 2023-01-01 | Stop reason: HOSPADM

## 2023-01-01 RX ORDER — LIDOCAINE 40 MG/G
CREAM TOPICAL
Status: CANCELLED | OUTPATIENT
Start: 2023-01-01

## 2023-01-01 RX ORDER — LIDOCAINE HYDROCHLORIDE 10 MG/ML
INJECTION, SOLUTION INFILTRATION; PERINEURAL PRN
Status: DISCONTINUED | OUTPATIENT
Start: 2023-01-01 | End: 2023-01-01 | Stop reason: HOSPADM

## 2023-01-01 RX ORDER — PIPERACILLIN SODIUM, TAZOBACTAM SODIUM 3; .375 G/15ML; G/15ML
3.38 INJECTION, POWDER, LYOPHILIZED, FOR SOLUTION INTRAVENOUS EVERY 6 HOURS
Status: DISCONTINUED | OUTPATIENT
Start: 2023-01-01 | End: 2023-01-01 | Stop reason: HOSPADM

## 2023-01-01 RX ORDER — ONDANSETRON 2 MG/ML
INJECTION INTRAMUSCULAR; INTRAVENOUS PRN
Status: DISCONTINUED | OUTPATIENT
Start: 2023-01-01 | End: 2023-01-01

## 2023-01-01 RX ORDER — CHLORHEXIDINE GLUCONATE ORAL RINSE 1.2 MG/ML
10 SOLUTION DENTAL 4 TIMES DAILY
Qty: 1200 ML | Refills: 4 | Status: SHIPPED | OUTPATIENT
Start: 2023-01-01 | End: 2023-01-01

## 2023-01-01 RX ORDER — POLYETHYLENE GLYCOL 3350 17 G/17G
17 POWDER, FOR SOLUTION ORAL DAILY
Status: DISCONTINUED | OUTPATIENT
Start: 2023-01-01 | End: 2023-01-01 | Stop reason: HOSPADM

## 2023-01-01 RX ADMIN — CHLORHEXIDINE GLUCONATE 10 ML: 1.2 SOLUTION ORAL at 16:16

## 2023-01-01 RX ADMIN — IOPAMIDOL 100 ML: 755 INJECTION, SOLUTION INTRAVASCULAR at 14:48

## 2023-01-01 RX ADMIN — PIPERACILLIN AND TAZOBACTAM 3.38 G: 3; .375 INJECTION, POWDER, FOR SOLUTION INTRAVENOUS at 15:12

## 2023-01-01 RX ADMIN — AMPICILLIN SODIUM AND SULBACTAM SODIUM 3 G: 2; 1 INJECTION, POWDER, FOR SOLUTION INTRAMUSCULAR; INTRAVENOUS at 10:34

## 2023-01-01 RX ADMIN — PROPOFOL 200 MG: 10 INJECTION, EMULSION INTRAVENOUS at 13:50

## 2023-01-01 RX ADMIN — IOPAMIDOL 101 ML: 755 INJECTION, SOLUTION INTRAVENOUS at 12:55

## 2023-01-01 RX ADMIN — PIPERACILLIN AND TAZOBACTAM 3.38 G: 3; .375 INJECTION, POWDER, FOR SOLUTION INTRAVENOUS at 02:39

## 2023-01-01 RX ADMIN — FENTANYL CITRATE 100 MCG: 50 INJECTION, SOLUTION INTRAMUSCULAR; INTRAVENOUS at 13:50

## 2023-01-01 RX ADMIN — IOPAMIDOL 89 ML: 755 INJECTION, SOLUTION INTRAVASCULAR at 13:23

## 2023-01-01 RX ADMIN — LIDOCAINE HYDROCHLORIDE,EPINEPHRINE BITARTRATE 5 ML: 10; .01 INJECTION, SOLUTION INFILTRATION; PERINEURAL at 14:33

## 2023-01-01 RX ADMIN — Medication 50 MG: at 13:51

## 2023-01-01 RX ADMIN — MORPHINE SULFATE 4 MG: 4 INJECTION INTRAVENOUS at 10:34

## 2023-01-01 RX ADMIN — LIDOCAINE HYDROCHLORIDE 100 MG: 20 INJECTION, SOLUTION INFILTRATION; PERINEURAL at 13:50

## 2023-01-01 RX ADMIN — PROPOFOL 150 MCG/KG/MIN: 10 INJECTION, EMULSION INTRAVENOUS at 13:50

## 2023-01-01 RX ADMIN — VANCOMYCIN HYDROCHLORIDE 1000 MG: 1 INJECTION, SOLUTION INTRAVENOUS at 16:19

## 2023-01-01 RX ADMIN — ONDANSETRON 4 MG: 2 INJECTION INTRAMUSCULAR; INTRAVENOUS at 14:10

## 2023-01-01 RX ADMIN — CHLORHEXIDINE GLUCONATE 10 ML: 1.2 SOLUTION ORAL at 12:25

## 2023-01-01 RX ADMIN — VANCOMYCIN HYDROCHLORIDE 1000 MG: 1 INJECTION, SOLUTION INTRAVENOUS at 03:15

## 2023-01-01 RX ADMIN — ACETAMINOPHEN 975 MG: 325 TABLET ORAL at 11:17

## 2023-01-01 RX ADMIN — IOPAMIDOL 100 ML: 755 INJECTION, SOLUTION INTRAVENOUS at 11:29

## 2023-01-01 RX ADMIN — VANCOMYCIN HYDROCHLORIDE 1000 MG: 1 INJECTION, SOLUTION INTRAVENOUS at 15:58

## 2023-01-01 RX ADMIN — SODIUM CHLORIDE, POTASSIUM CHLORIDE, SODIUM LACTATE AND CALCIUM CHLORIDE: 600; 310; 30; 20 INJECTION, SOLUTION INTRAVENOUS at 13:42

## 2023-01-01 RX ADMIN — PIPERACILLIN AND TAZOBACTAM 3.38 G: 3; .375 INJECTION, POWDER, FOR SOLUTION INTRAVENOUS at 09:24

## 2023-01-01 RX ADMIN — PIPERACILLIN AND TAZOBACTAM 3.38 G: 3; .375 INJECTION, POWDER, FOR SOLUTION INTRAVENOUS at 15:18

## 2023-01-01 RX ADMIN — FLUDEOXYGLUCOSE F-18 10.88 MILLICURIE: 500 INJECTION, SOLUTION INTRAVENOUS at 11:59

## 2023-01-01 RX ADMIN — SUGAMMADEX 200 MG: 100 INJECTION, SOLUTION INTRAVENOUS at 14:10

## 2023-01-01 RX ADMIN — PIPERACILLIN AND TAZOBACTAM 3.38 G: 3; .375 INJECTION, POWDER, FOR SOLUTION INTRAVENOUS at 20:38

## 2023-01-01 RX ADMIN — ESMOLOL HYDROCHLORIDE 10 MG: 10 INJECTION, SOLUTION INTRAVENOUS at 14:02

## 2023-01-01 RX ADMIN — CHLORHEXIDINE GLUCONATE 10 ML: 1.2 SOLUTION ORAL at 20:00

## 2023-01-01 RX ADMIN — IOPAMIDOL 93 ML: 755 INJECTION, SOLUTION INTRAVASCULAR at 14:04

## 2023-01-01 RX ADMIN — MORPHINE SULFATE 4 MG: 4 INJECTION INTRAVENOUS at 13:41

## 2023-01-01 RX ADMIN — ACETAMINOPHEN 975 MG: 325 TABLET ORAL at 19:59

## 2023-01-01 RX ADMIN — CHLORHEXIDINE GLUCONATE 10 ML: 1.2 SOLUTION ORAL at 09:24

## 2023-01-01 RX ADMIN — DEXAMETHASONE SODIUM PHOSPHATE 4 MG: 4 INJECTION, SOLUTION INTRA-ARTICULAR; INTRALESIONAL; INTRAMUSCULAR; INTRAVENOUS; SOFT TISSUE at 14:10

## 2023-01-01 ASSESSMENT — ACTIVITIES OF DAILY LIVING (ADL)
ADLS_ACUITY_SCORE: 35

## 2023-01-01 ASSESSMENT — PAIN SCALES - GENERAL
PAINLEVEL: NO PAIN (0)
PAINLEVEL: NO PAIN (0)
PAINLEVEL: NO PAIN (1)
PAINLEVEL: MODERATE PAIN (5)
PAINLEVEL: NO PAIN (0)
PAINLEVEL: SEVERE PAIN (7)
PAINLEVEL: MILD PAIN (3)
PAINLEVEL: SEVERE PAIN (6)

## 2023-01-23 ENCOUNTER — OFFICE VISIT (OUTPATIENT)
Dept: OTOLARYNGOLOGY | Facility: CLINIC | Age: 58
End: 2023-01-23

## 2023-01-23 VITALS
HEIGHT: 70 IN | WEIGHT: 181.2 LBS | BODY MASS INDEX: 25.94 KG/M2 | SYSTOLIC BLOOD PRESSURE: 161 MMHG | DIASTOLIC BLOOD PRESSURE: 83 MMHG | TEMPERATURE: 97.3 F

## 2023-01-23 DIAGNOSIS — Z13.29 SCREENING FOR HYPOTHYROIDISM: ICD-10-CM

## 2023-01-23 DIAGNOSIS — C06.0 BUCCAL MUCOSA SQUAMOUS CELL CARCINOMA (H): Primary | ICD-10-CM

## 2023-01-23 DIAGNOSIS — M87.9 OSTEONECROSIS (H): ICD-10-CM

## 2023-01-23 DIAGNOSIS — C41.1 SQUAMOUS CELL CARCINOMA OF MANDIBLE (H): Primary | ICD-10-CM

## 2023-01-23 DIAGNOSIS — E83.42 HYPOMAGNESEMIA: ICD-10-CM

## 2023-01-23 DIAGNOSIS — K12.30 MUCOSITIS: ICD-10-CM

## 2023-01-23 PROCEDURE — 99214 OFFICE O/P EST MOD 30 MIN: CPT | Performed by: STUDENT IN AN ORGANIZED HEALTH CARE EDUCATION/TRAINING PROGRAM

## 2023-01-23 ASSESSMENT — PAIN SCALES - GENERAL: PAINLEVEL: NO PAIN (0)

## 2023-01-23 NOTE — PROGRESS NOTES
January 23, 2023      Delores Buckley MD   [facility]   Department of Medicine   [address]   [place], [place] [address]     Bc Amezquita MD   [facility] Radiation Oncology   [address]   [place], [place] [address]       Dear Doctors:      I had the pleasure of meeting Mr. Sharpe back today in clinic.    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    TREATMENT:  He underwent a left marginal mandibulectomy, left buccal resection, left posterior maxillectomy, left neck dissection and reconstruction of the right radial forearm free flap by Dr. Kong Reece and Dr. Gino Obando at [facility] on 04/28/2020.  He received adjuvant chemoradiation therapy completed in August of 2020.  He had an early recurrent/residual disease and did not wish to undergo any operation and therefore was started on palliative chemotherapy.  He was initially treated with 5-FU, pembrolizumab and carboplatin but due to side effects of the 5-FU and carboplatin have been omitted.  He had a good response to this and subsequently was referred for reconsideration of surgical resectability.  He remained uninterested in surgery and then proceeded with definitive chemoradiation therapy under the care of Dr. Haroldo Amezquita and Dr. Delores Buckley.  He got 20 fractions of radiation with carboplatin and paclitaxel but due to side effect, he stopped his treatment.  I had not seen him for quite some time, but more recently presented to me with right jaw pain and left neck pain.  It appeared to be infectious in nature.  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  I arranged for him to have imaging, which he had and I subsequently started him on vitamin D and pentoxifylline.  He returns today for followup.    INTERVAL HISTORY:  He overall says he has improved since our last visit.  The pain in the left neck is improving and the overlying  skin changes have resolved.  He continues to have some erythema on both sides of the neck.  I wonder if it is perhaps related to radiation treatment.    PHYSICAL EXAMINATION:  On examination, the prior changes of the left neck have resolved.  There are no concerning lesions in the oral cavity or oropharynx.  He is a small exposed bone in the midline.    ASSESSMENT AND PLAN:  Mr. Sharpe looks well today.  I would like to see him back in three months.  I will reach out to Dr. Buckley to see if she would like to see him back if there is any role for continued immunotherapy.    Thank you for allowing me to participate in the care of this patient. If you have any further questions, please do not hesitate to contact me.      Sincerely,      Aditya Swartz M.D.      Head and Neck Surgical Oncology and Microvascular Reconstruction  Department of Otolaryngology - Head and Neck Surgery  [facility]        30 minutes spent on the date of the encounter in chart review, patient visit, review of tests, documentation and/or discussion with other providers about the issues documented above.

## 2023-01-23 NOTE — LETTER
1/23/2023       RE: Antonio Sharpe  [address] [place] [address]     Dear Colleague,    Thank you for referring your patient, Antonio Sharpe, to the [facility] EAR NOSE AND THROAT CLINIC [place] at [facility]. Please see a copy of my visit note below.    January 23, 2023      Delores Buckley MD   [facility]   Department of Medicine   [address]   [place] [id number]     Bc Amezquita MD   [facility] Radiation Oncology   [address]   [place] [id number]       Dear Doctors:      I had the pleasure of meeting Mr. Sharpe back today in clinic.    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    TREATMENT:  He underwent a left marginal mandibulectomy, left buccal resection, left posterior maxillectomy, left neck dissection and reconstruction of the right radial forearm free flap by Dr. Kong Reece and Dr. Gino Obando at [facility] on 04/28/2020.  He received adjuvant chemoradiation therapy completed in August of 2020.  He had an early recurrent/residual disease and did not wish to undergo any operation and therefore was started on palliative chemotherapy.  He was initially treated with 5-FU, pembrolizumab and carboplatin but due to side effects of the 5-FU and carboplatin have been omitted.  He had a good response to this and subsequently was referred for reconsideration of surgical resectability.  He remained uninterested in surgery and then proceeded with definitive chemoradiation therapy under the care of Dr. Haroldo Amezquita and Dr. Delores Buckley.  He got 20 fractions of radiation with carboplatin and paclitaxel but due to side effect, he stopped his treatment.  I had not seen him for quite some time, but more recently presented to me with right jaw pain and left neck pain.  It appeared to be infectious in nature.  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  I arranged for him to have imaging, which he had and I subsequently started him on vitamin D and pentoxifylline.  He returns today for followup.    INTERVAL HISTORY:  He overall says he has improved since our last visit.  The pain in the left neck is improving and the overlying skin changes have resolved.  He continues to have some erythema on both sides of the neck.  I wonder if it is perhaps related to radiation treatment.    PHYSICAL EXAMINATION:  On examination, the prior changes of the left neck have resolved.  There are no concerning lesions in the oral cavity or oropharynx.  He is a small exposed bone in the midline.    ASSESSMENT AND PLAN:  Mr. Sharpe looks well today.  I would like to see him back in three months.  I will reach out to Dr. Buckley to see if she would like to see him back if there is any role for continued immunotherapy.    Thank you for allowing me to participate in the care of this patient. If you have any further questions, please do not hesitate to contact me.      Sincerely,      Aditya Swartz M.D.      Head and Neck Surgical Oncology and Microvascular Reconstruction  Department of Otolaryngology - Head and Neck Surgery  [facility]        30 minutes spent on the date of the encounter in chart review, patient visit, review of tests, documentation and/or discussion with other providers about the issues documented above.

## 2023-01-23 NOTE — PATIENT INSTRUCTIONS
1. Please follow-up in clinic in 3 months.   2. Please call the ENT clinic with any questions,concerns, new or worsening symptoms.    -Clinic number is [phone]   - Natalya's direct line (Dr. Swartz's nurse) [phone]

## 2023-02-14 NOTE — TELEPHONE ENCOUNTER
[facility] Call Center    Phone Message    May a detailed message be left on voicemail: yes     Reason for Call: Other: Pt says that the infection on his jaw has come back and that it has been draining for about a week. He would like to discuss getting another antibiotic. Please contact pt to discuss options.     Thank you      Action Taken: Message routed to:  [facility] ent    Travel Screening: Not Applicable

## 2023-02-23 NOTE — TELEPHONE ENCOUNTER
Returned call to patient to discuss concerns. Ordered oral antibiotics. Patient unable to come in for an appointment due to insurance concerns. Patient discussed with Dr. Swartz.  Patient agreeable with plan of care and will call with further concerns.

## 2023-04-07 NOTE — TELEPHONE ENCOUNTER
.[facility] Call Center    Phone Message    May a detailed message be left on voicemail: yes     Reason for Call: Other: Pt calling in regards to the plate that was placed in his jaw is starting to be visible and he is not sure if its okay to wait until monday . Please reach out to pt to discuss. thank you      Action Taken: Message routed to:  [facility] ENT     Travel Screening: Not Applicable

## 2023-04-07 NOTE — TELEPHONE ENCOUNTER
Returned call to patient who indicates that last week he started to notice some exposed metal when he removes his dressing. He now indicates that he can see the plate and 2 screws at the site.     Patient woke up this morning with  Left facial swelling as well as discharge with a foul odor present.    Patient is scheduled to see Dr. Swartz on Monday. Writer will communicate with Dr. Swartz and see if he would like for patient to start on course of antibiotics.  Will update patient following.       Update:  Spoke with Dr. Swartz and send prescription to pharmacy for Augmentin. Advised patient to continue with appointment on Monday as scheduled.       Natalya Crowder, RN, BSN

## 2023-04-10 NOTE — PATIENT INSTRUCTIONS
1. Please schedule CT neck and chest.   2. Please call the ENT clinic with any questions,concerns, new or worsening symptoms.    -Clinic number is [phone]   - Natalya's direct line (Dr. Swartz's nurse) [phone]

## 2023-04-10 NOTE — LETTER
4/10/2023       RE: Antonio Sharpe  [address] [place] [address]     Dear Colleague,    Thank you for referring your patient, Antonio Sharpe, to the [facility] EAR NOSE AND THROAT CLINIC [place] at [facility]. Please see a copy of my visit note below.    HISTORY OF PRESENT ILLNESS:  I had the pleasure of meeting Mr. Sharpe back today in clinic.  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  He previously underwent a left marginal mandibulectomy, left buccal resection, left posterior maxillectomy, left neck dissection and reconstruction with a right radial forearm free flap by Dr. Kong Reece and Dr. Gino Obando at [facility].  He has had evidence of recurrent disease and as well as maintaining on palliative chemotherapy, although had some insurance coverage issues.  He subsequently had initiated definitive chemoradiation of this area, but could not complete due to side effects.  He has had intermittent loss of insurance coverage or changes to his insurance coverage and has not been able to follow up routinely.  More recently, he has been noticing pain, swelling, and drainage from the left neck.  He presents today for repeat evaluation.    PHYSICAL EXAMINATION:  On examination, he has a 3 x 1 cm area of exposed plate in the left face.  Intraorally, he has exposed bone anteriorly with evidence of osteoradionecrosis.  The flap itself appears healthy in the buccal mucosa and there are no signs of tumor.    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  He now has an exposed plate laterally.  On his recent CT scan, he had no evidence of osteonecrosis laterally, so I think this may be related to contamination of the plate with resultant  infection and exposed hardware.  He, however, does note my concern for osteonecrosis in this area.  He is currently on Augmentin.  We will continue this.  He maintains on vitamin E and pentoxifylline.  We will get an updated CT scan of the neck and chest to assess the extent of residual tumor as well as assess the extent of the osteoradionecrosis.  I did briefly start having a discussion with him about free tissue reconstruction of this area.  It is a bit of a challenging situation as he has presumed active malignancy and does not wish to treat this.  I will keep him updated on the scans.      Aditya Swartz M.D.      Head and Neck Surgical Oncology and Microvascular Reconstruction  Department of Otolaryngology - Head and Neck Surgery  [facility]       30 minutes spent on the date of the encounter in chart review, patient visit, review of tests, documentation and/or discussion with other providers about the issues documented above.         Again, thank you for allowing me to participate in the care of your patient.      Sincerely,    Aditya Swartz MD

## 2023-04-11 NOTE — PROGRESS NOTES
HISTORY OF PRESENT ILLNESS:  I had the pleasure of meeting Mr. Sharpe back today in clinic.  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  He previously underwent a left marginal mandibulectomy, left buccal resection, left posterior maxillectomy, left neck dissection and reconstruction with a right radial forearm free flap by Dr. Kong Reece and Dr. Gino Obando at [facility].  He has had evidence of recurrent disease and as well as maintaining on palliative chemotherapy, although had some insurance coverage issues.  He subsequently had initiated definitive chemoradiation of this area, but could not complete due to side effects.  He has had intermittent loss of insurance coverage or changes to his insurance coverage and has not been able to follow up routinely.  More recently, he has been noticing pain, swelling, and drainage from the left neck.  He presents today for repeat evaluation.    PHYSICAL EXAMINATION:  On examination, he has a 3 x 1 cm area of exposed plate in the left face.  Intraorally, he has exposed bone anteriorly with evidence of osteoradionecrosis.  The flap itself appears healthy in the buccal mucosa and there are no signs of tumor.    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  He now has an exposed plate laterally.  On his recent CT scan, he had no evidence of osteonecrosis laterally, so I think this may be related to contamination of the plate with resultant infection and exposed hardware.  He, however, does note my concern for osteonecrosis in this area.  He is currently on Augmentin.  We will continue this.  He maintains on vitamin E and pentoxifylline.  We will get an updated CT scan of the neck and chest to assess the extent of residual tumor as well as assess the extent of the  osteoradionecrosis.  I did briefly start having a discussion with him about free tissue reconstruction of this area.  It is a bit of a challenging situation as he has presumed active malignancy and does not wish to treat this.  I will keep him updated on the scans.      Aditya Swartz M.D.      Head and Neck Surgical Oncology and Microvascular Reconstruction  Department of Otolaryngology - Head and Neck Surgery  [facility]       30 minutes spent on the date of the encounter in chart review, patient visit, review of tests, documentation and/or discussion with other providers about the issues documented above.

## 2023-04-17 NOTE — TELEPHONE ENCOUNTER
"[facility] Call Center    Phone Message    May a detailed message be left on voicemail: yes     Reason for Call: Other: Pt calling wanting to request an antibiotic for his \"mouth infection that stated draining inside his mouth\" , please reach out to pt to discuss. Thank you      Action Taken: Message routed to:  [facility] ENT     Travel Screening: Not Applicable                                                                      "

## 2023-04-17 NOTE — TELEPHONE ENCOUNTER
Returned call to patient who indicates that he noticed some green drainage at the back of his moth over the weekend. He indicates that the drainage has improved and he has one additional day of antibiotics still to finish. He declines any additional symptoms at this time. Advised patient to continue and finish antibiotics as planned and call if he notices drainage again or worsening symptoms after completing antibiotics. Patient in agreement.     He completed his CT neck and chest scans today and advised we will call with results once read.     Patient encouraged to call with further questions or concerns.       Natalya Crowder, RN, BSN

## 2023-04-25 NOTE — TELEPHONE ENCOUNTER
Called and spoke with patient regarding the following CT scan results:    IMPRESSION:   No definitive evidence for metastatic disease in the chest. Slightly  increased size of a prominent right paratracheal node measuring 10 mm  in short axis and is favored to be reactive however attention on  follow-up is recommended.    Impression:     1. Continued slight decrease in size in the residual tumor within the  left  space.  2. Unchanged prominent right level 2 lymph node. No enlarging  lymphadenopathy.  3. Overall, the osteonecrosis of the mandible is similar, however  there is new soft tissue loss overlying the lateral left mandible  likely with external exposure of the plate which is new from prior.  Correlate clinically.  4. Small focus of air deep to the left buccopharyngeal fascia and left  pterygomandibular raphe which was not seen on prior. This may relate  to a small buccal mucosa defect. Recommend visualized inspection of  the left oropharynx.         Patient does indicate that his facial drainage has improved. He will return to clinic for follow-up with Dr. Swartz on 5/1. Patient encouraged to call with further questions or concerns.       Natalya Crowder, RN, BSN

## 2023-05-01 NOTE — PROGRESS NOTES
HISTORY OF PRESENT ILLNESS:  I had the pleasure of meeting Mr. Shapre back today in clinic.  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  He previously underwent a left marginal mandibulectomy, left buccal resection, left posterior maxillectomy, left neck dissection and reconstruction with a right radial forearm free flap by Dr. Kong Reece and Dr. Gino Obando at [facility].  He has had evidence of recurrent disease and as well as maintaining on palliative chemotherapy, although had some insurance coverage issues.  He subsequently had initiated definitive chemoradiation of this area, but could not complete due to side effects.  He has had intermittent loss of insurance coverage or changes to his insurance coverage and has not been able to follow up routinely.  More recently, he has been noticing pain, swelling, and drainage from the left neck.      When I saw him last, he had exposed bone anteriorly in the mandible and then approximately 3 cm of exposed plate in the left face.  We arranged CT scans to assess the extent of osteonecrosis.  This essentially shows osteonecrosis in the anterior mandible but the rest of the mandible looks okay.  I started him on some Augmentin and vitamin E and pentoxifylline.    More recently, he has been noticing increasing pain and trismus as well as a new opening in the oral cavity.  He also feels the exposed plate is getting more exposed.    PHYSICAL EXAMINATION:  On examination, there is approximately 5 cm of exposed plate in the left face.  This exposed bone anteriorly is stable in the oral cavity.  Posteriorly, he has dehiscence between the flap and native oral mucosa of the retromolar trigone with exposed bone on the lingual surface of the mandible.    ASSESSMENT AND PLAN:  A 57-year-old male with osteonecrosis of the anterior mandible, exposed plate lateral mandible, and a new dehiscence  posteriorly.  This is in the setting of presumed persistent tumor in the infratemporal fossa.  The new dehiscence makes me concerned about progression of his disease.  I would like to get a PET scan.  I do not think we can safely remove the exposed plate without creating significant risk of fracture given the poor height of the mandible and given the osteonecrosis.  He has been resistant to free tissue transfer in the past and I think with active malignancy present, I am not sure how aggressively I would push this either.  We will follow up on his PET scan.            Aditya Swartz M.D.      Head and Neck Surgical Oncology and Microvascular Reconstruction  Department of Otolaryngology - Head and Neck Surgery  [facility]        30 minutes spent on the date of the encounter in chart review, patient visit, review of tests, documentation and/or discussion with other providers about the issues documented above.

## 2023-05-01 NOTE — PATIENT INSTRUCTIONS
1. Please schedule PET scan ASAP  2. Please call the ENT clinic with any questions,concerns, new or worsening symptoms.    -Clinic number is [phone]   - Natalya's direct line (Dr. Swartz's nurse) [phone]

## 2023-05-01 NOTE — NURSING NOTE
"Chief Complaint   Patient presents with     RECHECK     Follow up       Blood pressure (!) 150/88, pulse 70, height 1.778 m (5' 10\"), weight 82.6 kg (182 lb), SpO2 99 %.    Melanie Wilkerson, EMT    "

## 2023-05-01 NOTE — LETTER
5/1/2023       RE: Antonio Sharpe  [address] [place] [address]     Dear Colleague,    Thank you for referring your patient, Antonio Sharpe, to the [facility] [place] at [facility]. Please see a copy of my visit note below.    HISTORY OF PRESENT ILLNESS:  I had the pleasure of meeting Mr. Sharpe back today in clinic.  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  He previously underwent a left marginal mandibulectomy, left buccal resection, left posterior maxillectomy, left neck dissection and reconstruction with a right radial forearm free flap by Dr. Kong Reece and Dr. Gino Obando at [facility].  He has had evidence of recurrent disease and as well as maintaining on palliative chemotherapy, although had some insurance coverage issues.  He subsequently had initiated definitive chemoradiation of this area, but could not complete due to side effects.  He has had intermittent loss of insurance coverage or changes to his insurance coverage and has not been able to follow up routinely.  More recently, he has been noticing pain, swelling, and drainage from the left neck.      When I saw him last, he had exposed bone anteriorly in the mandible and then approximately 3 cm of exposed plate in the left face.  We arranged CT scans to assess the extent of osteonecrosis.  This essentially shows osteonecrosis in the anterior mandible but the rest of the mandible looks okay.  I started him on some Augmentin and vitamin E and pentoxifylline.    More recently, he has been noticing increasing pain and trismus as well as a new opening in the oral cavity.  He also feels the exposed plate is getting more exposed.    PHYSICAL EXAMINATION:  On examination, there is approximately 5 cm of exposed plate in the left face.  This exposed bone anteriorly is  stable in the oral cavity.  Posteriorly, he has dehiscence between the flap and native oral mucosa of the retromolar trigone with exposed bone on the lingual surface of the mandible.    ASSESSMENT AND PLAN:  A 57-year-old male with osteonecrosis of the anterior mandible, exposed plate lateral mandible, and a new dehiscence posteriorly.  This is in the setting of presumed persistent tumor in the infratemporal fossa.  The new dehiscence makes me concerned about progression of his disease.  I would like to get a PET scan.  I do not think we can safely remove the exposed plate without creating significant risk of fracture given the poor height of the mandible and given the osteonecrosis.  He has been resistant to free tissue transfer in the past and I think with active malignancy present, I am not sure how aggressively I would push this either.  We will follow up on his PET scan.            dAitya Swartz M.D.      Head and Neck Surgical Oncology and Microvascular Reconstruction  Department of Otolaryngology - Head and Neck Surgery  [facility]        30 minutes spent on the date of the encounter in chart review, patient visit, review of tests, documentation and/or discussion with other providers about the issues documented above.       Again, thank you for allowing me to participate in the care of your patient.      Sincerely,    Aditya Swartz MD

## 2023-05-08 NOTE — TELEPHONE ENCOUNTER
[facility] Call Center    Phone Message    May a detailed message be left on voicemail: yes     Reason for Call: Other: The pt is requesting an antibiotic. The L side of his face is very swollen and inflamed and is painful to the touch. The pt goes to the [place] [facility] in [place]. Please call the pt to let him know the status. Thanks.     Action Taken: Message routed to:  [facility] ([facility] ent    Travel Screening: Not Applicable

## 2023-05-09 NOTE — TELEPHONE ENCOUNTER
Returned call to patient and left message to check in. Advised that writer has requested okay for antibiotics with Dr. Swartz and will update patient once approved. Patient encouraged to call back to discuss symptoms. Direct line left.       Update: Dr. Swartz agreeable to refill on Antibiotics. Sent to pharmacy and updated patient. He will call wit no improvement in symptoms.     Natalya Crowder, RN, BSN

## 2023-05-11 PROBLEM — L02.01 FACIAL ABSCESS: Status: ACTIVE | Noted: 2023-01-01

## 2023-05-11 PROBLEM — C06.9: Status: ACTIVE | Noted: 2023-01-01

## 2023-05-11 NOTE — ED TRIAGE NOTES
Pt comes in with swelling to left side of face. Pt currently being treated for jaw cancer and has had swelling to his face several times but this is the worse its ever been. It has been swollen for about a week but got much worse over the last 2 nights. Pt states airway is intact and no respiratory distress.      Triage Assessment     Row Name 05/11/23 1004       Triage Assessment (Adult)    Airway WDL WDL       Respiratory WDL    Respiratory WDL WDL       Skin Circulation/Temperature WDL    Skin Circulation/Temperature WDL X  swelling to left side of face       Cardiac WDL    Cardiac WDL WDL       Peripheral/Neurovascular WDL    Peripheral Neurovascular WDL WDL       Cognitive/Neuro/Behavioral WDL    Cognitive/Neuro/Behavioral WDL WDL

## 2023-05-11 NOTE — PHARMACY-VANCOMYCIN DOSING SERVICE
Pharmacy Vancomycin Initial Note  Date of Service May 11, 2023  Patient's  1965  57 year old, male    Indication: Abscess    Current estimated CrCl = Estimated Creatinine Clearance: 92.9 mL/min (based on SCr of 1 mg/dL).    Creatinine for last 3 days  2023: 10:25 AM Creatinine 0.88 mg/dL; 10:33 AM Creatinine POCT 1.0 mg/dL    Recent Vancomycin Level(s) for last 3 days  No results found for requested labs within last 3 days.      Vancomycin IV Administrations (past 72 hours)      No vancomycin orders with administrations in past 72 hours.                Nephrotoxins and other renal medications (From now, onward)    Start     Dose/Rate Route Frequency Ordered Stop    23 1530  piperacillin-tazobactam (ZOSYN) 3.375 g vial to attach to  mL bag         3.375 g  over 30 Minutes Intravenous ONCE 23 1450      23 1505  vancomycin (VANCOCIN) 1000 mg in dextrose 5% 200 mL PREMIX         1,000 mg  200 mL/hr over 1 Hours Intravenous EVERY 12 HOURS 23 1504            Contrast Orders - past 72 hours (72h ago, onward)    Start     Dose/Rate Route Frequency Stop    23 1125  iopamidol (ISOVUE-370) solution 100 mL         100 mL Intravenous ONCE 23 1129          [employer] Prediction of Planned Initial Vancomycin Regimen  Loading dose: N/A  Regimen: 1000 mg IV every 12 hours.  Start time: 15:04 on 2023  Exposure target: AUC24 (range)400-600 mg/L.hr   AUC24,ss: 502 mg/L.hr  Probability of AUC24 > 400: 74 %  Ctrough,ss: 16 mg/L  Probability of Ctrough,ss > 20: 30 %  Probability of nephrotoxicity (Lodise CHRIS ): 11 %          Plan:  1. Start vancomycin  1000 mg IV q12h.   2. Vancomycin monitoring method: AUC  3. Vancomycin therapeutic monitoring goal: 400-600 mg*h/L  4. Pharmacy will check vancomycin levels as appropriate in 1-3 Days.    5. Serum creatinine levels will be ordered daily for the first week of therapy and at least twice weekly for subsequent weeks.      Leann  Richi, [facility]

## 2023-05-11 NOTE — ED PROVIDER NOTES
ED Provider Note  [facility]      History     Chief Complaint   Patient presents with     Facial Swelling     HPI  Antonio Sharpe is a 57 year old male with a history of left buccal mucosa s/p left marginal mandibulectomy, buccal resection, posterior maxillectomy, and neck resection (4/28/2020) and s/p chemotherapy (most recently C1D22 Paclitaxel and Carboplatin on 5/2/2022) who presents to the ED for evaluation of facial swelling.     Notes that about 2 or 3 days ago he began having some increasing facial swelling on the left side of his face.  Woke up in the morning and noted that it was beginning to swell into his eye.  He called his physician who sent him a prescription for Augmentin, and he has been taking it, but his facial swelling is worsening.  Endorses significant pain in all of his left upper molars, and attributes this to his radiation.  Denies any fevers or chills, does endorse some trismus.  No vocal changes  Past Medical History  Past Medical History:   Diagnosis Date     Squamous cell carcinoma of mandible (H)      Testicular cancer (H)      History reviewed. No pertinent surgical history.  acetaminophen (TYLENOL) 500 MG tablet  amoxicillin-clavulanate (AUGMENTIN) 500-125 MG tablet  amoxicillin-clavulanate (AUGMENTIN) 875-125 MG tablet  ibuprofen (ADVIL/MOTRIN) 200 MG tablet  pentoxifylline ER (TRENTAL) 400 MG CR tablet  Wound Dressings (MEPILEX BORDER FLEX) PADS      No Known Allergies  Family History  History reviewed. No pertinent family history.  Social History   Social History     Tobacco Use     Smoking status: Every Day     Packs/day: 1.00     Types: Cigarettes     Start date: 1976     Smokeless tobacco: Never   Substance Use Topics     Alcohol use: Yes     Comment: case a week     Drug use: Not Currently      Past medical history, past surgical history, medications, allergies, family history, and social history were reviewed with the patient. No additional  "pertinent items.      A complete review of systems was performed with pertinent positives and negatives noted in the HPI, and all other systems negative.    Physical Exam   BP: (!) 147/86  Pulse: 80  Temp: 98.2  F (36.8  C)  Resp: 17  Height: 177.8 cm (5' 10\")  Weight: 82.6 kg (182 lb)  SpO2: 96 %  Physical Exam  GEN: Well appearing, non toxic, cooperative  HEENT: normocephalic and atraumatic, PERRLA, EOMI; significant swelling over the left maxilla, with extension into the periorbital tissue, as well as the left cheek.  Significant tenderness to palpation of all of his remaining molars on the top left without any definitive evidence of periapical abscess, however limited due to post radiation of the changes of the tissue; open wound on the lower left jaw with visible hardware that appears chronic without any superficial evidence of infection  CV: well-perfused, normal skin color for ethnicity  PULM: breathing comfortably, in no respiratory distress  ABD: nondistended  EXT: Full range of motion.  No edema.  NEURO: awake, conversant, grossly normal bilateral upper and lower extremity strength & ROM   SKIN: No rashes, ecchymosis, or lacerations  PSYCH: Calm and cooperative, interactive      ED Course, Procedures, & Data      Procedures       ED Course Selections: A consult was attained from the ENT service at 1305. The case was discussed at 1318. The consulting service's recommendations were provided at 1451.      Results for orders placed or performed during the hospital encounter of 05/11/23   Soft tissue neck CT w contrast     Status: None    Narrative    EXAM: CT SOFT TISSUE NECK W CONTRAST  5/11/2023 11:49 AM     HISTORY:  jaw swelling         COMPARISON:  4/17/2023, 12/19/2022     TECHNIQUE: Following intravenous administration of nonionic iodinated  contrast medium, thin section helical CT images were obtained from the  skull base down to the level of the aortic arch.  Axial, coronal and  sagittal reformations " were performed with 2-3 mm slice thickness  reconstruction. Images were reviewed in soft tissue, lung and bone  windows.    CONTRAST: iopamidol (ISOVUE-370) solution 100 mL    FINDINGS:   Extensive treatment changes of left marginal mandibulectomy, left  buccal resection, left posterior maxillectomy, left neck dissection  and radial free flap reconstruction. There is plate and screw fixation  laterally along the bone flap with peripherally enhancing fluid  collection along the lateral aspect of the superior portion of the  hardware measuring 1.6 x 2.1 x 1.8 cm. This is inferior and posterior  to the site of previous peripherally enhancing lesion which was  immediately inferior to the left zygomatic arch and is essentially  resolved on this exam. Extensive edema of the overlying soft tissues  of the face, left periorbital soft tissues, and adjacent left   space.    Stable erosive changes of the left greater than right mandible  anteriorly with stable breakthrough the anterior cortex to the right  of midline.    Multiple dental caries. Left maxillary first molar periapical lucency  is unchanged compared to prior exams.    Right parapharyngeal, , parotid, and some irregular spaces  are unremarkable. Status post left neck dissection. Unchanged  prominent right level 2A cervical lymph node measuring 1.3 cm.    Visualized intracranial contents are unremarkable. Left periorbital  edema, though both orbits are otherwise unremarkable. Minimal mucosal  thickening in the left maxillary sinus. No acute or suspicious osseous  lesion. Paraseptal emphysematous changes in the lung apices Visualized  airways and lung apices are otherwise clear.      Impression    IMPRESSION:   1. Abscess adjacent to the posterior left mandibular fixation hardware  along the ramus measuring up to 2.1 cm. Associated inflammatory change  throughout the adjacent soft tissues and in the left  space.  2. Further decrease in  nodular enhancing lesion inferior to the left  zygomatic arch compared to recent prior exams.  3. Stable mandibular osteonecrosis.  4. Continued anterior subluxation of the left mandibular condyle.  5. Stable prominent right level 2A lymph node.    I have personally reviewed the examination and initial interpretation  and I agree with the findings.    ZACHARY LOPEZ MD         SYSTEM ID:  [id number]   Basic metabolic panel     Status: Abnormal   Result Value Ref Range    Sodium 131 (L) 136 - 145 mmol/L    Potassium 4.9 3.4 - 5.3 mmol/L    Chloride 94 (L) 98 - 107 mmol/L    Carbon Dioxide (CO2) 25 22 - 29 mmol/L    Anion Gap 12 7 - 15 mmol/L    Urea Nitrogen 16.9 8.0 - 23.0 mg/dL    Creatinine 0.88 0.67 - 1.17 mg/dL    Calcium 10.3 (H) 8.6 - 10.0 mg/dL    Glucose 110 (H) 70 - 99 mg/dL    GFR Estimate >90 >60 mL/min/1.73m2   Lactic acid whole blood     Status: Normal   Result Value Ref Range    Lactic Acid 0.8 0.7 - 2.0 mmol/L   [place] Draw     Status: None    Narrative    The following orders were created for panel order [place] Draw.  Procedure                               Abnormality         Status                     ---------                               -----------         ------                     Extra Blue Top Tube[[id number]]                              Final result               Extra Red Top Tube[[id number]]                               Final result                 Please view results for these tests on the individual orders.   CBC with platelets and differential     Status: Abnormal   Result Value Ref Range    WBC Count 9.7 4.0 - 11.0 10e3/uL    RBC Count 4.80 4.40 - 5.90 10e6/uL    Hemoglobin 15.2 13.3 - 17.7 g/dL    Hematocrit 45.7 40.0 - 53.0 %    MCV 95 78 - 100 fL    MCH 31.7 26.5 - 33.0 pg    MCHC 33.3 31.5 - 36.5 g/dL    RDW 13.8 10.0 - 15.0 %    Platelet Count 248 150 - 450 10e3/uL    % Neutrophils 82 %    % Lymphocytes 7 %    % Monocytes 9 %    % Eosinophils 0 %    % Basophils 1 %    % Immature  Granulocytes 1 %    NRBCs per 100 WBC 0 <1 /100    Absolute Neutrophils 8.0 1.6 - 8.3 10e3/uL    Absolute Lymphocytes 0.6 (L) 0.8 - 5.3 10e3/uL    Absolute Monocytes 0.9 0.0 - 1.3 10e3/uL    Absolute Eosinophils 0.0 0.0 - 0.7 10e3/uL    Absolute Basophils 0.1 0.0 - 0.2 10e3/uL    Absolute Immature Granulocytes 0.1 <=0.4 10e3/uL    Absolute NRBCs 0.0 10e3/uL   Extra Blue Top Tube     Status: None   Result Value Ref Range    Hold Specimen JIC    Extra Red Top Tube     Status: None   Result Value Ref Range    Hold Specimen JIC    Creatinine POCT     Status: Normal   Result Value Ref Range    Creatinine POCT 1.0 0.7 - 1.3 mg/dL    GFR, ESTIMATED POCT >60 >60 mL/min/1.73m2   CBC with platelets differential     Status: Abnormal    Narrative    The following orders were created for panel order CBC with platelets differential.  Procedure                               Abnormality         Status                     ---------                               -----------         ------                     CBC with platelets and d...[[id number]]  Abnormal            Final result                 Please view results for these tests on the individual orders.     Medications   piperacillin-tazobactam (ZOSYN) 3.375 g vial to attach to  mL bag (has no administration in time range)   ampicillin-sulbactam (UNASYN) 3 g vial to attach to  mL bag (0 g Intravenous Stopped 5/11/23 1211)   morphine (PF) injection 4 mg (4 mg Intravenous $Given 5/11/23 1034)   iopamidol (ISOVUE-370) solution 100 mL (100 mLs Intravenous $Given 5/11/23 1129)   sodium chloride (PF) 0.9% PF flush 60 mL (60 mLs Intravenous $Given 5/11/23 1129)   morphine (PF) injection 4 mg (4 mg Intravenous $Given 5/11/23 1341)   lidocaine 1% with EPINEPHrine 1:100,000 injection 5 mL (5 mLs Intradermal $Given 5/11/23 1433)     Labs Ordered and Resulted from Time of ED Arrival to Time of ED Departure   BASIC METABOLIC PANEL - Abnormal       Result Value    Sodium 131 (*)      Potassium 4.9      Chloride 94 (*)     Carbon Dioxide (CO2) 25      Anion Gap 12      Urea Nitrogen 16.9      Creatinine 0.88      Calcium 10.3 (*)     Glucose 110 (*)     GFR Estimate >90     CBC WITH PLATELETS AND DIFFERENTIAL - Abnormal    WBC Count 9.7      RBC Count 4.80      Hemoglobin 15.2      Hematocrit 45.7      MCV 95      MCH 31.7      MCHC 33.3      RDW 13.8      Platelet Count 248      % Neutrophils 82      % Lymphocytes 7      % Monocytes 9      % Eosinophils 0      % Basophils 1      % Immature Granulocytes 1      NRBCs per 100 WBC 0      Absolute Neutrophils 8.0      Absolute Lymphocytes 0.6 (*)     Absolute Monocytes 0.9      Absolute Eosinophils 0.0      Absolute Basophils 0.1      Absolute Immature Granulocytes 0.1      Absolute NRBCs 0.0     LACTIC ACID WHOLE BLOOD - Normal    Lactic Acid 0.8     ISTAT CREATININE POCT - Normal    Creatinine POCT 1.0      GFR, ESTIMATED POCT >60     ISTAT CREATININE POCT   AEROBIC BACTERIAL CULTURE ROUTINE   ANAEROBIC BACTERIAL CULTURE ROUTINE     Soft tissue neck CT w contrast   Final Result   IMPRESSION:    1. Abscess adjacent to the posterior left mandibular fixation hardware   along the ramus measuring up to 2.1 cm. Associated inflammatory change   throughout the adjacent soft tissues and in the left  space.   2. Further decrease in nodular enhancing lesion inferior to the left   zygomatic arch compared to recent prior exams.   3. Stable mandibular osteonecrosis.   4. Continued anterior subluxation of the left mandibular condyle.   5. Stable prominent right level 2A lymph node.      I have personally reviewed the examination and initial interpretation   and I agree with the findings.      ZACHARY LOPEZ MD            SYSTEM ID:  [id number]             Critical care was not performed.     Medical Decision Making  The patient's presentation was of moderate complexity (an acute illness with systemic symptoms).    The patient's evaluation  involved:  review of external note(s) from 3+ sources (see separate area of note for details)  ordering and/or review of 3+ test(s) in this encounter (see separate area of note for details)  review of 3+ test result(s) ordered prior to this encounter (see separate area of note for details)    The patient's management necessitated high risk (a decision regarding hospitalization).      Assessment & Plan    57-year-old male with a past medical history of left buccal mucosal squamous cell carcinoma status post marginal mandibulectomy and reconstruction, radiation and chemo presenting with increasing swelling over the last 3 days in the left side of his face despite being on antibiotics.    Primarily concern for periapical abscess at this time versus lower suspicion for osteomyelitis of the jaw, or retroperitoneal abscess.  Lower suspicion of sialolithiasis.  However, due to his abnormal anatomy at baseline, as well as increasing swelling despite antibiotics, concern for possible underlying deep tissue infection or abscess.  We will plan for CT scan, a dose of IV Unasyn, and basic lab work here.    CT demonstrates abscess around his hardware    2:51 PM ENT at bedside.  Recommend adding Vanco and Zosyn, and admitting to their service at this time    I have reviewed the nursing notes. I have reviewed the findings, diagnosis, plan and need for follow up with the patient.    New Prescriptions    No medications on file       Final diagnoses:   Facial abscess   Cancer of buccal cavity (H)       Kerrie Bhat MD   [facility] EMERGENCY DEPARTMENT  5/11/2023     Kerrie Bhat MD  05/11/23 9051

## 2023-05-11 NOTE — H&P
Otolaryngology H&P Note  May 11, 2023    CC: Facial swelling    HPI: Antonio Sharpe is a 57 year old male with a past medical history of T4aN3b SCC of L buccal mucosa s/p L marginal mandibulectomy, buccal resection, posterior maxillectomy, ND and reconstruction w/ RFFF at [facility] in 2020 and incomplete definitive chemoradiation therapy ending in 5/2022 due to intolerance of side effects who presented to the ED earlier today for increased facial swelling. Patient reports that over the past several days he has had significant facial swelling especially of his left cheek, lip, and under eye. Patient was started on augmentin by his PCP however his symptoms continued to worsen and he was advised to seek further care.  In addition to facial swelling he has considerable pain to the area. He notes extension of the pain to involve his left ear and it feels like a sharp, shooting pain. He reports worsened hearing and tinnitus since radiation but denies vertigo or any acute changes in association with the ear pain. He reports numbness of his face ever since radiation which has remained stable. He feels like his vision is a little worse due to the left eye swelling, however he denies pain or double vision with EOM. He has been using a water pick to irrigate his mouth after he eats however he hasn't noticed any drainage or foul tastes in his mouth. He denies recent trauma to the face. He denies fevers or chills.       Past Medical History:   Diagnosis Date     Squamous cell carcinoma of mandible (H)      Testicular cancer (H)        History reviewed. No pertinent surgical history.    Current Outpatient Medications   Medication Sig Dispense Refill     acetaminophen (TYLENOL) 500 MG tablet Take 500 mg by mouth       amoxicillin-clavulanate (AUGMENTIN) 500-125 MG tablet TAKE 1 TABLET BY MOUTH THREE TIMES DAILY FOR 10 DAYS (Patient not taking: Reported on 5/1/2023)       amoxicillin-clavulanate (AUGMENTIN) 875-125 MG tablet  "Take 1 tablet by mouth every 12 hours 20 tablet 0     ibuprofen (ADVIL/MOTRIN) 200 MG tablet Take 600 mg by mouth every 6 hours as needed for mild pain       pentoxifylline ER (TRENTAL) 400 MG CR tablet Take 1 tablet (400 mg) by mouth 2 times daily for 60 days 120 tablet 11     Wound Dressings (MEPILEX BORDER FLEX) PADS Externally apply 1 Application topically 4 times daily as needed 180 each 11        No Known Allergies    Social History     Socioeconomic History     Marital status:      Spouse name: Not on file     Number of children: Not on file     Years of education: Not on file     Highest education level: Not on file   Occupational History     Not on file   Tobacco Use     Smoking status: Every Day     Packs/day: 1.00     Types: Cigarettes     Start date: 1976     Smokeless tobacco: Never   Vaping Use     Vaping status: Not on file   Substance and Sexual Activity     Alcohol use: Yes     Comment: case a week     Drug use: Not Currently     Sexual activity: Not on file   Other Topics Concern     Parent/sibling w/ CABG, MI or angioplasty before 65F 55M? Not Asked   Social History Narrative     Not on file     Social Determinants of Health     Financial Resource Strain: Not on file   Food Insecurity: Not on file   Transportation Needs: Not on file   Physical Activity: Not on file   Stress: Not on file   Social Connections: Not on file   Intimate Partner Violence: Not on file   Housing Stability: Not on file       History reviewed. No pertinent family history.    ROS: 12 point review of systems is negative unless noted in HPI.    PHYSICAL EXAM:  BP (!) 157/94   Pulse 75   Temp 98.2  F (36.8  C) (Oral)   Resp 16   Ht 1.778 m (5' 10\")   Wt 82.6 kg (182 lb)   SpO2 99%   BMI 26.11 kg/m    General: NAD, sitting in gourney   Head: Normocephalic, atraumatic  Face: Asymmetric secondary to left facial edema with marked left upper lip and infraorbital edema. Left cheek and mandible firm to palpation with 5 cm " of exposed plate along left mandible. Small amount of fluid expressed through plate holes with palpation.   Eyes: Left infraorbital edema. EOMI without spontaneous or gaze evoked nystagmus, lear sclera  Ears: No tragal tenderness, external ear canal open and clear bilaterally, TMs clear bilaterally.   Nose: No anterior drainage  Mouth: MMM. Exposed bone seen along left oral cavity anteriorly with dehiscence of flap in retromolar trigone with exposed bone along lingual surface of mandible.   Neck: left neck firm and woody, no palpable lymphadenopathy   Respiratory: Breathing non-labored on RA, no stridor, no audible wheezes or crackles  Skin: No rashes or skin lesions of the face/neck  Psych: Pleasant affect, cooperative  CV: Extremities warm and well perfused     PROCEDURE: Irrigation and debridement of facial abscess  Verbal consent was obtained. Local anesthesia was achieved with 5 cc of 1% lidocaine 1:100,000 epinephrine. The skin was prepped with chloraprep and an 11 blade was used to make a stab incision into palpable area of fullness along left cheek. Robin purulence drained from incision site and was sent for culture. A boo was used to break up any loculations within the fluid pocket. Wound was copiously irrigated with sterile saline and packed with 1/4 inch gauze and then a mepilex was applied over the wound. Patient tolerated the procedure well.     ROUTINE IP LABS (Last four results)    ESR: 25  CRP: 167    BMP  Recent Labs   Lab 05/11/23  1033 05/11/23  1025   NA  --  131*   POTASSIUM  --  4.9   CHLORIDE  --  94*   PRINCE  --  10.3*   CO2  --  25   BUN  --  16.9   CR 1.0 0.88   GLC  --  110*     CBC  Recent Labs   Lab 05/11/23  1025   WBC 9.7   RBC 4.80   HGB 15.2   HCT 45.7   MCV 95   MCH 31.7   MCHC 33.3   RDW 13.8        INRNo lab results found in last 7 days.    Imaging:  Results for orders placed or performed during the hospital encounter of 05/11/23   Soft tissue neck CT w contrast     Narrative    EXAM: CT SOFT TISSUE NECK W CONTRAST  5/11/2023 11:49 AM     HISTORY:  jaw swelling         COMPARISON:  4/17/2023, 12/19/2022     TECHNIQUE: Following intravenous administration of nonionic iodinated  contrast medium, thin section helical CT images were obtained from the  skull base down to the level of the aortic arch.  Axial, coronal and  sagittal reformations were performed with 2-3 mm slice thickness  reconstruction. Images were reviewed in soft tissue, lung and bone  windows.    CONTRAST: iopamidol (ISOVUE-370) solution 100 mL    FINDINGS:   Extensive treatment changes of left marginal mandibulectomy, left  buccal resection, left posterior maxillectomy, left neck dissection  and radial free flap reconstruction. There is plate and screw fixation  laterally along the bone flap with peripherally enhancing fluid  collection along the lateral aspect of the superior portion of the  hardware measuring 1.6 x 2.1 x 1.8 cm. This is inferior and posterior  to the site of previous peripherally enhancing lesion which was  immediately inferior to the left zygomatic arch and is essentially  resolved on this exam. Extensive edema of the overlying soft tissues  of the face, left periorbital soft tissues, and adjacent left   space.    Stable erosive changes of the left greater than right mandible  anteriorly with stable breakthrough the anterior cortex to the right  of midline.    Multiple dental caries. Left maxillary first molar periapical lucency  is unchanged compared to prior exams.    Right parapharyngeal, , parotid, and some irregular spaces  are unremarkable. Status post left neck dissection. Unchanged  prominent right level 2A cervical lymph node measuring 1.3 cm.    Visualized intracranial contents are unremarkable. Left periorbital  edema, though both orbits are otherwise unremarkable. Minimal mucosal  thickening in the left maxillary sinus. No acute or suspicious osseous  lesion.  Paraseptal emphysematous changes in the lung apices Visualized  airways and lung apices are otherwise clear.      Impression    IMPRESSION:   1. Abscess adjacent to the posterior left mandibular fixation hardware  along the ramus measuring up to 2.1 cm. Associated inflammatory change  throughout the adjacent soft tissues and in the left  space.  2. Further decrease in nodular enhancing lesion inferior to the left  zygomatic arch compared to recent prior exams.  3. Stable mandibular osteonecrosis.  4. Continued anterior subluxation of the left mandibular condyle.  5. Stable prominent right level 2A lymph node.    I have personally reviewed the examination and initial interpretation  and I agree with the findings.    ZACHARY LOPEZ MD         SYSTEM ID:  [id number]         Assessment and Plan  Antonio Sharpe is a 57 year old male with a past medical history of T4aN3b SCC of L buccal mucosa s/p L marginal mandibulectomy, buccal resection, posterior maxillectomy, ND and reconstruction w/ RFFF at [facility] in 2020 and incomplete definitive chemoradiation therapy ending in 5/2022 due to intolerance of side effects who presented to the ED earlier today for increased facial swelling and pain secondary to facial abscess confirmed on CT. Patient is afebrile and white count is normal, however inflammatory markers are elevated. Bedside I&D performed with return of alex purulence, cultures obtained and wound packed with 1/4 inch gauze. Will keep patient on broad spectrum antibiotics until cultures return.     Neuro:  - Pain control: scheduled tylenol, oxycodone prn     HEENT:  - 1/4 inch strip gauze packing to wound to be changed by ENT qD  - Change mepilex dressing prn     CV:  - MARIFER    Resp:  - MARIFER  - Supplemental oxygen as needed    GI:  - Regular diet  - Bowel regimen    :  - Voiding    ID:  - Vanc/zosyn  - F/u cultures  - Trend inflammatory markers: CBC, ESR    Heme:  - CBC in AM     Patient seen and  discussed with my staff, Dr. Swartz, who is in agreement with plan.     Nancy Alvarez MD  Otolaryngology-Head & Neck Surgery, PGY-1  Please contact ENT with questions by dialing [phone] and entering job code [phone] when prompted.

## 2023-05-12 NOTE — PLAN OF CARE
"Goal Outcome Evaluation:         Blood pressure (!) 141/73, pulse 63, temperature 97.6  F (36.4  C), temperature source Axillary, resp. rate 16, height 1.778 m (5' 10\"), weight 82.6 kg (182 lb), SpO2 98 %.    AVSS, A/O x 4. Pt is on room air and saturating well, He denies pain/nausea/vomiting and diarrhea and declined to take his scheduled Tylenol \" I don't have pain , I don't need it\"  His left cheek dressing was reinforced. He got Zosyn and Vancomycin.He is on regular diet. Right PIV is going at TKO 10 ML/hour for antibiotics. Continue to monitor pt and follow plan of care.      Problem: Plan of Care - These are the overarching goals to be used throughout the patient stay.    Goal: Plan of Care Review  Description: The Plan of Care Review/Shift note should be completed every shift.  The Outcome Evaluation is a brief statement about your assessment that the patient is improving, declining, or no change.  This information will be displayed automatically on your shift note.  5/12/2023 0445 by Freya Palma RN  Outcome: Progressing  5/12/2023 0444 by Freya Palma RN  Outcome: Progressing     Problem: Plan of Care - These are the overarching goals to be used throughout the patient stay.    Goal: Patient-Specific Goal (Individualized)  Description: You can add care plan individualizations to a care plan. Examples of Individualization might be:  \"Parent requests to be called daily at 9am for status\", \"I have a hard time hearing out of my right ear\", or \"Do not touch me to wake me up as it startles me\".  5/12/2023 0445 by Freya Palma RN  Outcome: Progressing  5/12/2023 0444 by Freya Palma RN  Outcome: Progressing     Problem: Plan of Care - These are the overarching goals to be used throughout the patient stay.    Goal: Absence of Hospital-Acquired Illness or Injury  5/12/2023 0445 by Freya Palma RN  Outcome: Progressing  5/12/2023 0444 by Freya Palma RN  Outcome: " Progressing  Intervention: Identify and Manage Fall Risk  Recent Flowsheet Documentation  Taken 5/12/2023 0200 by Freya Palma RN  Safety Promotion/Fall Prevention:   room organization consistent   clutter free environment maintained   lighting adjusted     Problem: Plan of Care - These are the overarching goals to be used throughout the patient stay.    Goal: Absence of Hospital-Acquired Illness or Injury  Intervention: Identify and Manage Fall Risk  Recent Flowsheet Documentation  Taken 5/12/2023 0200 by Freya Palma RN  Safety Promotion/Fall Prevention:   room organization consistent   clutter free environment maintained   lighting adjusted     Problem: Plan of Care - These are the overarching goals to be used throughout the patient stay.    Goal: Optimal Comfort and Wellbeing  5/12/2023 0445 by Freya Palma RN  Outcome: Progressing  5/12/2023 0444 by Freya Palma RN  Outcome: Progressing     Problem: Infection  Goal: Absence of Infection Signs and Symptoms  Outcome: Progressing

## 2023-05-12 NOTE — PHARMACY-ADMISSION MEDICATION HISTORY
Pharmacist Admission Medication History    Admission medication history is complete. The information provided in this note is only as accurate as the sources available at the time of the update.    Medication reconciliation/reorder completed by provider prior to medication history? Yes    Information Source(s): Spoke with patient and family; Reviewed medication dispense history; Chart Review     Changes made to PTA medication list:    Added: Vitamin E (per patient)    Deleted: Duplicate Augmentin entry     Changed: None    Pertinent Information: Prescribed 10-day course of Augmentin on 5/9    Prior to Admission medications    Medication Sig Last Dose Taking? Auth Provider Long Term End Date   acetaminophen (TYLENOL) 500 MG tablet Take 500-1,000 mg by mouth every 6 hours as needed for pain 5/11/2023 Yes Reported, Patient     amoxicillin-clavulanate (AUGMENTIN) 875-125 MG tablet Take 1 tablet by mouth every 12 hours 5/11/2023 at AM Yes Aditya Swartz MD     ibuprofen (ADVIL/MOTRIN) 200 MG tablet Take 600 mg by mouth every 6 hours as needed for mild pain Unknown Unknown  Reported, Patient     pentoxifylline ER (TRENTAL) 400 MG CR tablet Take 1 tablet (400 mg) by mouth 2 times daily for 60 days 5/11/2023 Yes Aditya Swartz MD Yes 5/12/23   vitamin E (TOCOPHEROL) 400 units (180 mg) capsule Take 400 Units by mouth daily Past Week Yes Unknown, Entered By History     Wound Dressings (MEPILEX BORDER FLEX) PADS Externally apply 1 Application topically 4 times daily as needed Not a med  Aditya Swartz MD         Medication History Completed By: Liborio Suh RPH 5/12/2023 2:09 PM

## 2023-05-12 NOTE — DISCHARGE SUMMARY
Discharge Summary  Antonio Sharpe  [id number]  1965    Date of Admission: 5/11/2023  Date of Discharge: 5/12/2023    Admission Diagnosis: Cancer of buccal cavity (H) [C06.9], left facial abscess  Discharge Diagnosis: Same    Procedure: Irrigation and debridement of facial abscess  Date: 5/11/23    HPI: Antonio Sharpe is a 57 year old male with a past medical history of T4aN3b SCC of L buccal mucosa s/p L marginal mandibulectomy, buccal resection, posterior maxillectomy, ND and reconstruction w/ RFFF at [facility] in 2020 and incomplete definitive chemoradiation therapy ending in 5/2022 due to intolerance of side effects who presented to the ED on 5/11/23 for increased facial swelling. Patient reports that over the past several days he has had significant facial swelling especially of his left cheek, lip, and under eye. Patient was started on augmentin by his PCP however his symptoms continued to worsen and he was advised to seek further care.  In addition to facial swelling he has considerable pain to the area. He notes extension of the pain to involve his left ear and it feels like a sharp, shooting pain. He reports worsened hearing and tinnitus since radiation but denies vertigo or any acute changes in association with the ear pain. He reports numbness of his face ever since radiation which has remained stable. He feels like his vision is a little worse due to the left eye swelling, however he denies pain or double vision with EOM. He has been using a water pick to irrigate his mouth after he eats however he hasn't noticed any drainage or foul tastes in his mouth. He denies recent trauma to the face. He denies fevers or chills.    Hospital Course: The patient was admitted to the hospital for concerns of left facial abscess. Labs on admission notable for CRP of 167 and ESR of 25. Patient underwent irrigation and debridement of a left facial abscess. Robin purulence drained from incision site and was  sent for culture. A boo was used to break up any loculations within the fluid pocket. Wound was copiously irrigated with sterile saline and packed with 1/4 inch gauze and then a mepilex was applied over the wound. Patient tolerated the procedure well.  Gram stain 2+ Gram positive cocci, aerobic culture grew streptococcus constellatus. Anaerobic culture still pending. Patient was originally started on unasyn in the ED and was then switched to vancomycin and zosyn. Patient received 24 hours of vancomycin and zosyn. CT soft tissue neck confirmed abscess adjacent to the posterior left mandibular fixation hardware along the ramus measuring up to 2.1 cm. Repeat CRP and ESR at discharge were 125 and 25, respectively. At discharge, the patient's pain was well controlled and patient demonstrated how to do own wound cares.      Discharge Exam:  Vitals:    05/11/23 1045 05/11/23 1336 05/11/23 1944 05/12/23 0150   BP: (!) 153/84 (!) 157/94 (!) 154/70 (!) 141/73   BP Location:    Left arm   Pulse: 82 75 70 63   Resp: 16 16  16   Temp:    97.6  F (36.4  C)   TempSrc:    Axillary   SpO2: 97% 99% 100% 98%   Weight:       Height:           General: A&O x 3, No acute distress  HEENT: Face asymmetric secondary to left facial edema with marked left upper lip and infraorbital edema. Left cheek and mandible firm to palpation with 5 cm of exposed plate along left mandible. Packing strip replaced into I&D site.   Respiratory: Breathing non-labored on room air, no stridor, no accessory muscle use.     Discharge Medications:     Medication List      Started    chlorhexidine 0.12 % solution  Commonly known as: PERIDEX  10 mLs, Swish & Spit, 4 TIMES DAILY        Modified    acetaminophen 325 MG tablet  Commonly known as: TYLENOL  650 mg, Oral, EVERY 4 HOURS PRN  What changed:     medication strength    how much to take    when to take this    reasons to take this     amoxicillin-clavulanate 875-125 MG tablet  Commonly known as: AUGMENTIN  1  tablet, Oral, 2 TIMES DAILY  What changed: when to take this        Discontinued    Mepilex Border Flex Pads     pentoxifylline  MG CR tablet  Commonly known as: TRENtal            No discharge procedures on file.    Dispo: To home in good condition. All of the patient's questions/concerns have been addressed at this time. Patient to use 1/4 inch strip gauze packing to wound twice daily until follow up in clinic next week. Augmentin 875-125 mg tablet 2 times daily for total of 8 days. Patient able to demonstrate wound cares and agreeable in plan.     Tricia Estevez, MS4  [facility]    Please contact ENT by dialing * [phone] and entering job code [id number].    Resident/Fellow Attestation   I, Laurel Castro MD, was present with the medical/DIA student who participated in the service and in the documentation of the note.  I have verified the history and personally performed the physical exam and medical decision making.  I agree with the assessment and plan of care as documented in the note.          Laurel Castro MD  PGY3  Date of Service (when I saw the patient): 05/12/23

## 2023-05-16 NOTE — TELEPHONE ENCOUNTER
Called to check in with patient following ER visit last week. Patient indicates he is doing well and feels his symptoms have significantly improved. Patient indicates that he is now unable to get any packing into the area as of today.    Patient will continue with plan for rescheduled PET and follow-up with Dr. Swartz on 6/5. Schedulers will call patient to arrange.     Patient questioned if he should stop his pentoxifylline. Writer will review with Dr. Swartz and update patient.     Patient encouraged to call with further questions or concerns.     Update: Spoke with Dr. Swartz and he agreed patient should remain on his pentoxifylline. Patient is agreeable and will call with further questions.       Natalya Crowder, RN, BSN

## 2023-06-05 NOTE — PATIENT INSTRUCTIONS
We will review your PET scan at tumor board on Friday.   2. Please call the ENT clinic with any questions,concerns, new or worsening symptoms.    -Clinic number is [phone]   - Natalya's direct line (Dr. Swartz's nurse) [phone]

## 2023-06-05 NOTE — PROGRESS NOTES
HISTORY OF PRESENT ILLNESS:  I had the pleasure of meeting Mr. Sharpe back today in clinic.  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  He underwent a left marginal mandibulectomy with buccal resection, left posterior maxillectomy, left neck dissection and reconstruction with a right radial forearm free flap by Dr. Kong Reece and Dr. Gino Obando at [facility].  He had evidence of recurrent disease post chemo-rads and has been on palliative chemotherapy.  He had a reasonable response and proceeded with definitive chemoradiation therapy, but could not complete treatment due to side effects.  I have continued to follow him.  More recently, he developed an abscess of the left cheek and had an I & D performed in the Emergency Department and subsequently was placed on antibiotics.  He is here today for followup.  The area has healed well and he is not having any persistent drainage.  He also has osteonecrosis anteriorly and the mandible and exposed plate in the left face that we have been observing.  He comes in today with a recent PET scan.    PHYSICAL EXAMINATION:  On examination, the exposed plate in the left face appears slightly progressed.  I am able to see the superior border of the mandible.  There is some purulent drainage present.  Intraorally, he has a defect through the radial forearm flap with exposed mandible.  He has exposed bone anteriorly.    He had a PET scan today and I am concerned about a mediastinal node.  There are no obvious signs of disease in the neck.  We will wait on these results.    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  He is doing well with wound cares.  We again discussed options for surgery of this.  I think that the only way to reconstruct this is with an osteocutaneous free flap, either a fibula or scapula.  He remained uninterested in additional surgery given his past  experience which I support.  I am concerned about his recent PET findings and we will follow up on these results and discuss his case at our Multidisciplinary Head and Neck Conference.      Aditya Swartz M.D.      Head and Neck Surgical Oncology and Microvascular Reconstruction  Department of Otolaryngology - Head and Neck Surgery  [facility]       20 minutes spent on the date of the encounter in chart review, patient visit, review of tests, documentation and/or discussion with other providers about the issues documented above.

## 2023-06-05 NOTE — LETTER
6/5/2023       RE: Antonio Sharpe  [address] [place] [address]     Dear Colleague,    Thank you for referring your patient, Antonio Sharpe, to the [facility] [place] [facility]. Please see a copy of my visit note below.    HISTORY OF PRESENT ILLNESS:  I had the pleasure of meeting Mr. Sharpe back today in clinic.  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  He underwent a left marginal mandibulectomy with buccal resection, left posterior maxillectomy, left neck dissection and reconstruction with a right radial forearm free flap by Dr. Kong Reece and Dr. Gino Obando at [facility][note 2].  He had evidence of recurrent disease post chemo-rads and has been on palliative chemotherapy.  He had a reasonable response and proceeded with definitive chemoradiation therapy, but could not complete treatment due to side effects.  I have continued to follow him.  More recently, he developed an abscess of the left cheek and had an I & D performed in the Emergency Department and subsequently was placed on antibiotics.  He is here today for followup.  The area has healed well and he is not having any persistent drainage.  He also has osteonecrosis anteriorly and the mandible and exposed plate in the left face that we have been observing.  He comes in today with a recent PET scan.    PHYSICAL EXAMINATION:  On examination, the exposed plate in the left face appears slightly progressed.  I am able to see the superior border of the mandible.  There is some purulent drainage present.  Intraorally, he has a defect through the radial forearm flap with exposed mandible.  He has exposed bone anteriorly.    He had a PET scan today and I am concerned about a mediastinal node.  There are no obvious signs of disease in the neck.  We will wait on these results.    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  He is doing well with wound cares.  We again discussed options for surgery of this.  I think that the only way to reconstruct this is with an osteocutaneous free flap, either a fibula or scapula.  He remained uninterested in additional surgery given his past experience which I support.  I am concerned about his recent PET findings and we will follow up on these results and discuss his case at our Multidisciplinary Head and Neck Conference.      Aditya Swartz M.D.      Head and Neck Surgical Oncology and Microvascular Reconstruction  Department of Otolaryngology - Head and Neck Surgery  [facility]       20 minutes spent on the date of the encounter in chart review, patient visit, review of tests, documentation and/or discussion with other providers about the issues documented above.         Again, thank you for allowing me to participate in the care of your patient.      Sincerely,    Aditya Swartz MD

## 2023-06-05 NOTE — NURSING NOTE
"Chief Complaint   Patient presents with     RECHECK     Follow up after PET       Blood pressure (!) 166/93, pulse 64, height 1.778 m (5' 10\"), weight 82.1 kg (181 lb).    Melanie Wilkerson, EMT    "

## 2023-06-09 NOTE — TUMOR CONFERENCE
Head & Neck Tumor Conference Note      Status: Established  Staff: Dr. Aditya Swartz     Tumor Site: Left mandible/alveolar ridge/buccal mucosa (oral cavity)  Tumor Pathology: [id number] SCC  Tumor Stage: [id number]  Tumor Treatment:   - Left marginal mandibulectomy, WLE of the left buccal mucosa, left posterior maxillectomy, left partial pharyngectomy, left selective neck dissection, right radial forearm free flap, mandibular plating, tracheostomy, and feeding tube placement (4/28/2020)  - Adjuvant chemoradiation (6600 cGy of radiation to the left oral cavity and neck, completed 8/17/2020 with 3-doses of high-dose cisplatin)  - Palliative chemotherapy 10/21/21 - carboplatin, taxol, radiation therapy, with pembrolizumab October 2021 - February 2022     Reason for Review: Review imaging and POC     Brief History: This is a 57 year old male with a prior history of a 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  He underwent a left marginal mandibulectomy, left buccal resection, left posterior maxillectomy, left neck dissection and reconstruction with a right radial forearm free flap by Dr. Kong Reece and Dr. Gino Obando on April 28, 2020 at [facility].  He received an adjuvant chemoradiation therapy completed in August of 2020.  Shortly after that, a recurrence/residual disease was identified.  He was subsequently  referred to me.  I discussed the low likelihood of cure and once we discussed the anticipated surgical plan.  The patient elected to proceed with palliative chemotherapy.  He was initially treated with 5FU pembrolizumab and carboplatin, but due to side effects, the 5FU and carboplatin have been omitted on several of the cycles.  He had some resolution of disease and I reevaluated him in February 2020 to reevaluate surgical resectability.  The patient remained uninterested in surgery and proceeded with chemoradiation therapy.  He proceeded with approximately 20 fractions of  radiation with carboplatin and paclitaxel.  Due side effects, he stopped his treatment before completion.  Over the last few weeks, he had noticed some pain in the right jaw, and was seen in the Emergency Department at [facility].  He was treated with penicillin, which did not resolve this.  He was subsequently seen by a dentist who started him on Augmentin and suggested that he be seen by an oral surgeon.  Given his history at [facility], he requested not to be seen by their team and instead wanted to be seen in our clinic.  He says that since he has been on the Augmentin, the right jaw pain has gone away.  He has noticed over the last five days, some swelling in the left neck with some overlying skin change.  He currently remains on Augmentin.     He has persistent pain along the mandible and his imaging is more consistent with ORN. His imaging shows progressive lucencies of the anterior mandible, osteolysis, without adjacent soft tissue thickening to suggest tumor recurrence. He is on vitamin E and pentoxyfylline, and antibiotics. He has new imaging to review.      Pertinent PMH:   Past Medical History        Past Medical History:   Diagnosis Date     Squamous cell carcinoma of mandible (H)       Testicular cancer (H)           Social Hx:   Social History            Tobacco Use     Smoking status: Every Day       Packs/day: 1.00       Types: Cigarettes       Start date: 1976     Smokeless tobacco: Never   Substance Use Topics     Alcohol use: Yes       Comment: case a week     Drug use: Not Currently         Imaging:   PET CT 6/5/23:   Findings:  Marked decrease almost resolution of previously FDG avid mass in the  left  space. Residual mildly FDG avid non ill defined area  at this site with max SUV 4.2.  Linear FDG uptake deep to left  oropharyngeal wall extending posterolaterally toward the mandible  likely inflammatory/ infectious process. No corresponding mass or  collection at this  site.  External to left hemimandible there is more focally FDG avid  heterogenously enhancing ill defined area measuring 1.4 cm reflecting  residual infectious process. Previously there was a 2.2 cm rim  enhancing collection on 5/11/23 dated CT.      Redemonstration of left marginal mandibulectomy with surgical plate  and screw placement. There is a defect along the anterior left  paramedian mandible where there is associated FDG uptake. This defect  is stable over multiple studies. The max SUV of 5.05 (previously  10.5), this is likely postsurgical.      The left submandibular gland surgically absent. Right submandible  gland, bilateral parotid glands are normal. Unchanged subcentimeter  left hypodense thyroid nodule.     There is no evident cervical lymphadenopathy.     The visualized lung apices appear clear. Please refer to whole body  PET/CT report for the FDG avid mediastinal lymphadenopathy.                                                                      Impression:      Interval marked decrease in the size and intensity of FDG uptake of  the lesion in the left  space representing treatment  response.   FDG avid lesion external to the left hemimandible where there was a  fluid collection on 5/11/23 most likely representing residual  infectious process. Linear FDG uptake extending from the left lateral  oropharyngeal wall along the deep tissues to the level of the mandible  is also likely reflecting inflammatory process.  Please refer to whole body PET CT report for the findings including an  FDG avid enlarged mediastinal lymphadenopathy.     I have personally reviewed the examination and initial interpretation  and I agree with the findings.     LANCE NEWMAN MD      Pathology: Not reviewed        Tumor Board Recommendation:   Discussion:   Review of the imaging shows some uptake that could be residual of the infection. He has plate exposed laterally however there is an anterior defect that is  similar to prior. The bone of the left hemimandible is more sclerotic than usual. He is not interested in any other free flap surgery. It appears similar to prior. There is a node in the mediastinum, pretracheal that is nearly definitely metastasis.        Plan:   IP EBUS biopsy of node      Laurel Castro MD  Otolaryngology- Head and Neck Surgery     Documentation / Disclaimer Cancer Tumor Board Note  Cancer tumor board recommendations do not override what is determined to be reasonable care and treatment, which is dependent on the circumstances of a patient's case; the patient's medical, social, and personal concerns; and the clinical judgment of the oncologist [physician].

## 2023-06-12 NOTE — TELEPHONE ENCOUNTER
[facility] Call Center    Phone Message    May a detailed message be left on voicemail: yes     Reason for Call: Other: Pt is wonderig if he can get another prescription for his mouth rinse that reduces his flem, also he needs wound bandages for his face - he goes through a warehouse and they need doctor notes, Pt goes through [facility] , he also goes to [facility] in [place], please call with questions, thanks     Action Taken: Other: ENT    Travel Screening: Not Applicable

## 2023-06-14 NOTE — TELEPHONE ENCOUNTER
Pt calling in again and has not heard from anyone concerning the TE that was sent 2 days ago and would like to know the results of his scans. Please reach out to patient, Thank you

## 2023-06-15 NOTE — TELEPHONE ENCOUNTER
Called and spoke with patient regarding the following PET scan results:  Impression:      Interval marked decrease in the size and intensity of FDG uptake of  the lesion in the left  space representing treatment  response.   FDG avid lesion external to the left hemimandible where there was a  fluid collection on 5/11/23 most likely representing residual  infectious process. Linear FDG uptake extending from the left lateral  oropharyngeal wall along the deep tissues to the level of the mandible  is also likely reflecting inflammatory process.  Please refer to whole body PET CT report for the findings including an  FDG avid enlarged mediastinal lymphadenopathy.    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 New enlarged hypermetabolic lymph nodes within the mediastinum  likely represents metastatic disease.  2. Please see dedicated neuroradiology report for the results of the  high resolution PET/CT.      Reviewed with patient that tumor board recommendation is to proceed with EBUS for evaluate of mediastinal lymphadenopathy. Patient agreeable and will await call to arrange.     Refill on Peridex mouth was sent. Patient also indicates he has additional facial swelling and some pus at site again. Writer will review with Dr. Swartz and send antibiotics if Dr. Swartz agreeable. Patient encouraged to call with further questions or concerns.       Update: Discussed with Dr. Swartz and he was agreeable to repeat antibiotics . Updated patient and sent antibiotics to pharmacy. Encouraged patient to call with no improvement.     Natalya Crowder, RN, BSN

## 2023-06-19 NOTE — PROGRESS NOTES
New Patient: Interventional Pulmonary (Lung nodule) Nurse Navigator Note    [facility]     Referred to (specialty): Interventional Pulmonary (Lung nodule)    Requested provider (if applicable): n/a    Date Referral Received: 6/16/2023    Evaluation for :  Lung nodule-history of head and neck neck now with mediastinal lymphadenopathy, reccommend EBUS to evaluate from tumor board discussion.       Clinical History (per Nurse review of records provided):    **BOOK MARKED**  6/5/2023:  PET scan results:  Impression:      Interval marked decrease in the size and intensity of FDG uptake of the lesion in the left  space representing treatment response.  FDG avid lesion external to the left hemimandible where there was a fluid collection on 5/11/23 most likely representing residual infectious process. Linear FDG uptake extending from the left lateral oropharyngeal wall along the deep tissues to the level of the mandible is also likely reflecting inflammatory process.  Please refer to whole body PET CT report for the findings including an  FDG avid enlarged mediastinal lymphadenopathy.     IMPRESSION:   In this patient with history of recurrent squamous cell carcinoma in the neck:   1. New enlarged hypermetabolic lymph nodes within the mediastinum likely represents metastatic disease.  2. Please see dedicated neuroradiology report for the results of the high resolution PET/CT.        Reviewed with patient that tumor board recommendation is to proceed with EBUS for evaluate of mediastinal lymphadenopathy. Patient agreeable and will await call to arrange.       Records Location (Care Everywhere, Media, etc.): Epic, [facility] ([facility], [facility])    Records Needed: none    Additional testing needed prior to consult: NONE

## 2023-06-21 NOTE — TELEPHONE ENCOUNTER
[facility] Call Center    Phone Message    May a detailed message be left on voicemail: yes     Reason for Call: Other: Pt ordered wound bandages, but was sent the wrong ones by [facility].  He is wondering if the correct ones can be sent.  Please call pt back to discuss.  Thanks     Action Taken: Other: ENT    Travel Screening: Not Applicable

## 2023-06-22 NOTE — TELEPHONE ENCOUNTER
Returned call to patient who indicates that [facility] sent him the wrong bandages. Writer called and spoke with the the [facility] home medical equipment who indicate that they do not carry the Mepilex pads but they are awaiting confirmation from their supervisor that they can proceed with the exchange for the correct dressings.     Writer sent some dressings to patient to use until he receives the new ones through home supply.     Patient also reports that antibiotics started last week have improved his symptoms and he no longer has ongoing pus from wound. Patient encouraged to call with further questions or concerns.     Natalya Crowder, RN, BSN

## 2023-06-28 NOTE — LETTER
6/28/2023         RE: Antonio Sharpe  [address] [place] [address]        Dear Colleague,    Thank you for referring your patient, Antonio Sharpe, to the [facility]. Please see a copy of my visit note below.    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 Swartz, ENT for biopsy of a right paratracheal lymph node with SUV 16.91 on PET 6/5/2023.   Pulmonary HPI    He has history of head and neck cancer, s/p left marginal mandibulectomy with buccal resection, left posterior maxillectomy, left neck dissection and reconstruction with radial forearm free flap by Dr. Kong Reece and Dr. Gino Obando at [facility]. Then had evidence of disease recurrence post chemoradiation with partial response but difficulty tolerating radiation. Has been off of palliative cancer-directed therapies since February 2022. PET/CT 6/5/2023 identified a concerning right paratracheal lymph node. His case was discussed at tumor conference, and he is here today to discuss undergoing a biopsy.     Was recently treated for facial cellulitis, completed augmentin with improvement a few weeks ago.     No respiratory symptoms or complaints. Denies dyspnea on exertion, wheezing, chest tightness or pain, cough. Not having any constitutional symptoms such as weight loss, night sweats, hemoptysis.       Exposure history: Denies asbestos or radon exposure   TB risk factors: No  Prior Imaging:Yes  Constitutional Symptoms: No  Personal history of cancer:Yes  Up to date on age-appropriate cancer screening:may need colonoscopy, PCP is through [facility] and records are limited    ROS Pulmonary  Dyspnea: No, Cough: No, Chest pain: No, Wheezing: No, Sputum Production: No, Hemoptysis: No  A complete ROS was otherwise negative except as noted in  "the HPI.  The patient was seen and examined by Maria Alejandra Sharma MD   Current Outpatient Medications   Medication    chlorhexidine (PERIDEX) 0.12 % solution    pentoxifylline ER (TRENTAL) 400 MG CR tablet    vitamin E (TOCOPHEROL) 400 units (180 mg) capsule    acetaminophen (TYLENOL) 325 MG tablet    ibuprofen (ADVIL/MOTRIN) 200 MG tablet    Wound Dressings (MEPILEX BORDER FLEX) PADS     No current facility-administered medications for this visit.     No Known Allergies  Social History     Socioeconomic History    Marital status:      Spouse name: Not on file    Number of children: Not on file    Years of education: Not on file    Highest education level: Not on file   Occupational History    Not on file   Tobacco Use    Smoking status: Every Day     Packs/day: 1.00     Types: Cigarettes     Start date: 1976    Smokeless tobacco: Never   Substance and Sexual Activity    Alcohol use: Yes     Comment: case a week    Drug use: Not Currently    Sexual activity: Not on file   Other Topics Concern    Parent/sibling w/ CABG, MI or angioplasty before 65F 55M? Not Asked   Social History Narrative    Not on file     Social Determinants of Health     Financial Resource Strain: Not on file   Food Insecurity: Not on file   Transportation Needs: Not on file   Physical Activity: Not on file   Stress: Not on file   Social Connections: Not on file   Intimate Partner Violence: Not on file   Housing Stability: Not on file     Past Medical History:   Diagnosis Date    Squamous cell carcinoma of mandible (H)     Testicular cancer (H)        Exam:   /80 (BP Location: Left arm, Patient Position: Sitting, Cuff Size: Adult Regular)   Pulse 65   Ht 1.778 m (5' 10\")   Wt 82.1 kg (181 lb)   SpO2 97%   BMI 25.97 kg/m    GENERAL APPEARANCE: Well developed, well nourished, alert, and in no apparent distress.  HENT: Mallampati II, limited ability to open mouth. Bandage over left mandible.   RESP: normal percussion, good air " flow throughout.  No crackles. No rhonchi. No wheezes.  CV: Normal S1, S2, regular rhythm, normal rate. No murmur.  No rub. No gallop. No LE edema.   SKIN: no rash on limited exam  NEURO: Mentation intact, speech normal, normal strength and tone, normal gait and stance  PSYCH: mentation appears normal. and affect normal/bright  Results:  - My interpretation of the images relevant for this visit includes: PET/CT 6/5/2023 with right paratracheal lymph node measuring 17 x 28 mm, SUV max 16.85. The central tracheobronchial tree is clear. No pleural effusion or  pneumothorax. No acute consolidation. Apical predominant emphysematous changes.  - PFTs not performed.     Culprit Lymph Node:   PET/CT 6/5/2023- 17 x 28 mm FDG avid internally hypodense right paratracheal lymph node with an FDG max of 16.85    Other nodules/lymph nodes:   PET/CT 6/5/2023- There is a subcarinal FDG avid lymph node measuring 23 x 13 mm as seen on series 4 image 135 with a maximum SUV of 6.60. There is  a 9 x 14 mm right hilar lymph node is seen on series 4 image 145 with a max SUV of 3.91.     The central tracheobronchial tree is clear. No pleural effusion or pneumothorax. No acute consolidation. Apical predominant emphysematous  changes. There are perifissural nodules within the right lung as seen on series 8 image 80 measuring 7 mm and a 5 mm nodule seen on image  72, likely lymph nodes. Mild FDG uptake along the posterolateral pleural surface of the right apex without concerning nodular densities on CT.    Assessment and plan:   57 year old with squamous cell cancer of the left buccal mucosal s/p chemoradiation who presents with a new [id number] paratracheal lymph node with intense uptake on PET. 96.3% probably of malignancy according to the [facility] model. Presented at tumor conference 6/9/2023. Recommend EBUS with biopsy of the right paratracheal lymph node, our schedulers will help arrange this within the next 1-2 weeks.   He otherwise  denies respiratory symptoms.        Maria Alejandra Sharma MD

## 2023-06-28 NOTE — PROGRESS NOTES
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 Swartz, ENT for biopsy of a right paratracheal lymph node with SUV 16.91 on PET 6/5/2023.   Pulmonary HPI    He has history of head and neck cancer, s/p left marginal mandibulectomy with buccal resection, left posterior maxillectomy, left neck dissection and reconstruction with radial forearm free flap by Dr. Kong Reece and Dr. Gino Obando at [facility]. Then had evidence of disease recurrence post chemoradiation with partial response but difficulty tolerating radiation. Has been off of palliative cancer-directed therapies since February 2022. PET/CT 6/5/2023 identified a concerning right paratracheal lymph node. His case was discussed at tumor conference, and he is here today to discuss undergoing a biopsy.     Was recently treated for facial cellulitis, completed augmentin with improvement a few weeks ago.     No respiratory symptoms or complaints. Denies dyspnea on exertion, wheezing, chest tightness or pain, cough. Not having any constitutional symptoms such as weight loss, night sweats, hemoptysis.       Exposure history: Denies asbestos or radon exposure   TB risk factors: No  Prior Imaging:Yes  Constitutional Symptoms: No  Personal history of cancer:Yes  Up to date on age-appropriate cancer screening:may need colonoscopy, PCP is through [facility] and records are limited    ROS Pulmonary  Dyspnea: No, Cough: No, Chest pain: No, Wheezing: No, Sputum Production: No, Hemoptysis: No  A complete ROS was otherwise negative except as noted in the HPI.  The patient was seen and examined by Maria Alejandra Sharma MD   Current Outpatient Medications   Medication     chlorhexidine (PERIDEX) 0.12 % solution     pentoxifylline ER (TRENTAL) 400 MG CR tablet     vitamin E (TOCOPHEROL) 400 units (180 mg) capsule  "    acetaminophen (TYLENOL) 325 MG tablet     ibuprofen (ADVIL/MOTRIN) 200 MG tablet     Wound Dressings (MEPILEX BORDER FLEX) PADS     No current facility-administered medications for this visit.     No Known Allergies  Social History     Socioeconomic History     Marital status:      Spouse name: Not on file     Number of children: Not on file     Years of education: Not on file     Highest education level: Not on file   Occupational History     Not on file   Tobacco Use     Smoking status: Every Day     Packs/day: 1.00     Types: Cigarettes     Start date: 1976     Smokeless tobacco: Never   Substance and Sexual Activity     Alcohol use: Yes     Comment: case a week     Drug use: Not Currently     Sexual activity: Not on file   Other Topics Concern     Parent/sibling w/ CABG, MI or angioplasty before 65F 55M? Not Asked   Social History Narrative     Not on file     Social Determinants of Health     Financial Resource Strain: Not on file   Food Insecurity: Not on file   Transportation Needs: Not on file   Physical Activity: Not on file   Stress: Not on file   Social Connections: Not on file   Intimate Partner Violence: Not on file   Housing Stability: Not on file     Past Medical History:   Diagnosis Date     Squamous cell carcinoma of mandible (H)      Testicular cancer (H)        Exam:   /80 (BP Location: Left arm, Patient Position: Sitting, Cuff Size: Adult Regular)   Pulse 65   Ht 1.778 m (5' 10\")   Wt 82.1 kg (181 lb)   SpO2 97%   BMI 25.97 kg/m    GENERAL APPEARANCE: Well developed, well nourished, alert, and in no apparent distress.  HENT: Mallampati II, limited ability to open mouth. Bandage over left mandible.   RESP: normal percussion, good air flow throughout.  No crackles. No rhonchi. No wheezes.  CV: Normal S1, S2, regular rhythm, normal rate. No murmur.  No rub. No gallop. No LE edema.   SKIN: no rash on limited exam  NEURO: Mentation intact, speech normal, normal strength and tone, " normal gait and stance  PSYCH: mentation appears normal. and affect normal/bright  Results:  - My interpretation of the images relevant for this visit includes: PET/CT 6/5/2023 with right paratracheal lymph node measuring 17 x 28 mm, SUV max 16.85. The central tracheobronchial tree is clear. No pleural effusion or  pneumothorax. No acute consolidation. Apical predominant emphysematous changes.  - PFTs not performed.     Culprit Lymph Node:   PET/CT 6/5/2023- 17 x 28 mm FDG avid internally hypodense right paratracheal lymph node with an FDG max of 16.85    Other nodules/lymph nodes:   PET/CT 6/5/2023- There is a subcarinal FDG avid lymph node measuring 23 x 13 mm as seen on series 4 image 135 with a maximum SUV of 6.60. There is  a 9 x 14 mm right hilar lymph node is seen on series 4 image 145 with a max SUV of 3.91.     The central tracheobronchial tree is clear. No pleural effusion or pneumothorax. No acute consolidation. Apical predominant emphysematous  changes. There are perifissural nodules within the right lung as seen on series 8 image 80 measuring 7 mm and a 5 mm nodule seen on image  72, likely lymph nodes. Mild FDG uptake along the posterolateral pleural surface of the right apex without concerning nodular densities on CT.    Assessment and plan:   57 year old with squamous cell cancer of the left buccal mucosal s/p chemoradiation who presents with a new [id number] paratracheal lymph node with intense uptake on PET. 96.3% probably of malignancy according to the [facility] model. Presented at tumor conference 6/9/2023. Recommend EBUS with biopsy of the right paratracheal lymph node, our schedulers will help arrange this within the next 1-2 weeks.   He otherwise denies respiratory symptoms.

## 2023-06-29 NOTE — PROGRESS NOTES
[facility] sent preoperative instructions to pt via confirmed encrypted e-mail per pt request d/t not having MyChart and inability to mail instructions to him soon enough.

## 2023-06-29 NOTE — TELEPHONE ENCOUNTER
Writer contacted the patient to schedule GI procedure. Scheduled for 07/03 with Dr. Godinez. H&P not needed for endo case. Packet information being emailed to email address on file per patient request.    Kyler Taylor on 6/29/2023 at 12:31 PM

## 2023-07-03 NOTE — TELEPHONE ENCOUNTER
Pt called reporting that woke up with face swollen and unable to open mouth wide. Reports having similar episode in April and was treated for an abscess. Spoke with Dr Godinez and advised to cancel EBUS for today and to seek care from ENT. This info was given to the patient and he verbalized understanding.

## 2023-07-10 NOTE — TELEPHONE ENCOUNTER
Called and left message for patient to check in after last week's concerns for swelling and drainage at mandible site last week. Left direct line for return call to check on improvement so we can have him proceed with pulmonary procedure.       Patient returned call and indicated that his symptoms of facial swelling and drainage had resolved. He would like to proceed with pulmonary procedure. Writer will update pulmonary team and have them reach out to arrange. Patient encouraged to call with further questions or concerns.     Natalya Crowder, RN, BSN

## 2023-07-13 NOTE — TELEPHONE ENCOUNTER
Writer contacted the patient to reschedule GI procedure. Scheduled for 07/19 with Dr. Santos. H&P not needed for endo case. Writer will email packet information to email address on file, per patient request.    Kyler Taylor on 7/13/2023 at 10:52 AM

## 2023-07-19 NOTE — DISCHARGE INSTRUCTIONS
Discharge Instructions after Bronchoscopy    Activity  ___ You had medicine to relax and for pain. You may feel dizzy or sleepy.  For 24 hours:   Do not drive or use heavy equipment.   Do not make important decisions.   Do not drink any alcohol.    Diet  ___ When you can swallow easily, you may go back to your regular diet, medicines  and light exercise.    Follow-up  ___ We took small tissue or fluid samples to study. We will call you with the results in about 10 business days.    Call right away if you have:   Unusual chest pain   Temperature above 100.6  F (37.5  C)   Coughing that does not stop.    If you have severe pain, bleeding, or shortness of breath, go to an emergency room.    If you have questions, call:  Monday to Friday, 8 a.m. to 4:30 p.m.  Adult Pulmonology Clinic: [phone]    After hours:  Hospital: [phone] (Ask for the pulmonary fellow on call)

## 2023-07-20 NOTE — TELEPHONE ENCOUNTER
[facility] Call Center    Phone Message    May a detailed message be left on voicemail: yes     Reason for Call: Other: Pt said the recurring facial infection is back and he also needs a refill of the mouthwash clorhexadine. Please call pt back asap,  [facility] location Thanks     Action Taken: Other: ENT    Travel Screening: Not Applicable

## 2023-07-21 NOTE — TELEPHONE ENCOUNTER
Unfortunately, the lymph node FNA was nondiagnostic... it should definitely be accessible with bronchoscopy if it's there.     My colleagues thought the ultrasound imaging looked smaller than the PET, and it had been 6 weeks or so between the PET and the biopsy attempt, perhaps the lymph node is a lot smaller now than when the PET was done, that would be good news.     we should repeat the CT to see if it's still there or do Repeat bronchoscopy with endobronchial ultrasound under general anesthesia should be able to get it if so     I've asked Dr Swartz to weigh in.

## 2023-07-21 NOTE — TELEPHONE ENCOUNTER
"Called patient and provided update from Dr Sharma. Patient states that he would prefer to repeat a CT Scan over another bronchoscopy if needed but is willing to do \"whatever his insurance covers.\"    Routing to Dr Sharma.    Caleb Amezquita RN    "

## 2023-07-21 NOTE — TELEPHONE ENCOUNTER
Returned call to patient to review symptoms. Patient stated the area on his cheek is swollen again. Pushed on the area and fluid came out. Requesting order for antibiotics.  Sent prescription to patient for Augmentin. Patient verbalized understanding. Will call clinic with worsening symptoms.

## 2023-07-21 NOTE — TELEPHONE ENCOUNTER
Patient is looking for refill on Augmentin because infection is back in face. Please contact patient to discuss. Thank you

## 2023-07-25 NOTE — TELEPHONE ENCOUNTER
LVM for patient to call back. Dr Sharma ordered new CT Chest w contrast to be completed at patient's earliest convenience.    Caleb Amezquita RN

## 2023-07-26 NOTE — TELEPHONE ENCOUNTER
LVM for patient to check in with symptoms of infection. Provided direct number for patient to return call.

## 2023-08-01 NOTE — TELEPHONE ENCOUNTER
I called Jeremi to relay the results of the CT, unfortunately, no change in the mediastinal adenopathy so we need to pursue a repeat endobronchial ultrasound transbronchial needle aspiration, this time we will do it under general anesthesia to minimize movement,coughing, etc and difficulty visualizing.     He will need a PAC consultation for limited oral opening.

## 2023-08-03 NOTE — TELEPHONE ENCOUNTER
[facility] Call Center    Phone Message    May a detailed message be left on voicemail: yes     Reason for Call: Other: Pt is requesting a call back from Dr. Swartz's care team regarding needing more wound bandages. Please reach back out to him to discuss. Thank you.      Action Taken: Message routed to:  [facility] ENT    Travel Screening: Not Applicable

## 2023-08-04 NOTE — TELEPHONE ENCOUNTER
FUTURE VISIT INFORMATION      SURGERY INFORMATION:  Date: 23  Location: uu or  Surgeon:  Chris Holm MD   Anesthesia Type:  general  Procedure: BRONCHOSCOPY, WITH BIOPSY OF 1 OR 2 LYMPH NODE STATIONS WITH ENDOBRONCHIAL ULTRASOUND GUIDANCE     RECORDS REQUESTED FROM:       Primary Care Provider: [facility]    Most recent EKG+ Tracin20- [facility]    Most recent ECHO: 21

## 2023-08-04 NOTE — TELEPHONE ENCOUNTER
Writer contacted the patient to schedule IP procedure. Scheduled for 08/17 with Dr. Holm. PAC visit scheduled for same day at patient's request. Packet information sent via email per patient request.    Kyler Taylor on 8/4/2023 at 7:54 AM

## 2023-08-08 NOTE — TELEPHONE ENCOUNTER
Returned call to patient and left message to discuss further. Left direct line for return call.     Natalya Crowder, RN, BSN

## 2023-08-10 NOTE — TELEPHONE ENCOUNTER
Writer contacted patient to discuss PAC visit being canceled and the reason for the cancellation. Explained to patient the Dr. Holm will update H&P on DOS. Writer relayed medications to be held per PAN recommendations. Vitamin E, ibuprofen, and Trental. Patient understood and had no questions, confirmed date of procedure.    Kyler Taylor on 8/10/2023 at 10:27 AM

## 2023-08-14 NOTE — TELEPHONE ENCOUNTER
[facility] Call Center    Phone Message    May a detailed message be left on voicemail: yes     Reason for Call: Other: Pt is requesting a call back from Dr. Swartz's care team regardin- out of wound bandages, 2- infection in face, 3- out of mouthwash. Please reach back out to him to discuss. Thank you.      Action Taken: Message routed to:  [facility] ENT    Travel Screening: Not Applicable

## 2023-08-17 NOTE — H&P
The History and Physical has been reviewed, the patient has been examined and no changes have occurred in the patient's condition since the H & P was completed.      Gen: NAD  ENT: limited mouth opening due to oral surgery and radiation therapy  CV: RRR, no murmurs  Resp: Clear breath sounds  Neuro: no focal deficits  Psych: AAOx3, mood stable

## 2023-08-17 NOTE — PROCEDURES
INTERVENTIONAL PULMONOLOGY       Procedure(s):    A flexible bronchoscopy  Airway exam  EBUS-TBNA (1 site)  Therapeutic suctioning (1 site)    Indication:  Mediastinal lymphadenopathy    Attending of Record:  Chris Holm MD    Interventional Pulmonary Fellow   Erica Paul    Trainees Present:   None     Medications:    General Anesthesia - See anesthesia flowsheet for details    Sedation Time:   Per Anesthesia Care Provider    Time Out:  Performed    The patient's medical record has been reviewed.  The indication for the procedure was reviewed.  The necessary history and physical examination was performed and reviewed.  The risks, benefits and alternatives of the procedure were discussed with the the patient in detail and he had the opportunity to ask questions.  I discussed in particular the potential complications including risks of minor or life-threatening bleeding and/or infection, respiratory failure, vocal cord trauma / paralysis, pneumothorax, and discomfort. Sedation risks were also discussed including abnormal heart rhythms, low blood pressure, and respiratory failure. All questions were answered to the best of my ability.  Verbal and written informed consent was obtained.  The proposed procedure and the patient's identification were verified prior to the procedure by the physician and the nurse.    The patient was assessed for the adequacy for the procedure and to receive medications.   Mental Status:  Alert and oriented x 3  Airway examination:  Class I (Complete visualization of soft palate)  Pulmonary:  Non labored respirations  CV:  Regular rate  ASA Grade:  (II)  Mild systemic disease    After clinical evaluation and reviewing the indication, risks, alternatives and benefits of the procedure the patient was deemed to be in satisfactory condition to undergo the procedure.           A Tuberculosis risk assessment was performed:  The patient has no known RISK of Tuberculosis    The  procedure was performed in a negative airflow room: The patient could not be moved to a negative airflow room because of no risk of TB and needed OR for the procedure    Maneuvers / Procedure:      A Flexible  bronchoscope was used for the procedure. The patient was orally intubated with a # 8.5 ETT and the flexible scope was passed through.    Airway Examination: A complete airway examination was performed from the distal trachea to the subsegmental level in each lobe of both lungs.  Pertinent findings include minimal clear secretions, no endobronchial lesions identified.           EBUS-TBNA (Lymph Node) The EBUS scope was inserted and evaluation included:                                                                                                                                                                                                                                                                                                                                                                                                                                                                                                          Station 4R: visualized and biopsied. A total of 5 biopsies were performed using 22G FNA Needle.                                                                                                                                                                                                                                                                                                                                                                                                                                                                             Cece was Present     Any disposable equipment was visually inspected and deemed to be intact immediately post procedure.      Relevant Pictures  4R LN        4R LN with EBUS needle        Assessment and Recommendations:     Successful  completion of Fb with EBUS TBNA 4R LN station  Ok to discharge once medically stable.   Follow up biopsy result          Erica Paul  Interventional pulmonary fellow  Pager: [phone]

## 2023-08-17 NOTE — TELEPHONE ENCOUNTER
LVM for patient to discuss previous message. Will send over refill of mouthwash and schedule patient to follow up with Dr. Swartz.

## 2023-08-17 NOTE — ANESTHESIA CARE TRANSFER NOTE
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    Anesthesia Type:   General     Note:    Oropharynx: oropharynx clear of all foreign objects and spontaneously breathing  Level of Consciousness: awake  Oxygen Supplementation: room air    Independent Airway: airway patency satisfactory and stable  Dentition: dentition unchanged  Vital Signs Stable: post-procedure vital signs reviewed and stable  Report to RN Given: handoff report given  Patient transferred to: PACU    Handoff Report: Identifed the Patient, Identified the Reponsible Provider, Reviewed the pertinent medical history, Discussed the surgical course, Reviewed Intra-OP anesthesia mangement and issues during anesthesia, Set expectations for post-procedure period and Allowed opportunity for questions and acknowledgement of understanding      Vitals:  Vitals Value Taken Time   /73    Temp     Pulse 75 08/17/23 1421   Resp 12    SpO2 98 % 08/17/23 1421   Vitals shown include unvalidated device data.    Electronically Signed By: YRN Riddle CRNA  August 17, 2023  2:22 PM

## 2023-08-17 NOTE — DISCHARGE INSTRUCTIONS
Post Bronchoscopy Patient Instructions:    August 17, 2023  Antonio Sharpe    Your procedure completed (bronchoscopy with ultrasound guided lymph node biopsy) without any immediate complications.  You may cough up scant amount of blood for the next 12-24 hours. If you have excessive cough with blood, chest pain, shortness of breath, please report to the closest emergency room.    You may experience low grade (less than 100.5 F) fever next 24 hours, if so, you can take Tylenol. If the fever persists more than 24 hours, please contact to our office or your primary care provider.    Our office (Thoracic/Pulmonary--[phone]) will call you with the results of samples taken during the procedure. Please note that you may get a result notification through  My Chart  before us calling you, as the Laboratories are instructed to release the results as soon as they are available to the patients and providers at the same time. Please allow your us 24-48 hours call you to discuss the results.    You may resume your diet as it was prior to procedure.    You may resume your medications after the procedure unless you are instructed to do differently.     Please follow instructions from the nursing staff upon discharge in terms of activity. In general, you should avoid any attention or motor skill requiring activities (e.g., driving or operating any motorized vehicle) for 24 hours as you might be still under the effect of sedation medications. Please make sure an adult to accompany you next 24 hours.     Should you have any question, please do not hesitate to call our office.    Erica Paul MD     [facility], [place]  Same-Day Surgery   Adult Discharge Orders & Instructions     For 24 hours after surgery    Get plenty of rest.  A responsible adult must stay with you for at least 24 hours after you leave the hospital.   Do not drive or use heavy equipment.  If you have weakness or tingling, don't  drive or use heavy equipment until this feeling goes away.  Do not drink alcohol.  Avoid strenuous or risky activities.  Ask for help when climbing stairs.   You may feel lightheaded.  IF so, sit for a few minutes before standing.  Have someone help you get up.   If you have nausea (feel sick to your stomach): Drink only clear liquids such as apple juice, ginger ale, broth or 7-Up.  Rest may also help.  Be sure to drink enough fluids.  Move to a regular diet as you feel able.  You may have a slight fever. Call the doctor if your fever is over 100 F (37.7 C) (taken under the tongue) or lasts longer than 24 hours.  You may have a dry mouth, a sore throat, muscle aches or trouble sleeping.  These should go away after 24 hours.  Do not make important or legal decisions.   Call your doctor for any of the followin.  Signs of infection (fever, growing tenderness at the surgery site, a large amount of drainage or bleeding, severe pain, foul-smelling drainage, redness, swelling).    2. It has been over 8 to 10 hours since surgery and you are still not able to urinate (pass water).    3.  Headache for over 24 hours.    4.  Numbness, tingling or weakness the day after surgery (if you had spinal anesthesia).  To contact Dr Mihai Rivero, call [phone]  or:    '   [phone] and ask for the resident on call for   pulmonology (answered 24 hours a day)  '   Emergency Department:    [facility]: [phone]       (TTY for hearing impaired: [phone])    [facility]: [phone]       (TTY for hearing impaired: [phone])

## 2023-08-17 NOTE — ANESTHESIA PROCEDURE NOTES
Airway       Patient location during procedure: OR       Procedure Start/Stop Times: 8/17/2023 1:55 PM  Staff -        CRNA: Rosy Moore APRN CRNA       Performed By: CRNA  Consent for Airway        Urgency: elective  Indications and Patient Condition       Indications for airway management: antoni-procedural       Induction type:intravenous       Mask difficulty assessment: 1 - vent by mask    Final Airway Details       Final airway type: endotracheal airway       Successful airway: ETT - single  Endotracheal Airway Details        ETT size (mm): 8.5       Cuffed: yes       Successful intubation technique: video laryngoscopy       VL Blade Size: Glidescope 3       Grade View of Cords: 1       Adjucts: stylet       Position: Center       Measured from: gums/teeth       Secured at (cm): 24       Bite block used: None    Post intubation assessment        Placement verified by: capnometry, equal breath sounds and chest rise        Number of attempts at approach: 1       Secured with: silk tape       Ease of procedure: easy       Dentition: Unchanged    Medication(s) Administered   Medication Administration Time: 8/17/2023 1:55 PM    Additional Comments       Very small mouth opening. Able to pass glidescope blade on patients L side of mouth. R side very tight.

## 2023-08-17 NOTE — ANESTHESIA POSTPROCEDURE EVALUATION
Patient: Antonio Sharpe    Procedure: Procedure(s):  BRONCHOSCOPY, WITH BIOPSY OF 1 OR 2 LYMPH NODE STATIONS WITH ENDOBRONCHIAL ULTRASOUND GUIDANCE       Anesthesia Type:  General    Note:  Disposition: Outpatient   Postop Pain Control: Uneventful            Sign Out: Well controlled pain   PONV: No   Neuro/Psych: Uneventful            Sign Out: Acceptable/Baseline neuro status   Airway/Respiratory: Uneventful            Sign Out: Acceptable/Baseline resp. status   CV/Hemodynamics: Uneventful            Sign Out: Acceptable CV status; No obvious hypovolemia; No obvious fluid overload   Other NRE: NONE   DID A NON-ROUTINE EVENT OCCUR? No           Last vitals:  Vitals Value Taken Time   /75 08/17/23 1430   Temp 37  C (98.6  F) 08/17/23 1430   Pulse 70 08/17/23 1444   Resp 15 08/17/23 1430   SpO2 95 % 08/17/23 1444   Vitals shown include unvalidated device data.    Electronically Signed By: Manuelito Sarmiento MD  August 17, 2023  2:46 PM

## 2023-08-17 NOTE — ANESTHESIA PREPROCEDURE EVALUATION
Anesthesia Pre-Procedure Evaluation    Patient: Antonio Sharpe   MRN: [id number] : 1965        Procedure : Procedure(s):  BRONCHOSCOPY, WITH BIOPSY OF 1 OR 2 LYMPH NODE STATIONS WITH ENDOBRONCHIAL ULTRASOUND GUIDANCE          Past Medical History:   Diagnosis Date    Squamous cell carcinoma of mandible (H)     Testicular cancer (H)       Past Surgical History:   Procedure Laterality Date    BRONCHOSCOPY RIDID OR FLEXIBLE W/ENDOBRONCHIAL ULTRASOUND GUIDED 1 OR 2 NODE STATIONS N/A 2023    Procedure: BRONCHOSCOPY, WITH BIOPSY OF 1 OR 2 LYMPH NODE STATIONS WITH ENDOBRONCHIAL ULTRASOUND GUIDANCE;  Surgeon: Abbi Santos MD;  Location:  GI      No Known Allergies   Social History     Tobacco Use    Smoking status: Every Day     Packs/day: 1.00     Types: Cigarettes     Start date:     Smokeless tobacco: Never   Substance Use Topics    Alcohol use: Yes     Comment: case a week      Wt Readings from Last 1 Encounters:   23 81.1 kg (178 lb 12.7 oz)        Anesthesia Evaluation   Pt has had prior anesthetic. Type: General.        ROS/MED HX  ENT/Pulmonary: Comment: Head and neck tumor.      Neurologic:  - neg neurologic ROS     Cardiovascular:       METS/Exercise Tolerance:     Hematologic:  - neg hematologic  ROS     Musculoskeletal:  - neg musculoskeletal ROS     GI/Hepatic:  - neg GI/hepatic ROS     Renal/Genitourinary:  - neg Renal ROS     Endo:  - neg endo ROS     Psychiatric/Substance Use:  - neg psychiatric ROS     Infectious Disease:  - neg infectious disease ROS     Malignancy: Comment: Head and neck tumor with metastatic work up today.      Other:            Physical Exam    Airway      Comment: Resection of facial tumor.    Mallampati: II   TM distance: > 3 FB   Neck ROM: full   Mouth opening: < 3 cm    Respiratory Devices and Support         Dental       (+) Modest Abnormalities - crowns, retainers, 1 or 2 missing teeth      Cardiovascular   cardiovascular exam normal       Rhythm and  rate: regular and normal     Pulmonary   pulmonary exam normal        breath sounds clear to auscultation           OUTSIDE LABS:  CBC:   Lab Results   Component Value Date    WBC 6.1 05/12/2023    WBC 9.7 05/11/2023    HGB 13.3 05/12/2023    HGB 15.2 05/11/2023    HCT 41.1 05/12/2023    HCT 45.7 05/11/2023     05/12/2023     05/11/2023     BMP:   Lab Results   Component Value Date     (L) 05/12/2023     (L) 05/11/2023    POTASSIUM 4.0 05/12/2023    POTASSIUM 4.9 05/11/2023    CHLORIDE 97 (L) 05/12/2023    CHLORIDE 94 (L) 05/11/2023    CO2 26 05/12/2023    CO2 25 05/11/2023    BUN 12.8 05/12/2023    BUN 16.9 05/11/2023    CR 0.78 05/12/2023    CR 1.0 05/11/2023     (H) 05/12/2023     (H) 05/12/2023     COAGS: No results found for: PTT, INR, FIBR  POC: No results found for: BGM, HCG, HCGS  HEPATIC:   Lab Results   Component Value Date    ALBUMIN 3.2 (L) 05/09/2022    PROTTOTAL 6.6 (L) 05/09/2022    ALT 31 05/09/2022    AST 25 05/09/2022    ALKPHOS 53 05/09/2022    BILITOTAL 0.3 05/09/2022     OTHER:   Lab Results   Component Value Date    LACT 0.8 05/11/2023    PRINCE 9.5 05/12/2023    PHOS 3.0 05/12/2023    MAG 1.9 05/12/2023    TSH 1.51 06/05/2023    T4 0.78 09/22/2021    SED 25 (H) 05/12/2023       Anesthesia Plan    ASA Status:  4    NPO Status:  NPO Appropriate    Anesthesia Type: General.     - Airway: ETT   Induction: Intravenous.   Maintenance: TIVA.        Consents    Anesthesia Plan(s) and associated risks, benefits, and realistic alternatives discussed. Questions answered and patient/representative(s) expressed understanding.     - Discussed: Risks, Benefits and Alternatives for BOTH SEDATION and the PROCEDURE were discussed     - Discussed with:  Patient      - Extended Intubation/Ventilatory Support Discussed: Yes.      - Patient is DNR/DNI Status: No     Use of blood products discussed: Yes.     - Discussed with: Patient.     Postoperative Care    Pain management: IV  analgesics.   PONV prophylaxis: Ondansetron (or other 5HT-3), Dexamethasone or Solumedrol     Comments:    Other Comments: The material risks, benefits, and alternatives were discussed in detail.  The patient agrees to proceed.              Quintin Cleveland MD

## 2023-08-18 NOTE — TELEPHONE ENCOUNTER
Spoke with patient regarding scheduling appt for follow up with Dr. Swartz in 1-3 weeks. Patient scheduled accordingly for return on 8/28 at 11:40a since patient comes from [place]. Patient aware of time, date and location.

## 2023-08-21 NOTE — TELEPHONE ENCOUNTER
RNCC forwarded FNA result to Dr. Sharma and Dr Swartz, requesting pt is contacted to discuss.    inal Diagnosis     Final   Specimen A                 Interpretation:                  Positive for malignancy  Metastatic squamous cell carcinoma                 Adequacy:                 Satisfactory for evaluation         RNCC contacted pt per Dr Sharma and relayed results.  Pt verbalized understanding, he has my call back number for questions or concerns.

## 2023-08-24 NOTE — TUMOR CONFERENCE
Head & Neck Tumor Conference Note   8/25/2023    Status: Established  Staff: Dr. Aditya Swartz     Tumor Site: Left mandible/alveolar ridge/buccal mucosa (oral cavity)  Tumor Pathology: [id number] SCC  Tumor Stage: [id number]  Tumor Treatment:   - Left marginal mandibulectomy, WLE of the left buccal mucosa, left posterior maxillectomy, left partial pharyngectomy, left selective neck dissection, right radial forearm free flap, mandibular plating, tracheostomy, and feeding tube placement (4/28/2020)  - Adjuvant chemoradiation (6600 cGy of radiation to the left oral cavity and neck, completed 8/17/2020 with 3-doses of high-dose cisplatin)  - Palliative chemotherapy 10/21/21 - carboplatin, taxol, radiation therapy, with pembrolizumab October 2021 - February 2022     Reason for Review: Review imaging and POC     Brief History: This is a 57 year old male with a prior history of a 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  He underwent a left marginal mandibulectomy, left buccal resection, left posterior maxillectomy, left neck dissection and reconstruction with a right radial forearm free flap by Dr. Kong Reece and Dr. Gino Obando on April 28, 2020 at [facility].  He received an adjuvant chemoradiation therapy completed in August of 2020.  Shortly after that, a recurrence/residual disease was identified.  He was subsequently  referred to me.  I discussed the low likelihood of cure and once we discussed the anticipated surgical plan.  The patient elected to proceed with palliative chemotherapy.  He was initially treated with 5FU pembrolizumab and carboplatin, but due to side effects, the 5FU and carboplatin have been omitted on several of the cycles.  He had some resolution of disease and I reevaluated him in February 2020 to reevaluate surgical resectability.  The patient remained uninterested in surgery and proceeded with chemoradiation therapy.  He proceeded with approximately 20  fractions of radiation with carboplatin and paclitaxel.  Due side effects, he stopped his treatment before completion.  Over the last few weeks, he had noticed some pain in the right jaw, and was seen in the Emergency Department at [facility].  He was treated with penicillin, which did not resolve this.  He was subsequently seen by a dentist who started him on Augmentin and suggested that he be seen by an oral surgeon.  Given his history at [facility], he requested not to be seen by their team and instead wanted to be seen in our clinic.      He has a recent mediastinal lymph node with biopsy-proven SCC. There are no obvious signs of disease in the neck. We will review his imaging today in the setting of new metastasis.    Pertinent PMH:   Past Medical History:   Diagnosis Date    Squamous cell carcinoma of mandible (H)     Testicular cancer (H)         Smoking Hx:   Social History     Tobacco Use    Smoking status: Every Day     Packs/day: 1.00     Types: Cigarettes     Start date: 1976    Smokeless tobacco: Never   Substance Use Topics    Alcohol use: Yes     Comment: case a week    Drug use: Not Currently     Imaging:   PET/CT  Impression:      Interval marked decrease in the size and intensity of FDG uptake of  the lesion in the left  space representing treatment  response.   FDG avid lesion external to the left hemimandible where there was a  fluid collection on 5/11/23 most likely representing residual  infectious process. Linear FDG uptake extending from the left lateral  oropharyngeal wall along the deep tissues to the level of the mandible  is also likely reflecting inflammatory process.  Please refer to whole body PET CT report for the findings including an  FDG avid enlarged mediastinal lymphadenopathy.    IMPRESSION:   This patient with a history of squamous cell carcinoma of the mandible  and testicular cancer:  1.  1.8 cm short axis right paratracheal lymph node corresponding to  FDG  avid region on PET dated 6/5/2023 and concerning for possible  neoplasm this is enlarged from 8/26/2021, but stable in size from  4/17/2023.  2.  Multiple enlarging and more conspicuous pulmonary nodules,  including 7 x 5 mm solid pulmonary nodule in the right major fissure,  previously 3 x 3 mm. Close attention on follow-up recommended to  exclude developing neoplasm.  3.  Thickened gastric mucosa in the fundus is nonspecific with  corresponding hypermetabolism on prior PET scans, though this may be  related to gastric motility.    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 New enlarged hypermetabolic lymph nodes within the mediastinum  likely represents metastatic disease.  2. Please see dedicated neuroradiology report for the results of the  high resolution PET/CT.       Pathology:   Not reviewed.    Tumor Board Recommendation:   Discussion: Imaging was reviewed. On the most recent PET in June there is mediastinal lymphadenopathy, now with biopsy demonstrating SCC metastasis. He is resistant to any large surgery/intervention, however we will have him see rad/onc for further discussion.    Regarding the remainder of his imaging findings, there is residual disease in the left  space (however there has previously been active infection which confounds the PET scan findings). There is a small amount of residual uptake but overall the  space lesion looks improved from prior. 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 There is re-demonstration of ORN in his mandible.     Plan:   - repeat imaging given PET/CT are from June  - med/onc referral to discuss further intervention    Abbie Aj MD   Otolaryngology-Head & Neck Surgery PGY3  Please contact ENT on call with questions    Documentation / Disclaimer Cancer Tumor Board Note  Cancer tumor board recommendations do not override what is  determined to be reasonable care and treatment, which is dependent on the circumstances of a patient's case; the patient's medical, social, and personal concerns; and the clinical judgment of the oncologist [physician].

## 2023-08-28 NOTE — NURSING NOTE
"Chief Complaint   Patient presents with    RECHECK     Follow up     Blood pressure (!) 174/84, pulse 74, temperature 98.2  F (36.8  C), height 1.778 m (5' 10\"), weight 83 kg (183 lb), SpO2 98 %.  Dustin Hogue LPN    "

## 2023-08-28 NOTE — LETTER
8/28/2023       RE: Antonio Sharpe  [address] [place] [address]     Dear Colleague,    Thank you for referring your patient, Antonio Sharpe, to the [facility] [place] at [facility]. Please see a copy of my visit note below.    Head and Neck Surgery  8/28/23    Mr Sharpe presents today for a wound examination mandated by his insurance company to get refills on his mepilex dressings.    He has had no new complaints since we last spoke. We are arranging medical oncology referral.    On exam, he has exposed plate and a fistula wound over the zygoma.     A/P:  Stable wound  Continue wound cares  He wants to see oral surgery for some painful left maxillary teeth  I will see him next month as mandated by his insurance company    Aditya Swartz MD    10 minutes spent on the date of the encounter in chart review, patient visit, review of tests, documentation and/or discussion with other providers about the issues documented above.       Again, thank you for allowing me to participate in the care of your patient.      Sincerely,    Aditya Swartz MD

## 2023-08-29 NOTE — PROGRESS NOTES
Head and Neck Surgery  8/28/23    Mr Sharpe presents today for a wound examination mandated by his insurance company to get refills on his mepilex dressings.    He has had no new complaints since we last spoke. We are arranging medical oncology referral.    On exam, he has exposed plate and a fistula wound over the zygoma.     A/P:  Stable wound  Continue wound cares  He wants to see oral surgery for some painful left maxillary teeth  I will see him next month as mandated by his insurance company    Aditya Swartz MD    10 minutes spent on the date of the encounter in chart review, patient visit, review of tests, documentation and/or discussion with other providers about the issues documented above.

## 2023-09-06 NOTE — TELEPHONE ENCOUNTER
Received a call from patient indicating that he woke up this morning with facial swelling. He denies any additional concerns including denies breathing issues. He is having a hard time with mouth opening given the facial swelling. Reviewed with Dr. Swartz and he agreed with antibiotics which were sent to pharmacy.     Advised patient to call with worsening symptoms or seek treatment at ER with breathing or any urgent changes. Patient agreeable and will call with further questions or concerns.       Natalya Crowder, RN, BSN

## 2023-09-13 NOTE — NURSING NOTE
"Oncology Rooming Note    September 13, 2023 3:40 PM   Antonio Sharpe is a 57 year old male who presents for:    Chief Complaint   Patient presents with    Oncology Clinic Visit     [facility] RETURN - SCC MANDIBLE      Initial Vitals: BP (!) 145/84 (BP Location: Right arm, Patient Position: Chair, Cuff Size: Adult Large)   Pulse 60   Temp 97.8  F (36.6  C)   Resp 16   Ht 1.778 m (5' 10\")   Wt 82.3 kg (181 lb 6.4 oz)   SpO2 96%   BMI 26.03 kg/m   Estimated body mass index is 26.03 kg/m  as calculated from the following:    Height as of this encounter: 1.778 m (5' 10\").    Weight as of this encounter: 82.3 kg (181 lb 6.4 oz). Body surface area is 2.02 meters squared.  No Pain (0) Comment: Data Unavailable   No LMP for male patient.  Allergies reviewed: Yes  Medications reviewed: Yes    Medications: Medication refills not needed today.  Pharmacy name entered into [employer]    [id number] - [place], [place] - [address] AT [facility] [address] [place]  [facility] - [place], [place] - [address] [place] [address]    Jaydon Moore LPN              "

## 2023-09-13 NOTE — PROGRESS NOTES
[facility] CANCER [facility]    PATIENT NAME: Antonio Sharpe  MRN # [id number]   DATE OF VISIT: September 13, 2023  YOB: 1965     Referring Provider: Dr. Aditya Swartz, Otolaryngology  PCP: None    CANCER TYPE: SCC L mandible/alveolar ridge, buccal mucosa  STAGE: [id number]  ECOG PS: 0    PD-L1: 2% on L cheek lesion 9/2021  NGS:     SUMMARY  4/28/20 Left marginal mandibulectomy, WLE of the left buccal mucosa, left posterior maxillectomy, left partial pharyngectomy, left selective neck dissection, right radial forearm free flap, mandibular plating, tracheostomy, and feeding tube placement (Dr. Triana at [facility]). Path: SCC, 5.7 cm moderately differentiated, DOI 1.5 cm, + PNI, no LVI, negative margins, 5/33 nodes positive, + MARIA ELENA  6/30~8/17/20 Chemoradiation 6600 cGy with HD cisplatin (Dr. Serna at [facility])  Care was at [facility] with Dr. Tarango, Dr. Christ Stewart (ENT), Dr. Kong Reece (Otolaryngology at [facility]), and Dr. Serna (Radiation Oncology)    8/11/21 CT neck. 3 x 2.8 x 3.6 cm mass at the reconstruction flap within the subcutaneous fat of the L cheek abutting masseter medially and skin laterally, extending to parotid, abutting carotid.   8/16/21 FNA L cheek. Path: Highly suspicious for SCC  8/26/21 PET/CT.   9/2/21 MRI neck.   9/8/21 FNA L cheek lesion (Dr. Swartz). Path: SCC. TPS 2%   10/21/21~2/15/22 C1-6 carboplatin 5FU pembrolizumab c/b severe mucositis. C2 pembro alone due to ongoing resolving mucositis. DYPD testing negative. 5FU dose reduced 50% for C3-6. 5FU omitted C5-6.  3/8/22 PET/CT. Decreased size and FDG uptake of the mass along the temporalis muscle, deep to the L zygomatic arch, 1.5  1.3 cm (SUV 10.7), peristent linear lucency on CT immediately underneath the anterior cortical bone of the anterior mandible at the  Midline (SUV 10.5), extending to the medullary cavity of the anterior mandible where there is subtle corresponding lucency, similar to prior, likely post  surgical. Unchanged thyroid nodule. Gastric fundus thickening which can be seen with gastritis, recommend direct visualization, focal anorectal uptake, likely physiologic, attention on follow up  4/11~5/3/22 Chemoradiation with weekly carboplatin paclitaxel, intended 70 Gy, 35 fractions (Dr. Amezquita, summary 6/11/23). No-showed starting the 4th week of radiation, hence received 3400 cGy to residual tumor, 3060 cGy high risk tissue. Turned out he lost insurance coverage.  12/19/22 Re-established care with Dr. Swartz after developing R jaw pain.   12/19/22 CT neck. 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 Increased subcutaneous edema L lower face and submandibular space. Effaced fat planes. Questionable 3.4 x 1.4 cm rim enhancing area in the L submandibular space. Treated with vitamin D and pentoxifylline. Infected hardware April-May 2023 treated with antibiotics.  5/11~5/12/23 [facility] for facial swelling. Found to have L facial abscess s/p I&D  6/5/23 PET/CT. Changes felt to be more related to infection/inflammation based on changes since 5/11/23 CT. However, had new enlarged R paratracheal, subcarinal, R hilar adenopathy (SUB 16.85 in R paratracheal node, 6.6 subcarinal, 3.91 R hilar). EBUS recommended.  7/19/23 Bronch, EBUS (Dr. Santos) with conscious sedation. 4R biopsied. Limited LN evaluation due to limitations from trismus and osteo. Path: Non diagnostic.  8/1/23 CT chest. Increased conspicuity of R minor fissure nodules, including 5 x 3 mm nodule, previously 3 x 3 mm. 7 x 5 mm R major fissure nodule. Additional scattered nodules are larger compared to 4/17/23. 1.8 cm R paratracheal LN. 1.5 cm area of peripherally enhancing hypoattenuation in the mediastinum posterior to the trachea corresponding to FDG avid are on PET 6/5/23.  Unchanged 9-10 mm R hilar node. Gastric mucosa thickening. 6 mm tail of pancreas nodule  8/17/23 Bronch, EBUS under general anesthesia (Dr. Holm). 4R biopsied. Path: SCC. TPS <1%    ASSESSMENT AND PLAN   SCC oral cavity, recurrent, now bx proven metastatic disease, TPS <1%: Question of residual disease in the L  space, potentially obscured by infection on June PET/CT. Bx proven mediastinal dz. Ongoing exposed plate and fistula over the zygoma. Relatively slow growth of the 4R node over the last 4 months, doubled in size in the short axis compared to Dec 2022 (7.6 --> 17.9 mm) and almost no change between the June 5 PET/CT and the CT chest 8/1. Given this slow pace of change, lack of symptoms and other issues, options are to 1) Wait. The 4R LN is not going to cause problems in the next couple of months. Starting chemo is not going to help the fistula at all and is going to increase the risk of infection 2) Consider weekly carbo paclitaxel + pembrolizumab; 3) Consider pembro alone given TPS >1% in 2021 (not preferred, but an option nonetheless). CPS <1%, consideration can be given to the Extreme regimen, but 5FU is not an option due to the terrible mucositis. At the end of our discussion, he prefers/is ok with waiting a little bit linger. Will restage in about a month in Oct with CT neck and CT chest/abd.    Screening for hypothyroidism: TSH 1.51 on 6/5/23. Check in Dec.     Fistula: Needs another dressing order.     Port: Likely not functional, will check next visit with labs. He'd rather wait than address now. Remove if not working.      Hearing loss: Chronic. Precludes cisplatin    Tobacco dependence, ETOH: Not discussed today, will discuss next visit.     40 minutes spent by me on the date of the encounter doing chart review, history and exam, documentation and further activities per the note     Delores Buckley MD  Associate Professor of Medicine  Hematology, Oncology and  "Transplantation      SUBJECTIVE  Antonio returns for follow up. Lost to follow up with me despite calls, etc., due to lack of insurance coverage. I last saw him in April 2022 prior to starting chemoradiation, which he was not able to complete due to the loss of insurance.     Doing fine other than the fistula.   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 Treated at [facility] C spine  Appy 1971  Hearing loss L ear after radiation s/p myringotomy  Chronic tinnitus secondary to cisplatin  Hemorrhoids    CURRENT OUTPATIENT MEDICATIONS  Reviewed    ALLERGIES  No Known Allergies     PHYSICAL EXAM  BP (!) 145/84 (BP Location: Right arm, Patient Position: Chair, Cuff Size: Adult Large)   Pulse 60   Temp 97.8  F (36.6  C)   Resp 16   Ht 1.778 m (5' 10\")   Wt 82.3 kg (181 lb 6.4 oz)   SpO2 96%   BMI 26.03 kg/m    GEN: NAD  HEENT: EOMI, no icterus, injection or pallor. Fistula - exposed hardware but looks c/d/i  EXT: no edema  NEURO: alert    LABORATORY AND IMAGING STUDIES    Labs 6.5.23 and 5./12./23 were independently reviewed and interpreted by me    CT Chest w Contrast  Narrative: EXAM: CT CHEST W CONTRAST 8/1/2023 1:31 PM     DEMOGRAPHICS: Male of 57 years    INDICATION: Previously noted mediastinal adenopathy undetectable on  EBUS, history of head and neck cancer; Mediastinal lymphadenopathy;  Malignant neoplasm of mouth (H)    COMPARISON: PET/CT 6/5/2023, chest CT 4/17/2023, PET/CT 8/26/2021.    TECHNIQUE: Helical CT imaging of the chest was obtained with contrast.  Multiplanar post-processed and MIP reformats were obtained reviewed.     FINDINGS:    LINES/TUBES: Right chest Port-A-Cath tip terminates in the mid SVC.    LUNG: Increased conspicuity of right minor fissure solid pulmonary  nodules, including 5 " x 3 mm nodule (series 4, image 164), previously 3  x 3 mm on 4/17/2023 without corresponding FDG avidity on recent PET,  though this is likely due to small size. 7 x 5 mm solid nodule in the  right major fissure (series 4, image 196), previously 3 x 3 mm.  Additional scattered solid nodules in the major fissure are larger  from 4/17/2023. No focal consolidation.     LARGE AIRWAYS: Patent tracheobronchial tree. Intraluminal density is  in a different position from previous limited density likely  representing mucous with no corresponding finding on PET.    PLEURA: No pneumothorax or pleural effusion. No pleural mass or  calcification. Right apical pleural blebs.    VESSELS: Normal size/configuration of the great vessels. No pulmonary  embolism.    HEART: No cardiomegaly. No pericardial effusion. Severe coronary  atherosclerotic calcifications. No valvular abnormalities present.  No  intracardiac mass or thrombus.     MEDIASTINUM AND GABRIELLA: 1.8 cm right paratracheal lymph node (series 3,  image 106) corresponding to FDG avid lymph node seen on PET dated  6/5/2023 area of hypoattenuation with surrounding rim enhancement in  the mediastinum posterior to the trachea measuring approximately 1.5  cm in short axis (series 3, image 113) corresponding to area of FDG  activity on PET. Nonenlarged right hilar lymph node appears unchanged  in the short interval with short axis dimension approximately 9-10 mm.    CHEST WALL: Mild bilateral gynecomastia.    LOWER NECK: Thyroid unremarkable.    UPPER ABDOMEN: Redemonstration of scattered, stable hepatic cysts,  index of which measures 2.2 cm in hepatic segment VI. Thickening of  the gastric mucosa posteriorly without focal enhancement corresponding  to partially FDG avid mucosa on recent PET. 6 mm hypoattenuation in  the tail of the pancreas (series 3, image 326) too small to  characterize, also seen on recent PET and unchanged. The main portal  vein, celiac and superior mesenteric  trunks appear patent. No  conspicuous mass in the spleen. Adrenal glands appear normal. No  abnormality in the left visualized kidney.    BONES: Mild thoracic spine degenerative changes. No acute osseous  abnormality. No suspicious bony lesions. Unremarkable soft tissues.  Impression: IMPRESSION:   This patient with a history of squamous cell carcinoma of the mandible  and testicular cancer:  1.  1.8 cm short axis right paratracheal lymph node corresponding to  FDG avid region on PET dated 6/5/2023 and concerning for possible  neoplasm this is enlarged from 8/26/2021, but stable in size from  4/17/2023.  2.  Multiple enlarging and more conspicuous pulmonary nodules,  including 7 x 5 mm solid pulmonary nodule in the right major fissure,  previously 3 x 3 mm. Close attention on follow-up recommended to  exclude developing neoplasm.  3.  Thickened gastric mucosa in the fundus is nonspecific with  corresponding hypermetabolism on prior PET scans, though this may be  related to gastric motility.    I have personally reviewed the examination and initial interpretation  and I agree with the findings.    ELAINE HELLER MD         SYSTEM ID:  [id number]     CT neck 6.5.23 and PET CT 6.5.23 and CT chest 12/19/22 personally reviewed and interpreted by me as above

## 2023-09-13 NOTE — LETTER
9/13/2023         RE: Antonio Sharpe  [address] [place] [address]        Dear Colleague,    Thank you for referring your patient, Antonio Sharpe, to the [facility]. Please see a copy of my visit note below.       [facility]    PATIENT NAME: Antonio Sharpe  MRN # [id number]   DATE OF VISIT: September 13, 2023  YOB: 1965     Referring Provider: Dr. Aditya Swartz, Otolaryngology  PCP: None    CANCER TYPE: SCC L mandible/alveolar ridge, buccal mucosa  STAGE: [id number]  ECOG PS: 0    PD-L1: 2% on L cheek lesion 9/2021  NGS:     SUMMARY  4/28/20 Left marginal mandibulectomy, WLE of the left buccal mucosa, left posterior maxillectomy, left partial pharyngectomy, left selective neck dissection, right radial forearm free flap, mandibular plating, tracheostomy, and feeding tube placement (Dr. Triana at [facility]). Path: SCC, 5.7 cm moderately differentiated, DOI 1.5 cm, + PNI, no LVI, negative margins, 5/33 nodes positive, + MARIA ELENA  6/30~8/17/20 Chemoradiation 6600 cGy with HD cisplatin (Dr. Serna at [facility])  Care was at [facility] with Dr. Tarango, Dr. Christ Stewart (ENT), Dr. Kong Reece (Otolaryngology at [facility]), and Dr. Serna (Radiation Oncology)    8/11/21 CT neck. 3 x 2.8 x 3.6 cm mass at the reconstruction flap within the subcutaneous fat of the L cheek abutting masseter medially and skin laterally, extending to parotid, abutting carotid.   8/16/21 FNA L cheek. Path: Highly suspicious for SCC  8/26/21 PET/CT.   9/2/21 MRI neck.   9/8/21 FNA L cheek lesion (Dr. Swartz). Path: SCC. TPS 2%   10/21/21~2/15/22 C1-6 carboplatin 5FU pembrolizumab c/b severe mucositis. C2 pembro alone due to ongoing resolving mucositis. DYPD testing negative. 5FU dose reduced 50% for C3-6. 5FU omitted C5-6.  3/8/22 PET/CT. Decreased size and FDG uptake of the mass along the temporalis muscle, deep to the L zygomatic arch, 1.5  1.3 cm (SUV 10.7), peristent  linear lucency on CT immediately underneath the anterior cortical bone of the anterior mandible at the  Midline (SUV 10.5), extending to the medullary cavity of the anterior mandible where there is subtle corresponding lucency, similar to prior, likely post surgical. Unchanged thyroid nodule. Gastric fundus thickening which can be seen with gastritis, recommend direct visualization, focal anorectal uptake, likely physiologic, attention on follow up  4/11~5/3/22 Chemoradiation with weekly carboplatin paclitaxel, intended 70 Gy, 35 fractions (Dr. Amezquita, summary 6/11/23). No-showed starting the 4th week of radiation, hence received 3400 cGy to residual tumor, 3060 cGy high risk tissue. Turned out he lost insurance coverage.  12/19/22 Re-established care with Dr. Sawrtz after developing R jaw pain.   12/19/22 CT neck. 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 Increased subcutaneous edema L lower face and submandibular space. Effaced fat planes. Questionable 3.4 x 1.4 cm rim enhancing area in the L submandibular space. Treated with vitamin D and pentoxifylline. Infected hardware April-May 2023 treated with antibiotics.  5/11~5/12/23 [facility] for facial swelling. Found to have L facial abscess s/p I&D  6/5/23 PET/CT. Changes felt to be more related to infection/inflammation based on changes since 5/11/23 CT. However, had new enlarged R paratracheal, subcarinal, R hilar adenopathy (SUB 16.85 in R paratracheal node, 6.6 subcarinal, 3.91 R hilar). EBUS recommended.  7/19/23 Bronch, EBUS (Dr. Santos) with conscious sedation. 4R biopsied. Limited LN evaluation due to limitations from trismus and osteo. Path: Non diagnostic.  8/1/23 CT chest. Increased conspicuity of R minor fissure nodules, including 5 x 3 mm nodule, previously 3 x 3 mm. 7 x  5 mm R major fissure nodule. Additional scattered nodules are larger compared to 4/17/23. 1.8 cm R paratracheal LN. 1.5 cm area of peripherally enhancing hypoattenuation in the mediastinum posterior to the trachea corresponding to FDG avid are on PET 6/5/23. Unchanged 9-10 mm R hilar node. Gastric mucosa thickening. 6 mm tail of pancreas nodule  8/17/23 Bronch, EBUS under general anesthesia (Dr. Holm). 4R biopsied. Path: SCC. TPS <1%    ASSESSMENT AND PLAN   SCC oral cavity, recurrent, now bx proven metastatic disease, TPS <1%: Question of residual disease in the L  space, potentially obscured by infection on June PET/CT. Bx proven mediastinal dz. Ongoing exposed plate and fistula over the zygoma. Relatively slow growth of the 4R node over the last 4 months, doubled in size in the short axis compared to Dec 2022 (7.6 --> 17.9 mm) and almost no change between the June 5 PET/CT and the CT chest 8/1. Given this slow pace of change, lack of symptoms and other issues, options are to 1) Wait. The 4R LN is not going to cause problems in the next couple of months. Starting chemo is not going to help the fistula at all and is going to increase the risk of infection 2) Consider weekly carbo paclitaxel + pembrolizumab; 3) Consider pembro alone given TPS >1% in 2021 (not preferred, but an option nonetheless). CPS <1%, consideration can be given to the Extreme regimen, but 5FU is not an option due to the terrible mucositis. At the end of our discussion, he prefers/is ok with waiting a little bit linger. Will restage in about a month in Oct with CT neck and CT chest/abd.    Screening for hypothyroidism: TSH 1.51 on 6/5/23. Check in Dec.     Fistula: Needs another dressing order.     Port: Likely not functional, will check next visit with labs. He'd rather wait than address now. Remove if not working.      Hearing loss: Chronic. Precludes cisplatin    Tobacco dependence, ETOH: Not discussed today, will discuss next  "visit.     40 minutes spent by me on the date of the encounter doing chart review, history and exam, documentation and further activities per the note     Delores Buckley MD  Associate Professor of Medicine  Hematology, Oncology and Transplantation      KESHIA Alvarez returns for follow up. Lost to follow up with me despite calls, etc., due to lack of insurance coverage. I last saw him in April 2022 prior to starting chemoradiation, which he was not able to complete due to the loss of insurance.     Doing fine other than the fistula.   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 Treated at [facility] spine  Appy 1971  Hearing loss L ear after radiation s/p myringotomy  Chronic tinnitus secondary to cisplatin  Hemorrhoids    CURRENT OUTPATIENT MEDICATIONS  Reviewed    ALLERGIES  No Known Allergies     PHYSICAL EXAM  BP (!) 145/84 (BP Location: Right arm, Patient Position: Chair, Cuff Size: Adult Large)   Pulse 60   Temp 97.8  F (36.6  C)   Resp 16   Ht 1.778 m (5' 10\")   Wt 82.3 kg (181 lb 6.4 oz)   SpO2 96%   BMI 26.03 kg/m    GEN: NAD  HEENT: EOMI, no icterus, injection or pallor. Fistula - exposed hardware but looks c/d/i  EXT: no edema  NEURO: alert    LABORATORY AND IMAGING STUDIES    Labs 6.5.23 and 5./12./23 were independently reviewed and interpreted by me    CT Chest w Contrast  Narrative: EXAM: CT CHEST W CONTRAST 8/1/2023 1:31 PM     DEMOGRAPHICS: Male of 57 years    INDICATION: Previously noted mediastinal adenopathy undetectable on  EBUS, history of head and neck cancer; Mediastinal lymphadenopathy;  Malignant neoplasm of mouth (H)    COMPARISON: PET/CT 6/5/2023, chest CT 4/17/2023, PET/CT 8/26/2021.    TECHNIQUE: Helical CT imaging of the chest was obtained with contrast.  Multiplanar " post-processed and MIP reformats were obtained reviewed.     FINDINGS:    LINES/TUBES: Right chest Port-A-Cath tip terminates in the mid SVC.    LUNG: Increased conspicuity of right minor fissure solid pulmonary  nodules, including 5 x 3 mm nodule (series 4, image 164), previously 3  x 3 mm on 4/17/2023 without corresponding FDG avidity on recent PET,  though this is likely due to small size. 7 x 5 mm solid nodule in the  right major fissure (series 4, image 196), previously 3 x 3 mm.  Additional scattered solid nodules in the major fissure are larger  from 4/17/2023. No focal consolidation.     LARGE AIRWAYS: Patent tracheobronchial tree. Intraluminal density is  in a different position from previous limited density likely  representing mucous with no corresponding finding on PET.    PLEURA: No pneumothorax or pleural effusion. No pleural mass or  calcification. Right apical pleural blebs.    VESSELS: Normal size/configuration of the great vessels. No pulmonary  embolism.    HEART: No cardiomegaly. No pericardial effusion. Severe coronary  atherosclerotic calcifications. No valvular abnormalities present.  No  intracardiac mass or thrombus.     MEDIASTINUM AND GABRIELLA: 1.8 cm right paratracheal lymph node (series 3,  image 106) corresponding to FDG avid lymph node seen on PET dated  6/5/2023 area of hypoattenuation with surrounding rim enhancement in  the mediastinum posterior to the trachea measuring approximately 1.5  cm in short axis (series 3, image 113) corresponding to area of FDG  activity on PET. Nonenlarged right hilar lymph node appears unchanged  in the short interval with short axis dimension approximately 9-10 mm.    CHEST WALL: Mild bilateral gynecomastia.    LOWER NECK: Thyroid unremarkable.    UPPER ABDOMEN: Redemonstration of scattered, stable hepatic cysts,  index of which measures 2.2 cm in hepatic segment VI. Thickening of  the gastric mucosa posteriorly without focal enhancement corresponding  to  partially FDG avid mucosa on recent PET. 6 mm hypoattenuation in  the tail of the pancreas (series 3, image 326) too small to  characterize, also seen on recent PET and unchanged. The main portal  vein, celiac and superior mesenteric trunks appear patent. No  conspicuous mass in the spleen. Adrenal glands appear normal. No  abnormality in the left visualized kidney.    BONES: Mild thoracic spine degenerative changes. No acute osseous  abnormality. No suspicious bony lesions. Unremarkable soft tissues.  Impression: IMPRESSION:   This patient with a history of squamous cell carcinoma of the mandible  and testicular cancer:  1.  1.8 cm short axis right paratracheal lymph node corresponding to  FDG avid region on PET dated 6/5/2023 and concerning for possible  neoplasm this is enlarged from 8/26/2021, but stable in size from  4/17/2023.  2.  Multiple enlarging and more conspicuous pulmonary nodules,  including 7 x 5 mm solid pulmonary nodule in the right major fissure,  previously 3 x 3 mm. Close attention on follow-up recommended to  exclude developing neoplasm.  3.  Thickened gastric mucosa in the fundus is nonspecific with  corresponding hypermetabolism on prior PET scans, though this may be  related to gastric motility.    I have personally reviewed the examination and initial interpretation  and I agree with the findings.    ELAINE HELLER MD         SYSTEM ID:  [id number]     CT neck 6.5.23 and PET CT 6.5.23 and CT chest 12/19/22 personally reviewed and interpreted by me as above        Sincerely,        Delores Buckley MD

## 2023-09-18 NOTE — PROGRESS NOTES
[facility]: Cancer Care                                                                                          Received message that Antonio reached out to our clinic as he ran out of Mepilex this weekend and was wondering if we reordered them last week. Per provider, rx was faxed to  pharmacy that handles medical supplies.    Confirmed with Antonio that he was able to locate prescription, which he confirmed. He reports the mepilex will be set to him.    Encouraged him to reach out for further questions/concerns.    Mahogany Hillman, RN, BSN  RN Care Coordinator  [facility]

## 2023-09-18 NOTE — TELEPHONE ENCOUNTER
Call from pt, who questions if wound bandages were ordered last week, as he ran out over the weekend. Writer informed the Mepilex Border dressings were ordered on 9/13 for quantity 180 and 11 refills, but cannot see where order was sent. Pt states he will call  Medical Supply to see if they have the order. Writer informed will ask Dr. Buckley and Mahogany, RNCC, to be sure where it was sent in case.     1352 LVM for pt informing Dr. Buckley requested a paper script be sent to pharmacy for dressings, as that supply company does not take scripts electronically from charting system, requested pt call triage if supply company did not receive fax script. Triage number left for call back at [phone].

## 2023-10-11 NOTE — PROGRESS NOTES
Returned call to patient about facial swelling from recurrent cellulitis. Discussed situation with Dr. Swartz and Augmentin ordered. Patient agreeable to plan and pharmacy confirmed. No further question or concerns at this time.     Meagan Putnam, RN, BSN  RN Care Coordinator, ENT Clinic  [facility]  Direct: [phone]

## 2023-10-17 NOTE — PROGRESS NOTES
[facility]: Cancer Care                                                                                          Received call from Antonio that he needs refill of his mepilex prescription. Relayed that Dr. Buckley prescribed 180 mepilex with 11 refills on 9/13/23. Antonio reports that his insurance will only fill 60 mepilex at a time and he has run out and is unable to refill the prescription.    Reached out to [facility] and they confirmed that they need new prescription every 30 days.     Refill ordered and faxed to [facility].      Relayed to Antonio that I sent the prescription and encouraged him to reach out with any further needs.    Addendum 10/25/23 1:48 PM  Received call from Antonio that [facility] needs orders resent to them with doctor signature. Order resent. He also states that they need a monthly wound assessment documented. Discussed with Dr. Buckley, who states that Dr. Swartz has been doing the monthly wound assessments. Message sent to Dr. Swartz's team to coordinate wound assessment for patient, and they confirmed that they will take care of this. Antonio will reach out if he has any further needs from med onc team.    Mahogany Hillman, RN, BSN  RN Care Coordinator  [facility]

## 2023-10-17 NOTE — TELEPHONE ENCOUNTER
Patient called, has Squamous cell carcinoma of oral cavity, looking to be seen at [facility] for non healing wound on jawbone.  OK to schedule?

## 2023-10-17 NOTE — TELEPHONE ENCOUNTER
Consult received via Phone from patient for wound of the jawbone.    Please schedule with Nathaly Martines PA-C, Tamika Ridley NP, Rafi Gupta M.D., or Rogelio Castro M.D. at [facility] [place] for next available appointment.    **For all providers, Maxine Crespo PA-C, Dr. Cho, Tamika Ridley NP or Dr. Castro, please schedule a follow up 2-3 weeks after initial appointment.**  --If unable to schedule within 2-3 weeks then please place on cancellation list--    Is the patient able to make their own medical decisions? Yes    Can the patient be scheduled on a Thursday? Yes    Is patient a REENA lift? PLEASE INQUIRE WHEN MAKING THE APPOINTMENT AND PUT IN APPOINTMENT NOTES    Routing to  Wound Healing Scheduling.

## 2023-11-08 NOTE — NURSING NOTE
Is the patient currently in the state of [place]? YES    Visit mode:TELEPHONE    If the visit is dropped, the patient can be reconnected by: TELEPHONE VISIT: Phone number:   Telephone Information:   Mobile [phone]       Will anyone else be joining the visit? Yes, pt's wife will be joining the telephone call per pt  (If patient encounters technical issues they should call [phone]    How would you like to obtain your AVS? Mail a copy    Are changes needed to the allergy or medication list? Pt stated no med changes    Reason for visit: RECHECK    No other vitals to report per pt      Eym ABADF

## 2023-11-08 NOTE — PROGRESS NOTES
Virtual Visit Details    Type of service:  Telephone Visit   Phone call duration: 21 minutes        [facility] CANCER [facility]    PATIENT NAME: Antonio Sharpe  MRN # [id number]   DATE OF VISIT: November 8, 2023  YOB: 1965     Referring Provider: Dr. Aditya Swartz, Otolaryngology  PCP: None    CANCER TYPE: SCC L mandible/alveolar ridge, buccal mucosa  STAGE: [id number]  ECOG PS: 0    PD-L1: 2% on L cheek lesion 9/2021  NGS:     SUMMARY  4/28/20 Left marginal mandibulectomy, WLE of the left buccal mucosa, left posterior maxillectomy, left partial pharyngectomy, left selective neck dissection, right radial forearm free flap, mandibular plating, tracheostomy, and feeding tube placement (Dr. Triana at [facility]). Path: SCC, 5.7 cm moderately differentiated, DOI 1.5 cm, + PNI, no LVI, negative margins, 5/33 nodes positive, + MARIA ELENA  6/30~8/17/20 Chemoradiation 6600 cGy with HD cisplatin (Dr. Serna at [facility])  Care was at [facility] with Dr. Tarango, Dr. Christ Stewart (ENT), Dr. Kong Reece (Otolaryngology at [facility]), and Dr. Serna (Radiation Oncology)    8/11/21 CT neck. 3 x 2.8 x 3.6 cm mass at the reconstruction flap within the subcutaneous fat of the L cheek abutting masseter medially and skin laterally, extending to parotid, abutting carotid.   8/16/21 FNA L cheek. Path: Highly suspicious for SCC  8/26/21 PET/CT.   9/2/21 MRI neck.   9/8/21 FNA L cheek lesion (Dr. Swartz). Path: SCC. TPS 2%   10/21/21~2/15/22 C1-6 carboplatin 5FU pembrolizumab c/b severe mucositis. C2 pembro alone due to ongoing resolving mucositis. DYPD testing negative. 5FU dose reduced 50% for C3-6. 5FU omitted C5-6.  3/8/22 PET/CT. Decreased size and FDG uptake of the mass along the temporalis muscle, deep to the L zygomatic arch, 1.5  1.3 cm (SUV 10.7), peristent linear lucency on CT immediately underneath the anterior cortical bone of the anterior mandible at the  Midline (SUV 10.5), extending to the medullary cavity of the  anterior mandible where there is subtle corresponding lucency, similar to prior, likely post surgical. Unchanged thyroid nodule. Gastric fundus thickening which can be seen with gastritis, recommend direct visualization, focal anorectal uptake, likely physiologic, attention on follow up  4/11~5/3/22 Chemoradiation with weekly carboplatin paclitaxel, intended 70 Gy, 35 fractions (Dr. Amezquita, summary 6/11/23). No-showed starting the 4th week of radiation, hence received 3400 cGy to residual tumor, 3060 cGy high risk tissue. Turned out he lost insurance coverage.  12/19/22 Re-established care with Dr. Swartz after developing R jaw pain.   12/19/22 CT neck. 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 Increased subcutaneous edema L lower face and submandibular space. Effaced fat planes. Questionable 3.4 x 1.4 cm rim enhancing area in the L submandibular space. Treated with vitamin D and pentoxifylline. Infected hardware April-May 2023 treated with antibiotics.  5/11~5/12/23 [facility] for facial swelling. Found to have L facial abscess s/p I&D  6/5/23 PET/CT. Changes felt to be more related to infection/inflammation based on changes since 5/11/23 CT. However, had new enlarged R paratracheal, subcarinal, R hilar adenopathy (SUB 16.85 in R paratracheal node, 6.6 subcarinal, 3.91 R hilar). EBUS recommended.  7/19/23 Bronch, EBUS (Dr. Santos) with conscious sedation. 4R biopsied. Limited LN evaluation due to limitations from trismus and osteo. Path: Non diagnostic.  8/1/23 CT chest. Increased conspicuity of R minor fissure nodules, including 5 x 3 mm nodule, previously 3 x 3 mm. 7 x 5 mm R major fissure nodule. Additional scattered nodules are larger compared to 4/17/23. 1.8 cm R paratracheal LN. 1.5 cm area of peripherally enhancing  hypoattenuation in the mediastinum posterior to the trachea corresponding to FDG avid are on PET 6/5/23. Unchanged 9-10 mm R hilar node. Gastric mucosa thickening. 6 mm tail of pancreas nodule  8/17/23 Bronch, EBUS under general anesthesia (Dr. Holm). 4R biopsied. Path: SCC. TPS <1%    ASSESSMENT AND PLAN   SCC oral cavity, recurrent, now bx proven metastatic disease, TPS <1%:   Reviewed CT chest/abdomen and CT neck with Jeremi. Imaging previously reviewed with Dr. Buckley as well. There's been growth in the mediastinal and left hilar LN, as well as a subcarinal LN enlarging since August imaging. At this point systemic treatment is recommended. He is due for ENT follow-up with Lory on 11/13 to evaluate fistula. If concerns for ongoing infection, would recommend pembro alone but suspect this would be less effective. Ideally we would like to begin weekly carbo paclitaxel + pembro which Jeremi is agreeable to. Alternatively we could consider radiation to the mediastinum. Jeremi is planning to travel to [place] for a month from [facility]. We'll plan to touch base by phone next week following his ENT appointment to determine our next steps for treatment.     Addendum 11/17/23:  Plan reviewed with Dr. Buckley and Lory Fonseca CNP with ENT. 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 He will be traveling to [place] from ~11/24-1/1/24. Per Dr. Buckley's imaging review there has been growth in the R paratracheal and subcarinal nodes although fairly slow given the 5 month interval between imaging. Will plan for repeat CT A/P and CT neck on return and tentative initiation of systemic  treatment at that time. Jeremi was updated on this plan by phone today. He will call if any of the appointments in early January need rescheduling based on his tentative return date.     Addendum 11/27/23:   Pt was recommended to schedule repeat imaging and follow-up in late December but has elected to schedule CT  "imaging 1/15/23 and visit with Dr. Buckley to follow. As above, it was recommended patient contact clinic sooner if he returns to [place] in late December or early January and imaging/clinic visit can be moved to an earlier time.      Screening for hypothyroidism: TSH 1.51 on 6/5/23. Check in Dec or serially if we start pembro.     Fistula: Stable per report but unable to evaluate due to phone visit. ENT apt 11/13.     Port: Likely not functional, will check next visit with labs. He'd rather wait than address now. Remove if not working.      Hearing loss: Chronic. Precludes cisplatin    Tobacco dependence, ETOH: Still drinking but did not discuss in detail today.    30 minutes spent on the date of the encounter doing chart review, review of test results, interpretation of tests, and documentation, in addition to 21 minutes spent on the phone with the patient.     Yaquelin Tovar, RAMON Alvarez is called today for close follow-up and review of recent CT imaging.  -Generally feels okay but is having some pain on his right side, around his lateral ribcage along his nipple line. Feels the rib is displaced. Seeing a chiropractor for this  -No fevers/chills  -Notes hardware in jaw more exposed, can now see 7 screws rather than 6. Drains consistently. Occasionally can express pus superior to this area. No redness, swelling or pain in face recently. 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 Treated at [facility] C spine  Appy 1971  Hearing loss L ear after radiation s/p myringotomy  Chronic tinnitus secondary to cisplatin  Hemorrhoids    CURRENT OUTPATIENT MEDICATIONS  Reviewed    ALLERGIES  No Known Allergies     PHYSICAL EXAM  Ht 1.778 m (5' 10\")   Wt 81.3 kg (179 lb 3.2 oz)   BMI 25.71 kg/m      Unable to " do physical exam 2/2 telephone visit. Well sounding in no distress. Normal speech and thought process. Good voice quality. No audible wheezing or cough.    LABORATORY AND IMAGING STUDIES   Most Recent 3 CBC's:  Recent Labs   Lab Test 05/12/23  0621 05/11/23  1025 05/09/22  0836 05/02/22  0707   WBC 6.1 9.7 3.4* 3.3*   HGB 13.3 15.2 12.7* 13.0*   MCV 97 95 102* 103*    248 157 167   ANEUTAUTO  --  8.0 2.4 2.2    Most Recent 3 BMP's:  Recent Labs   Lab Test 05/12/23  0633 05/12/23  0621 05/11/23  1033 05/11/23  1025 05/09/22  0836 05/02/22  0707 04/25/22  1012   NA  --  134*  --  131* 136 137 135   POTASSIUM  --  4.0  --  4.9 4.5 4.2 4.4   CHLORIDE  --  97*  --  94* 105 103 101   CO2  --  26  --  25 27 27 26   BUN  --  12.8  --  16.9 18 22 18   CR  --  0.78 1.0 0.88 0.82 0.85 0.84   ANIONGAP  --  11  --  12 4 7 8   PRINCE  --  9.5  --  10.3* 9.0 9.2 9.0   * 112*  --  110* 88 105* 133*   PROTTOTAL  --   --   --   --  6.6* 6.6* 7.0   ALBUMIN  --   --   --   --  3.2* 3.3* 3.4    Most Recent 2 LFT's:  Recent Labs   Lab Test 05/09/22  0836 05/02/22  0707   AST 25 26   ALT 31 34   ALKPHOS 53 58   BILITOTAL 0.3 0.3    Most Recent TSH and T4:  Recent Labs   Lab Test 06/05/23  1438 09/22/21  1039   TSH 1.51 1.54   T4  --  0.78     Phos/Mag:  Lab Results   Component Value Date    PHOS 3.0 05/12/2023    MAG 1.9 05/12/2023    MAG 1.8 05/09/2022    MAG 1.8 05/02/2022      I reviewed the above labs today.    CT Chest Abdomen w Contrast  Narrative: EXAM: CT CHEST ABDOMEN W CONTRAST  LOCATION: [facility]  DATE: 11/1/2023    INDICATION: Restaging for squamous cell carcinoma of the left mandible status post limited chemoradiation treatment in 2022 due to loss of insurance. Disease recurrence and infection in 2023; biopsy proven mediastinal metastatic disease in Aug 2023.  COMPARISON: Chest CT on 08/01/2023; CT chest, abdomen and pelvis on 12/29/2021  TECHNIQUE: CT scan of the chest and abdomen was  performed following injection of IV contrast. Pelvis was incidentally included in the scanning field. Multiplanar reformats were obtained. Dose reduction techniques were used.   CONTRAST: 100 mL Isovue 370    FINDINGS:     LUNGS AND PLEURA: Nodular thickening of the right major and right minor fissure is has increased. A pleural nodule in the anterior right upper lobe measures 6 x 4 mm on series 4 image 81, previously not appreciable. There is also a pleural nodule along   the mediastinal pleural surface in the medial right upper lobe measuring 10 x 3 mm on series 4 image 84, previously also not seen. A small groundglass opacity in the medial right upper lobe adjacent to this medial pleural nodule is new, indeterminate.   Biapical pleural parenchymal scarring with multiple peripheral bulla redemonstrated. No large pneumonic consolidation, pleural effusion or pneumothorax.    MEDIASTINUM/AXILLAE: Mediastinal and right hilar lymphadenopathy has increased. For example, a right paratracheal lymph node measures 3 x 1.9 cm on series 4 image 69, previously 2.5 x 1.6 cm. No left hilar, axillary or supraclavicular lymphadenopathy.    CORONARY ARTERY CALCIFICATION: Severe three-vessel coronary artery atherosclerotic calcification present.    HEPATOBILIARY: Liver is normal in size with multiple benign-appearing cysts measuring up to 2 cm in the right hepatic lobe. Gallbladder is fairly decompressed and normal.    PANCREAS: Normal.    SPLEEN: Normal.    ADRENAL GLANDS: Normal.    KIDNEYS: Mild right hydronephrosis due to UPJ obstruction appears progressed since 2021. No right hydroureter. No urinary stones. Left extrarenal pelvis is present. No left hydroureteronephrosis. Bladder is normal.    BOWEL: No focal bowel thickening or obstruction.    LYMPH NODES: Normal.    VASCULATURE: Moderate aortoiliac atherosclerotic calcification. A 3.1 cm infrarenal abdominal aortic aneurysm is not significantly changed.    MUSCULOSKELETAL:  Scattered mild degenerative disc space narrowing in the thoracic and lumbar spine. No suspicious osseous lesions or acute fractures. An intramuscular lipoma is seen in the right thigh muscle measuring up to 7 cm, increased from 2021 when   it measured 5.3 cm.    OTHER: Mild prostate enlargement.  Impression: IMPRESSION:    1.  Increase mediastinal and right hilar lymphadenopathy compatible with progression of kenn metastasis.    2.  Increased nodularity along the right pulmonary fissures as well as new pleural nodules, highly suspicious for progression of pleural metastatic disease.    3.  A new small focal groundglass opacity is seen in the medial right upper lobe adjacent to a new pleural nodule, nonspecific for an infectious or inflammatory process versus early lymphangitic spread of disease.    4.  No subdiaphragmatic metastasis identified.    5.  Interval development of mild right hydronephrosis due to right UPJ obstruction.    6.  Stable 3.1 cm infrarenal abdominal aortic aneurysm.     I personally reviewed the above images today.

## 2023-11-08 NOTE — Clinical Note
11/8/2023         RE: Antonio Sharpe  [address] [place] [address]        Dear Colleague,    Thank you for referring your patient, Antonio Sharpe, to the [facility]. Please see a copy of my visit note below.    Virtual Visit Details    Type of service:  Telephone Visit   Phone call duration: *** minutes        [facility]CER [facility]    PATIENT NAME: Antonio Sharpe  MRN # [id number]   DATE OF VISIT: November 8, 2023  YOB: 1965     Referring Provider: Dr. Aditya Swartz, Otolaryngology  PCP: None    CANCER TYPE: SCC L mandible/alveolar ridge, buccal mucosa  STAGE: [id number]  ECOG PS: 0    PD-L1: 2% on L cheek lesion 9/2021  NGS:     SUMMARY  4/28/20 Left marginal mandibulectomy, WLE of the left buccal mucosa, left posterior maxillectomy, left partial pharyngectomy, left selective neck dissection, right radial forearm free flap, mandibular plating, tracheostomy, and feeding tube placement (Dr. Triana at [facility]). Path: SCC, 5.7 cm moderately differentiated, DOI 1.5 cm, + PNI, no LVI, negative margins, 5/33 nodes positive, + MARIA ELENA  6/30~8/17/20 Chemoradiation 6600 cGy with HD cisplatin (Dr. Serna at [facility])  Care was at [facility] with Dr. Tarango, Dr. Christ Stewart (ENT), Dr. Kong Reece (Otolaryngology at [facility]), and Dr. Serna (Radiation Oncology)    8/11/21 CT neck. 3 x 2.8 x 3.6 cm mass at the reconstruction flap within the subcutaneous fat of the L cheek abutting masseter medially and skin laterally, extending to parotid, abutting carotid.   8/16/21 FNA L cheek. Path: Highly suspicious for SCC  8/26/21 PET/CT.   9/2/21 MRI neck.   9/8/21 FNA L cheek lesion (Dr. Swartz). Path: SCC. TPS 2%   10/21/21~2/15/22 C1-6 carboplatin 5FU pembrolizumab c/b severe mucositis. C2 pembro alone due to ongoing resolving mucositis. DYPD testing negative. 5FU dose reduced 50% for C3-6. 5FU omitted C5-6.  3/8/22 PET/CT. Decreased size and FDG uptake of the  mass along the temporalis muscle, deep to the L zygomatic arch, 1.5  1.3 cm (SUV 10.7), peristent linear lucency on CT immediately underneath the anterior cortical bone of the anterior mandible at the  Midline (SUV 10.5), extending to the medullary cavity of the anterior mandible where there is subtle corresponding lucency, similar to prior, likely post surgical. Unchanged thyroid nodule. Gastric fundus thickening which can be seen with gastritis, recommend direct visualization, focal anorectal uptake, likely physiologic, attention on follow up  4/11~5/3/22 Chemoradiation with weekly carboplatin paclitaxel, intended 70 Gy, 35 fractions (Dr. Amezquita, summary 6/11/23). No-showed starting the 4th week of radiation, hence received 3400 cGy to residual tumor, 3060 cGy high risk tissue. Turned out he lost insurance coverage.  12/19/22 Re-established care with Dr. Swartz after developing R jaw pain.   12/19/22 CT neck. 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 Increased subcutaneous edema L lower face and submandibular space. Effaced fat planes. Questionable 3.4 x 1.4 cm rim enhancing area in the L submandibular space. Treated with vitamin D and pentoxifylline. Infected hardware April-May 2023 treated with antibiotics.  5/11~5/12/23 [facility] for facial swelling. Found to have L facial abscess s/p I&D  6/5/23 PET/CT. Changes felt to be more related to infection/inflammation based on changes since 5/11/23 CT. However, had new enlarged R paratracheal, subcarinal, R hilar adenopathy (SUB 16.85 in R paratracheal node, 6.6 subcarinal, 3.91 R hilar). EBUS recommended.  7/19/23 Bronch, EBUS (Dr. Santos) with conscious sedation. 4R biopsied. Limited LN evaluation due to limitations from trismus and osteo. Path: Non diagnostic.  8/1/23 CT chest.  Increased conspicuity of R minor fissure nodules, including 5 x 3 mm nodule, previously 3 x 3 mm. 7 x 5 mm R major fissure nodule. Additional scattered nodules are larger compared to 4/17/23. 1.8 cm R paratracheal LN. 1.5 cm area of peripherally enhancing hypoattenuation in the mediastinum posterior to the trachea corresponding to FDG avid are on PET 6/5/23. Unchanged 9-10 mm R hilar node. Gastric mucosa thickening. 6 mm tail of pancreas nodule  8/17/23 Bronch, EBUS under general anesthesia (Dr. Holm). 4R biopsied. Path: SCC. TPS <1%    ASSESSMENT AND PLAN   SCC oral cavity, recurrent, now bx proven metastatic disease, TPS <1%: Question of residual disease in the L  space, potentially obscured by infection on June PET/CT. Bx proven mediastinal dz. Ongoing exposed plate and fistula over the zygoma. Relatively slow growth of the 4R node over the last 4 months, doubled in size in the short axis compared to Dec 2022 (7.6 --> 17.9 mm) and almost no change between the June 5 PET/CT and the CT chest 8/1. Given this slow pace of change, lack of symptoms and other issues, options are to 1) Wait. The 4R LN is not going to cause problems in the next couple of months. Starting chemo is not going to help the fistula at all and is going to increase the risk of infection 2) Consider weekly carbo paclitaxel + pembrolizumab; 3) Consider pembro alone given TPS >1% in 2021 (not preferred, but an option nonetheless). CPS <1%, consideration can be given to the Extreme regimen, but 5FU is not an option due to the terrible mucositis. At the end of our discussion, he prefers/is ok with waiting a little bit linger. Will restage in about a month in Oct with CT neck and CT chest/abd.    Screening for hypothyroidism: TSH 1.51 on 6/5/23. Check in Dec.     Fistula: Needs another dressing order.     Port: Likely not functional, will check next visit with labs. He'd rather wait than address now. Remove if not working.      Hearing  "loss: Chronic. Precludes cisplatin    Tobacco dependence, ETOH: Not discussed today, will discuss next visit.     *** minutes spent on the date of the encounter doing {2021 E&M time in:[id number]}     RAMON Sharpe returns for follow up. Lost to follow up with me despite calls, etc., due to lack of insurance coverage. I last saw him in April 2022 prior to starting chemoradiation, which he was not able to complete due to the loss of insurance.     Doing fine other than the fistula.   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 Treated at [facility] C spine  Appy 1971  Hearing loss L ear after radiation s/p myringotomy  Chronic tinnitus secondary to cisplatin  Hemorrhoids    CURRENT OUTPATIENT MEDICATIONS  Reviewed    ALLERGIES  No Known Allergies     PHYSICAL EXAM  Ht 1.778 m (5' 10\")   Wt 81.3 kg (179 lb 3.2 oz)   BMI 25.71 kg/m    GEN: NAD  HEENT: EOMI, no icterus, injection or pallor. Fistula - exposed hardware but looks c/d/i  EXT: no edema  NEURO: alert    LABORATORY AND IMAGING STUDIES    Labs 6.5.23 and 5./12./23 were independently reviewed and interpreted by me    CT Chest Abdomen w Contrast  Narrative: EXAM: CT CHEST ABDOMEN W CONTRAST  LOCATION: [facility]  DATE: 11/1/2023    INDICATION: Restaging for squamous cell carcinoma of the left mandible status post limited chemoradiation treatment in 2022 due to loss of insurance. Disease recurrence and infection in 2023; biopsy proven mediastinal metastatic disease in Aug 2023.  COMPARISON: Chest CT on 08/01/2023; CT chest, abdomen and pelvis on 12/29/2021  TECHNIQUE: CT scan of the chest and abdomen was performed following injection of IV contrast. Pelvis " was incidentally included in the scanning field. Multiplanar reformats were obtained. Dose reduction techniques were used.   CONTRAST: 100 mL Isovue 370    FINDINGS:     LUNGS AND PLEURA: Nodular thickening of the right major and right minor fissure is has increased. A pleural nodule in the anterior right upper lobe measures 6 x 4 mm on series 4 image 81, previously not appreciable. There is also a pleural nodule along   the mediastinal pleural surface in the medial right upper lobe measuring 10 x 3 mm on series 4 image 84, previously also not seen. A small groundglass opacity in the medial right upper lobe adjacent to this medial pleural nodule is new, indeterminate.   Biapical pleural parenchymal scarring with multiple peripheral bulla redemonstrated. No large pneumonic consolidation, pleural effusion or pneumothorax.    MEDIASTINUM/AXILLAE: Mediastinal and right hilar lymphadenopathy has increased. For example, a right paratracheal lymph node measures 3 x 1.9 cm on series 4 image 69, previously 2.5 x 1.6 cm. No left hilar, axillary or supraclavicular lymphadenopathy.    CORONARY ARTERY CALCIFICATION: Severe three-vessel coronary artery atherosclerotic calcification present.    HEPATOBILIARY: Liver is normal in size with multiple benign-appearing cysts measuring up to 2 cm in the right hepatic lobe. Gallbladder is fairly decompressed and normal.    PANCREAS: Normal.    SPLEEN: Normal.    ADRENAL GLANDS: Normal.    KIDNEYS: Mild right hydronephrosis due to UPJ obstruction appears progressed since 2021. No right hydroureter. No urinary stones. Left extrarenal pelvis is present. No left hydroureteronephrosis. Bladder is normal.    BOWEL: No focal bowel thickening or obstruction.    LYMPH NODES: Normal.    VASCULATURE: Moderate aortoiliac atherosclerotic calcification. A 3.1 cm infrarenal abdominal aortic aneurysm is not significantly changed.    MUSCULOSKELETAL: Scattered mild degenerative disc space narrowing in the  thoracic and lumbar spine. No suspicious osseous lesions or acute fractures. An intramuscular lipoma is seen in the right thigh muscle measuring up to 7 cm, increased from 2021 when   it measured 5.3 cm.    OTHER: Mild prostate enlargement.  Impression: IMPRESSION:    1.  Increase mediastinal and right hilar lymphadenopathy compatible with progression of kenn metastasis.    2.  Increased nodularity along the right pulmonary fissures as well as new pleural nodules, highly suspicious for progression of pleural metastatic disease.    3.  A new small focal groundglass opacity is seen in the medial right upper lobe adjacent to a new pleural nodule, nonspecific for an infectious or inflammatory process versus early lymphangitic spread of disease.    4.  No subdiaphragmatic metastasis identified.    5.  Interval development of mild right hydronephrosis due to right UPJ obstruction.    6.  Stable 3.1 cm infrarenal abdominal aortic aneurysm.            Virtual Visit Details    Type of service:  Telephone Visit   Phone call duration: 21 minutes        [facility]    PATIENT NAME: Antonio Sharpe  MRN # [id number]   DATE OF VISIT: November 8, 2023  YOB: 1965     Referring Provider: Dr. Aditya Swartz, Otolaryngology  PCP: None    CANCER TYPE: SCC L mandible/alveolar ridge, buccal mucosa  STAGE: [id number]  ECOG PS: 0    PD-L1: 2% on L cheek lesion 9/2021  NGS:     SUMMARY  4/28/20 Left marginal mandibulectomy, WLE of the left buccal mucosa, left posterior maxillectomy, left partial pharyngectomy, left selective neck dissection, right radial forearm free flap, mandibular plating, tracheostomy, and feeding tube placement (Dr. Triana at [facility]). Path: SCC, 5.7 cm moderately differentiated, DOI 1.5 cm, + PNI, no LVI, negative margins, 5/33 nodes positive, + MARIA ELENA  6/30~8/17/20 Chemoradiation 6600 cGy with HD cisplatin (Dr. Serna at [facility])  Care was at [facility] with Dr. Tarango, Dr. Christ Stewart  (ENT), Dr. Kong Reece (Otolaryngology at [facility]), and Dr. Serna (Radiation Oncology)    8/11/21 CT neck. 3 x 2.8 x 3.6 cm mass at the reconstruction flap within the subcutaneous fat of the L cheek abutting masseter medially and skin laterally, extending to parotid, abutting carotid.   8/16/21 FNA L cheek. Path: Highly suspicious for SCC  8/26/21 PET/CT.   9/2/21 MRI neck.   9/8/21 FNA L cheek lesion (Dr. Swartz). Path: SCC. TPS 2%   10/21/21~2/15/22 C1-6 carboplatin 5FU pembrolizumab c/b severe mucositis. C2 pembro alone due to ongoing resolving mucositis. DYPD testing negative. 5FU dose reduced 50% for C3-6. 5FU omitted C5-6.  3/8/22 PET/CT. Decreased size and FDG uptake of the mass along the temporalis muscle, deep to the L zygomatic arch, 1.5  1.3 cm (SUV 10.7), peristent linear lucency on CT immediately underneath the anterior cortical bone of the anterior mandible at the  Midline (SUV 10.5), extending to the medullary cavity of the anterior mandible where there is subtle corresponding lucency, similar to prior, likely post surgical. Unchanged thyroid nodule. Gastric fundus thickening which can be seen with gastritis, recommend direct visualization, focal anorectal uptake, likely physiologic, attention on follow up  4/11~5/3/22 Chemoradiation with weekly carboplatin paclitaxel, intended 70 Gy, 35 fractions (Dr. Amezquita, summary 6/11/23). No-showed starting the 4th week of radiation, hence received 3400 cGy to residual tumor, 3060 cGy high risk tissue. Turned out he lost insurance coverage.  12/19/22 Re-established care with Dr. Swartz after developing R jaw pain.   12/19/22 CT neck. 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 Increased subcutaneous edema L lower face and submandibular space. Effaced fat planes.  Questionable 3.4 x 1.4 cm rim enhancing area in the L submandibular space. Treated with vitamin D and pentoxifylline. Infected hardware April-May 2023 treated with antibiotics.  5/11~5/12/23 [facility] for facial swelling. Found to have L facial abscess s/p I&D  6/5/23 PET/CT. Changes felt to be more related to infection/inflammation based on changes since 5/11/23 CT. However, had new enlarged R paratracheal, subcarinal, R hilar adenopathy (SUB 16.85 in R paratracheal node, 6.6 subcarinal, 3.91 R hilar). EBUS recommended.  7/19/23 Bronch, EBUS (Dr. Santos) with conscious sedation. 4R biopsied. Limited LN evaluation due to limitations from trismus and osteo. Path: Non diagnostic.  8/1/23 CT chest. Increased conspicuity of R minor fissure nodules, including 5 x 3 mm nodule, previously 3 x 3 mm. 7 x 5 mm R major fissure nodule. Additional scattered nodules are larger compared to 4/17/23. 1.8 cm R paratracheal LN. 1.5 cm area of peripherally enhancing hypoattenuation in the mediastinum posterior to the trachea corresponding to FDG avid are on PET 6/5/23. Unchanged 9-10 mm R hilar node. Gastric mucosa thickening. 6 mm tail of pancreas nodule  8/17/23 Bronch, EBUS under general anesthesia (Dr. Holm). 4R biopsied. Path: SCC. TPS <1%    ASSESSMENT AND PLAN   SCC oral cavity, recurrent, now bx proven metastatic disease, TPS <1%:   Reviewed CT chest/abdomen and CT neck with Jeremi. Imaging previously reviewed with Dr. Buckley as well. There's been growth in the mediastinal and left hilar LN, as well as a subcarinal LN enlarging since August imaging. At this point systemic treatment is recommended. He is due for ENT follow-up with Lory on 11/13 to evaluate fistula. If concerns for ongoing infection, would recommend pembro alone but suspect this would be less effective. Ideally we would like to begin weekly carbo paclitaxel + pembro which Jeremi is agreeable to. Alternatively we could consider radiation to the mediastinum. Jeremi is planning  "to travel to [place] for a month from [facility]. We'll plan to touch base by phone next week following his ENT appointment to determine our next steps for treatment.     Screening for hypothyroidism: TSH 1.51 on 6/5/23. Check in Dec or serially if we start pembro.     Fistula: Stable per report but unable to evaluate due to phone visit. ENT apt 11/13.     Port: Likely not functional, will check next visit with labs. He'd rather wait than address now. Remove if not working.      Hearing loss: Chronic. Precludes cisplatin    Tobacco dependence, ETOH: Still drinking but did not discuss in detail today.    30 minutes spent on the date of the encounter doing chart review, review of test results, interpretation of tests, and documentation, in addition to 21 minutes spent on the phone with the patient.     Yaquelin Tovar, RAMON Alvarez is called today for close follow-up and review of recent CT imaging.  -Generally feels okay but is having some pain on his right side, around his lateral ribcage along his nipple line. Feels the rib is displaced. Seeing a chiropractor for this  -No fevers/chills  -Notes hardware in jaw more exposed, can now see 7 screws rather than 6. Drains consistently. Occasionally can express pus superior to this area. No redness, swelling or pain in face recently. 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 Well sounding " in no distress. Normal speech and thought process. Good voice quality. No audible wheezing or cough.    LABORATORY AND IMAGING STUDIES   Most Recent 3 CBC's:  Recent Labs   Lab Test 05/12/23  0621 05/11/23  1025 05/09/22  0836 05/02/22  0707   WBC 6.1 9.7 3.4* 3.3*   HGB 13.3 15.2 12.7* 13.0*   MCV 97 95 102* 103*    248 157 167   ANEUTAUTO  --  8.0 2.4 2.2    Most Recent 3 BMP's:  Recent Labs   Lab Test 05/12/23  0633 05/12/23  0621 05/11/23  1033 05/11/23  1025 05/09/22  0836 05/02/22  0707 04/25/22  1012   NA  --  134*  --  131* 136 137 135   POTASSIUM  --  4.0  --  4.9 4.5 4.2 4.4   CHLORIDE  --  97*  --  94* 105 103 101   CO2  --  26  --  25 27 27 26   BUN  --  12.8  --  16.9 18 22 18   CR  --  0.78 1.0 0.88 0.82 0.85 0.84   ANIONGAP  --  11  --  12 4 7 8   PRINCE  --  9.5  --  10.3* 9.0 9.2 9.0   * 112*  --  110* 88 105* 133*   PROTTOTAL  --   --   --   --  6.6* 6.6* 7.0   ALBUMIN  --   --   --   --  3.2* 3.3* 3.4    Most Recent 2 LFT's:  Recent Labs   Lab Test 05/09/22  0836 05/02/22  0707   AST 25 26   ALT 31 34   ALKPHOS 53 58   BILITOTAL 0.3 0.3    Most Recent TSH and T4:  Recent Labs   Lab Test 06/05/23  1438 09/22/21  1039   TSH 1.51 1.54   T4  --  0.78     Phos/Mag:  Lab Results   Component Value Date    PHOS 3.0 05/12/2023    MAG 1.9 05/12/2023    MAG 1.8 05/09/2022    MAG 1.8 05/02/2022      I reviewed the above labs today.    CT Chest Abdomen w Contrast  Narrative: EXAM: CT CHEST ABDOMEN W CONTRAST  LOCATION: [facility]  DATE: 11/1/2023    INDICATION: Restaging for squamous cell carcinoma of the left mandible status post limited chemoradiation treatment in 2022 due to loss of insurance. Disease recurrence and infection in 2023; biopsy proven mediastinal metastatic disease in Aug 2023.  COMPARISON: Chest CT on 08/01/2023; CT chest, abdomen and pelvis on 12/29/2021  TECHNIQUE: CT scan of the chest and abdomen was performed following injection of IV contrast. Pelvis  thoracic and lumbar spine. No suspicious osseous lesions or acute fractures. An intramuscular lipoma is seen in the right thigh muscle measuring up to 7 cm, increased from 2021 when   it measured 5.3 cm.    OTHER: Mild prostate enlargement.  Impression: IMPRESSION:    1.  Increase mediastinal and right hilar lymphadenopathy compatible with progression of kenn metastasis.    2.  Increased nodularity along the right pulmonary fissures as well as new pleural nodules, highly suspicious for progression of pleural metastatic disease.    3.  A new small focal groundglass opacity is seen in the medial right upper lobe adjacent to a new pleural nodule, nonspecific for an infectious or inflammatory process versus early lymphangitic spread of disease.    4.  No subdiaphragmatic metastasis identified.    5.  Interval development of mild right hydronephrosis due to right UPJ obstruction.    6.  Stable 3.1 cm infrarenal abdominal aortic aneurysm.     I personally reviewed the above images today.           Again, thank you for allowing me to participate in the care of your patient.        Sincerely,        Yaquelin Tovar, CNP

## 2023-11-13 NOTE — LETTER
11/13/2023       RE: Antonio Sharpe  [address] [place] [address]     Dear Colleague,    Thank you for referring your patient, Antonio Sharpe, to the [facility] [place] at [facility]. Please see a copy of my visit note below.    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 Followed by definitive chemoradiation that could not be completed due to side effects. 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 Follow with Dr. Swartz. Due to wound care needs and documentation, patient requires follow up in ENT clinic monthly for wound care supply coverage.    Interval History:   Patient comes in today for monthly visit. Recently had drainage from the left check abscess. Drained purulent drainage for about 2 days. This tends to occur about monthly. Today, patient denies any drainage. Patient is out of Mepilex wound care supplies. Reports that exposed hardware is worsening at the anterior aspect of the hardware. Patient has been following with oncology for palliative treatment. Patient is planning to go to [place] for the next 1.5-2 months.     Past Medical History:  Past Medical History:   Diagnosis Date     Squamous cell carcinoma of mandible (H)      Testicular cancer (H)        Past Surgical History:  Past Surgical History:   Procedure Laterality Date     BRONCHOSCOPY RIDID OR FLEXIBLE W/ENDOBRONCHIAL ULTRASOUND GUIDED 1 OR 2 NODE STATIONS N/A 7/19/2023    Procedure: BRONCHOSCOPY, WITH BIOPSY OF 1 OR 2 LYMPH NODE STATIONS WITH ENDOBRONCHIAL ULTRASOUND GUIDANCE;  Surgeon: Abbi Santos MD;  Location:  GI     BRONCHOSCOPY RIDID OR FLEXIBLE W/ENDOBRONCHIAL ULTRASOUND GUIDED 1 OR 2 NODE STATIONS N/A 8/17/2023     "Procedure: BRONCHOSCOPY, WITH BIOPSY OF 1 OR 2 LYMPH NODE STATIONS WITH ENDOBRONCHIAL ULTRASOUND GUIDANCE;  Surgeon: Chris Holm MD;  Location: [facility] OR       Medications:  Current Outpatient Medications   Medication Sig Dispense Refill     acetaminophen (TYLENOL) 325 MG tablet Take 2 tablets (650 mg) by mouth every 4 hours as needed for mild pain or fever 90 tablet 0     amoxicillin-clavulanate (AUGMENTIN) 875-125 MG tablet Take 1 tablet by mouth every 12 hours for 10 days 20 tablet 0     chlorhexidine (PERIDEX) 0.12 % solution Swish and spit 10 mLs in mouth 4 times daily 1200 mL 4     ibuprofen (ADVIL/MOTRIN) 200 MG tablet Take 600 mg by mouth every 6 hours as needed for mild pain       Wound Dressings (MEPILEX BORDER FLEX) PADS Externally apply 1 Application topically 4 times daily as needed 180 each 10     pentoxifylline ER (TRENTAL) 400 MG CR tablet Take 1 tablet (400 mg) by mouth 2 times daily (Patient not taking: Reported on 9/13/2023) 120 tablet 11     vitamin E (TOCOPHEROL) 400 units (180 mg) capsule Take 400 Units by mouth daily (Patient not taking: Reported on 9/13/2023)         Allergies:  No Known Allergies     Social History:  Social History     Tobacco Use     Smoking status: Every Day     Packs/day: 1     Types: Cigarettes     Start date: 1976     Smokeless tobacco: Never   Substance Use Topics     Alcohol use: Yes     Comment: case a week     Drug use: Not Currently     ROS: 10 point ROS neg other than the symptoms noted above in the HPI.    Physical Exam:    Ht 1.778 m (5' 10\")   Wt 79.4 kg (175 lb)   BMI 25.11 kg/m    Wt Readings from Last 3 Encounters:   11/13/23 79.4 kg (175 lb)   11/08/23 81.3 kg (179 lb 3.2 oz)   09/13/23 82.3 kg (181 lb 6.4 oz)        Constitutional:  The patient was unaccompanied, well-groomed, and in no acute distress.     Neurologic: Alert and oriented x 3.     Psychiatric: The patient's affect was calm, cooperative, and appropriate.     Respiratory: Breathing " comfortably without stridor or exertion of accessory muscles.    Head/Face:  Small amount of scabbing on left zygoma without redness, swelling, or drainage.   Oral Cavity: Left mandibular exposed plate with purulent drainage.      Assessment/Plan:  1. 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 Due to recently history of purulent drainage from fistula, will order Augmentin to prevent any recurrent drainage. Will update Oncology regarding recent infection.     Dr. Swartz assessed patient today and will continue to monitor exposed hardware. Patient requires twice daily dressing changes with mepilex dressings due to excessive drainage. Orders and documentation provided to patient for dressing prescription.      Lory Fonseca DNP, APRN, CNP  Otolaryngology  Head & Neck Surgery  [phone]    20 minutes spent on the date of the encounter doing chart review, history and exam, documentation and further activities per the note.         Again, thank you for allowing me to participate in the care of your patient.      Sincerely,    Molly Fonseca, NP

## 2023-11-13 NOTE — PROGRESS NOTES
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 Followed by definitive chemoradiation that could not be completed due to side effects. 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 Follow with Dr. Swartz. Due to wound care needs and documentation, patient requires follow up in ENT clinic monthly for wound care supply coverage.    Interval History:   Patient comes in today for monthly visit. Recently had drainage from the left check abscess. Drained purulent drainage for about 2 days. This tends to occur about monthly. Today, patient denies any drainage. Patient is out of Mepilex wound care supplies. Reports that exposed hardware is worsening at the anterior aspect of the hardware. Patient has been following with oncology for palliative treatment. Patient is planning to go to [place] for the next 1.5-2 months.     Past Medical History:  Past Medical History:   Diagnosis Date     Squamous cell carcinoma of mandible (H)      Testicular cancer (H)        Past Surgical History:  Past Surgical History:   Procedure Laterality Date     BRONCHOSCOPY RIDID OR FLEXIBLE W/ENDOBRONCHIAL ULTRASOUND GUIDED 1 OR 2 NODE STATIONS N/A 7/19/2023    Procedure: BRONCHOSCOPY, WITH BIOPSY OF 1 OR 2 LYMPH NODE STATIONS WITH ENDOBRONCHIAL ULTRASOUND GUIDANCE;  Surgeon: Abbi Santos MD;  Location:  GI     BRONCHOSCOPY RIDID OR FLEXIBLE W/ENDOBRONCHIAL ULTRASOUND GUIDED 1 OR 2 NODE STATIONS N/A 8/17/2023    Procedure: BRONCHOSCOPY, WITH BIOPSY OF 1 OR 2 LYMPH NODE STATIONS WITH ENDOBRONCHIAL ULTRASOUND GUIDANCE;  Surgeon: Chris Holm MD;  Location:  OR       Medications:  Current Outpatient Medications   Medication Sig Dispense Refill     acetaminophen (TYLENOL) 325 MG tablet Take 2 tablets (650 mg) by mouth every 4 hours as needed  "for mild pain or fever 90 tablet 0     amoxicillin-clavulanate (AUGMENTIN) 875-125 MG tablet Take 1 tablet by mouth every 12 hours for 10 days 20 tablet 0     chlorhexidine (PERIDEX) 0.12 % solution Swish and spit 10 mLs in mouth 4 times daily 1200 mL 4     ibuprofen (ADVIL/MOTRIN) 200 MG tablet Take 600 mg by mouth every 6 hours as needed for mild pain       Wound Dressings (MEPILEX BORDER FLEX) PADS Externally apply 1 Application topically 4 times daily as needed 180 each 10     pentoxifylline ER (TRENTAL) 400 MG CR tablet Take 1 tablet (400 mg) by mouth 2 times daily (Patient not taking: Reported on 9/13/2023) 120 tablet 11     vitamin E (TOCOPHEROL) 400 units (180 mg) capsule Take 400 Units by mouth daily (Patient not taking: Reported on 9/13/2023)         Allergies:  No Known Allergies     Social History:  Social History     Tobacco Use     Smoking status: Every Day     Packs/day: 1     Types: Cigarettes     Start date: 1976     Smokeless tobacco: Never   Substance Use Topics     Alcohol use: Yes     Comment: case a week     Drug use: Not Currently     ROS: 10 point ROS neg other than the symptoms noted above in the HPI.    Physical Exam:    Ht 1.778 m (5' 10\")   Wt 79.4 kg (175 lb)   BMI 25.11 kg/m    Wt Readings from Last 3 Encounters:   11/13/23 79.4 kg (175 lb)   11/08/23 81.3 kg (179 lb 3.2 oz)   09/13/23 82.3 kg (181 lb 6.4 oz)        Constitutional:  The patient was unaccompanied, well-groomed, and in no acute distress.     Neurologic: Alert and oriented x 3.     Psychiatric: The patient's affect was calm, cooperative, and appropriate.     Respiratory: Breathing comfortably without stridor or exertion of accessory muscles.    Head/Face:  Small amount of scabbing on left zygoma without redness, swelling, or drainage.   Oral Cavity: Left mandibular exposed plate with purulent drainage.      Assessment/Plan:  1. 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 Due to recently history of purulent drainage from fistula, will order Augmentin to prevent any recurrent drainage. Will update Oncology regarding recent infection.     Dr. Swartz assessed patient today and will continue to monitor exposed hardware. Patient requires twice daily dressing changes with mepilex dressings due to excessive drainage. Orders and documentation provided to patient for dressing prescription.      Lory Fonseca DNP, APRN, CNP  Otolaryngology  Head & Neck Surgery  [phone]    20 minutes spent on the date of the encounter doing chart review, history and exam, documentation and further activities per the note.

## 2023-11-13 NOTE — NURSING NOTE
"No chief complaint on file.      Height 1.778 m (5' 10\"), weight 79.4 kg (175 lb).    Melanie Wilkerson, EMT    "

## 2023-11-13 NOTE — LETTER
2023    RE: Antonio Sharpe  [address] [place] [address]  [place] [address]  : 1965  MRN: [id number]    To Whom It May Concern,    I am writing on behalf of my patient, Antonio Sharpe to document the medical necessity of 4x4 Mepilex bandages for the wound care treatment of osteoradionecrosis of the left mandible. This letter provides information about the patient's medical history and diagnosis and a statement summarizing my treatment rationale.     Summary of Patient History and Diagnosis  Exposed mandibular plate with chronic drainage due to severe osteoradionecrosis after marginal mandibulectomy with buccal resection, left posterior maxillectomy, left neck dissection and reconstruction with a right radial forearm free flap followed by radiation treatment for a left buccal cancer.       Treatment Rationale  4x4 mepilex dressings required to cover exposed hardware and mandible and absorb drainage. Due to excessive wound drainage, dressing changes are required 3 times daily.      Duration  Duration of wound care is indefinite at this time because patient is not a surgical candidate for treatment of osteoradionecrosis.     Summary  In summary, 90 4x4 mepilex dressings are needed per month for wound care that is medically necessary for this patient s medical condition. Please call my office at [phone] if I can provide you with any additional information to approve my request. I look forward to receiving your timely response and approval of this request.     Sincerely,    Molly Fonseca CNP

## 2023-11-14 NOTE — PROGRESS NOTES
Spoke to Antonio regarding medical necessity letter for his Mepilex prescription. Confirmed this needs to be faxed to [facility] in [place] (#[phone]). Both items have been faxed today and patient will reach out if he has further questions or concerns.     Meagan Putnam, RN, BSN  RN Care Coordinator, ENT Clinic  [facility]  Direct: [phone]

## 2023-12-21 NOTE — TELEPHONE ENCOUNTER
[facility] Call Center    Phone Message    May a detailed message be left on voicemail: yes     Reason for Call: Pt is in [place] and is having a problem with the infection again. Pt is having a lot of problem with pain. Please call to discuss. Thank you    Action Taken: Message routed to:  [facility] ([facility] ENT    Travel Screening: Not Applicable

## 2023-12-22 NOTE — TELEPHONE ENCOUNTER
Called and spoke to patient regarding Dr. Swartz placing orders for antibiotics and pain medication to patient's preferred pharmacy in [place]. Patient verbalized understanding and had no further questions or concerns.     [facility]   [address] [place] [address]    Meagan Putnam, RN, BSN  RN Care Coordinator, ENT Clinic  [facility]  Direct: [phone]

## 2023-12-22 NOTE — TELEPHONE ENCOUNTER
"  Nurse Triage SBAR    Is this a 2nd Level Triage? YES, LICENSED PRACTITIONER REVIEW IS REQUIRED    Situation: Patient calling in from [place] where he is staying currently, reporting symptoms of abscess infection returning and states \"MAJOR\" pain 8/10 L mandible region. Patient of Dr Swartz who patient states is well acquainted with this ongoing situation. Patient calling to request antibiotics and something stronger for pain. Extra strength Tylenol \"only putting a small dent in it\" and \"I can't be taking 3,000mg of Ibuprofen either I'll be gone in a month\".     Last office visit 11/13/23: Recently had drainage from the left check abscess. Drained purulent drainage for about 2 days. This tends to occur about monthly.     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 Followed by definitive chemoradiation that could not be completed due to side effects. 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 Follow with Dr. Swartz.     Assessment: Patient is very uncomfortable to the point where it is affecting eating and making sleep near impossible. He would like clinic to call him to prescribe something for pain and antibiotics for a returning infection.    Protocol Recommended Disposition:   Go To Office Now    Recommendation: Please call patient to advise on plan of care. He is in [place] and the only pharmacy that takes his insurance is 35 miles away and ONLY open until 2:30p today - then will not open again until Wednesday 12/27.    Routed to provider/clinic        Reason for Disposition   SEVERE pain (e.g., excruciating, unable to do any normal activities)    Additional Information   Negative: Shock suspected (e.g., cold/pale/clammy skin, too weak to stand, low BP, rapid pulse)   Negative: Similar pain previously and it was from 'heart attack'   Negative: Similar pain previously and it was from 'angina' " "and not relieved by nitroglycerin   Negative: Sounds like a life-threatening emergency to the triager   Negative: Chest pain   Negative: Sinus pain and congestion   Negative: Headache or pain in upper forehead   Negative: Toothache is main symptom   Negative: Followed a face injury   Negative: Difficulty breathing or unusual sweating (e.g., sweating without exertion)   Negative: Can't close the mouth fully (i.e., \"mouth is locked open\", patient will have difficulty talking)   Negative: Fever and localized red rash   Negative: Fever and area of face is swollen    Answer Assessment - Initial Assessment Questions  1. ONSET: \"When did the pain start?\" (e.g., minutes, hours, days)        About 2 weeks ago- progressively gotten worse      2. ONSET: \"Does the pain come and go, or has it been constant since it started?\" (e.g., constant, intermittent, fleeting)        Pretty consistent. Eating intensifies it      3. SEVERITY: \"How bad is the pain?\"   (Scale 1-10; mild, moderate or severe)    - MILD (1-3): doesn't interfere with normal activities     - MODERATE (4-7): interferes with normal activities or awakens from sleep     - SEVERE (8-10): excruciating pain, unable to do any normal activities           8/10       4. LOCATION: \"Where does it hurt?\"         Left lower mandible    5. RASH: \"Is there any redness, rash, or swelling of the face?\"        Started to swell from \"the infection\"- describes what sounds like an area that was abscess before      6. FEVER: \"Do you have a fever?\" If Yes, ask: \"What is it, how was it measured, and when did it start?\"         No      7. OTHER SYMPTOMS: \"Do you have any other symptoms?\" (e.g., fever, toothache, nasal discharge, nasal congestion, clicking sensation in jaw joint)        Chronic runny nose otherwise nothing out of ordinary    8. PREGNANCY: \"Is there any chance you are pregnant?\" \"When was your last menstrual period?\"        no    Protocols used: Face Pain-A-[place]    "

## 2024-01-01 ENCOUNTER — OFFICE VISIT (OUTPATIENT)
Dept: RADIATION ONCOLOGY | Facility: CLINIC | Age: 59
End: 2024-01-01
Attending: RADIOLOGY
Payer: COMMERCIAL

## 2024-01-01 ENCOUNTER — APPOINTMENT (OUTPATIENT)
Dept: CT IMAGING | Facility: CLINIC | Age: 59
End: 2024-01-01
Attending: PHYSICIAN ASSISTANT
Payer: COMMERCIAL

## 2024-01-01 ENCOUNTER — APPOINTMENT (OUTPATIENT)
Dept: CT IMAGING | Facility: CLINIC | Age: 59
End: 2024-01-01
Attending: EMERGENCY MEDICINE
Payer: COMMERCIAL

## 2024-01-01 ENCOUNTER — PATIENT OUTREACH (OUTPATIENT)
Dept: ONCOLOGY | Facility: CLINIC | Age: 59
End: 2024-01-01

## 2024-01-01 ENCOUNTER — NURSE TRIAGE (OUTPATIENT)
Dept: ONCOLOGY | Facility: CLINIC | Age: 59
End: 2024-01-01
Payer: COMMERCIAL

## 2024-01-01 ENCOUNTER — NURSE TRIAGE (OUTPATIENT)
Dept: NURSING | Facility: CLINIC | Age: 59
End: 2024-01-01

## 2024-01-01 ENCOUNTER — LAB (OUTPATIENT)
Dept: LAB | Facility: CLINIC | Age: 59
End: 2024-01-01
Attending: REGISTERED NURSE
Payer: COMMERCIAL

## 2024-01-01 ENCOUNTER — PATIENT OUTREACH (OUTPATIENT)
Dept: ONCOLOGY | Facility: CLINIC | Age: 59
End: 2024-01-01
Payer: COMMERCIAL

## 2024-01-01 ENCOUNTER — INFUSION THERAPY VISIT (OUTPATIENT)
Dept: ONCOLOGY | Facility: CLINIC | Age: 59
End: 2024-01-01
Attending: REGISTERED NURSE
Payer: COMMERCIAL

## 2024-01-01 ENCOUNTER — HOSPITAL ENCOUNTER (INPATIENT)
Facility: CLINIC | Age: 59
LOS: 3 days | Discharge: HOME | End: 2024-01-13
Attending: EMERGENCY MEDICINE | Admitting: STUDENT IN AN ORGANIZED HEALTH CARE EDUCATION/TRAINING PROGRAM
Payer: COMMERCIAL

## 2024-01-01 ENCOUNTER — ANCILLARY PROCEDURE (OUTPATIENT)
Dept: ULTRASOUND IMAGING | Facility: CLINIC | Age: 59
End: 2024-01-01
Payer: COMMERCIAL

## 2024-01-01 ENCOUNTER — HOSPITAL ENCOUNTER (EMERGENCY)
Facility: CLINIC | Age: 59
Discharge: HOME | End: 2024-01-31
Attending: EMERGENCY MEDICINE | Admitting: EMERGENCY MEDICINE
Payer: COMMERCIAL

## 2024-01-01 ENCOUNTER — TELEPHONE (OUTPATIENT)
Dept: ONCOLOGY | Facility: CLINIC | Age: 59
End: 2024-01-01

## 2024-01-01 ENCOUNTER — TELEPHONE (OUTPATIENT)
Dept: ONCOLOGY | Facility: CLINIC | Age: 59
End: 2024-01-01
Payer: COMMERCIAL

## 2024-01-01 ENCOUNTER — VIRTUAL VISIT (OUTPATIENT)
Dept: ONCOLOGY | Facility: CLINIC | Age: 59
End: 2024-01-01
Attending: INTERNAL MEDICINE
Payer: COMMERCIAL

## 2024-01-01 ENCOUNTER — DOCUMENTATION ONLY (OUTPATIENT)
Dept: CARE COORDINATION | Facility: CLINIC | Age: 59
End: 2024-01-01
Payer: COMMERCIAL

## 2024-01-01 ENCOUNTER — HOSPITAL ENCOUNTER (INPATIENT)
Facility: CLINIC | Age: 59
LOS: 6 days | End: 2024-02-13
Attending: EMERGENCY MEDICINE | Admitting: INTERNAL MEDICINE
Payer: COMMERCIAL

## 2024-01-01 ENCOUNTER — ONCOLOGY VISIT (OUTPATIENT)
Dept: RADIATION ONCOLOGY | Facility: CLINIC | Age: 59
End: 2024-01-01

## 2024-01-01 VITALS
TEMPERATURE: 97.5 F | HEIGHT: 68 IN | WEIGHT: 160.7 LBS | BODY MASS INDEX: 24.35 KG/M2 | SYSTOLIC BLOOD PRESSURE: 123 MMHG | DIASTOLIC BLOOD PRESSURE: 70 MMHG | RESPIRATION RATE: 16 BRPM | OXYGEN SATURATION: 86 % | HEART RATE: 66 BPM

## 2024-01-01 VITALS
HEART RATE: 91 BPM | HEIGHT: 69 IN | OXYGEN SATURATION: 96 % | WEIGHT: 188.5 LBS | TEMPERATURE: 97.8 F | RESPIRATION RATE: 16 BRPM | BODY MASS INDEX: 27.92 KG/M2 | SYSTOLIC BLOOD PRESSURE: 153 MMHG | DIASTOLIC BLOOD PRESSURE: 77 MMHG

## 2024-01-01 VITALS
HEART RATE: 88 BPM | BODY MASS INDEX: 26.74 KG/M2 | SYSTOLIC BLOOD PRESSURE: 126 MMHG | HEIGHT: 69 IN | TEMPERATURE: 98.3 F | WEIGHT: 180.56 LBS | RESPIRATION RATE: 18 BRPM | OXYGEN SATURATION: 93 % | DIASTOLIC BLOOD PRESSURE: 68 MMHG

## 2024-01-01 VITALS
TEMPERATURE: 97.4 F | RESPIRATION RATE: 16 BRPM | SYSTOLIC BLOOD PRESSURE: 139 MMHG | HEART RATE: 80 BPM | DIASTOLIC BLOOD PRESSURE: 82 MMHG | OXYGEN SATURATION: 96 %

## 2024-01-01 DIAGNOSIS — R60.0 BILATERAL LOWER EXTREMITY EDEMA: ICD-10-CM

## 2024-01-01 DIAGNOSIS — C78.89: ICD-10-CM

## 2024-01-01 DIAGNOSIS — C79.51 METASTATIC SQUAMOUS CELL CARCINOMA TO BONE (H): ICD-10-CM

## 2024-01-01 DIAGNOSIS — C06.9 SQUAMOUS CELL CARCINOMA OF ORAL CAVITY (H): Primary | ICD-10-CM

## 2024-01-01 DIAGNOSIS — C41.1 MALIGNANT NEOPLASM OF MANDIBLE (H): Primary | ICD-10-CM

## 2024-01-01 DIAGNOSIS — N17.9 ACUTE RENAL FAILURE, UNSPECIFIED ACUTE RENAL FAILURE TYPE (H): ICD-10-CM

## 2024-01-01 DIAGNOSIS — G89.3 CANCER ASSOCIATED PAIN: Primary | ICD-10-CM

## 2024-01-01 DIAGNOSIS — G89.3 CANCER ASSOCIATED PAIN: ICD-10-CM

## 2024-01-01 DIAGNOSIS — C79.9 MULTIPLE LESIONS OF METASTATIC MALIGNANCY (H): ICD-10-CM

## 2024-01-01 DIAGNOSIS — C79.51 MALIGNANT NEOPLASM METASTATIC TO BONE (H): Primary | ICD-10-CM

## 2024-01-01 DIAGNOSIS — D70.1 CHEMOTHERAPY-INDUCED NEUTROPENIA (H): ICD-10-CM

## 2024-01-01 DIAGNOSIS — C41.1 SQUAMOUS CELL CARCINOMA OF MANDIBLE (H): ICD-10-CM

## 2024-01-01 DIAGNOSIS — T45.1X5A CHEMOTHERAPY-INDUCED NEUTROPENIA (H): ICD-10-CM

## 2024-01-01 DIAGNOSIS — Z13.29 SCREENING FOR HYPOTHYROIDISM: ICD-10-CM

## 2024-01-01 DIAGNOSIS — C78.7 SECONDARY MALIGNANT NEOPLASM OF LIVER (H): ICD-10-CM

## 2024-01-01 DIAGNOSIS — G47.00 INSOMNIA, UNSPECIFIED TYPE: ICD-10-CM

## 2024-01-01 DIAGNOSIS — E87.5 HYPERKALEMIA: ICD-10-CM

## 2024-01-01 DIAGNOSIS — L02.01 FACIAL ABSCESS: ICD-10-CM

## 2024-01-01 DIAGNOSIS — C79.51 CANCER, METASTATIC TO BONE (H): Primary | ICD-10-CM

## 2024-01-01 DIAGNOSIS — C79.51 CANCER, METASTATIC TO BONE (H): ICD-10-CM

## 2024-01-01 DIAGNOSIS — E83.52 HYPERCALCEMIA: Primary | ICD-10-CM

## 2024-01-01 DIAGNOSIS — K59.03 DRUG-INDUCED CONSTIPATION: ICD-10-CM

## 2024-01-01 DIAGNOSIS — R18.0 MALIGNANT ASCITES (H): ICD-10-CM

## 2024-01-01 DIAGNOSIS — M54.9 BACK PAIN, UNSPECIFIED BACK LOCATION, UNSPECIFIED BACK PAIN LATERALITY, UNSPECIFIED CHRONICITY: ICD-10-CM

## 2024-01-01 DIAGNOSIS — C06.9 SQUAMOUS CELL CARCINOMA OF ORAL CAVITY (H): ICD-10-CM

## 2024-01-01 DIAGNOSIS — N17.9 AKI (ACUTE KIDNEY INJURY) (H): ICD-10-CM

## 2024-01-01 DIAGNOSIS — C79.51 METASTASIS TO BONE (H): ICD-10-CM

## 2024-01-01 DIAGNOSIS — G89.3 CANCER RELATED PAIN: Primary | ICD-10-CM

## 2024-01-01 DIAGNOSIS — M25.551 RIGHT HIP PAIN: ICD-10-CM

## 2024-01-01 DIAGNOSIS — E83.52 HYPERCALCEMIA: ICD-10-CM

## 2024-01-01 DIAGNOSIS — C79.51 MALIGNANT NEOPLASM METASTATIC TO BONE (H): ICD-10-CM

## 2024-01-01 DIAGNOSIS — E87.1 HYPONATREMIA: ICD-10-CM

## 2024-01-01 LAB
% LINING CELLS, BODY FLUID: 1 %
ABO/RH(D): NORMAL
ABSOLUTE NEUTROPHILS, BODY FLUID: 497.6 /UL
ABSOLUTE NEUTROPHILS, BODY FLUID: 86.8 /UL
ALBUMIN BODY FLUID SOURCE: NORMAL
ALBUMIN FLD-MCNC: 2.2 G/DL
ALBUMIN SERPL BCG-MCNC: 2.9 G/DL (ref 3.5–5.2)
ALBUMIN SERPL BCG-MCNC: 3 G/DL (ref 3.5–5.2)
ALBUMIN SERPL BCG-MCNC: 3.1 G/DL (ref 3.5–5.2)
ALBUMIN SERPL BCG-MCNC: 3.3 G/DL (ref 3.5–5.2)
ALBUMIN SERPL BCG-MCNC: 3.5 G/DL (ref 3.5–5.2)
ALBUMIN SERPL BCG-MCNC: 4 G/DL (ref 3.5–5.2)
ALBUMIN UR-MCNC: NEGATIVE MG/DL
ALP SERPL-CCNC: 106 U/L (ref 40–150)
ALP SERPL-CCNC: 138 U/L (ref 40–150)
ALP SERPL-CCNC: 145 U/L (ref 40–150)
ALP SERPL-CCNC: 151 U/L (ref 40–150)
ALP SERPL-CCNC: 161 U/L (ref 40–150)
ALP SERPL-CCNC: 165 U/L (ref 40–150)
ALT SERPL W P-5'-P-CCNC: 12 U/L (ref 0–70)
ALT SERPL W P-5'-P-CCNC: 15 U/L (ref 0–70)
ALT SERPL W P-5'-P-CCNC: 16 U/L (ref 0–70)
ALT SERPL W P-5'-P-CCNC: 20 U/L (ref 0–70)
ALT SERPL W P-5'-P-CCNC: 21 U/L (ref 0–70)
ALT SERPL W P-5'-P-CCNC: 22 U/L (ref 0–70)
ANION GAP SERPL CALCULATED.3IONS-SCNC: 10 MMOL/L (ref 7–15)
ANION GAP SERPL CALCULATED.3IONS-SCNC: 11 MMOL/L (ref 7–15)
ANION GAP SERPL CALCULATED.3IONS-SCNC: 13 MMOL/L (ref 7–15)
ANION GAP SERPL CALCULATED.3IONS-SCNC: 14 MMOL/L (ref 7–15)
ANION GAP SERPL CALCULATED.3IONS-SCNC: 15 MMOL/L (ref 7–15)
ANION GAP SERPL CALCULATED.3IONS-SCNC: 16 MMOL/L (ref 7–15)
ANION GAP SERPL CALCULATED.3IONS-SCNC: 17 MMOL/L (ref 7–15)
ANION GAP SERPL CALCULATED.3IONS-SCNC: 9 MMOL/L (ref 7–15)
ANION GAP SERPL CALCULATED.3IONS-SCNC: 9 MMOL/L (ref 7–15)
ANTIBODY SCREEN: NEGATIVE
APPEARANCE FLD: ABNORMAL
APPEARANCE FLD: ABNORMAL
APPEARANCE UR: CLEAR
APTT PPP: 40 SECONDS (ref 22–38)
APTT PPP: 88 SECONDS (ref 22–38)
AST SERPL W P-5'-P-CCNC: 29 U/L (ref 0–45)
AST SERPL W P-5'-P-CCNC: 33 U/L (ref 0–45)
AST SERPL W P-5'-P-CCNC: 39 U/L (ref 0–45)
AST SERPL W P-5'-P-CCNC: 54 U/L (ref 0–45)
AST SERPL W P-5'-P-CCNC: 56 U/L (ref 0–45)
AST SERPL W P-5'-P-CCNC: 56 U/L (ref 0–45)
ATRIAL RATE - MUSE: 87 BPM
ATRIAL RATE - MUSE: 88 BPM
BACTERIA BLD CULT: NO GROWTH
BACTERIA BLD CULT: NO GROWTH
BACTERIA FLD CULT: NO GROWTH
BASOPHILS # BLD AUTO: 0 10E3/UL (ref 0–0.2)
BASOPHILS # BLD AUTO: 0.1 10E3/UL (ref 0–0.2)
BASOPHILS NFR BLD AUTO: 0 %
BILIRUB DIRECT SERPL-MCNC: 0.64 MG/DL (ref 0–0.3)
BILIRUB SERPL-MCNC: 0.3 MG/DL
BILIRUB SERPL-MCNC: 0.3 MG/DL
BILIRUB SERPL-MCNC: 0.4 MG/DL
BILIRUB SERPL-MCNC: 0.8 MG/DL
BILIRUB SERPL-MCNC: 1 MG/DL
BILIRUB SERPL-MCNC: 1.2 MG/DL
BILIRUB UR QL STRIP: NEGATIVE
BUN SERPL-MCNC: 14.1 MG/DL (ref 8–23)
BUN SERPL-MCNC: 14.8 MG/DL (ref 8–23)
BUN SERPL-MCNC: 14.9 MG/DL (ref 8–23)
BUN SERPL-MCNC: 15.5 MG/DL (ref 8–23)
BUN SERPL-MCNC: 18.7 MG/DL (ref 8–23)
BUN SERPL-MCNC: 27.2 MG/DL (ref 8–23)
BUN SERPL-MCNC: 67.3 MG/DL (ref 8–23)
BUN SERPL-MCNC: 73.6 MG/DL (ref 8–23)
BUN SERPL-MCNC: 78.8 MG/DL (ref 8–23)
BUN SERPL-MCNC: 88.1 MG/DL (ref 8–23)
BUN SERPL-MCNC: 88.2 MG/DL (ref 8–23)
CALCIUM SERPL-MCNC: 10.1 MG/DL (ref 8.8–10.2)
CALCIUM SERPL-MCNC: 10.3 MG/DL (ref 8.8–10.2)
CALCIUM SERPL-MCNC: 10.5 MG/DL (ref 8.8–10.2)
CALCIUM SERPL-MCNC: 10.8 MG/DL (ref 8.8–10.2)
CALCIUM SERPL-MCNC: 11.6 MG/DL (ref 8.8–10.2)
CALCIUM SERPL-MCNC: 8.3 MG/DL (ref 8.8–10.2)
CALCIUM SERPL-MCNC: 8.3 MG/DL (ref 8.8–10.2)
CALCIUM SERPL-MCNC: 8.4 MG/DL (ref 8.8–10.2)
CALCIUM SERPL-MCNC: 8.4 MG/DL (ref 8.8–10.2)
CALCIUM SERPL-MCNC: 8.6 MG/DL (ref 8.8–10.2)
CALCIUM SERPL-MCNC: 8.7 MG/DL (ref 8.8–10.2)
CELL COUNT BODY FLUID SOURCE: ABNORMAL
CELL COUNT BODY FLUID SOURCE: ABNORMAL
CHLORIDE SERPL-SCNC: 85 MMOL/L (ref 98–107)
CHLORIDE SERPL-SCNC: 85 MMOL/L (ref 98–107)
CHLORIDE SERPL-SCNC: 87 MMOL/L (ref 98–107)
CHLORIDE SERPL-SCNC: 87 MMOL/L (ref 98–107)
CHLORIDE SERPL-SCNC: 89 MMOL/L (ref 98–107)
CHLORIDE SERPL-SCNC: 90 MMOL/L (ref 98–107)
CHLORIDE SERPL-SCNC: 91 MMOL/L (ref 98–107)
CHLORIDE SERPL-SCNC: 92 MMOL/L (ref 98–107)
COLOR FLD: ABNORMAL
COLOR FLD: ABNORMAL
COLOR UR AUTO: NORMAL
CREAT SERPL-MCNC: 0.64 MG/DL (ref 0.67–1.17)
CREAT SERPL-MCNC: 0.65 MG/DL (ref 0.67–1.17)
CREAT SERPL-MCNC: 0.74 MG/DL (ref 0.67–1.17)
CREAT SERPL-MCNC: 0.8 MG/DL (ref 0.67–1.17)
CREAT SERPL-MCNC: 0.82 MG/DL (ref 0.67–1.17)
CREAT SERPL-MCNC: 0.83 MG/DL (ref 0.67–1.17)
CREAT SERPL-MCNC: 1.35 MG/DL (ref 0.67–1.17)
CREAT SERPL-MCNC: 1.51 MG/DL (ref 0.67–1.17)
CREAT SERPL-MCNC: 1.6 MG/DL (ref 0.67–1.17)
CREAT SERPL-MCNC: 1.65 MG/DL (ref 0.67–1.17)
CREAT SERPL-MCNC: 1.9 MG/DL (ref 0.67–1.17)
CREAT SERPL-MCNC: 1.99 MG/DL (ref 0.67–1.17)
CREAT UR-MCNC: 30.1 MG/DL
DEPRECATED HCO3 PLAS-SCNC: 22 MMOL/L (ref 22–29)
DEPRECATED HCO3 PLAS-SCNC: 23 MMOL/L (ref 22–29)
DEPRECATED HCO3 PLAS-SCNC: 23 MMOL/L (ref 22–29)
DEPRECATED HCO3 PLAS-SCNC: 24 MMOL/L (ref 22–29)
DEPRECATED HCO3 PLAS-SCNC: 25 MMOL/L (ref 22–29)
DEPRECATED HCO3 PLAS-SCNC: 27 MMOL/L (ref 22–29)
DEPRECATED HCO3 PLAS-SCNC: 27 MMOL/L (ref 22–29)
DEPRECATED HCO3 PLAS-SCNC: 30 MMOL/L (ref 22–29)
DEPRECATED HCO3 PLAS-SCNC: 30 MMOL/L (ref 22–29)
DIASTOLIC BLOOD PRESSURE - MUSE: NORMAL MMHG
DIASTOLIC BLOOD PRESSURE - MUSE: NORMAL MMHG
EGFRCR SERPLBLD CKD-EPI 2021: 38 ML/MIN/1.73M2
EGFRCR SERPLBLD CKD-EPI 2021: 40 ML/MIN/1.73M2
EGFRCR SERPLBLD CKD-EPI 2021: 47 ML/MIN/1.73M2
EGFRCR SERPLBLD CKD-EPI 2021: 49 ML/MIN/1.73M2
EGFRCR SERPLBLD CKD-EPI 2021: 53 ML/MIN/1.73M2
EGFRCR SERPLBLD CKD-EPI 2021: 60 ML/MIN/1.73M2
EGFRCR SERPLBLD CKD-EPI 2021: >90 ML/MIN/1.73M2
EOSINOPHIL # BLD AUTO: 0 10E3/UL (ref 0–0.7)
EOSINOPHIL NFR BLD AUTO: 0 %
ERYTHROCYTE [DISTWIDTH] IN BLOOD BY AUTOMATED COUNT: 12.2 % (ref 10–15)
ERYTHROCYTE [DISTWIDTH] IN BLOOD BY AUTOMATED COUNT: 12.3 % (ref 10–15)
ERYTHROCYTE [DISTWIDTH] IN BLOOD BY AUTOMATED COUNT: 12.5 % (ref 10–15)
ERYTHROCYTE [DISTWIDTH] IN BLOOD BY AUTOMATED COUNT: 12.7 % (ref 10–15)
ERYTHROCYTE [DISTWIDTH] IN BLOOD BY AUTOMATED COUNT: 13.2 % (ref 10–15)
FIBRINOGEN PPP-MCNC: 465 MG/DL (ref 170–490)
FIBRINOGEN PPP-MCNC: 517 MG/DL (ref 170–490)
GLUCOSE BLDC GLUCOMTR-MCNC: 100 MG/DL (ref 70–99)
GLUCOSE BLDC GLUCOMTR-MCNC: 108 MG/DL (ref 70–99)
GLUCOSE BLDC GLUCOMTR-MCNC: 113 MG/DL (ref 70–99)
GLUCOSE BLDC GLUCOMTR-MCNC: 117 MG/DL (ref 70–99)
GLUCOSE BLDC GLUCOMTR-MCNC: 124 MG/DL (ref 70–99)
GLUCOSE BLDC GLUCOMTR-MCNC: 125 MG/DL (ref 70–99)
GLUCOSE BLDC GLUCOMTR-MCNC: 125 MG/DL (ref 70–99)
GLUCOSE BLDC GLUCOMTR-MCNC: 126 MG/DL (ref 70–99)
GLUCOSE BLDC GLUCOMTR-MCNC: 135 MG/DL (ref 70–99)
GLUCOSE BLDC GLUCOMTR-MCNC: 137 MG/DL (ref 70–99)
GLUCOSE BLDC GLUCOMTR-MCNC: 137 MG/DL (ref 70–99)
GLUCOSE BLDC GLUCOMTR-MCNC: 139 MG/DL (ref 70–99)
GLUCOSE BLDC GLUCOMTR-MCNC: 140 MG/DL (ref 70–99)
GLUCOSE BLDC GLUCOMTR-MCNC: 150 MG/DL (ref 70–99)
GLUCOSE BLDC GLUCOMTR-MCNC: 151 MG/DL (ref 70–99)
GLUCOSE BLDC GLUCOMTR-MCNC: 154 MG/DL (ref 70–99)
GLUCOSE BLDC GLUCOMTR-MCNC: 157 MG/DL (ref 70–99)
GLUCOSE BLDC GLUCOMTR-MCNC: 158 MG/DL (ref 70–99)
GLUCOSE BLDC GLUCOMTR-MCNC: 171 MG/DL (ref 70–99)
GLUCOSE BLDC GLUCOMTR-MCNC: 184 MG/DL (ref 70–99)
GLUCOSE BLDC GLUCOMTR-MCNC: 189 MG/DL (ref 70–99)
GLUCOSE BLDC GLUCOMTR-MCNC: 93 MG/DL (ref 70–99)
GLUCOSE SERPL-MCNC: 103 MG/DL (ref 70–99)
GLUCOSE SERPL-MCNC: 105 MG/DL (ref 70–99)
GLUCOSE SERPL-MCNC: 109 MG/DL (ref 70–99)
GLUCOSE SERPL-MCNC: 115 MG/DL (ref 70–99)
GLUCOSE SERPL-MCNC: 122 MG/DL (ref 70–99)
GLUCOSE SERPL-MCNC: 122 MG/DL (ref 70–99)
GLUCOSE SERPL-MCNC: 125 MG/DL (ref 70–99)
GLUCOSE SERPL-MCNC: 130 MG/DL (ref 70–99)
GLUCOSE SERPL-MCNC: 149 MG/DL (ref 70–99)
GLUCOSE SERPL-MCNC: 87 MG/DL (ref 70–99)
GLUCOSE SERPL-MCNC: 89 MG/DL (ref 70–99)
GLUCOSE SERPL-MCNC: 98 MG/DL (ref 70–99)
GLUCOSE UR STRIP-MCNC: NEGATIVE MG/DL
GRAM STAIN RESULT: NORMAL
GRAM STAIN RESULT: NORMAL
HCT VFR BLD AUTO: 34.7 % (ref 40–53)
HCT VFR BLD AUTO: 36.4 % (ref 40–53)
HCT VFR BLD AUTO: 36.5 % (ref 40–53)
HCT VFR BLD AUTO: 37.3 % (ref 40–53)
HCT VFR BLD AUTO: 37.8 % (ref 40–53)
HCT VFR BLD AUTO: 38.4 % (ref 40–53)
HCT VFR BLD AUTO: 38.7 % (ref 40–53)
HCT VFR BLD AUTO: 40.5 % (ref 40–53)
HCT VFR BLD AUTO: 41.3 % (ref 40–53)
HGB BLD-MCNC: 11.7 G/DL (ref 13.3–17.7)
HGB BLD-MCNC: 12.1 G/DL (ref 13.3–17.7)
HGB BLD-MCNC: 12.4 G/DL (ref 13.3–17.7)
HGB BLD-MCNC: 12.7 G/DL (ref 13.3–17.7)
HGB BLD-MCNC: 12.7 G/DL (ref 13.3–17.7)
HGB BLD-MCNC: 13 G/DL (ref 13.3–17.7)
HGB BLD-MCNC: 13.5 G/DL (ref 13.3–17.7)
HGB BLD-MCNC: 13.5 G/DL (ref 13.3–17.7)
HGB BLD-MCNC: 13.8 G/DL (ref 13.3–17.7)
HGB UR QL STRIP: NEGATIVE
IMM GRANULOCYTES # BLD: 0.1 10E3/UL
IMM GRANULOCYTES # BLD: 0.1 10E3/UL
IMM GRANULOCYTES # BLD: 0.2 10E3/UL
IMM GRANULOCYTES # BLD: 0.3 10E3/UL
IMM GRANULOCYTES NFR BLD: 1 %
IMM GRANULOCYTES NFR BLD: 1 %
IMM GRANULOCYTES NFR BLD: 2 %
IMM GRANULOCYTES NFR BLD: 2 %
INR PPP: 2.12 (ref 0.85–1.15)
INR PPP: 2.55 (ref 0.85–1.15)
INTERPRETATION ECG - MUSE: NORMAL
INTERPRETATION ECG - MUSE: NORMAL
KETONES UR STRIP-MCNC: NEGATIVE MG/DL
LEUKOCYTE ESTERASE UR QL STRIP: NEGATIVE
LIPASE SERPL-CCNC: 34 U/L (ref 13–60)
LIPASE SERPL-CCNC: 37 U/L (ref 13–60)
LYMPHOCYTES # BLD AUTO: 0.1 10E3/UL (ref 0.8–5.3)
LYMPHOCYTES # BLD AUTO: 0.3 10E3/UL (ref 0.8–5.3)
LYMPHOCYTES # BLD AUTO: 0.3 10E3/UL (ref 0.8–5.3)
LYMPHOCYTES # BLD AUTO: 0.6 10E3/UL (ref 0.8–5.3)
LYMPHOCYTES NFR BLD AUTO: 1 %
LYMPHOCYTES NFR BLD AUTO: 2 %
LYMPHOCYTES NFR BLD AUTO: 3 %
LYMPHOCYTES NFR BLD AUTO: 5 %
LYMPHOCYTES NFR FLD MANUAL: 24 %
LYMPHOCYTES NFR FLD MANUAL: 6 %
MAGNESIUM SERPL-MCNC: 3.3 MG/DL (ref 1.7–2.3)
MAGNESIUM SERPL-MCNC: 3.4 MG/DL (ref 1.7–2.3)
MAGNESIUM SERPL-MCNC: 3.5 MG/DL (ref 1.7–2.3)
MAGNESIUM SERPL-MCNC: 3.5 MG/DL (ref 1.7–2.3)
MCH RBC QN AUTO: 30.5 PG (ref 26.5–33)
MCH RBC QN AUTO: 30.5 PG (ref 26.5–33)
MCH RBC QN AUTO: 31.1 PG (ref 26.5–33)
MCH RBC QN AUTO: 31.2 PG (ref 26.5–33)
MCH RBC QN AUTO: 31.4 PG (ref 26.5–33)
MCH RBC QN AUTO: 31.7 PG (ref 26.5–33)
MCH RBC QN AUTO: 31.7 PG (ref 26.5–33)
MCH RBC QN AUTO: 31.8 PG (ref 26.5–33)
MCH RBC QN AUTO: 32.2 PG (ref 26.5–33)
MCHC RBC AUTO-ENTMCNC: 33.2 G/DL (ref 31.5–36.5)
MCHC RBC AUTO-ENTMCNC: 33.3 G/DL (ref 31.5–36.5)
MCHC RBC AUTO-ENTMCNC: 33.4 G/DL (ref 31.5–36.5)
MCHC RBC AUTO-ENTMCNC: 33.6 G/DL (ref 31.5–36.5)
MCHC RBC AUTO-ENTMCNC: 33.7 G/DL (ref 31.5–36.5)
MCHC RBC AUTO-ENTMCNC: 33.9 G/DL (ref 31.5–36.5)
MCHC RBC AUTO-ENTMCNC: 34 G/DL (ref 31.5–36.5)
MCHC RBC AUTO-ENTMCNC: 34.1 G/DL (ref 31.5–36.5)
MCHC RBC AUTO-ENTMCNC: 34.9 G/DL (ref 31.5–36.5)
MCV RBC AUTO: 90 FL (ref 78–100)
MCV RBC AUTO: 92 FL (ref 78–100)
MCV RBC AUTO: 93 FL (ref 78–100)
MCV RBC AUTO: 95 FL (ref 78–100)
MCV RBC AUTO: 96 FL (ref 78–100)
MONOCYTES # BLD AUTO: 0.8 10E3/UL (ref 0–1.3)
MONOCYTES # BLD AUTO: 0.8 10E3/UL (ref 0–1.3)
MONOCYTES # BLD AUTO: 1.1 10E3/UL (ref 0–1.3)
MONOCYTES # BLD AUTO: 1.1 10E3/UL (ref 0–1.3)
MONOCYTES NFR BLD AUTO: 5 %
MONOCYTES NFR BLD AUTO: 7 %
MONOCYTES NFR BLD AUTO: 8 %
MONOCYTES NFR BLD AUTO: 9 %
MONOS+MACROS NFR FLD MANUAL: 35 %
MONOS+MACROS NFR FLD MANUAL: 55 %
NEUTROPHILS # BLD AUTO: 10.1 10E3/UL (ref 1.6–8.3)
NEUTROPHILS # BLD AUTO: 12.8 10E3/UL (ref 1.6–8.3)
NEUTROPHILS # BLD AUTO: 13.4 10E3/UL (ref 1.6–8.3)
NEUTROPHILS # BLD AUTO: 9.2 10E3/UL (ref 1.6–8.3)
NEUTROPHILS NFR BLD AUTO: 85 %
NEUTROPHILS NFR BLD AUTO: 88 %
NEUTROPHILS NFR BLD AUTO: 89 %
NEUTROPHILS NFR BLD AUTO: 92 %
NEUTS BAND NFR FLD MANUAL: 17 %
NEUTS BAND NFR FLD MANUAL: 57 %
NITRATE UR QL: NEGATIVE
NRBC # BLD AUTO: 0 10E3/UL
NRBC BLD AUTO-RTO: 0 /100
OSMOLALITY UR: 240 MMOL/KG (ref 100–1200)
OSMOLALITY UR: 254 MMOL/KG (ref 100–1200)
OTHER CELLS FLD MANUAL: 2 %
OTHER CELLS FLD MANUAL: 5 %
P AXIS - MUSE: 51 DEGREES
P AXIS - MUSE: 67 DEGREES
PATH REPORT.COMMENTS IMP SPEC: ABNORMAL
PATH REPORT.COMMENTS IMP SPEC: ABNORMAL
PATH REPORT.COMMENTS IMP SPEC: YES
PATH REPORT.FINAL DX SPEC: ABNORMAL
PATH REPORT.GROSS SPEC: ABNORMAL
PATH REPORT.MICROSCOPIC SPEC OTHER STN: ABNORMAL
PATH REPORT.RELEVANT HX SPEC: ABNORMAL
PH UR STRIP: 5.5 [PH] (ref 5–7)
PHOSPHATE SERPL-MCNC: 4.7 MG/DL (ref 2.5–4.5)
PHOSPHATE SERPL-MCNC: 5.5 MG/DL (ref 2.5–4.5)
PHOSPHATE SERPL-MCNC: 5.7 MG/DL (ref 2.5–4.5)
PLATELET # BLD AUTO: 206 10E3/UL (ref 150–450)
PLATELET # BLD AUTO: 208 10E3/UL (ref 150–450)
PLATELET # BLD AUTO: 218 10E3/UL (ref 150–450)
PLATELET # BLD AUTO: 233 10E3/UL (ref 150–450)
PLATELET # BLD AUTO: 261 10E3/UL (ref 150–450)
PLATELET # BLD AUTO: 262 10E3/UL (ref 150–450)
PLATELET # BLD AUTO: 300 10E3/UL (ref 150–450)
PLATELET # BLD AUTO: 332 10E3/UL (ref 150–450)
PLATELET # BLD AUTO: 337 10E3/UL (ref 150–450)
POTASSIUM SERPL-SCNC: 4.2 MMOL/L (ref 3.4–5.3)
POTASSIUM SERPL-SCNC: 4.4 MMOL/L (ref 3.4–5.3)
POTASSIUM SERPL-SCNC: 4.5 MMOL/L (ref 3.4–5.3)
POTASSIUM SERPL-SCNC: 4.7 MMOL/L (ref 3.4–5.3)
POTASSIUM SERPL-SCNC: 4.8 MMOL/L (ref 3.4–5.3)
POTASSIUM SERPL-SCNC: 5.2 MMOL/L (ref 3.4–5.3)
POTASSIUM SERPL-SCNC: 5.2 MMOL/L (ref 3.4–5.3)
POTASSIUM SERPL-SCNC: 5.3 MMOL/L (ref 3.4–5.3)
POTASSIUM SERPL-SCNC: 5.4 MMOL/L (ref 3.4–5.3)
POTASSIUM SERPL-SCNC: 5.5 MMOL/L (ref 3.4–5.3)
POTASSIUM SERPL-SCNC: 5.5 MMOL/L (ref 3.4–5.3)
POTASSIUM SERPL-SCNC: 5.7 MMOL/L (ref 3.4–5.3)
POTASSIUM SERPL-SCNC: 5.8 MMOL/L (ref 3.4–5.3)
POTASSIUM SERPL-SCNC: 6 MMOL/L (ref 3.4–5.3)
POTASSIUM SERPL-SCNC: 6.3 MMOL/L (ref 3.4–5.3)
PR INTERVAL - MUSE: 154 MS
PR INTERVAL - MUSE: 162 MS
PROT FLD-MCNC: 4.1 G/DL
PROT SERPL-MCNC: 6.1 G/DL (ref 6.4–8.3)
PROT SERPL-MCNC: 6.1 G/DL (ref 6.4–8.3)
PROT SERPL-MCNC: 6.2 G/DL (ref 6.4–8.3)
PROT SERPL-MCNC: 6.7 G/DL (ref 6.4–8.3)
PROT SERPL-MCNC: 6.7 G/DL (ref 6.4–8.3)
PROT SERPL-MCNC: 7.4 G/DL (ref 6.4–8.3)
PROTEIN BODY FLUID SOURCE: NORMAL
QRS DURATION - MUSE: 70 MS
QRS DURATION - MUSE: 82 MS
QT - MUSE: 356 MS
QT - MUSE: 372 MS
QTC - MUSE: 428 MS
QTC - MUSE: 450 MS
R AXIS - MUSE: 19 DEGREES
R AXIS - MUSE: 60 DEGREES
RBC # BLD AUTO: 3.75 10E6/UL (ref 4.4–5.9)
RBC # BLD AUTO: 3.82 10E6/UL (ref 4.4–5.9)
RBC # BLD AUTO: 3.95 10E6/UL (ref 4.4–5.9)
RBC # BLD AUTO: 4 10E6/UL (ref 4.4–5.9)
RBC # BLD AUTO: 4.01 10E6/UL (ref 4.4–5.9)
RBC # BLD AUTO: 4.19 10E6/UL (ref 4.4–5.9)
RBC # BLD AUTO: 4.26 10E6/UL (ref 4.4–5.9)
RBC # BLD AUTO: 4.42 10E6/UL (ref 4.4–5.9)
RBC # BLD AUTO: 4.44 10E6/UL (ref 4.4–5.9)
RBC URINE: <1 /HPF
SODIUM SERPL-SCNC: 122 MMOL/L (ref 135–145)
SODIUM SERPL-SCNC: 124 MMOL/L (ref 135–145)
SODIUM SERPL-SCNC: 124 MMOL/L (ref 135–145)
SODIUM SERPL-SCNC: 125 MMOL/L (ref 135–145)
SODIUM SERPL-SCNC: 126 MMOL/L (ref 135–145)
SODIUM SERPL-SCNC: 126 MMOL/L (ref 135–145)
SODIUM SERPL-SCNC: 127 MMOL/L (ref 135–145)
SODIUM SERPL-SCNC: 128 MMOL/L (ref 135–145)
SODIUM SERPL-SCNC: 129 MMOL/L (ref 135–145)
SODIUM SERPL-SCNC: 130 MMOL/L (ref 135–145)
SODIUM UR-SCNC: 20 MMOL/L
SP GR UR STRIP: 1.01 (ref 1–1.03)
SPECIMEN EXPIRATION DATE: NORMAL
SYSTOLIC BLOOD PRESSURE - MUSE: NORMAL MMHG
SYSTOLIC BLOOD PRESSURE - MUSE: NORMAL MMHG
T AXIS - MUSE: 46 DEGREES
T AXIS - MUSE: 66 DEGREES
T4 FREE SERPL-MCNC: 1.05 NG/DL (ref 0.9–1.7)
T4 FREE SERPL-MCNC: 1.12 NG/DL (ref 0.9–1.7)
TSH SERPL DL<=0.005 MIU/L-ACNC: 2.38 UIU/ML (ref 0.3–4.2)
TSH SERPL DL<=0.005 MIU/L-ACNC: 4.81 UIU/ML (ref 0.3–4.2)
UROBILINOGEN UR STRIP-MCNC: NORMAL MG/DL
VENTRICULAR RATE- MUSE: 87 BPM
VENTRICULAR RATE- MUSE: 88 BPM
WBC # BLD AUTO: 10.1 10E3/UL (ref 4–11)
WBC # BLD AUTO: 10.5 10E3/UL (ref 4–11)
WBC # BLD AUTO: 11.2 10E3/UL (ref 4–11)
WBC # BLD AUTO: 12 10E3/UL (ref 4–11)
WBC # BLD AUTO: 14.4 10E3/UL (ref 4–11)
WBC # BLD AUTO: 14.5 10E3/UL (ref 4–11)
WBC # BLD AUTO: 6.5 10E3/UL (ref 4–11)
WBC # BLD AUTO: 7.6 10E3/UL (ref 4–11)
WBC # BLD AUTO: 9.4 10E3/UL (ref 4–11)
WBC # FLD AUTO: 526 /UL
WBC # FLD AUTO: 873 /UL
WBC URINE: <1 /HPF

## 2024-01-01 PROCEDURE — 85610 PROTHROMBIN TIME: CPT | Performed by: STUDENT IN AN ORGANIZED HEALTH CARE EDUCATION/TRAINING PROGRAM

## 2024-01-01 PROCEDURE — 258N000003 HC RX IP 258 OP 636

## 2024-01-01 PROCEDURE — 99223 1ST HOSP IP/OBS HIGH 75: CPT | Mod: AI | Performed by: INTERNAL MEDICINE

## 2024-01-01 PROCEDURE — 82040 ASSAY OF SERUM ALBUMIN: CPT

## 2024-01-01 PROCEDURE — 250N000011 HC RX IP 250 OP 636: Performed by: EMERGENCY MEDICINE

## 2024-01-01 PROCEDURE — 80048 BASIC METABOLIC PNL TOTAL CA: CPT

## 2024-01-01 PROCEDURE — 80053 COMPREHEN METABOLIC PANEL: CPT

## 2024-01-01 PROCEDURE — 258N000001 HC RX 258: Performed by: EMERGENCY MEDICINE

## 2024-01-01 PROCEDURE — 85384 FIBRINOGEN ACTIVITY: CPT | Performed by: INTERNAL MEDICINE

## 2024-01-01 PROCEDURE — 49083 ABD PARACENTESIS W/IMAGING: CPT | Performed by: INTERNAL MEDICINE

## 2024-01-01 PROCEDURE — 36415 COLL VENOUS BLD VENIPUNCTURE: CPT

## 2024-01-01 PROCEDURE — 250N000013 HC RX MED GY IP 250 OP 250 PS 637

## 2024-01-01 PROCEDURE — 250N000012 HC RX MED GY IP 250 OP 636 PS 637

## 2024-01-01 PROCEDURE — 77290 THER RAD SIMULAJ FIELD CPLX: CPT | Performed by: RADIOLOGY

## 2024-01-01 PROCEDURE — 250N000013 HC RX MED GY IP 250 OP 250 PS 637: Performed by: INTERNAL MEDICINE

## 2024-01-01 PROCEDURE — 77307 TELETHX ISODOSE PLAN CPLX: CPT | Performed by: RADIOLOGY

## 2024-01-01 PROCEDURE — 83735 ASSAY OF MAGNESIUM: CPT

## 2024-01-01 PROCEDURE — 85730 THROMBOPLASTIN TIME PARTIAL: CPT | Performed by: EMERGENCY MEDICINE

## 2024-01-01 PROCEDURE — 99233 SBSQ HOSP IP/OBS HIGH 50: CPT | Mod: GC | Performed by: INTERNAL MEDICINE

## 2024-01-01 PROCEDURE — 82565 ASSAY OF CREATININE: CPT

## 2024-01-01 PROCEDURE — 77334 RADIATION TREATMENT AID(S): CPT | Mod: 26 | Performed by: RADIOLOGY

## 2024-01-01 PROCEDURE — 74177 CT ABD & PELVIS W/CONTRAST: CPT

## 2024-01-01 PROCEDURE — 84132 ASSAY OF SERUM POTASSIUM: CPT

## 2024-01-01 PROCEDURE — 250N000012 HC RX MED GY IP 250 OP 636 PS 637: Performed by: STUDENT IN AN ORGANIZED HEALTH CARE EDUCATION/TRAINING PROGRAM

## 2024-01-01 PROCEDURE — 85027 COMPLETE CBC AUTOMATED: CPT

## 2024-01-01 PROCEDURE — 250N000011 HC RX IP 250 OP 636

## 2024-01-01 PROCEDURE — 82962 GLUCOSE BLOOD TEST: CPT

## 2024-01-01 PROCEDURE — 84132 ASSAY OF SERUM POTASSIUM: CPT | Performed by: STUDENT IN AN ORGANIZED HEALTH CARE EDUCATION/TRAINING PROGRAM

## 2024-01-01 PROCEDURE — 99253 IP/OBS CNSLTJ NEW/EST LOW 45: CPT | Mod: GC | Performed by: STUDENT IN AN ORGANIZED HEALTH CARE EDUCATION/TRAINING PROGRAM

## 2024-01-01 PROCEDURE — 999N000104 CT THORACIC SPINE RECONSTRUCTED

## 2024-01-01 PROCEDURE — 84295 ASSAY OF SERUM SODIUM: CPT

## 2024-01-01 PROCEDURE — 84439 ASSAY OF FREE THYROXINE: CPT | Performed by: PATHOLOGY

## 2024-01-01 PROCEDURE — 36415 COLL VENOUS BLD VENIPUNCTURE: CPT | Performed by: STUDENT IN AN ORGANIZED HEALTH CARE EDUCATION/TRAINING PROGRAM

## 2024-01-01 PROCEDURE — 36415 COLL VENOUS BLD VENIPUNCTURE: CPT | Performed by: EMERGENCY MEDICINE

## 2024-01-01 PROCEDURE — 99223 1ST HOSP IP/OBS HIGH 75: CPT | Mod: FS

## 2024-01-01 PROCEDURE — 77336 RADIATION PHYSICS CONSULT: CPT | Performed by: RADIOLOGY

## 2024-01-01 PROCEDURE — 84295 ASSAY OF SERUM SODIUM: CPT | Performed by: STUDENT IN AN ORGANIZED HEALTH CARE EDUCATION/TRAINING PROGRAM

## 2024-01-01 PROCEDURE — 85610 PROTHROMBIN TIME: CPT | Performed by: EMERGENCY MEDICINE

## 2024-01-01 PROCEDURE — 80051 ELECTROLYTE PANEL: CPT

## 2024-01-01 PROCEDURE — 82570 ASSAY OF URINE CREATININE: CPT | Performed by: PHYSICIAN ASSISTANT

## 2024-01-01 PROCEDURE — 99233 SBSQ HOSP IP/OBS HIGH 50: CPT | Performed by: STUDENT IN AN ORGANIZED HEALTH CARE EDUCATION/TRAINING PROGRAM

## 2024-01-01 PROCEDURE — 70450 CT HEAD/BRAIN W/O DYE: CPT | Mod: 26 | Performed by: RADIOLOGY

## 2024-01-01 PROCEDURE — 74177 CT ABD & PELVIS W/CONTRAST: CPT | Mod: 26 | Performed by: RADIOLOGY

## 2024-01-01 PROCEDURE — 250N000012 HC RX MED GY IP 250 OP 636 PS 637: Performed by: EMERGENCY MEDICINE

## 2024-01-01 PROCEDURE — 83690 ASSAY OF LIPASE: CPT | Performed by: EMERGENCY MEDICINE

## 2024-01-01 PROCEDURE — 77427 RADIATION TX MANAGEMENT X5: CPT | Mod: GC | Performed by: RADIOLOGY

## 2024-01-01 PROCEDURE — 77280 THER RAD SIMULAJ FIELD SMPL: CPT | Mod: 26 | Performed by: RADIOLOGY

## 2024-01-01 PROCEDURE — P9047 ALBUMIN (HUMAN), 25%, 50ML: HCPCS

## 2024-01-01 PROCEDURE — 77412 RADIATION TX DELIVERY LVL 3: CPT | Performed by: RADIOLOGY

## 2024-01-01 PROCEDURE — 250N000011 HC RX IP 250 OP 636: Performed by: INTERNAL MEDICINE

## 2024-01-01 PROCEDURE — 93005 ELECTROCARDIOGRAM TRACING: CPT | Performed by: EMERGENCY MEDICINE

## 2024-01-01 PROCEDURE — 99207 PR NO BILLABLE SERVICE THIS VISIT: CPT | Performed by: INTERNAL MEDICINE

## 2024-01-01 PROCEDURE — 84439 ASSAY OF FREE THYROXINE: CPT

## 2024-01-01 PROCEDURE — 89050 BODY FLUID CELL COUNT: CPT | Performed by: EMERGENCY MEDICINE

## 2024-01-01 PROCEDURE — 93010 ELECTROCARDIOGRAM REPORT: CPT | Performed by: INTERNAL MEDICINE

## 2024-01-01 PROCEDURE — 86900 BLOOD TYPING SEROLOGIC ABO: CPT | Performed by: EMERGENCY MEDICINE

## 2024-01-01 PROCEDURE — 87040 BLOOD CULTURE FOR BACTERIA: CPT | Performed by: STUDENT IN AN ORGANIZED HEALTH CARE EDUCATION/TRAINING PROGRAM

## 2024-01-01 PROCEDURE — 258N000003 HC RX IP 258 OP 636: Performed by: STUDENT IN AN ORGANIZED HEALTH CARE EDUCATION/TRAINING PROGRAM

## 2024-01-01 PROCEDURE — 84443 ASSAY THYROID STIM HORMONE: CPT | Performed by: PATHOLOGY

## 2024-01-01 PROCEDURE — 250N000013 HC RX MED GY IP 250 OP 250 PS 637: Performed by: STUDENT IN AN ORGANIZED HEALTH CARE EDUCATION/TRAINING PROGRAM

## 2024-01-01 PROCEDURE — 258N000003 HC RX IP 258 OP 636: Performed by: EMERGENCY MEDICINE

## 2024-01-01 PROCEDURE — 88305 TISSUE EXAM BY PATHOLOGIST: CPT | Mod: 26 | Performed by: PATHOLOGY

## 2024-01-01 PROCEDURE — 99215 OFFICE O/P EST HI 40 MIN: CPT | Mod: 25 | Performed by: RADIOLOGY

## 2024-01-01 PROCEDURE — 99239 HOSP IP/OBS DSCHRG MGMT >30: CPT | Performed by: STUDENT IN AN ORGANIZED HEALTH CARE EDUCATION/TRAINING PROGRAM

## 2024-01-01 PROCEDURE — 82947 ASSAY GLUCOSE BLOOD QUANT: CPT | Performed by: INTERNAL MEDICINE

## 2024-01-01 PROCEDURE — 80048 BASIC METABOLIC PNL TOTAL CA: CPT | Performed by: INTERNAL MEDICINE

## 2024-01-01 PROCEDURE — 77334 RADIATION TREATMENT AID(S): CPT | Performed by: RADIOLOGY

## 2024-01-01 PROCEDURE — 250N000011 HC RX IP 250 OP 636: Performed by: FAMILY MEDICINE

## 2024-01-01 PROCEDURE — 99285 EMERGENCY DEPT VISIT HI MDM: CPT | Mod: 25 | Performed by: EMERGENCY MEDICINE

## 2024-01-01 PROCEDURE — 85004 AUTOMATED DIFF WBC COUNT: CPT | Performed by: PHYSICIAN ASSISTANT

## 2024-01-01 PROCEDURE — 85384 FIBRINOGEN ACTIVITY: CPT | Performed by: STUDENT IN AN ORGANIZED HEALTH CARE EDUCATION/TRAINING PROGRAM

## 2024-01-01 PROCEDURE — 258N000003 HC RX IP 258 OP 636: Performed by: PHYSICIAN ASSISTANT

## 2024-01-01 PROCEDURE — 96365 THER/PROPH/DIAG IV INF INIT: CPT

## 2024-01-01 PROCEDURE — 70450 CT HEAD/BRAIN W/O DYE: CPT

## 2024-01-01 PROCEDURE — 99207 PR NO BILLABLE SERVICE THIS VISIT: CPT | Performed by: STUDENT IN AN ORGANIZED HEALTH CARE EDUCATION/TRAINING PROGRAM

## 2024-01-01 PROCEDURE — 88342 IMHCHEM/IMCYTCHM 1ST ANTB: CPT | Mod: TC | Performed by: PHYSICIAN ASSISTANT

## 2024-01-01 PROCEDURE — 96374 THER/PROPH/DIAG INJ IV PUSH: CPT | Performed by: EMERGENCY MEDICINE

## 2024-01-01 PROCEDURE — 120N000005 HC R&B MS OVERFLOW UMMC

## 2024-01-01 PROCEDURE — 85041 AUTOMATED RBC COUNT: CPT

## 2024-01-01 PROCEDURE — 71260 CT THORAX DX C+: CPT

## 2024-01-01 PROCEDURE — 84100 ASSAY OF PHOSPHORUS: CPT

## 2024-01-01 PROCEDURE — 99233 SBSQ HOSP IP/OBS HIGH 50: CPT | Performed by: FAMILY MEDICINE

## 2024-01-01 PROCEDURE — 88341 IMHCHEM/IMCYTCHM EA ADD ANTB: CPT | Mod: 26 | Performed by: PATHOLOGY

## 2024-01-01 PROCEDURE — 88112 CYTOPATH CELL ENHANCE TECH: CPT | Mod: 26 | Performed by: PATHOLOGY

## 2024-01-01 PROCEDURE — 96361 HYDRATE IV INFUSION ADD-ON: CPT | Performed by: EMERGENCY MEDICINE

## 2024-01-01 PROCEDURE — 99284 EMERGENCY DEPT VISIT MOD MDM: CPT | Mod: 25 | Performed by: EMERGENCY MEDICINE

## 2024-01-01 PROCEDURE — 84300 ASSAY OF URINE SODIUM: CPT | Performed by: PHYSICIAN ASSISTANT

## 2024-01-01 PROCEDURE — 0W9G3ZZ DRAINAGE OF PERITONEAL CAVITY, PERCUTANEOUS APPROACH: ICD-10-PCS | Performed by: INTERNAL MEDICINE

## 2024-01-01 PROCEDURE — 87205 SMEAR GRAM STAIN: CPT | Performed by: INTERNAL MEDICINE

## 2024-01-01 PROCEDURE — 99232 SBSQ HOSP IP/OBS MODERATE 35: CPT

## 2024-01-01 PROCEDURE — 83935 ASSAY OF URINE OSMOLALITY: CPT | Performed by: PHYSICIAN ASSISTANT

## 2024-01-01 PROCEDURE — 82040 ASSAY OF SERUM ALBUMIN: CPT | Performed by: EMERGENCY MEDICINE

## 2024-01-01 PROCEDURE — 77417 THER RADIOLOGY PORT IMAGE(S): CPT | Performed by: RADIOLOGY

## 2024-01-01 PROCEDURE — 93010 ELECTROCARDIOGRAM REPORT: CPT | Performed by: EMERGENCY MEDICINE

## 2024-01-01 PROCEDURE — 120N000003 HC R&B IMCU UMMC

## 2024-01-01 PROCEDURE — 99253 IP/OBS CNSLTJ NEW/EST LOW 45: CPT | Mod: 25 | Performed by: RADIOLOGY

## 2024-01-01 PROCEDURE — 85730 THROMBOPLASTIN TIME PARTIAL: CPT | Performed by: STUDENT IN AN ORGANIZED HEALTH CARE EDUCATION/TRAINING PROGRAM

## 2024-01-01 PROCEDURE — 70491 CT SOFT TISSUE NECK W/DYE: CPT | Mod: 26 | Performed by: RADIOLOGY

## 2024-01-01 PROCEDURE — 250N000013 HC RX MED GY IP 250 OP 250 PS 637: Performed by: FAMILY MEDICINE

## 2024-01-01 PROCEDURE — 99223 1ST HOSP IP/OBS HIGH 75: CPT | Performed by: INTERNAL MEDICINE

## 2024-01-01 PROCEDURE — 84075 ASSAY ALKALINE PHOSPHATASE: CPT | Performed by: PHYSICIAN ASSISTANT

## 2024-01-01 PROCEDURE — 36415 COLL VENOUS BLD VENIPUNCTURE: CPT | Performed by: PATHOLOGY

## 2024-01-01 PROCEDURE — 250N000011 HC RX IP 250 OP 636: Performed by: PHYSICIAN ASSISTANT

## 2024-01-01 PROCEDURE — 99255 IP/OBS CONSLTJ NEW/EST HI 80: CPT | Mod: GC | Performed by: STUDENT IN AN ORGANIZED HEALTH CARE EDUCATION/TRAINING PROGRAM

## 2024-01-01 PROCEDURE — 99255 IP/OBS CONSLTJ NEW/EST HI 80: CPT

## 2024-01-01 PROCEDURE — P9047 ALBUMIN (HUMAN), 25%, 50ML: HCPCS | Performed by: EMERGENCY MEDICINE

## 2024-01-01 PROCEDURE — 84132 ASSAY OF SERUM POTASSIUM: CPT | Performed by: EMERGENCY MEDICINE

## 2024-01-01 PROCEDURE — 84132 ASSAY OF SERUM POTASSIUM: CPT | Performed by: INTERNAL MEDICINE

## 2024-01-01 PROCEDURE — 250N000013 HC RX MED GY IP 250 OP 250 PS 637: Performed by: PHYSICIAN ASSISTANT

## 2024-01-01 PROCEDURE — 99207 PR APP CREDIT; MD BILLING SHARED VISIT: CPT | Performed by: STUDENT IN AN ORGANIZED HEALTH CARE EDUCATION/TRAINING PROGRAM

## 2024-01-01 PROCEDURE — 93005 ELECTROCARDIOGRAM TRACING: CPT

## 2024-01-01 PROCEDURE — 70491 CT SOFT TISSUE NECK W/DYE: CPT

## 2024-01-01 PROCEDURE — 87205 SMEAR GRAM STAIN: CPT | Performed by: EMERGENCY MEDICINE

## 2024-01-01 PROCEDURE — 999N000104 CT LUMBAR SPINE RECONSTRUCTED

## 2024-01-01 PROCEDURE — 99232 SBSQ HOSP IP/OBS MODERATE 35: CPT | Performed by: INTERNAL MEDICINE

## 2024-01-01 PROCEDURE — 36415 COLL VENOUS BLD VENIPUNCTURE: CPT | Performed by: PHYSICIAN ASSISTANT

## 2024-01-01 PROCEDURE — 71260 CT THORAX DX C+: CPT | Mod: 26 | Performed by: RADIOLOGY

## 2024-01-01 PROCEDURE — 85025 COMPLETE CBC W/AUTO DIFF WBC: CPT | Performed by: EMERGENCY MEDICINE

## 2024-01-01 PROCEDURE — 89050 BODY FLUID CELL COUNT: CPT | Performed by: INTERNAL MEDICINE

## 2024-01-01 PROCEDURE — 99253 IP/OBS CNSLTJ NEW/EST LOW 45: CPT | Mod: GC | Performed by: INTERNAL MEDICINE

## 2024-01-01 PROCEDURE — 99233 SBSQ HOSP IP/OBS HIGH 50: CPT

## 2024-01-01 PROCEDURE — 72128 CT CHEST SPINE W/O DYE: CPT | Mod: 26 | Performed by: RADIOLOGY

## 2024-01-01 PROCEDURE — 72131 CT LUMBAR SPINE W/O DYE: CPT | Mod: 26 | Performed by: RADIOLOGY

## 2024-01-01 PROCEDURE — 83935 ASSAY OF URINE OSMOLALITY: CPT

## 2024-01-01 PROCEDURE — 99284 EMERGENCY DEPT VISIT MOD MDM: CPT | Performed by: EMERGENCY MEDICINE

## 2024-01-01 PROCEDURE — 81001 URINALYSIS AUTO W/SCOPE: CPT | Performed by: PHYSICIAN ASSISTANT

## 2024-01-01 PROCEDURE — 99254 IP/OBS CNSLTJ NEW/EST MOD 60: CPT | Performed by: STUDENT IN AN ORGANIZED HEALTH CARE EDUCATION/TRAINING PROGRAM

## 2024-01-01 PROCEDURE — 84443 ASSAY THYROID STIM HORMONE: CPT

## 2024-01-01 PROCEDURE — 96374 THER/PROPH/DIAG INJ IV PUSH: CPT | Mod: 59 | Performed by: EMERGENCY MEDICINE

## 2024-01-01 PROCEDURE — 77280 THER RAD SIMULAJ FIELD SMPL: CPT | Performed by: RADIOLOGY

## 2024-01-01 PROCEDURE — 99215 OFFICE O/P EST HI 40 MIN: CPT | Performed by: REGISTERED NURSE

## 2024-01-01 PROCEDURE — 82248 BILIRUBIN DIRECT: CPT

## 2024-01-01 PROCEDURE — 250N000011 HC RX IP 250 OP 636: Performed by: REGISTERED NURSE

## 2024-01-01 PROCEDURE — 85014 HEMATOCRIT: CPT

## 2024-01-01 PROCEDURE — 99418 PROLNG IP/OBS E/M EA 15 MIN: CPT

## 2024-01-01 PROCEDURE — 85004 AUTOMATED DIFF WBC COUNT: CPT

## 2024-01-01 PROCEDURE — 99233 SBSQ HOSP IP/OBS HIGH 50: CPT | Performed by: INTERNAL MEDICINE

## 2024-01-01 PROCEDURE — 84157 ASSAY OF PROTEIN OTHER: CPT | Performed by: EMERGENCY MEDICINE

## 2024-01-01 PROCEDURE — 83735 ASSAY OF MAGNESIUM: CPT | Performed by: INTERNAL MEDICINE

## 2024-01-01 PROCEDURE — 258N000001 HC RX 258

## 2024-01-01 PROCEDURE — 96375 TX/PRO/DX INJ NEW DRUG ADDON: CPT | Performed by: EMERGENCY MEDICINE

## 2024-01-01 PROCEDURE — 82042 OTHER SOURCE ALBUMIN QUAN EA: CPT | Performed by: EMERGENCY MEDICINE

## 2024-01-01 PROCEDURE — 85025 COMPLETE CBC W/AUTO DIFF WBC: CPT | Performed by: PATHOLOGY

## 2024-01-01 PROCEDURE — 99215 OFFICE O/P EST HI 40 MIN: CPT | Mod: 93 | Performed by: INTERNAL MEDICINE

## 2024-01-01 PROCEDURE — 77307 TELETHX ISODOSE PLAN CPLX: CPT | Mod: 26 | Performed by: RADIOLOGY

## 2024-01-01 PROCEDURE — 80053 COMPREHEN METABOLIC PANEL: CPT | Performed by: PATHOLOGY

## 2024-01-01 PROCEDURE — 99417 PROLNG OP E/M EACH 15 MIN: CPT | Performed by: REGISTERED NURSE

## 2024-01-01 PROCEDURE — 258N000001 HC RX 258: Performed by: STUDENT IN AN ORGANIZED HEALTH CARE EDUCATION/TRAINING PROGRAM

## 2024-01-01 PROCEDURE — 88342 IMHCHEM/IMCYTCHM 1ST ANTB: CPT | Mod: 26 | Performed by: PATHOLOGY

## 2024-01-01 PROCEDURE — 258N000003 HC RX IP 258 OP 636: Performed by: REGISTERED NURSE

## 2024-01-01 PROCEDURE — 77290 THER RAD SIMULAJ FIELD CPLX: CPT | Mod: 26 | Performed by: RADIOLOGY

## 2024-01-01 PROCEDURE — G0463 HOSPITAL OUTPT CLINIC VISIT: HCPCS | Performed by: REGISTERED NURSE

## 2024-01-01 PROCEDURE — 96376 TX/PRO/DX INJ SAME DRUG ADON: CPT | Performed by: EMERGENCY MEDICINE

## 2024-01-01 RX ORDER — AMOXICILLIN 250 MG
2 CAPSULE ORAL 2 TIMES DAILY
Status: DISCONTINUED | OUTPATIENT
Start: 2024-01-01 | End: 2024-01-01 | Stop reason: HOSPADM

## 2024-01-01 RX ORDER — AMOXICILLIN 250 MG
2 CAPSULE ORAL 2 TIMES DAILY PRN
Status: DISCONTINUED | OUTPATIENT
Start: 2024-01-01 | End: 2024-01-01 | Stop reason: HOSPADM

## 2024-01-01 RX ORDER — EPINEPHRINE 1 MG/ML
0.3 INJECTION, SOLUTION INTRAMUSCULAR; SUBCUTANEOUS EVERY 5 MIN PRN
Status: CANCELLED | OUTPATIENT
Start: 2024-01-01

## 2024-01-01 RX ORDER — ONDANSETRON 4 MG/1
4 TABLET, ORALLY DISINTEGRATING ORAL EVERY 6 HOURS PRN
Status: CANCELLED | OUTPATIENT
Start: 2024-01-01

## 2024-01-01 RX ORDER — NALOXONE HYDROCHLORIDE 0.4 MG/ML
0.4 INJECTION, SOLUTION INTRAMUSCULAR; INTRAVENOUS; SUBCUTANEOUS
Status: DISCONTINUED | OUTPATIENT
Start: 2024-01-01 | End: 2024-01-01

## 2024-01-01 RX ORDER — GLYCOPYRROLATE 0.2 MG/ML
0.2 INJECTION, SOLUTION INTRAMUSCULAR; INTRAVENOUS EVERY 4 HOURS PRN
Status: DISCONTINUED | OUTPATIENT
Start: 2024-01-01 | End: 2024-01-01 | Stop reason: HOSPADM

## 2024-01-01 RX ORDER — OXYCODONE HCL 5 MG/5 ML
10-15 SOLUTION, ORAL ORAL
Qty: 840 ML | Refills: 0 | Status: SHIPPED | OUTPATIENT
Start: 2024-01-01 | End: 2024-01-01

## 2024-01-01 RX ORDER — NALOXONE HYDROCHLORIDE 0.4 MG/ML
0.1 INJECTION, SOLUTION INTRAMUSCULAR; INTRAVENOUS; SUBCUTANEOUS
Status: DISCONTINUED | OUTPATIENT
Start: 2024-01-01 | End: 2024-01-01

## 2024-01-01 RX ORDER — MORPHINE SULFATE 30 MG/1
30 TABLET, FILM COATED, EXTENDED RELEASE ORAL EVERY 12 HOURS
Status: DISCONTINUED | OUTPATIENT
Start: 2024-01-01 | End: 2024-01-01

## 2024-01-01 RX ORDER — CELECOXIB 100 MG/1
100 CAPSULE ORAL 2 TIMES DAILY
Qty: 28 CAPSULE | Refills: 0 | Status: CANCELLED
Start: 2024-01-01

## 2024-01-01 RX ORDER — LORAZEPAM 2 MG/ML
1 INJECTION INTRAMUSCULAR
Status: DISCONTINUED | OUTPATIENT
Start: 2024-01-01 | End: 2024-01-01 | Stop reason: HOSPADM

## 2024-01-01 RX ORDER — NALOXONE HYDROCHLORIDE 0.4 MG/ML
0.2 INJECTION, SOLUTION INTRAMUSCULAR; INTRAVENOUS; SUBCUTANEOUS
Status: DISCONTINUED | OUTPATIENT
Start: 2024-01-01 | End: 2024-01-01

## 2024-01-01 RX ORDER — DEXTROSE MONOHYDRATE 25 G/50ML
25-50 INJECTION, SOLUTION INTRAVENOUS
Status: DISCONTINUED | OUTPATIENT
Start: 2024-01-01 | End: 2024-01-01

## 2024-01-01 RX ORDER — MORPHINE SULFATE 2 MG/ML
2 INJECTION, SOLUTION INTRAMUSCULAR; INTRAVENOUS EVERY 4 HOURS PRN
Status: DISCONTINUED | OUTPATIENT
Start: 2024-01-01 | End: 2024-01-01

## 2024-01-01 RX ORDER — HYDROMORPHONE HYDROCHLORIDE 1 MG/ML
2 SOLUTION ORAL
Status: DISCONTINUED | OUTPATIENT
Start: 2024-01-01 | End: 2024-01-01

## 2024-01-01 RX ORDER — LORAZEPAM 0.5 MG/1
1 TABLET ORAL
Status: DISCONTINUED | OUTPATIENT
Start: 2024-01-01 | End: 2024-01-01 | Stop reason: HOSPADM

## 2024-01-01 RX ORDER — POLYETHYLENE GLYCOL 3350 17 G/17G
17 POWDER, FOR SOLUTION ORAL DAILY
Status: DISCONTINUED | OUTPATIENT
Start: 2024-01-01 | End: 2024-01-01 | Stop reason: HOSPADM

## 2024-01-01 RX ORDER — HYDROMORPHONE HYDROCHLORIDE 1 MG/ML
0.5 INJECTION, SOLUTION INTRAMUSCULAR; INTRAVENOUS; SUBCUTANEOUS ONCE
Status: COMPLETED | OUTPATIENT
Start: 2024-01-01 | End: 2024-01-01

## 2024-01-01 RX ORDER — NICOTINE POLACRILEX 4 MG
15-30 LOZENGE BUCCAL
Status: DISCONTINUED | OUTPATIENT
Start: 2024-01-01 | End: 2024-01-01

## 2024-01-01 RX ORDER — DEXTROSE MONOHYDRATE 25 G/50ML
25 INJECTION, SOLUTION INTRAVENOUS ONCE
Status: COMPLETED | OUTPATIENT
Start: 2024-01-01 | End: 2024-01-01

## 2024-01-01 RX ORDER — PROCHLORPERAZINE MALEATE 10 MG
10 TABLET ORAL EVERY 6 HOURS PRN
Status: CANCELLED | OUTPATIENT
Start: 2024-01-01

## 2024-01-01 RX ORDER — PROCHLORPERAZINE 25 MG
25 SUPPOSITORY, RECTAL RECTAL EVERY 12 HOURS PRN
Status: CANCELLED | OUTPATIENT
Start: 2024-01-01

## 2024-01-01 RX ORDER — DEXAMETHASONE 4 MG/1
4 TABLET ORAL
Qty: 10 TABLET | Refills: 0 | Status: SHIPPED | OUTPATIENT
Start: 2024-01-01

## 2024-01-01 RX ORDER — NALOXONE HYDROCHLORIDE 0.4 MG/ML
0.4 INJECTION, SOLUTION INTRAMUSCULAR; INTRAVENOUS; SUBCUTANEOUS
Status: DISCONTINUED | OUTPATIENT
Start: 2024-01-01 | End: 2024-01-01 | Stop reason: HOSPADM

## 2024-01-01 RX ORDER — FUROSEMIDE 10 MG/ML
40 INJECTION INTRAMUSCULAR; INTRAVENOUS ONCE
Status: COMPLETED | OUTPATIENT
Start: 2024-01-01 | End: 2024-01-01

## 2024-01-01 RX ORDER — ONDANSETRON 2 MG/ML
4 INJECTION INTRAMUSCULAR; INTRAVENOUS EVERY 30 MIN PRN
Status: DISCONTINUED | OUTPATIENT
Start: 2024-01-01 | End: 2024-01-01 | Stop reason: HOSPADM

## 2024-01-01 RX ORDER — HEPARIN SODIUM,PORCINE 10 UNIT/ML
5-20 VIAL (ML) INTRAVENOUS DAILY PRN
Status: CANCELLED | OUTPATIENT
Start: 2024-01-01

## 2024-01-01 RX ORDER — PROCHLORPERAZINE 25 MG
25 SUPPOSITORY, RECTAL RECTAL EVERY 12 HOURS PRN
Status: DISCONTINUED | OUTPATIENT
Start: 2024-01-01 | End: 2024-01-01 | Stop reason: HOSPADM

## 2024-01-01 RX ORDER — LACTULOSE 10 G/15ML
10 SOLUTION ORAL DAILY
Status: DISCONTINUED | OUTPATIENT
Start: 2024-01-01 | End: 2024-01-01

## 2024-01-01 RX ORDER — MORPHINE SULFATE 15 MG/1
30 TABLET, FILM COATED, EXTENDED RELEASE ORAL EVERY 12 HOURS
Qty: 40 TABLET | Refills: 0 | Status: SHIPPED | OUTPATIENT
Start: 2024-01-01 | End: 2024-01-01 | Stop reason: ALTCHOICE

## 2024-01-01 RX ORDER — SALIVA STIMULANT COMB. NO.3
2 SPRAY, NON-AEROSOL (ML) MUCOUS MEMBRANE
Status: DISCONTINUED | OUTPATIENT
Start: 2024-01-01 | End: 2024-01-01 | Stop reason: HOSPADM

## 2024-01-01 RX ORDER — HEPARIN SODIUM (PORCINE) LOCK FLUSH IV SOLN 100 UNIT/ML 100 UNIT/ML
5 SOLUTION INTRAVENOUS
Status: DISCONTINUED | OUTPATIENT
Start: 2024-01-01 | End: 2024-01-01 | Stop reason: HOSPADM

## 2024-01-01 RX ORDER — MORPHINE SULFATE 15 MG/1
30 TABLET, FILM COATED, EXTENDED RELEASE ORAL AT BEDTIME
Qty: 15 TABLET | Refills: 0 | Status: SHIPPED | OUTPATIENT
Start: 2024-01-01 | End: 2024-01-01

## 2024-01-01 RX ORDER — CHLORHEXIDINE GLUCONATE ORAL RINSE 1.2 MG/ML
15 SOLUTION DENTAL 4 TIMES DAILY
Status: DISCONTINUED | OUTPATIENT
Start: 2024-01-01 | End: 2024-01-01

## 2024-01-01 RX ORDER — ATROPINE SULFATE 10 MG/ML
2 SOLUTION/ DROPS OPHTHALMIC EVERY 4 HOURS PRN
Status: DISCONTINUED | OUTPATIENT
Start: 2024-01-01 | End: 2024-01-01 | Stop reason: HOSPADM

## 2024-01-01 RX ORDER — FUROSEMIDE 10 MG/ML
40 INJECTION INTRAMUSCULAR; INTRAVENOUS ONCE
Status: DISCONTINUED | OUTPATIENT
Start: 2024-01-01 | End: 2024-01-01

## 2024-01-01 RX ORDER — CELECOXIB 100 MG/1
100 CAPSULE ORAL 2 TIMES DAILY
Qty: 28 CAPSULE | Refills: 0 | Status: SHIPPED | OUTPATIENT
Start: 2024-01-01

## 2024-01-01 RX ORDER — ENOXAPARIN SODIUM 100 MG/ML
40 INJECTION SUBCUTANEOUS EVERY 24 HOURS
Status: DISCONTINUED | OUTPATIENT
Start: 2024-01-01 | End: 2024-01-01

## 2024-01-01 RX ORDER — LIDOCAINE 40 MG/G
CREAM TOPICAL
Status: DISCONTINUED | OUTPATIENT
Start: 2024-01-01 | End: 2024-01-01 | Stop reason: HOSPADM

## 2024-01-01 RX ORDER — FENTANYL 12.5 UG/1
12 PATCH TRANSDERMAL
Status: DISCONTINUED | OUTPATIENT
Start: 2024-01-01 | End: 2024-01-01

## 2024-01-01 RX ORDER — PROCHLORPERAZINE MALEATE 5 MG
10 TABLET ORAL EVERY 6 HOURS PRN
Status: DISCONTINUED | OUTPATIENT
Start: 2024-01-01 | End: 2024-01-01 | Stop reason: HOSPADM

## 2024-01-01 RX ORDER — CHLORHEXIDINE GLUCONATE ORAL RINSE 1.2 MG/ML
10 SOLUTION DENTAL ONCE
Status: COMPLETED | OUTPATIENT
Start: 2024-01-01 | End: 2024-01-01

## 2024-01-01 RX ORDER — OXYCODONE HYDROCHLORIDE 5 MG/1
5-10 TABLET ORAL EVERY 4 HOURS PRN
Status: DISCONTINUED | OUTPATIENT
Start: 2024-01-01 | End: 2024-01-01

## 2024-01-01 RX ORDER — OXYCODONE HCL 5 MG/5 ML
10-20 SOLUTION, ORAL ORAL EVERY 4 HOURS PRN
Qty: 500 ML | Refills: 0 | Status: SHIPPED | OUTPATIENT
Start: 2024-01-01

## 2024-01-01 RX ORDER — ALBUTEROL SULFATE 0.83 MG/ML
2.5 SOLUTION RESPIRATORY (INHALATION)
Status: CANCELLED | OUTPATIENT
Start: 2024-01-01

## 2024-01-01 RX ORDER — ONDANSETRON 2 MG/ML
4 INJECTION INTRAMUSCULAR; INTRAVENOUS EVERY 6 HOURS PRN
Status: DISCONTINUED | OUTPATIENT
Start: 2024-01-01 | End: 2024-01-01

## 2024-01-01 RX ORDER — ALBUMIN (HUMAN) 12.5 G/50ML
12.5 SOLUTION INTRAVENOUS ONCE
Status: COMPLETED | OUTPATIENT
Start: 2024-01-01 | End: 2024-01-01

## 2024-01-01 RX ORDER — ALBUTEROL SULFATE 90 UG/1
1-2 AEROSOL, METERED RESPIRATORY (INHALATION)
Status: CANCELLED
Start: 2024-01-01

## 2024-01-01 RX ORDER — CALCIUM GLUCONATE 20 MG/ML
1 INJECTION, SOLUTION INTRAVENOUS ONCE
Status: COMPLETED | OUTPATIENT
Start: 2024-01-01 | End: 2024-01-01

## 2024-01-01 RX ORDER — MORPHINE SULFATE 2 MG/ML
2 INJECTION, SOLUTION INTRAMUSCULAR; INTRAVENOUS ONCE
Status: COMPLETED | OUTPATIENT
Start: 2024-01-01 | End: 2024-01-01

## 2024-01-01 RX ORDER — BISACODYL 10 MG
10 SUPPOSITORY, RECTAL RECTAL DAILY PRN
Status: DISCONTINUED | OUTPATIENT
Start: 2024-01-01 | End: 2024-01-01 | Stop reason: HOSPADM

## 2024-01-01 RX ORDER — ACETAMINOPHEN 325 MG/1
650 TABLET ORAL EVERY 4 HOURS PRN
Status: DISCONTINUED | OUTPATIENT
Start: 2024-01-01 | End: 2024-01-01

## 2024-01-01 RX ORDER — HEPARIN SODIUM (PORCINE) LOCK FLUSH IV SOLN 100 UNIT/ML 100 UNIT/ML
SOLUTION INTRAVENOUS
Status: COMPLETED
Start: 2024-01-01 | End: 2024-01-01

## 2024-01-01 RX ORDER — ZOLEDRONIC ACID 0.04 MG/ML
4 INJECTION, SOLUTION INTRAVENOUS ONCE
Status: CANCELLED | OUTPATIENT
Start: 2024-01-01 | End: 2024-01-01

## 2024-01-01 RX ORDER — OXYCODONE HCL 5 MG/5 ML
5-10 SOLUTION, ORAL ORAL EVERY 4 HOURS PRN
Status: DISCONTINUED | OUTPATIENT
Start: 2024-01-01 | End: 2024-01-01

## 2024-01-01 RX ORDER — HEPARIN SODIUM,PORCINE 10 UNIT/ML
5-10 VIAL (ML) INTRAVENOUS
Status: DISCONTINUED | OUTPATIENT
Start: 2024-01-01 | End: 2024-01-01 | Stop reason: HOSPADM

## 2024-01-01 RX ORDER — LIDOCAINE 4 G/G
2 PATCH TOPICAL
Status: DISCONTINUED | OUTPATIENT
Start: 2024-01-01 | End: 2024-01-01 | Stop reason: HOSPADM

## 2024-01-01 RX ORDER — ZOLEDRONIC ACID 0.04 MG/ML
4 INJECTION, SOLUTION INTRAVENOUS ONCE
Status: CANCELLED
Start: 2024-01-01

## 2024-01-01 RX ORDER — KETOROLAC TROMETHAMINE 15 MG/ML
15 INJECTION, SOLUTION INTRAMUSCULAR; INTRAVENOUS EVERY 8 HOURS
Status: DISCONTINUED | OUTPATIENT
Start: 2024-01-01 | End: 2024-01-01

## 2024-01-01 RX ORDER — CHLORHEXIDINE GLUCONATE ORAL RINSE 1.2 MG/ML
10 SOLUTION DENTAL 4 TIMES DAILY
Status: DISCONTINUED | OUTPATIENT
Start: 2024-01-01 | End: 2024-01-01 | Stop reason: HOSPADM

## 2024-01-01 RX ORDER — NALOXONE HYDROCHLORIDE 0.4 MG/ML
0.2 INJECTION, SOLUTION INTRAMUSCULAR; INTRAVENOUS; SUBCUTANEOUS
Status: DISCONTINUED | OUTPATIENT
Start: 2024-01-01 | End: 2024-01-01 | Stop reason: HOSPADM

## 2024-01-01 RX ORDER — ZOLEDRONIC ACID 0.04 MG/ML
4 INJECTION, SOLUTION INTRAVENOUS ONCE
Status: COMPLETED | OUTPATIENT
Start: 2024-01-01 | End: 2024-01-01

## 2024-01-01 RX ORDER — METHYLPREDNISOLONE SODIUM SUCCINATE 125 MG/2ML
125 INJECTION, POWDER, LYOPHILIZED, FOR SOLUTION INTRAMUSCULAR; INTRAVENOUS
Status: CANCELLED
Start: 2024-01-01

## 2024-01-01 RX ORDER — ONDANSETRON 4 MG/1
4 TABLET, ORALLY DISINTEGRATING ORAL EVERY 6 HOURS PRN
Status: DISCONTINUED | OUTPATIENT
Start: 2024-01-01 | End: 2024-01-01 | Stop reason: HOSPADM

## 2024-01-01 RX ORDER — LACTULOSE 10 G/15ML
10 SOLUTION ORAL DAILY
Status: DISCONTINUED | OUTPATIENT
Start: 2024-01-01 | End: 2024-01-01 | Stop reason: HOSPADM

## 2024-01-01 RX ORDER — ALBUMIN (HUMAN) 12.5 G/50ML
25-50 SOLUTION INTRAVENOUS ONCE
Status: COMPLETED | OUTPATIENT
Start: 2024-01-01 | End: 2024-01-01

## 2024-01-01 RX ORDER — HYDROMORPHONE HYDROCHLORIDE 1 MG/ML
4 SOLUTION ORAL
Status: DISCONTINUED | OUTPATIENT
Start: 2024-01-01 | End: 2024-01-01

## 2024-01-01 RX ORDER — AMOXICILLIN 250 MG
1 CAPSULE ORAL 2 TIMES DAILY PRN
Status: DISCONTINUED | OUTPATIENT
Start: 2024-01-01 | End: 2024-01-01 | Stop reason: HOSPADM

## 2024-01-01 RX ORDER — MEPERIDINE HYDROCHLORIDE 25 MG/ML
25 INJECTION INTRAMUSCULAR; INTRAVENOUS; SUBCUTANEOUS EVERY 30 MIN PRN
Status: CANCELLED | OUTPATIENT
Start: 2024-01-01

## 2024-01-01 RX ORDER — AMOXICILLIN 250 MG
1 CAPSULE ORAL 2 TIMES DAILY PRN
Status: DISCONTINUED | OUTPATIENT
Start: 2024-01-01 | End: 2024-01-01

## 2024-01-01 RX ORDER — AMOXICILLIN 250 MG
1 CAPSULE ORAL 2 TIMES DAILY PRN
Status: CANCELLED | OUTPATIENT
Start: 2024-01-01

## 2024-01-01 RX ORDER — ONDANSETRON 2 MG/ML
4 INJECTION INTRAMUSCULAR; INTRAVENOUS EVERY 6 HOURS PRN
Status: CANCELLED | OUTPATIENT
Start: 2024-01-01

## 2024-01-01 RX ORDER — POLYETHYLENE GLYCOL 3350 17 G/17G
17 POWDER, FOR SOLUTION ORAL 2 TIMES DAILY
Status: DISCONTINUED | OUTPATIENT
Start: 2024-01-01 | End: 2024-01-01

## 2024-01-01 RX ORDER — DIPHENHYDRAMINE HYDROCHLORIDE 50 MG/ML
50 INJECTION INTRAMUSCULAR; INTRAVENOUS
Status: CANCELLED
Start: 2024-01-01

## 2024-01-01 RX ORDER — AMOXICILLIN 250 MG
3 CAPSULE ORAL 2 TIMES DAILY
Status: DISCONTINUED | OUTPATIENT
Start: 2024-01-01 | End: 2024-01-01 | Stop reason: HOSPADM

## 2024-01-01 RX ORDER — HEPARIN SODIUM,PORCINE 10 UNIT/ML
5-10 VIAL (ML) INTRAVENOUS EVERY 24 HOURS
Status: DISCONTINUED | OUTPATIENT
Start: 2024-01-01 | End: 2024-01-01 | Stop reason: HOSPADM

## 2024-01-01 RX ORDER — CALCIUM CARBONATE 500 MG/1
1000 TABLET, CHEWABLE ORAL 4 TIMES DAILY PRN
Status: CANCELLED | OUTPATIENT
Start: 2024-01-01

## 2024-01-01 RX ORDER — HEPARIN SODIUM (PORCINE) LOCK FLUSH IV SOLN 100 UNIT/ML 100 UNIT/ML
5-10 SOLUTION INTRAVENOUS
Status: DISCONTINUED | OUTPATIENT
Start: 2024-01-01 | End: 2024-01-01 | Stop reason: HOSPADM

## 2024-01-01 RX ORDER — HYDROMORPHONE HYDROCHLORIDE 1 MG/ML
.5-1 INJECTION, SOLUTION INTRAMUSCULAR; INTRAVENOUS; SUBCUTANEOUS
Status: DISCONTINUED | OUTPATIENT
Start: 2024-01-01 | End: 2024-01-01

## 2024-01-01 RX ORDER — ACETAMINOPHEN 325 MG/1
975 TABLET ORAL 3 TIMES DAILY
Status: DISCONTINUED | OUTPATIENT
Start: 2024-01-01 | End: 2024-01-01 | Stop reason: HOSPADM

## 2024-01-01 RX ORDER — LORAZEPAM 2 MG/ML
0.5 INJECTION INTRAMUSCULAR EVERY 4 HOURS PRN
Status: CANCELLED | OUTPATIENT
Start: 2024-01-01

## 2024-01-01 RX ORDER — CALCIUM CARBONATE 500 MG/1
1000 TABLET, CHEWABLE ORAL 4 TIMES DAILY PRN
Status: DISCONTINUED | OUTPATIENT
Start: 2024-01-01 | End: 2024-01-01 | Stop reason: HOSPADM

## 2024-01-01 RX ORDER — LANOLIN ALCOHOL/MO/W.PET/CERES
3 CREAM (GRAM) TOPICAL
Status: DISCONTINUED | OUTPATIENT
Start: 2024-01-01 | End: 2024-01-01 | Stop reason: HOSPADM

## 2024-01-01 RX ORDER — ACETAMINOPHEN 325 MG/10.15ML
1000 LIQUID ORAL EVERY 8 HOURS SCHEDULED
Status: DISCONTINUED | OUTPATIENT
Start: 2024-01-01 | End: 2024-01-01

## 2024-01-01 RX ORDER — POLYETHYLENE GLYCOL 3350 17 G/17G
17 POWDER, FOR SOLUTION ORAL 2 TIMES DAILY PRN
Qty: 510 G | Refills: 0 | Status: SHIPPED | OUTPATIENT
Start: 2024-01-01 | End: 2024-01-01

## 2024-01-01 RX ORDER — HEPARIN SODIUM,PORCINE 10 UNIT/ML
5-20 VIAL (ML) INTRAVENOUS DAILY PRN
Status: DISCONTINUED | OUTPATIENT
Start: 2024-01-01 | End: 2024-01-01 | Stop reason: HOSPADM

## 2024-01-01 RX ORDER — GLYCOPYRROLATE 1 MG/1
2 TABLET ORAL EVERY 4 HOURS PRN
Status: DISCONTINUED | OUTPATIENT
Start: 2024-01-01 | End: 2024-01-01 | Stop reason: HOSPADM

## 2024-01-01 RX ORDER — POLYETHYLENE GLYCOL 3350 17 G/17G
17 POWDER, FOR SOLUTION ORAL 2 TIMES DAILY PRN
Status: DISCONTINUED | OUTPATIENT
Start: 2024-01-01 | End: 2024-01-01

## 2024-01-01 RX ORDER — OLANZAPINE 5 MG/1
5 TABLET, ORALLY DISINTEGRATING ORAL AT BEDTIME
Status: DISCONTINUED | OUTPATIENT
Start: 2024-01-01 | End: 2024-01-01 | Stop reason: HOSPADM

## 2024-01-01 RX ORDER — SODIUM CHLORIDE, SODIUM LACTATE, POTASSIUM CHLORIDE, CALCIUM CHLORIDE 600; 310; 30; 20 MG/100ML; MG/100ML; MG/100ML; MG/100ML
INJECTION, SOLUTION INTRAVENOUS CONTINUOUS
Status: DISCONTINUED | OUTPATIENT
Start: 2024-01-01 | End: 2024-01-01 | Stop reason: HOSPADM

## 2024-01-01 RX ORDER — KETOROLAC TROMETHAMINE 15 MG/ML
15 INJECTION, SOLUTION INTRAMUSCULAR; INTRAVENOUS EVERY 8 HOURS
Status: COMPLETED | OUTPATIENT
Start: 2024-01-01 | End: 2024-01-01

## 2024-01-01 RX ORDER — LIDOCAINE 40 MG/G
CREAM TOPICAL
Status: CANCELLED | OUTPATIENT
Start: 2024-01-01

## 2024-01-01 RX ORDER — CALCIUM GLUCONATE 94 MG/ML
1 INJECTION, SOLUTION INTRAVENOUS ONCE
Status: DISCONTINUED | OUTPATIENT
Start: 2024-01-01 | End: 2024-01-01

## 2024-01-01 RX ORDER — DEXAMETHASONE SODIUM PHOSPHATE 10 MG/ML
10 INJECTION, SOLUTION INTRAMUSCULAR; INTRAVENOUS ONCE
Status: COMPLETED | OUTPATIENT
Start: 2024-01-01 | End: 2024-01-01

## 2024-01-01 RX ORDER — CEFAZOLIN SODIUM 2 G/100ML
2 INJECTION, SOLUTION INTRAVENOUS
Status: DISCONTINUED | OUTPATIENT
Start: 2024-01-01 | End: 2024-01-01

## 2024-01-01 RX ORDER — SENNOSIDES 8.6 MG
2 TABLET ORAL 2 TIMES DAILY PRN
Qty: 30 TABLET | Refills: 0 | Status: SHIPPED | OUTPATIENT
Start: 2024-01-01 | End: 2024-01-01

## 2024-01-01 RX ORDER — OXYCODONE HCL 5 MG/5 ML
10 SOLUTION, ORAL ORAL
Status: DISCONTINUED | OUTPATIENT
Start: 2024-01-01 | End: 2024-01-01 | Stop reason: HOSPADM

## 2024-01-01 RX ORDER — CARBOXYMETHYLCELLULOSE SODIUM 5 MG/ML
1-2 SOLUTION/ DROPS OPHTHALMIC
Status: DISCONTINUED | OUTPATIENT
Start: 2024-01-01 | End: 2024-01-01 | Stop reason: HOSPADM

## 2024-01-01 RX ORDER — LIDOCAINE 4 G/G
2 PATCH TOPICAL EVERY 24 HOURS
Qty: 20 PATCH | Refills: 0 | Status: SHIPPED | OUTPATIENT
Start: 2024-01-01

## 2024-01-01 RX ORDER — MINERAL OIL/HYDROPHIL PETROLAT
OINTMENT (GRAM) TOPICAL
Status: DISCONTINUED | OUTPATIENT
Start: 2024-01-01 | End: 2024-01-01 | Stop reason: HOSPADM

## 2024-01-01 RX ORDER — IOPAMIDOL 755 MG/ML
115 INJECTION, SOLUTION INTRAVASCULAR ONCE
Status: COMPLETED | OUTPATIENT
Start: 2024-01-01 | End: 2024-01-01

## 2024-01-01 RX ORDER — ONDANSETRON 4 MG/1
4 TABLET, ORALLY DISINTEGRATING ORAL EVERY 6 HOURS PRN
Status: DISCONTINUED | OUTPATIENT
Start: 2024-01-01 | End: 2024-01-01

## 2024-01-01 RX ORDER — MORPHINE SULFATE 30 MG/1
30 TABLET, FILM COATED, EXTENDED RELEASE ORAL EVERY 12 HOURS
Qty: 30 TABLET | Refills: 0 | Status: SHIPPED | OUTPATIENT
Start: 2024-01-01

## 2024-01-01 RX ORDER — IOPAMIDOL 755 MG/ML
107 INJECTION, SOLUTION INTRAVASCULAR ONCE
Status: COMPLETED | OUTPATIENT
Start: 2024-01-01 | End: 2024-01-01

## 2024-01-01 RX ORDER — OXYCODONE HCL 5 MG/5 ML
10-20 SOLUTION, ORAL ORAL EVERY 4 HOURS PRN
Start: 2024-01-01 | End: 2024-01-01

## 2024-01-01 RX ORDER — DEXTROSE MONOHYDRATE 25 G/50ML
25 INJECTION, SOLUTION INTRAVENOUS ONCE
Qty: 50 ML | Refills: 0 | Status: COMPLETED | OUTPATIENT
Start: 2024-01-01 | End: 2024-01-01

## 2024-01-01 RX ORDER — DIPHENHYDRAMINE HCL 25 MG
50 CAPSULE ORAL ONCE
Status: CANCELLED
Start: 2024-01-01

## 2024-01-01 RX ORDER — MAGNESIUM HYDROXIDE/ALUMINUM HYDROXICE/SIMETHICONE 120; 1200; 1200 MG/30ML; MG/30ML; MG/30ML
30 SUSPENSION ORAL EVERY 4 HOURS PRN
Status: DISCONTINUED | OUTPATIENT
Start: 2024-01-01 | End: 2024-01-01 | Stop reason: HOSPADM

## 2024-01-01 RX ORDER — SENNOSIDES 8.6 MG
8.6 TABLET ORAL 2 TIMES DAILY
Status: DISCONTINUED | OUTPATIENT
Start: 2024-01-01 | End: 2024-01-01

## 2024-01-01 RX ORDER — OXYCODONE HCL 20 MG/ML
10-20 CONCENTRATE, ORAL ORAL EVERY 4 HOURS PRN
Status: DISCONTINUED | OUTPATIENT
Start: 2024-01-01 | End: 2024-01-01

## 2024-01-01 RX ORDER — ONDANSETRON 2 MG/ML
4 INJECTION INTRAMUSCULAR; INTRAVENOUS EVERY 6 HOURS PRN
Status: DISCONTINUED | OUTPATIENT
Start: 2024-01-01 | End: 2024-01-01 | Stop reason: HOSPADM

## 2024-01-01 RX ORDER — AMOXICILLIN 250 MG
2 CAPSULE ORAL 2 TIMES DAILY PRN
Status: DISCONTINUED | OUTPATIENT
Start: 2024-01-01 | End: 2024-01-01

## 2024-01-01 RX ORDER — HEPARIN SODIUM (PORCINE) LOCK FLUSH IV SOLN 100 UNIT/ML 100 UNIT/ML
5 SOLUTION INTRAVENOUS
Status: CANCELLED | OUTPATIENT
Start: 2024-01-01

## 2024-01-01 RX ORDER — FENTANYL 25 UG/1
25 PATCH TRANSDERMAL
Status: DISCONTINUED | OUTPATIENT
Start: 2024-01-01 | End: 2024-01-01

## 2024-01-01 RX ORDER — ONDANSETRON 2 MG/ML
4 INJECTION INTRAMUSCULAR; INTRAVENOUS ONCE
Status: COMPLETED | OUTPATIENT
Start: 2024-01-01 | End: 2024-01-01

## 2024-01-01 RX ORDER — PALONOSETRON 0.05 MG/ML
0.25 INJECTION, SOLUTION INTRAVENOUS ONCE
Status: CANCELLED | OUTPATIENT
Start: 2024-01-01

## 2024-01-01 RX ORDER — SODIUM CHLORIDE 9 MG/ML
INJECTION, SOLUTION INTRAVENOUS CONTINUOUS
Status: DISCONTINUED | OUTPATIENT
Start: 2024-01-01 | End: 2024-01-01 | Stop reason: HOSPADM

## 2024-01-01 RX ORDER — LACTULOSE 10 G/15ML
10 SOLUTION ORAL 2 TIMES DAILY
Status: DISCONTINUED | OUTPATIENT
Start: 2024-01-01 | End: 2024-01-01

## 2024-01-01 RX ORDER — AMOXICILLIN 250 MG
2 CAPSULE ORAL 2 TIMES DAILY PRN
Status: CANCELLED | OUTPATIENT
Start: 2024-01-01

## 2024-01-01 RX ORDER — DEXAMETHASONE 4 MG/1
4 TABLET ORAL
Status: DISCONTINUED | OUTPATIENT
Start: 2024-01-01 | End: 2024-01-01

## 2024-01-01 RX ORDER — CHLORHEXIDINE GLUCONATE ORAL RINSE 1.2 MG/ML
10 SOLUTION DENTAL 4 TIMES DAILY
Qty: 1893 ML | Refills: 11 | Status: SHIPPED | OUTPATIENT
Start: 2024-01-01

## 2024-01-01 RX ORDER — POLYETHYLENE GLYCOL 3350 17 G/17G
17 POWDER, FOR SOLUTION ORAL 2 TIMES DAILY
Status: DISCONTINUED | OUTPATIENT
Start: 2024-01-01 | End: 2024-01-01 | Stop reason: HOSPADM

## 2024-01-01 RX ORDER — OXYCODONE HCL 5 MG/5 ML
20 SOLUTION, ORAL ORAL
Status: DISCONTINUED | OUTPATIENT
Start: 2024-01-01 | End: 2024-01-01 | Stop reason: HOSPADM

## 2024-01-01 RX ORDER — DEXAMETHASONE 4 MG/1
4 TABLET ORAL
Qty: 7 TABLET | Refills: 0 | Status: SHIPPED | OUTPATIENT
Start: 2024-01-01 | End: 2024-01-01

## 2024-01-01 RX ORDER — HYDROMORPHONE HYDROCHLORIDE 1 MG/ML
.5-1 INJECTION, SOLUTION INTRAMUSCULAR; INTRAVENOUS; SUBCUTANEOUS EVERY 4 HOURS PRN
Status: DISCONTINUED | OUTPATIENT
Start: 2024-01-01 | End: 2024-01-01

## 2024-01-01 RX ADMIN — OXYCODONE HYDROCHLORIDE 10 MG: 5 TABLET ORAL at 01:57

## 2024-01-01 RX ADMIN — SENNOSIDES AND DOCUSATE SODIUM 2 TABLET: 8.6; 5 TABLET ORAL at 21:10

## 2024-01-01 RX ADMIN — CHLORHEXIDINE GLUCONATE 0.12% ORAL RINSE 10 ML: 1.2 LIQUID ORAL at 07:49

## 2024-01-01 RX ADMIN — DEXAMETHASONE INTENSOL 4 MG: 1 SOLUTION, CONCENTRATE ORAL at 08:47

## 2024-01-01 RX ADMIN — DEXTROSE MONOHYDRATE 25 G: 25 INJECTION, SOLUTION INTRAVENOUS at 05:56

## 2024-01-01 RX ADMIN — OXYCODONE HYDROCHLORIDE 10 MG: 5 SOLUTION ORAL at 14:21

## 2024-01-01 RX ADMIN — CHLORHEXIDINE GLUCONATE 10 ML: 1.2 RINSE ORAL at 08:48

## 2024-01-01 RX ADMIN — LACTULOSE 10 G: 20 SOLUTION ORAL at 15:13

## 2024-01-01 RX ADMIN — HYDROMORPHONE HYDROCHLORIDE 0.5 MG: 1 INJECTION, SOLUTION INTRAMUSCULAR; INTRAVENOUS; SUBCUTANEOUS at 09:09

## 2024-01-01 RX ADMIN — HYDROMORPHONE HYDROCHLORIDE 1 MG: 1 INJECTION, SOLUTION INTRAMUSCULAR; INTRAVENOUS; SUBCUTANEOUS at 06:18

## 2024-01-01 RX ADMIN — OXYCODONE HYDROCHLORIDE 10 MG: 5 SOLUTION ORAL at 07:49

## 2024-01-01 RX ADMIN — DEXTROSE MONOHYDRATE 25 G: 25 INJECTION, SOLUTION INTRAVENOUS at 17:08

## 2024-01-01 RX ADMIN — IOPAMIDOL 115 ML: 755 INJECTION, SOLUTION INTRAVENOUS at 12:36

## 2024-01-01 RX ADMIN — DEXTROSE 50 % IN WATER (D50W) INTRAVENOUS SYRINGE 25 G: at 14:02

## 2024-01-01 RX ADMIN — ONDANSETRON 4 MG: 2 INJECTION INTRAMUSCULAR; INTRAVENOUS at 11:25

## 2024-01-01 RX ADMIN — SODIUM CHLORIDE 500 ML: 9 INJECTION, SOLUTION INTRAVENOUS at 11:26

## 2024-01-01 RX ADMIN — CALCIUM GLUCONATE 1 G: 20 INJECTION, SOLUTION INTRAVENOUS at 14:01

## 2024-01-01 RX ADMIN — HYDROMORPHONE HYDROCHLORIDE 1 MG: 1 INJECTION, SOLUTION INTRAMUSCULAR; INTRAVENOUS; SUBCUTANEOUS at 05:36

## 2024-01-01 RX ADMIN — HYDROMORPHONE HYDROCHLORIDE 1 MG: 1 INJECTION, SOLUTION INTRAMUSCULAR; INTRAVENOUS; SUBCUTANEOUS at 22:45

## 2024-01-01 RX ADMIN — CHLORHEXIDINE GLUCONATE 10 ML: 1.2 RINSE ORAL at 19:39

## 2024-01-01 RX ADMIN — MORPHINE SULFATE 2 MG: 2 INJECTION, SOLUTION INTRAMUSCULAR; INTRAVENOUS at 16:32

## 2024-01-01 RX ADMIN — HYDROMORPHONE HYDROCHLORIDE 2 MG: 1 SOLUTION ORAL at 17:09

## 2024-01-01 RX ADMIN — Medication 5 ML: at 10:03

## 2024-01-01 RX ADMIN — HYDROMORPHONE HYDROCHLORIDE 4 MG: 1 SOLUTION ORAL at 11:32

## 2024-01-01 RX ADMIN — SODIUM ZIRCONIUM CYCLOSILICATE 10 G: 10 POWDER, FOR SUSPENSION ORAL at 17:27

## 2024-01-01 RX ADMIN — HYDROMORPHONE HYDROCHLORIDE 1 MG: 1 INJECTION, SOLUTION INTRAMUSCULAR; INTRAVENOUS; SUBCUTANEOUS at 09:37

## 2024-01-01 RX ADMIN — HYDROMORPHONE HYDROCHLORIDE 0.5 MG: 1 INJECTION, SOLUTION INTRAMUSCULAR; INTRAVENOUS; SUBCUTANEOUS at 02:31

## 2024-01-01 RX ADMIN — OXYCODONE HYDROCHLORIDE 10 MG: 5 SOLUTION ORAL at 16:12

## 2024-01-01 RX ADMIN — SODIUM CHLORIDE, PRESERVATIVE FREE 5 ML: 5 INJECTION INTRAVENOUS at 10:19

## 2024-01-01 RX ADMIN — HYDROMORPHONE HYDROCHLORIDE 0.5 MG: 1 INJECTION, SOLUTION INTRAMUSCULAR; INTRAVENOUS; SUBCUTANEOUS at 13:59

## 2024-01-01 RX ADMIN — KETOROLAC TROMETHAMINE 15 MG: 15 INJECTION, SOLUTION INTRAMUSCULAR; INTRAVENOUS at 15:45

## 2024-01-01 RX ADMIN — HYDROMORPHONE HYDROCHLORIDE 4 MG: 1 SOLUTION ORAL at 04:24

## 2024-01-01 RX ADMIN — CHLORHEXIDINE GLUCONATE 10 ML: 1.2 RINSE ORAL at 15:12

## 2024-01-01 RX ADMIN — HYDROMORPHONE HYDROCHLORIDE 1 MG: 1 INJECTION, SOLUTION INTRAMUSCULAR; INTRAVENOUS; SUBCUTANEOUS at 07:58

## 2024-01-01 RX ADMIN — CHLORHEXIDINE GLUCONATE 0.12% ORAL RINSE 10 ML: 1.2 LIQUID ORAL at 21:11

## 2024-01-01 RX ADMIN — OXYCODONE HYDROCHLORIDE 10 MG: 5 SOLUTION ORAL at 21:10

## 2024-01-01 RX ADMIN — LIDOCAINE 2 PATCH: 4 PATCH TOPICAL at 20:36

## 2024-01-01 RX ADMIN — KETOROLAC TROMETHAMINE 15 MG: 15 INJECTION, SOLUTION INTRAMUSCULAR; INTRAVENOUS at 22:04

## 2024-01-01 RX ADMIN — OXYCODONE HYDROCHLORIDE 20 MG: 100 SOLUTION ORAL at 06:09

## 2024-01-01 RX ADMIN — DEXTROSE MONOHYDRATE 300 ML: 100 INJECTION, SOLUTION INTRAVENOUS at 17:09

## 2024-01-01 RX ADMIN — SODIUM CHLORIDE, POTASSIUM CHLORIDE, SODIUM LACTATE AND CALCIUM CHLORIDE: 600; 310; 30; 20 INJECTION, SOLUTION INTRAVENOUS at 08:20

## 2024-01-01 RX ADMIN — MORPHINE SULFATE 30 MG: 30 TABLET, EXTENDED RELEASE ORAL at 04:58

## 2024-01-01 RX ADMIN — ZOLEDRONIC ACID 4 MG: 0.04 INJECTION, SOLUTION INTRAVENOUS at 17:11

## 2024-01-01 RX ADMIN — Medication 5 ML: at 04:23

## 2024-01-01 RX ADMIN — OXYCODONE HYDROCHLORIDE 10 MG: 5 SOLUTION ORAL at 20:35

## 2024-01-01 RX ADMIN — Medication 5 ML: at 07:58

## 2024-01-01 RX ADMIN — FENTANYL 1 PATCH: 25 PATCH TRANSDERMAL at 15:29

## 2024-01-01 RX ADMIN — Medication 5 ML: at 13:10

## 2024-01-01 RX ADMIN — LORAZEPAM 1 MG: 2 INJECTION INTRAMUSCULAR; INTRAVENOUS at 19:49

## 2024-01-01 RX ADMIN — OXYCODONE HYDROCHLORIDE 10 MG: 100 SOLUTION ORAL at 10:01

## 2024-01-01 RX ADMIN — HYDROMORPHONE HYDROCHLORIDE 4 MG: 1 SOLUTION ORAL at 01:11

## 2024-01-01 RX ADMIN — HYDROMORPHONE HYDROCHLORIDE 0.5 MG: 1 INJECTION, SOLUTION INTRAMUSCULAR; INTRAVENOUS; SUBCUTANEOUS at 23:24

## 2024-01-01 RX ADMIN — DEXTROSE MONOHYDRATE 25 G: 25 INJECTION, SOLUTION INTRAVENOUS at 21:27

## 2024-01-01 RX ADMIN — HYDROMORPHONE HYDROCHLORIDE 4 MG: 1 SOLUTION ORAL at 19:59

## 2024-01-01 RX ADMIN — SODIUM CHLORIDE 250 ML: 9 INJECTION, SOLUTION INTRAVENOUS at 17:11

## 2024-01-01 RX ADMIN — MORPHINE SULFATE 2 MG: 2 INJECTION, SOLUTION INTRAMUSCULAR; INTRAVENOUS at 05:20

## 2024-01-01 RX ADMIN — LACTULOSE 10 G: 20 SOLUTION ORAL at 16:26

## 2024-01-01 RX ADMIN — Medication 5 ML: at 08:27

## 2024-01-01 RX ADMIN — LIDOCAINE 2 PATCH: 4 PATCH TOPICAL at 20:17

## 2024-01-01 RX ADMIN — ACETAMINOPHEN 1000 MG: 325 SOLUTION ORAL at 20:06

## 2024-01-01 RX ADMIN — ALBUMIN HUMAN 12.5 G: 0.25 SOLUTION INTRAVENOUS at 18:24

## 2024-01-01 RX ADMIN — SODIUM ZIRCONIUM CYCLOSILICATE 10 G: 10 POWDER, FOR SUSPENSION ORAL at 21:08

## 2024-01-01 RX ADMIN — ONDANSETRON 4 MG: 2 INJECTION INTRAMUSCULAR; INTRAVENOUS at 08:23

## 2024-01-01 RX ADMIN — FENTANYL 1 PATCH: 12.5 PATCH TRANSDERMAL at 16:25

## 2024-01-01 RX ADMIN — HYDROMORPHONE HYDROCHLORIDE 1 MG: 1 INJECTION, SOLUTION INTRAMUSCULAR; INTRAVENOUS; SUBCUTANEOUS at 09:02

## 2024-01-01 RX ADMIN — ACETAMINOPHEN 1000 MG: 325 SOLUTION ORAL at 19:47

## 2024-01-01 RX ADMIN — HYDROMORPHONE HYDROCHLORIDE 1 MG: 1 INJECTION, SOLUTION INTRAMUSCULAR; INTRAVENOUS; SUBCUTANEOUS at 04:10

## 2024-01-01 RX ADMIN — OXYCODONE HYDROCHLORIDE 20 MG: 100 SOLUTION ORAL at 14:06

## 2024-01-01 RX ADMIN — Medication: at 13:07

## 2024-01-01 RX ADMIN — HYDROMORPHONE HYDROCHLORIDE 1 MG: 1 INJECTION, SOLUTION INTRAMUSCULAR; INTRAVENOUS; SUBCUTANEOUS at 11:30

## 2024-01-01 RX ADMIN — HYDROMORPHONE HYDROCHLORIDE 1 MG: 1 INJECTION, SOLUTION INTRAMUSCULAR; INTRAVENOUS; SUBCUTANEOUS at 23:42

## 2024-01-01 RX ADMIN — SENNOSIDES AND DOCUSATE SODIUM 2 TABLET: 8.6; 5 TABLET ORAL at 09:07

## 2024-01-01 RX ADMIN — Medication 5 ML: at 04:26

## 2024-01-01 RX ADMIN — OLANZAPINE 5 MG: 5 TABLET, ORALLY DISINTEGRATING ORAL at 22:03

## 2024-01-01 RX ADMIN — FENTANYL 1 PATCH: 25 PATCH TRANSDERMAL at 15:12

## 2024-01-01 RX ADMIN — ENOXAPARIN SODIUM 40 MG: 40 INJECTION SUBCUTANEOUS at 21:08

## 2024-01-01 RX ADMIN — HYDROMORPHONE HYDROCHLORIDE 1 MG: 1 INJECTION, SOLUTION INTRAMUSCULAR; INTRAVENOUS; SUBCUTANEOUS at 16:18

## 2024-01-01 RX ADMIN — HYDROMORPHONE HYDROCHLORIDE 1 MG: 1 INJECTION, SOLUTION INTRAMUSCULAR; INTRAVENOUS; SUBCUTANEOUS at 05:39

## 2024-01-01 RX ADMIN — ACETAMINOPHEN 975 MG: 325 TABLET, FILM COATED ORAL at 07:48

## 2024-01-01 RX ADMIN — ACETAMINOPHEN 975 MG: 325 TABLET, FILM COATED ORAL at 14:18

## 2024-01-01 RX ADMIN — HYDROMORPHONE HYDROCHLORIDE 1 MG: 1 INJECTION, SOLUTION INTRAMUSCULAR; INTRAVENOUS; SUBCUTANEOUS at 13:30

## 2024-01-01 RX ADMIN — MORPHINE SULFATE 2 MG: 2 INJECTION, SOLUTION INTRAMUSCULAR; INTRAVENOUS at 04:57

## 2024-01-01 RX ADMIN — ALBUMIN HUMAN 12.5 G: 0.25 SOLUTION INTRAVENOUS at 11:54

## 2024-01-01 RX ADMIN — HYDROMORPHONE HYDROCHLORIDE 0.5 MG: 1 INJECTION, SOLUTION INTRAMUSCULAR; INTRAVENOUS; SUBCUTANEOUS at 11:26

## 2024-01-01 RX ADMIN — OXYCODONE HYDROCHLORIDE 20 MG: 100 SOLUTION ORAL at 18:24

## 2024-01-01 RX ADMIN — DEXTROSE 300 ML: 10 SOLUTION INTRAVENOUS at 14:02

## 2024-01-01 RX ADMIN — DEXAMETHASONE SODIUM PHOSPHATE 10 MG: 10 INJECTION, SOLUTION INTRAMUSCULAR; INTRAVENOUS at 09:05

## 2024-01-01 RX ADMIN — DOCUSATE SODIUM 50 MG AND SENNOSIDES 8.6 MG 1 TABLET: 8.6; 5 TABLET, FILM COATED ORAL at 09:59

## 2024-01-01 RX ADMIN — HYDROMORPHONE HYDROCHLORIDE 0.5 MG: 1 INJECTION, SOLUTION INTRAMUSCULAR; INTRAVENOUS; SUBCUTANEOUS at 12:32

## 2024-01-01 RX ADMIN — OXYCODONE HYDROCHLORIDE 20 MG: 100 SOLUTION ORAL at 19:38

## 2024-01-01 RX ADMIN — HYDROMORPHONE HYDROCHLORIDE 1 MG: 1 INJECTION, SOLUTION INTRAMUSCULAR; INTRAVENOUS; SUBCUTANEOUS at 03:47

## 2024-01-01 RX ADMIN — OXYCODONE HYDROCHLORIDE 20 MG: 100 SOLUTION ORAL at 10:47

## 2024-01-01 RX ADMIN — ALUMINUM HYDROXIDE, MAGNESIUM HYDROXIDE, AND SIMETHICONE 30 ML: 200; 200; 20 SUSPENSION ORAL at 21:34

## 2024-01-01 RX ADMIN — DEXTROSE MONOHYDRATE 300 ML: 100 INJECTION, SOLUTION INTRAVENOUS at 21:27

## 2024-01-01 RX ADMIN — HYDROMORPHONE HYDROCHLORIDE 1 MG: 1 INJECTION, SOLUTION INTRAMUSCULAR; INTRAVENOUS; SUBCUTANEOUS at 01:54

## 2024-01-01 RX ADMIN — HYDROMORPHONE HYDROCHLORIDE 1 MG: 1 INJECTION, SOLUTION INTRAMUSCULAR; INTRAVENOUS; SUBCUTANEOUS at 17:50

## 2024-01-01 RX ADMIN — OXYCODONE HYDROCHLORIDE 10 MG: 5 TABLET ORAL at 20:16

## 2024-01-01 RX ADMIN — OXYCODONE HYDROCHLORIDE 20 MG: 100 SOLUTION ORAL at 00:06

## 2024-01-01 RX ADMIN — HYDROMORPHONE HYDROCHLORIDE 1 MG: 1 INJECTION, SOLUTION INTRAMUSCULAR; INTRAVENOUS; SUBCUTANEOUS at 08:20

## 2024-01-01 RX ADMIN — OXYCODONE HYDROCHLORIDE 20 MG: 100 SOLUTION ORAL at 04:23

## 2024-01-01 RX ADMIN — OLANZAPINE 5 MG: 5 TABLET, ORALLY DISINTEGRATING ORAL at 21:27

## 2024-01-01 RX ADMIN — HYDROMORPHONE HYDROCHLORIDE 1 MG: 1 INJECTION, SOLUTION INTRAMUSCULAR; INTRAVENOUS; SUBCUTANEOUS at 06:55

## 2024-01-01 RX ADMIN — HYDROMORPHONE HYDROCHLORIDE 4 MG: 1 SOLUTION ORAL at 08:47

## 2024-01-01 RX ADMIN — MORPHINE SULFATE 2 MG: 2 INJECTION, SOLUTION INTRAMUSCULAR; INTRAVENOUS at 09:51

## 2024-01-01 RX ADMIN — POLYETHYLENE GLYCOL 3350 17 G: 17 POWDER, FOR SOLUTION ORAL at 21:10

## 2024-01-01 RX ADMIN — SODIUM CHLORIDE 8.6 UNITS: 9 INJECTION, SOLUTION INTRAVENOUS at 14:02

## 2024-01-01 RX ADMIN — HYDROMORPHONE HYDROCHLORIDE 1 MG: 1 INJECTION, SOLUTION INTRAMUSCULAR; INTRAVENOUS; SUBCUTANEOUS at 15:21

## 2024-01-01 RX ADMIN — FUROSEMIDE 40 MG: 10 INJECTION, SOLUTION INTRAVENOUS at 15:11

## 2024-01-01 RX ADMIN — SODIUM CHLORIDE 7.3 UNITS: 9 INJECTION, SOLUTION INTRAVENOUS at 05:59

## 2024-01-01 RX ADMIN — LIDOCAINE 2 PATCH: 4 PATCH TOPICAL at 21:10

## 2024-01-01 RX ADMIN — SODIUM ZIRCONIUM CYCLOSILICATE 10 G: 10 POWDER, FOR SUSPENSION ORAL at 14:16

## 2024-01-01 RX ADMIN — CHLORHEXIDINE GLUCONATE 10 ML: 1.2 SOLUTION ORAL at 20:16

## 2024-01-01 RX ADMIN — HYDROMORPHONE HYDROCHLORIDE 0.5 MG: 1 INJECTION, SOLUTION INTRAMUSCULAR; INTRAVENOUS; SUBCUTANEOUS at 05:56

## 2024-01-01 RX ADMIN — IOPAMIDOL 107 ML: 755 INJECTION, SOLUTION INTRAVENOUS at 13:34

## 2024-01-01 RX ADMIN — HYDROMORPHONE HYDROCHLORIDE 1 MG: 1 INJECTION, SOLUTION INTRAMUSCULAR; INTRAVENOUS; SUBCUTANEOUS at 10:44

## 2024-01-01 RX ADMIN — LACTULOSE 10 G: 20 SOLUTION ORAL at 19:46

## 2024-01-01 RX ADMIN — CALCIUM GLUCONATE 1 G: 20 INJECTION, SOLUTION INTRAVENOUS at 17:16

## 2024-01-01 RX ADMIN — CHLORHEXIDINE GLUCONATE 0.12% ORAL RINSE 10 ML: 1.2 LIQUID ORAL at 16:14

## 2024-01-01 RX ADMIN — SODIUM ZIRCONIUM CYCLOSILICATE 10 G: 10 POWDER, FOR SUSPENSION ORAL at 23:58

## 2024-01-01 RX ADMIN — SODIUM CHLORIDE: 9 INJECTION, SOLUTION INTRAVENOUS at 12:55

## 2024-01-01 RX ADMIN — ALBUMIN HUMAN 25 G: 0.25 SOLUTION INTRAVENOUS at 18:02

## 2024-01-01 RX ADMIN — MORPHINE SULFATE 2 MG: 2 INJECTION, SOLUTION INTRAMUSCULAR; INTRAVENOUS at 14:00

## 2024-01-01 RX ADMIN — DEXTROSE MONOHYDRATE 300 ML: 100 INJECTION, SOLUTION INTRAVENOUS at 05:56

## 2024-01-01 RX ADMIN — MORPHINE SULFATE 2 MG: 2 INJECTION, SOLUTION INTRAMUSCULAR; INTRAVENOUS at 08:24

## 2024-01-01 RX ADMIN — DEXAMETHASONE INTENSOL 4 MG: 1 SOLUTION, CONCENTRATE ORAL at 08:23

## 2024-01-01 RX ADMIN — HYDROMORPHONE HYDROCHLORIDE 1 MG: 1 INJECTION, SOLUTION INTRAMUSCULAR; INTRAVENOUS; SUBCUTANEOUS at 21:27

## 2024-01-01 RX ADMIN — DEXAMETHASONE INTENSOL 4 MG: 1 SOLUTION, CONCENTRATE ORAL at 08:36

## 2024-01-01 RX ADMIN — HYDROMORPHONE HYDROCHLORIDE 0.5 MG: 1 INJECTION, SOLUTION INTRAMUSCULAR; INTRAVENOUS; SUBCUTANEOUS at 21:09

## 2024-01-01 RX ADMIN — MORPHINE SULFATE 2 MG: 2 INJECTION, SOLUTION INTRAMUSCULAR; INTRAVENOUS at 12:39

## 2024-01-01 RX ADMIN — Medication 5 ML: at 13:13

## 2024-01-01 RX ADMIN — MORPHINE SULFATE 2 MG: 2 INJECTION, SOLUTION INTRAMUSCULAR; INTRAVENOUS at 18:37

## 2024-01-01 RX ADMIN — ALUMINUM HYDROXIDE, MAGNESIUM HYDROXIDE, AND SIMETHICONE 30 ML: 200; 200; 20 SUSPENSION ORAL at 17:16

## 2024-01-01 RX ADMIN — LACTULOSE 10 G: 20 SOLUTION ORAL at 08:23

## 2024-01-01 RX ADMIN — ACETAMINOPHEN 1000 MG: 325 SOLUTION ORAL at 19:38

## 2024-01-01 RX ADMIN — HYDROMORPHONE HYDROCHLORIDE 1 MG: 1 INJECTION, SOLUTION INTRAMUSCULAR; INTRAVENOUS; SUBCUTANEOUS at 14:28

## 2024-01-01 RX ADMIN — ACETAMINOPHEN 975 MG: 325 TABLET, FILM COATED ORAL at 22:27

## 2024-01-01 RX ADMIN — Medication 5 ML: at 18:37

## 2024-01-01 RX ADMIN — MORPHINE SULFATE 30 MG: 30 TABLET, EXTENDED RELEASE ORAL at 18:49

## 2024-01-01 RX ADMIN — HYDROMORPHONE HYDROCHLORIDE 1 MG: 1 INJECTION, SOLUTION INTRAMUSCULAR; INTRAVENOUS; SUBCUTANEOUS at 22:32

## 2024-01-01 RX ADMIN — SODIUM CHLORIDE 1000 ML: 9 INJECTION, SOLUTION INTRAVENOUS at 14:13

## 2024-01-01 RX ADMIN — MORPHINE SULFATE 2 MG: 2 INJECTION, SOLUTION INTRAMUSCULAR; INTRAVENOUS at 21:27

## 2024-01-01 RX ADMIN — POLYETHYLENE GLYCOL 3350 17 G: 17 POWDER, FOR SOLUTION ORAL at 14:21

## 2024-01-01 RX ADMIN — FUROSEMIDE 40 MG: 10 INJECTION, SOLUTION INTRAVENOUS at 17:04

## 2024-01-01 RX ADMIN — HYDROMORPHONE HYDROCHLORIDE 1 MG: 1 INJECTION, SOLUTION INTRAMUSCULAR; INTRAVENOUS; SUBCUTANEOUS at 16:22

## 2024-01-01 RX ADMIN — OXYCODONE HYDROCHLORIDE 20 MG: 100 SOLUTION ORAL at 15:11

## 2024-01-01 RX ADMIN — LORAZEPAM 1 MG: 2 INJECTION INTRAMUSCULAR; INTRAVENOUS at 00:41

## 2024-01-01 RX ADMIN — Medication: at 19:29

## 2024-01-01 RX ADMIN — Medication 5 ML: at 17:32

## 2024-01-01 RX ADMIN — OLANZAPINE 2.5 MG: 5 TABLET, ORALLY DISINTEGRATING ORAL at 22:04

## 2024-01-01 RX ADMIN — HEPARIN SODIUM (PORCINE) LOCK FLUSH IV SOLN 100 UNIT/ML 5 ML: 100 SOLUTION at 10:19

## 2024-01-01 RX ADMIN — SODIUM ZIRCONIUM CYCLOSILICATE 10 G: 10 POWDER, FOR SUSPENSION ORAL at 21:30

## 2024-01-01 RX ADMIN — OXYCODONE HYDROCHLORIDE 10 MG: 5 SOLUTION ORAL at 09:52

## 2024-01-01 RX ADMIN — HYDROMORPHONE HYDROCHLORIDE 1 MG: 1 INJECTION, SOLUTION INTRAMUSCULAR; INTRAVENOUS; SUBCUTANEOUS at 21:46

## 2024-01-01 RX ADMIN — OXYCODONE HYDROCHLORIDE 10 MG: 5 SOLUTION ORAL at 07:43

## 2024-01-01 RX ADMIN — SODIUM CHLORIDE 7.3 UNITS: 9 INJECTION, SOLUTION INTRAVENOUS at 17:13

## 2024-01-01 RX ADMIN — OLANZAPINE 5 MG: 5 TABLET, ORALLY DISINTEGRATING ORAL at 21:06

## 2024-01-01 RX ADMIN — OXYCODONE HYDROCHLORIDE 10 MG: 5 SOLUTION ORAL at 00:45

## 2024-01-01 RX ADMIN — OXYCODONE HYDROCHLORIDE 10 MG: 5 SOLUTION ORAL at 17:37

## 2024-01-01 RX ADMIN — HYDROMORPHONE HYDROCHLORIDE 1 MG: 1 INJECTION, SOLUTION INTRAMUSCULAR; INTRAVENOUS; SUBCUTANEOUS at 16:05

## 2024-01-01 RX ADMIN — SODIUM CHLORIDE 7.3 UNITS: 9 INJECTION, SOLUTION INTRAVENOUS at 21:41

## 2024-01-01 RX ADMIN — OXYCODONE HYDROCHLORIDE 20 MG: 100 SOLUTION ORAL at 08:43

## 2024-01-01 RX ADMIN — HYDROMORPHONE HYDROCHLORIDE 1 MG: 1 INJECTION, SOLUTION INTRAMUSCULAR; INTRAVENOUS; SUBCUTANEOUS at 03:04

## 2024-01-01 RX ADMIN — OXYCODONE HYDROCHLORIDE 20 MG: 100 SOLUTION ORAL at 14:08

## 2024-01-01 RX ADMIN — HYDROMORPHONE HYDROCHLORIDE 1 MG: 1 INJECTION, SOLUTION INTRAMUSCULAR; INTRAVENOUS; SUBCUTANEOUS at 12:55

## 2024-01-01 RX ADMIN — MORPHINE SULFATE 2 MG: 2 INJECTION, SOLUTION INTRAMUSCULAR; INTRAVENOUS at 23:58

## 2024-01-01 RX ADMIN — OXYCODONE HYDROCHLORIDE 20 MG: 100 SOLUTION ORAL at 00:40

## 2024-01-01 ASSESSMENT — ACTIVITIES OF DAILY LIVING (ADL)
ADLS_ACUITY_SCORE: 22
ADLS_ACUITY_SCORE: 21
ADLS_ACUITY_SCORE: 19
ADLS_ACUITY_SCORE: 21
ADLS_ACUITY_SCORE: 35
ADLS_ACUITY_SCORE: 18
ADLS_ACUITY_SCORE: 18
ADLS_ACUITY_SCORE: 35
ADLS_ACUITY_SCORE: 18
ADLS_ACUITY_SCORE: 35
ADLS_ACUITY_SCORE: 18
ADLS_ACUITY_SCORE: 21
ADLS_ACUITY_SCORE: 19
ADLS_ACUITY_SCORE: 21
ADLS_ACUITY_SCORE: 21
ADLS_ACUITY_SCORE: 18
ADLS_ACUITY_SCORE: 21
ADLS_ACUITY_SCORE: 35
ADLS_ACUITY_SCORE: 21
ADLS_ACUITY_SCORE: 18
ADLS_ACUITY_SCORE: 19
ADLS_ACUITY_SCORE: 18
ADLS_ACUITY_SCORE: 21
ADLS_ACUITY_SCORE: 18
ADLS_ACUITY_SCORE: 21
ADLS_ACUITY_SCORE: 35
ADLS_ACUITY_SCORE: 21
ADLS_ACUITY_SCORE: 18
ADLS_ACUITY_SCORE: 21
ADLS_ACUITY_SCORE: 18
ADLS_ACUITY_SCORE: 18
ADLS_ACUITY_SCORE: 21
ADLS_ACUITY_SCORE: 21
ADLS_ACUITY_SCORE: 18
ADLS_ACUITY_SCORE: 21
ADLS_ACUITY_SCORE: 19
ADLS_ACUITY_SCORE: 18
ADLS_ACUITY_SCORE: 21
ADLS_ACUITY_SCORE: 35
ADLS_ACUITY_SCORE: 21
ADLS_ACUITY_SCORE: 35
ADLS_ACUITY_SCORE: 24
ADLS_ACUITY_SCORE: 18
ADLS_ACUITY_SCORE: 18
ADLS_ACUITY_SCORE: 22
ADLS_ACUITY_SCORE: 35
ADLS_ACUITY_SCORE: 21
ADLS_ACUITY_SCORE: 18
ADLS_ACUITY_SCORE: 18
ADLS_ACUITY_SCORE: 21
ADLS_ACUITY_SCORE: 18
ADLS_ACUITY_SCORE: 21
ADLS_ACUITY_SCORE: 18
ADLS_ACUITY_SCORE: 35
ADLS_ACUITY_SCORE: 18
ADLS_ACUITY_SCORE: 35
ADLS_ACUITY_SCORE: 18
ADLS_ACUITY_SCORE: 18
ADLS_ACUITY_SCORE: 24
ADLS_ACUITY_SCORE: 18
ADLS_ACUITY_SCORE: 18
ADLS_ACUITY_SCORE: 21
ADLS_ACUITY_SCORE: 18
ADLS_ACUITY_SCORE: 35
ADLS_ACUITY_SCORE: 35
ADLS_ACUITY_SCORE: 18
ADLS_ACUITY_SCORE: 18
ADLS_ACUITY_SCORE: 24
ADLS_ACUITY_SCORE: 18
ADLS_ACUITY_SCORE: 18
ADLS_ACUITY_SCORE: 21
ADLS_ACUITY_SCORE: 24
ADLS_ACUITY_SCORE: 21
ADLS_ACUITY_SCORE: 18
ADLS_ACUITY_SCORE: 21
ADLS_ACUITY_SCORE: 18
ADLS_ACUITY_SCORE: 19
ADLS_ACUITY_SCORE: 24
ADLS_ACUITY_SCORE: 19
ADLS_ACUITY_SCORE: 18
ADLS_ACUITY_SCORE: 18
ADLS_ACUITY_SCORE: 21
ADLS_ACUITY_SCORE: 18
ADLS_ACUITY_SCORE: 21
DEPENDENT_IADLS:: INDEPENDENT
ADLS_ACUITY_SCORE: 21
ADLS_ACUITY_SCORE: 18
ADLS_ACUITY_SCORE: 21
ADLS_ACUITY_SCORE: 21
ADLS_ACUITY_SCORE: 35

## 2024-01-01 ASSESSMENT — PAIN SCALES - GENERAL
PAINLEVEL: WORST PAIN (10)
PAINLEVEL: EXTREME PAIN (9)

## 2024-01-10 PROBLEM — M54.9 BACK PAIN, UNSPECIFIED BACK LOCATION, UNSPECIFIED BACK PAIN LATERALITY, UNSPECIFIED CHRONICITY: Status: ACTIVE | Noted: 2024-01-01

## 2024-01-10 PROBLEM — C79.9 MULTIPLE LESIONS OF METASTATIC MALIGNANCY (H): Status: ACTIVE | Noted: 2024-01-01

## 2024-01-10 PROBLEM — C41.1 SQUAMOUS CELL CARCINOMA OF MANDIBLE (H): Status: ACTIVE | Noted: 2021-09-23

## 2024-01-10 NOTE — ED PROVIDER NOTES
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  When asked if there is a particular area that seems bothering him more, he says ?his back, though reports diffuse pain throughout his upper body.  No associated swelling paresthesias or weakness, fevers/chills, saddle paresthesia, bowel/bladder dysfunction.  He also endorses a headache, but says that this is consistent with prior headaches.  No chest pain, shortness of breath, abdominal pain, nausea, vomiting, diarrhea.  He has been taking oxycodone at home for pain without improvement.  Chart review shows that patient had CT chest abdomen pelvis in November, which showed increased mediastinal and hilar lymphadenopathy compatible with metastasis, and pleural nodules suspicious for metastatic disease.  Patient is scheduled for a serial CT next week.      Past Medical History  Past Medical History:   Diagnosis Date    Squamous cell carcinoma of mandible (H)     Testicular cancer (H)      Past Surgical History:   Procedure Laterality Date    BRONCHOSCOPY RIDID OR FLEXIBLE W/ENDOBRONCHIAL ULTRASOUND GUIDED 1 OR 2 NODE STATIONS N/A 7/19/2023    Procedure: BRONCHOSCOPY, WITH BIOPSY OF 1 OR 2 LYMPH NODE STATIONS WITH ENDOBRONCHIAL ULTRASOUND GUIDANCE;  Surgeon: Abbi Santos MD;  Location: [facility]    BRONCHOSCOPY RIDID OR FLEXIBLE W/ENDOBRONCHIAL ULTRASOUND GUIDED 1 OR 2 NODE STATIONS N/A 8/17/2023    Procedure: BRONCHOSCOPY, WITH BIOPSY OF 1 OR 2 LYMPH NODE STATIONS WITH ENDOBRONCHIAL ULTRASOUND GUIDANCE;  Surgeon: Chris Holm MD;  Location: [facility] OR     acetaminophen (TYLENOL) 325 MG tablet  amoxicillin-clavulanate (AUGMENTIN) 875-125 MG tablet  chlorhexidine (PERIDEX) 0.12 % solution  ibuprofen (ADVIL/MOTRIN) 200 MG  "tablet  Wound Dressings (MEPILEX BORDER FLEX) PADS      No Known Allergies  Family History  No family history on file.  Social History   Social History     Tobacco Use    Smoking status: Every Day     Packs/day: 1     Types: Cigarettes     Start date: 1976    Smokeless tobacco: Never   Substance Use Topics    Alcohol use: Yes     Comment: case a week    Drug use: Not Currently         A medically appropriate review of systems was performed with pertinent positives and negatives noted in the HPI, and all other systems negative.    Physical Exam   BP: (!) 171/84  Pulse: 92  Temp: 97.5  F (36.4  C)  Resp: 16  Height: 175.3 cm (5' 9\")  SpO2: 98 %  Physical Exam  Constitutional:       General: He is not in acute distress.     Appearance: Normal appearance. He is not toxic-appearing.   HENT:      Head: Normocephalic and atraumatic.   Eyes:      Conjunctiva/sclera: Conjunctivae normal.   Cardiovascular:      Rate and Rhythm: Normal rate and regular rhythm.      Heart sounds: Normal heart sounds.   Pulmonary:      Effort: Pulmonary effort is normal. No respiratory distress.      Breath sounds: Normal breath sounds.   Abdominal:      Palpations: Abdomen is soft.      Tenderness: There is no abdominal tenderness.   Musculoskeletal:         General: No deformity.      Cervical back: Neck supple.   Skin:     General: Skin is warm.   Neurological:      Mental Status: He is alert.           ED Course, Procedures, & Data       Procedures                      Results for orders placed or performed during the hospital encounter of 01/10/24   CT Thoracic Spine Reconstructed     Status: None    Narrative    CT THORACIC SPINE RECONSTRUCTED 1/10/2024 1:55 PM    Provided History: back pain   ICD-10:    Comparison: Same day body CT. Chest CT 8/1/2023.    Technique: Using multidetector thin collimation helical acquisition  technique, axial, sagittal and coronal 2 mm thickness CT  reconstructions were obtained through the thoracic " spine.    Findings:  Normal thoracic vertebral alignment multiple hypodense attenuating  masses within the thoracic vertebrae, largest at T12, new since  8/1/2023. No loss of vertebral body height. There is no evidence of  fracture or significant prevertebral soft tissue swelling. Multilevel  loss of intervertebral disc height, endplate osteophytosis, and facet  hypertrophy. No high-grade spinal canal or neural foraminal stenosis.    Partially visualized right chest wall Port-A-Cath.    See report for same day body CT for findings in the chest, including  mediastinal adenopathy, as well as abdominal findings including  multiple hepatic and splenic lesions.      Impression    Impression:   1. No acute thoracic spine fracture or traumatic subluxation.    2. Multiple thoracic vertebral metastases, largest at T12. No  associated pathologic fracture.  3. Multilevel mild to moderate thoracic degenerative changes.  4. Multiple metastases in the chest and abdomen. See report for same  day body CT.    ZNEA GALLO MD         SYSTEM ID:  [id number]   CT Lumbar Spine Reconstructed     Status: None    Narrative    CT LUMBAR SPINE RECONSTRUCTED 1/10/2024 1:56 PM    History: back pain.  ICD-10:    Comparison: Same day body CT and thoracic spine CT.    Technique: Using multidetector thin collimation helical acquisition  technique, axial, coronal and sagittal CT images through the lumbar  spine were obtained.    Findings: There are 5 lumbar type vertebrae. Normal lumbar spine  alignment. Moderate loss of intervertebral disc height at L3-4. Mild  loss of disc height at L2-3 and L4-5. Scattered regions of  hypoattenuation in the lumbar vertebral bodies, most conspicuous at L3  measuring up to 9 mm. Additional lesions in the T12 vertebral body,  posterior S1 vertebral body, and in the right ilium. Findings on a  level by level basis are as follows:    L1-L2: No spinal canal or neural foraminal stenosis.    L2-L3: Disc bulge and  bilateral facet hypertrophy. Mild to moderate  spinal canal stenosis. Mild bilateral neural foraminal narrowing.    L3-L4: Circumference of disc bulge and bilateral facet hypertrophy.  Mild to moderate spinal canal stenosis. Mild bilateral neural  foraminal narrowing.    L4-L5: Circumferential disc bulge and bilateral facet hypertrophy.  Moderate spinal canal stenosis. Mild bilateral neural foraminal  narrowing.    L5-S1: Posterior disc bulge and bilateral facet hypertrophy. No spinal  canal stenosis. Mild bilateral neural foraminal narrowing.    The visualized adjacent paraspinous tissues are grossly within normal  limits.    Prominence of the right renal pelvis, unchanged since 11/1/2023.  Multiple hepatic, adrenal, splenic, and lymph node metastases.      Impression    Impression:  1. Thoracic, lumbar, and sacral vertebral metastases. No pathologic  fracture.  2. Right iliac metastases.  3. Multilevel lumbar degenerative changes, greatest at L4-5.  4. Multiple abdominal metastases. See report for same day body CT.    ZENA GALLO MD         SYSTEM ID:  [id number]   CT Chest/Abdomen/Pelvis w Contrast     Status: None    Narrative    EXAMINATION: CT CHEST/ABDOMEN/PELVIS W CONTRAST, 1/10/2024 2:02 PM    TECHNIQUE:  Helical CT images from the thoracic inlet through the  symphysis pubis were obtained with contrast. Contrast dose: 107cc of  isovue 370    COMPARISON: CT of chest abdomen with contrast 11/1/2023.    HISTORY: Hx SCC r/o metastatic disease,    FINDINGS:  Lines and tubes:  -Upper chest port catheter terminates in the low SVC.    Chest: Multiple prominent axillary lymph nodes similar to prior. There  are multiple enlarged lymph nodes throughout the mediastinum, several  which are increased in conspicuity compared to prior exam 11/1/2023.  Conglomerate of necrotic nodes along the right tracheal border  extending up to the great vessels, and in the subcarinal region. For  example, necrotic right paratracheal  node measuring 2.8 x 2.2 cm at  its largest (3/131), previously 2.5 x 2 cm.    Heart: Heart is normal, extensive coronary artery calcifications and  calcifications of the thoracic aorta. Borderline enlarged main  pulmonary artery measuring 3.1 cm.    Lungs: No consolidations, pneumothorax, or pleural effusion. Right  upper lobe mosaic groundglass attenuation. Scattered atelectasis  throughout the right greater than left lung. There is increased  prominence of multiple pulmonary nodules, most significantly in the  right lung, additionally multiple increased and new fissural nodules,  as well as a peribronchial micronodular distribution, concerning for  metastases.    Abdomen and pelvis:  Liver: Numerous new heterogeneously enhancing nodular masses  throughout both hepatic lobes, for example enhancing right hepatic  lobe mass measuring 2.1 x 2.3 cm (3/363) with central hypoattenuation.    Biliary System: Unremarkable, common bile duct is normal caliber.    Pancreas: Unchanged 5 mm fat density lesion pancreatic tail (3/331).  Remainder of pancreas unremarkable, no pancreatic ductal dilation.    Adrenal glands: New enhancing right adrenal nodule measuring 1.4 x 1.4  cm (3/340).    Spleen: New hypoattenuating lesions throughout the spleen concerning  for metastatic disease. Otherwise unremarkable.    Kidneys: Normal cortical medullary enhancement of the kidneys. Left  kidney unremarkable, unchanged moderate to severe hydronephrosis of  the right kidney secondary to ureteropelvic junction obstruction.     Gastrointestinal tract: Appendix is not well-visualized, no dilated  segments of small and large bowel, no definitive focal mural  thickening. Moderate stool burden in the transverse colon.    Mesentery/peritoneum/retroperitoneum: Numerous scattered metastatic  implants along the anterior abdominal wall, peritoneum, throughout the  retroperitoneum and paracolic gutters. No intra-abdominal fluid  collections. No free  intra-abdominal air.    Lymph nodes: Multiple prominent lymph nodes along the periaortic  chains    Vasculature: Patent abdominal aorta and branch vessels, calcified  atherosclerosis. Nonaneurysmal aorta. The portal vein, superior  mesenteric vein, and splenic vein are patent without obstruction.    Pelvis: Prostatic calcifications, mild concentric wall thickening of  the bladder, likely chronic. Fat-containing right greater than left  inguinal hernias.     Bones: New pelvic lytic lesions including the right iliac wing,  scattered vertebral body lytic lesions, for example T12, T11, T7.    Soft Tissues: New presumably metastatic metastatic soft tissue lesion  posterior superior right iliac wing (3/411).      Impression    IMPRESSION: In this patient with a known history of metastatic  skeletal carcinoma, there is evidence for disease progression with  multiple new metastatic lesions throughout the abdomen and pelvis.  1. Increased size of multiple pulmonary nodules, additional increased  nodules along the pulmonary fissures.  2. Increased size of multiple necrotic appearing and coalescing  mediastinal lymph nodes as per above.   3. New diffuse metastatic lesions throughout the liver, as well as  numerous implants throughout the peritoneum, anterior abdominal wall,  retroperitoneum, mesentery, spleen, and new right adrenal metastatic  lesion.  4. New bony metastatic lesions throughout the pelvis including the  right iliac wing, and throughout the spine as detailed above.   5. Unchanged moderate to severe hydronephrosis of the right kidney  secondary to ureteropelvic junction obstruction.     I have personally reviewed the examination and initial interpretation  and I agree with the findings.    MARIA T MOODY MD         SYSTEM ID:  [id number]   Head CT w/o contrast     Status: None    Narrative    EXAM: CT HEAD W/O CONTRAST  1/10/2024 2:03 PM     HISTORY:  HA, hx CA r/o mets       COMPARISON:  Neck CT 11/1/2023. Body  PET CT 6/5/2023.    TECHNIQUE: Using multidetector thin collimation helical acquisition  technique, axial, coronal and sagittal CT images from the skull base  to the vertex were obtained without intravenous contrast.   (topogram) image(s) also obtained and reviewed.    FINDINGS:  No acute intracranial hemorrhage, mass effect, or midline shift. No  acute loss of gray-white matter differentiation in the cerebral  hemispheres. Ventricles are proportionate to the cerebral sulci. Clear  basal cisterns. Moderate leukoaraiosis.    The bony calvaria and the bones of the skull base are normal.  Incidental 6 mm left frontoethmoidal osteoma, unchanged. Mastoid air  cells are clear. Grossly normal orbits.       Impression    IMPRESSION:  1. No acute intracranial pathology.   2. Noncontrast head CT of limited sensitivity for detection of brain  metastases. Consider further evaluation with contrast-enhanced brain  MRI.    ZENA GALLO MD         SYSTEM ID:  [id number]   Soft tissue neck CT w contrast     Status: None    Narrative    EXAM: CT SOFT TISSUE NECK W CONTRAST  1/10/2024 2:05 PM     HISTORY:  Hx mandibular SCC, assess worsing spread         COMPARISON:  Neck CT 11/1/2023.     TECHNIQUE: Following intravenous administration of nonionic iodinated  contrast medium, thin section helical CT images were obtained from the  skull base down to the level of the aortic arch.  Axial, coronal and  sagittal reformations were performed with 2-3 mm slice thickness  reconstruction. Images were reviewed in soft tissue, lung and bone  windows.    CONTRAST: 107cc of isovue 370    FINDINGS:   Status post left neck dissection with decreased associated edema.  Fixation hardware spanning the anterior mandible, left mandibular  ramus, and left mandibular angle. Increase in extent of exposed left  mandible and fixation hardware at the posterior body and proximal  mandibular angle. Persistent coarsened trabeculated and sclerotic  left  hemimandible.    Edema and soft tissue fullness in the left  space with  effacement of the retromaxillary fat, unchanged.    Enlarged right subclavicular lymph node measuring up to 2 cm (series  3, image 69) previously 1 cm. Partially visualized enlarged  mediastinal lymph nodes.    Atrophic left thyroid lobe, unchanged.    Atherosclerotic calcification of the bilateral carotid bifurcations.  Partially visualized right chest wall Port-A-Cath.    Multilevel cervical degenerative changes. New lytic lesion in the  right posterior C6 vertebral body.    Unchanged mucosal thickening along the floor of the left maxillary  sinus, suspected associated oroantral fistula, and multiple dental  caries.    Emphysematous changes of the right greater than left lung apices.  Nodularity along the upper right major fissure.      Impression    IMPRESSION:  Impression:  1. Extensive degree of postsurgical and postradiation changes  throughout the left neck. No specific features of local recurrence.  2. Increased exposure of the left hemimandible and associated hardware  with persistent features concerning for osteomyelitis and/or  osteoradionecrosis.  3. New C6 vertebral metastasis.  4. Odontogenic sinusitis of the left maxillary sinus.  5. Increased size of presumed metastatic right subclavicular lymph  node and partially visualized pathologic mediastinal lymph nodes.    Primary: NI-RADS 1.    Neck: NI-RADS 4.    NI-RADS CECT Surveillance Legend:    Primary  1: No evidence of recurrence: routine surveillance  2: Low suspicion    a) Superficial abnormality (skin, mucosal surface): direct visual  inspection    b) Ill-defined deep abnormality: short interval follow-up* or PET  3: High suspicion (new or enlarging discrete nodule/mass): biopsy  4: Definitive recurrence (path proven or clinical progression): no  biopsy needed    Nodes  1: No evidence of recurrence: routine surveillance  2: Low suspicion (ill-defined): short  interval follow-up or PET  3: High suspicion (new or enlarging lymph node): biopsy if clinically  needed  4: Definitive recurrence (path proven or clinical progression): no  biopsy needed    *short interval follow-up: 3 months at our institution    ZENA GALLO MD         SYSTEM ID:  [id number]   Comprehensive metabolic panel     Status: Abnormal   Result Value Ref Range    Sodium 122 (L) 135 - 145 mmol/L    Potassium 4.8 3.4 - 5.3 mmol/L    Carbon Dioxide (CO2) 24 22 - 29 mmol/L    Anion Gap 13 7 - 15 mmol/L    Urea Nitrogen 14.1 8.0 - 23.0 mg/dL    Creatinine 0.64 (L) 0.67 - 1.17 mg/dL    GFR Estimate >90 >60 mL/min/1.73m2    Calcium 10.3 (H) 8.8 - 10.2 mg/dL    Chloride 85 (L) 98 - 107 mmol/L    Glucose 98 70 - 99 mg/dL    Alkaline Phosphatase 106 40 - 150 U/L    AST 33 0 - 45 U/L    ALT 12 0 - 70 U/L    Protein Total 7.4 6.4 - 8.3 g/dL    Albumin 4.0 3.5 - 5.2 g/dL    Bilirubin Total 0.3 <=1.2 mg/dL   CBC with platelets and differential     Status: Abnormal   Result Value Ref Range    WBC Count 12.0 (H) 4.0 - 11.0 10e3/uL    RBC Count 4.19 (L) 4.40 - 5.90 10e6/uL    Hemoglobin 13.5 13.3 - 17.7 g/dL    Hematocrit 38.7 (L) 40.0 - 53.0 %    MCV 92 78 - 100 fL    MCH 32.2 26.5 - 33.0 pg    MCHC 34.9 31.5 - 36.5 g/dL    RDW 12.5 10.0 - 15.0 %    Platelet Count 300 150 - 450 10e3/uL    % Neutrophils 85 %    % Lymphocytes 5 %    % Monocytes 9 %    % Eosinophils 0 %    % Basophils 0 %    % Immature Granulocytes 1 %    NRBCs per 100 WBC 0 <1 /100    Absolute Neutrophils 10.1 (H) 1.6 - 8.3 10e3/uL    Absolute Lymphocytes 0.6 (L) 0.8 - 5.3 10e3/uL    Absolute Monocytes 1.1 0.0 - 1.3 10e3/uL    Absolute Eosinophils 0.0 0.0 - 0.7 10e3/uL    Absolute Basophils 0.1 0.0 - 0.2 10e3/uL    Absolute Immature Granulocytes 0.1 <=0.4 10e3/uL    Absolute NRBCs 0.0 10e3/uL   UA with Microscopic reflex to Culture     Status: Normal    Specimen: Urine, NOS   Result Value Ref Range    Color Urine Straw Colorless, Straw, Light Yellow,  Yellow    Appearance Urine Clear Clear    Glucose Urine Negative Negative mg/dL    Bilirubin Urine Negative Negative    Ketones Urine Negative Negative mg/dL    Specific Gravity Urine 1.012 1.003 - 1.035    Blood Urine Negative Negative    pH Urine 5.5 5.0 - 7.0    Protein Albumin Urine Negative Negative mg/dL    Urobilinogen Urine Normal Normal, 2.0 mg/dL    Nitrite Urine Negative Negative    Leukocyte Esterase Urine Negative Negative    RBC Urine <1 <=2 /HPF    WBC Urine <1 <=5 /HPF    Narrative    Urine Culture not indicated   Sodium random urine     Status: None   Result Value Ref Range    Sodium Urine mmol/L 20 mmol/L   Osmolality urine     Status: Normal   Result Value Ref Range    Osmolality Urine 240 100 - 1,200 mmol/kg    Narrative    Reference Ranges depend on patient's hydration status and renal function.   Neonates:  mmol/kg   2 years and older, random specimens: 100-1200 mmol/kg; Greater than 850 mmol/kg after 12 hour fluid restriction  Urine/serum osmolality ratio: 2 years and older: 1.0-3.0; 3.0-4.7 after 12 hour fluid restriction   Creatinine random urine     Status: None   Result Value Ref Range    Creatinine Urine mg/dL 30.1 mg/dL   Sodium     Status: Abnormal   Result Value Ref Range    Sodium 124 (L) 135 - 145 mmol/L   CBC with platelets differential     Status: Abnormal    Narrative    The following orders were created for panel order CBC with platelets differential.  Procedure                               Abnormality         Status                     ---------                               -----------         ------                     CBC with platelets and d...[[id number]]  Abnormal            Final result                 Please view results for these tests on the individual orders.     Medications   chlorhexidine (PERIDEX) 0.12 % solution 10 mL (has no administration in time range)   lidocaine 1 % 0.1-1 mL (has no administration in time range)   lidocaine (LMX4) cream (has no  administration in time range)   sodium chloride (PF) 0.9% PF flush 3 mL (3 mLs Intracatheter Not Given 1/10/24 1804)   sodium chloride (PF) 0.9% PF flush 3 mL (has no administration in time range)   senna-docusate (SENOKOT-S/PERICOLACE) 8.6-50 MG per tablet 1 tablet (has no administration in time range)     Or   senna-docusate (SENOKOT-S/PERICOLACE) 8.6-50 MG per tablet 2 tablet (has no administration in time range)   calcium carbonate (TUMS) chewable tablet 1,000 mg (has no administration in time range)   oxyCODONE (ROXICODONE) tablet 5-10 mg (has no administration in time range)   HYDROmorphone (DILAUDID) injection 1 mg (has no administration in time range)   Lidocaine (LIDOCARE) 4 % Patch 2 patch (has no administration in time range)   HYDROmorphone (PF) (DILAUDID) injection 0.5 mg (0.5 mg Intravenous $Given 1/10/24 1232)   iopamidol (ISOVUE-370) solution 107 mL (107 mLs Intravenous $Given 1/10/24 1334)   sodium chloride (PF) 0.9% PF flush 77 mL (77 mLs Intravenous $Given 1/10/24 1334)   sodium chloride 0.9% BOLUS 1,000 mL (0 mLs Intravenous Stopped 1/10/24 1554)   HYDROmorphone (DILAUDID) injection 1 mg (1 mg Intravenous $Given 1/10/24 1428)   HYDROmorphone (DILAUDID) injection 1 mg (1 mg Intravenous $Given 1/10/24 1618)   HYDROmorphone (DILAUDID) injection 1 mg (1 mg Intravenous Not Given 1/10/24 1650)     Labs Ordered and Resulted from Time of ED Arrival to Time of ED Departure   COMPREHENSIVE METABOLIC PANEL - Abnormal       Result Value    Sodium 122 (*)     Potassium 4.8      Carbon Dioxide (CO2) 24      Anion Gap 13      Urea Nitrogen 14.1      Creatinine 0.64 (*)     GFR Estimate >90      Calcium 10.3 (*)     Chloride 85 (*)     Glucose 98      Alkaline Phosphatase 106      AST 33      ALT 12      Protein Total 7.4      Albumin 4.0      Bilirubin Total 0.3     CBC WITH PLATELETS AND DIFFERENTIAL - Abnormal    WBC Count 12.0 (*)     RBC Count 4.19 (*)     Hemoglobin 13.5      Hematocrit 38.7 (*)     MCV 92       MCH 32.2      MCHC 34.9      RDW 12.5      Platelet Count 300      % Neutrophils 85      % Lymphocytes 5      % Monocytes 9      % Eosinophils 0      % Basophils 0      % Immature Granulocytes 1      NRBCs per 100 WBC 0      Absolute Neutrophils 10.1 (*)     Absolute Lymphocytes 0.6 (*)     Absolute Monocytes 1.1      Absolute Eosinophils 0.0      Absolute Basophils 0.1      Absolute Immature Granulocytes 0.1      Absolute NRBCs 0.0     SODIUM - Abnormal    Sodium 124 (*)    ROUTINE UA WITH MICROSCOPIC REFLEX TO CULTURE - Normal    Color Urine Straw      Appearance Urine Clear      Glucose Urine Negative      Bilirubin Urine Negative      Ketones Urine Negative      Specific Gravity Urine 1.012      Blood Urine Negative      pH Urine 5.5      Protein Albumin Urine Negative      Urobilinogen Urine Normal      Nitrite Urine Negative      Leukocyte Esterase Urine Negative      RBC Urine <1      WBC Urine <1     OSMOLALITY, RANDOM URINE - Normal    Osmolality Urine 240     SODIUM RANDOM URINE    Sodium Urine mmol/L 20     CREATININE RANDOM URINE    Creatinine Urine mg/dL 30.1       Soft tissue neck CT w contrast   Final Result   IMPRESSION:   Impression:   1. Extensive degree of postsurgical and postradiation changes   throughout the left neck. No specific features of local recurrence.   2. Increased exposure of the left hemimandible and associated hardware   with persistent features concerning for osteomyelitis and/or   osteoradionecrosis.   3. New C6 vertebral metastasis.   4. Odontogenic sinusitis of the left maxillary sinus.   5. Increased size of presumed metastatic right subclavicular lymph   node and partially visualized pathologic mediastinal lymph nodes.      Primary: NI-RADS 1.      Neck: NI-RADS 4.      NI-RADS CECT Surveillance Legend:      Primary   1: No evidence of recurrence: routine surveillance   2: Low suspicion     a) Superficial abnormality (skin, mucosal surface): direct visual   inspection      b) Ill-defined deep abnormality: short interval follow-up* or PET   3: High suspicion (new or enlarging discrete nodule/mass): biopsy   4: Definitive recurrence (path proven or clinical progression): no   biopsy needed      Nodes   1: No evidence of recurrence: routine surveillance   2: Low suspicion (ill-defined): short interval follow-up or PET   3: High suspicion (new or enlarging lymph node): biopsy if clinically   needed   4: Definitive recurrence (path proven or clinical progression): no   biopsy needed      *short interval follow-up: 3 months at our institution      ZENA GALLO MD            SYSTEM ID:  [id number]      Head CT w/o contrast   Final Result   IMPRESSION:   1. No acute intracranial pathology.    2. Noncontrast head CT of limited sensitivity for detection of brain   metastases. Consider further evaluation with contrast-enhanced brain   MRI.      ZENA GALLO MD            SYSTEM ID:  [id number]      CT Chest/Abdomen/Pelvis w Contrast   Final Result   IMPRESSION: In this patient with a known history of metastatic   skeletal carcinoma, there is evidence for disease progression with   multiple new metastatic lesions throughout the abdomen and pelvis.   1. Increased size of multiple pulmonary nodules, additional increased   nodules along the pulmonary fissures.   2. Increased size of multiple necrotic appearing and coalescing   mediastinal lymph nodes as per above.    3. New diffuse metastatic lesions throughout the liver, as well as   numerous implants throughout the peritoneum, anterior abdominal wall,   retroperitoneum, mesentery, spleen, and new right adrenal metastatic   lesion.   4. New bony metastatic lesions throughout the pelvis including the   right iliac wing, and throughout the spine as detailed above.    5. Unchanged moderate to severe hydronephrosis of the right kidney   secondary to ureteropelvic junction obstruction.       I have personally reviewed the examination and initial  interpretation   and I agree with the findings.      MARIA T MOODY MD            SYSTEM ID:  [id number]      CT Lumbar Spine Reconstructed   Final Result   Impression:   1. Thoracic, lumbar, and sacral vertebral metastases. No pathologic   fracture.   2. Right iliac metastases.   3. Multilevel lumbar degenerative changes, greatest at L4-5.   4. Multiple abdominal metastases. See report for same day body CT.      ZENA GALLO MD            SYSTEM ID:  [id number]      CT Thoracic Spine Reconstructed   Final Result   Impression:    1. No acute thoracic spine fracture or traumatic subluxation.     2. Multiple thoracic vertebral metastases, largest at T12. No   associated pathologic fracture.   3. Multilevel mild to moderate thoracic degenerative changes.   4. Multiple metastases in the chest and abdomen. See report for same   day body CT.      ZENA GALLO MD            SYSTEM ID:  [id number]             Critical care was not performed.     Medical Decision Making  The patient's presentation was of high complexity (a chronic illness severe exacerbation, progression, or side effect of treatment).    The patient's evaluation involved:  review of external note(s) from 2 sources (prior ED and clinical)  review of 3+ test result(s) ordered prior to this encounter (imaging)  ordering and/or review of 3+ test(s) in this encounter (see separate area of note for details)  discussion of management or test interpretation with another health professional (admitting team)    The patient's management necessitated high risk (a decision regarding hospitalization).    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  No associated neurosymptoms or red flag back symptoms.  Concerningly, patient has CT CAP in November that showed likely metastatic disease of mediastinal and hilar lymph nodes as well as pulm  nodules concerning for mets.  In ED patient is HDS/AF, well-appearing.  Lungs are clear.  Exam otherwise nonfocal.  Given oncologic history, concern for worsening metastatic disease.  Plan for CT to further assess, with thoracic and lumbar recons to rule out bony mets/pathologic fracture.  Given report of headache, will obtain Noncon head CT to rule out brain mets.  Will give analgesia.  Dispo pending workup. Please see full ED course, results, and disposition  detailed below.    ED Course as of 01/10/24 1909   Wed Andrew 10, 2024   1323 WBC(!): 12.0  Mild neutrophilia.  CBC otherwise unremarkable   1328 Sodium(!): 122  Urine studies added. CMP otherwise noncontributory.  Will plan for admission for hyponatremia.    1416 Head CT w/o contrast  No acute intracranial   1417 CT Thoracic Spine Reconstructed  Multiple thoracic vertebral metastases, largest at T12 without aassociated pathologic fracture.   1511 Handed off to medicine   1528 CT Lumbar Spine Reconstructed  Lumbar metastasis and right iliac metastases.   1528 CT Chest/Abdomen/Pelvis w Contrast  Multiple new metastatic lesions throughout the abdomen and pelvis as detailed in full report.     1529 Given the patient's hyponatremia and multiple new metastatic lesions plan for medicine admission.           I have reviewed the nursing notes. I have reviewed the findings, diagnosis, plan and need for follow up with the patient.    New Prescriptions    No medications on file       Final diagnoses:   Back pain, unspecified back location, unspecified back pain laterality, unspecified chronicity   Squamous cell carcinoma of mandible (H)   Multiple lesions of metastatic malignancy (H)   Hyponatremia         Rolando Spann PA-C  [facility] EMERGENCY DEPARTMENT  1/10/2024     Rolando Spann PA-C  01/10/24 1909

## 2024-01-10 NOTE — MEDICATION SCRIBE - ADMISSION MEDICATION HISTORY
Medication Scribe Admission Medication History    Admission medication history is complete. The information provided in this note is only as accurate as the sources available at the time of the update.    Information Source(s): Patient and CareEverywhere/SureScripts via in-person    Pertinent Information:   -Pt stated he is still taking his Augmentin 875-125 mg and will be done with it in about 3-4 days   -Pt also stated he misses some dosages of this medications because he was traveling    Changes made to PTA medication list:  Added: Augmentin 875-125 mg  Deleted: Wound dressing (duplicate), Vitamin E, Pentoxifylline 400 mg   Changed: None    Medication Affordability:       Allergies reviewed with patient and updates made in EHR: yes    Medication History Completed By: Carrie Randolph 1/10/2024 3:30 PM    Prior to Admission medications    Medication Sig Last Dose Taking? Auth Provider Long Term End Date   acetaminophen (TYLENOL) 325 MG tablet Take 2 tablets (650 mg) by mouth every 4 hours as needed for mild pain or fever 1/10/2024 at am Yes Nancy Alvarez MD No    amoxicillin-clavulanate (AUGMENTIN) 875-125 MG tablet Take 1 tablet by mouth 2 times daily For 10 days 1/10/2024 at am Yes Reported, Patient     chlorhexidine (PERIDEX) 0.12 % solution Swish and spit 10 mLs in mouth 4 times daily 1/10/2024 at am Yes Aditya Swartz MD     ibuprofen (ADVIL/MOTRIN) 200 MG tablet Take 600 mg by mouth every 6 hours as needed for mild pain 1/10/2024 at am Yes Reported, Patient     Wound Dressings (MEPILEX BORDER FLEX) PADS Externally apply 1 Application topically 4 times daily as needed  Yes Delores Buckley MD Yes

## 2024-01-10 NOTE — PROVIDER NOTIFICATION
Rolando Spann PA-C paged    Pt was asking about another dose of dilaudid. I dont think the admitting team has seen him yet.

## 2024-01-10 NOTE — H&P
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 Initially underwent extensive resection of mandible and surrounding structures with skin grafts. Has completed multiple courses of chemoradiation and follows regularly with oncology and ENT. Most recent imaging in November of 2023 showed some further progression of his malignancy. Presented to [facility] with 1.5 months of worsening back and hip pain. Imaging showed worsening metastatic disease. Plan for admission for oncology and palliative care evaluation and pain management. Overall hemodynamically stable and appropriate for admission to the floor.   - Oncology consult  - Palliative care consult to discuss pain management and potentially goals of care pending oncology recs  - Pain plan   - Oxycodone 5-10 mg PO q4h    - Dilaudid 1 mg IV q4h    - Lidocaine patches    Hyponatremia  On admission, sodium of 122 previously minimally low in the 130's. No change oral intake. Urine studies were done in the ED, but unfortunately after patient was given 1 L bolus of NaCl. Recheck showed minimal improvement to 124. Initial concern for SIADH in the setting of metastatic disease, but would not expect improvement in sodium. Overall, asymptomatic so will continue to monitor for now  - Follow sodium q6h  - Repeat urine studies in AM    Leukocytosis, mild  On admission, WBC of 12. No evidence of acute infection at this time or concern for acute infection.  Likely stress response in the setting of pain.  - Follow CBC    Elevated Blood Pressure  Blood pressure elevated in the setting of acute pain. No known history of high blood pressure. No symptoms at this time.   - Treatment of pain as noted above        Diet: Regular Diet Adult    DVT Prophylaxis: Low Risk/Ambulatory with no VTE prophylaxis indicated  Salas Catheter: Not present  Lines: PRESENT             Cardiac Monitoring: None  Code Status: Full Code      Clinically Significant Risk Factors Present on Admission         # Hyponatremia: Lowest Na = 122 mmol/L in last 2 days, will monitor as appropriate   # Hypercalcemia: Highest Ca = 10.3 mg/dL in last 2 days, will monitor as appropriate                        Disposition Plan      Expected Discharge Date: 01/12/2024                The patient's care was discussed with the Attending Physician, Dr. Bruce, Bedside Nurse, and Patient.    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     Patient reports over the last month he has had worsening pain of his lower back and right hip. His pain has become so severe over the last several days that he has been unable to lay down or sleep. Presented to [facility] for further elevation and treatment. States he has been eating and drinking normally and no other acute complaints.       Past Medical  History    Past Medical History:   Diagnosis Date    Squamous cell carcinoma of mandible (H)     Testicular cancer (H)        Past Surgical History   Past Surgical History:   Procedure Laterality Date    BRONCHOSCOPY RIDID OR FLEXIBLE W/ENDOBRONCHIAL ULTRASOUND GUIDED 1 OR 2 NODE STATIONS N/A 7/19/2023    Procedure: BRONCHOSCOPY, WITH BIOPSY OF 1 OR 2 LYMPH NODE STATIONS WITH ENDOBRONCHIAL ULTRASOUND GUIDANCE;  Surgeon: Abbi Santos MD;  Location: [facility] GI    BRONCHOSCOPY RIDID OR FLEXIBLE W/ENDOBRONCHIAL ULTRASOUND GUIDED 1 OR 2 NODE STATIONS N/A 8/17/2023    Procedure: BRONCHOSCOPY, WITH BIOPSY OF 1 OR 2 LYMPH NODE STATIONS WITH ENDOBRONCHIAL ULTRASOUND GUIDANCE;  Surgeon: Chris Holm MD;  Location: [facility] OR       Prior to Admission Medications   Prior to Admission Medications   Prescriptions Last Dose Informant Patient Reported? Taking?   Wound Dressings (MEPILEX BORDER FLEX) PADS   No Yes   Sig: Externally apply 1 Application topically 4 times daily as needed   acetaminophen (TYLENOL) 325 MG tablet 1/10/2024 at am  No Yes   Sig: Take 2 tablets (650 mg) by mouth every 4 hours as needed for mild pain or fever   amoxicillin-clavulanate (AUGMENTIN) 875-125 MG tablet 1/10/2024 at am  Yes Yes   Sig: Take 1 tablet by mouth 2 times daily For 10 days   chlorhexidine (PERIDEX) 0.12 % solution 1/10/2024 at am  No Yes   Sig: Swish and spit 10 mLs in mouth 4 times daily   ibuprofen (ADVIL/MOTRIN) 200 MG tablet 1/10/2024 at am  Yes Yes   Sig: Take 600 mg by mouth every 6 hours as needed for mild pain      Facility-Administered Medications: None         Physical Exam   Vital Signs: Temp: 97.5  F (36.4  C) Temp src: Oral BP: (!) 171/84 Pulse: 92   Resp: 16 SpO2: 98 % O2 Device: None (Room air)    Weight: 0 lbs 0 oz    General Appearance: Comfortable, nontoxic appearing male seen laying in bed.  Eyes: PERRLA.  No conjunctival icterus.  HEENT: Atraumatic. Bandage over left mandible. No erythema or swelling indicative of  infection.   Respiratory: Breathing comfortably on room air.   Lymph/Hematologic: No bruising on exposed skin.  Skin: No lesions or rashes noted on exposed skin.  Musculoskeletal: Moving all extremities spontaneously.  Neurologic: Cranial nerves II through XII grossly intact.  Psychiatric: Mood appropriate.    Medical Decision Making     60 MINUTES SPENT BY ME on the date of service doing chart review, history, exam, documentation & further activities per the note.      Data   Imaging results reviewed over the past 24 hrs:   Recent Results (from the past 24 hour(s))   CT Thoracic Spine Reconstructed    Narrative    CT THORACIC SPINE RECONSTRUCTED 1/10/2024 1:55 PM    Provided History: back pain   ICD-10:    Comparison: Same day body CT. Chest CT 8/1/2023.    Technique: Using multidetector thin collimation helical acquisition  technique, axial, sagittal and coronal 2 mm thickness CT  reconstructions were obtained through the thoracic spine.    Findings:  Normal thoracic vertebral alignment multiple hypodense attenuating  masses within the thoracic vertebrae, largest at T12, new since  8/1/2023. No loss of vertebral body height. There is no evidence of  fracture or significant prevertebral soft tissue swelling. Multilevel  loss of intervertebral disc height, endplate osteophytosis, and facet  hypertrophy. No high-grade spinal canal or neural foraminal stenosis.    Partially visualized right chest wall Port-A-Cath.    See report for same day body CT for findings in the chest, including  mediastinal adenopathy, as well as abdominal findings including  multiple hepatic and splenic lesions.      Impression    Impression:   1. No acute thoracic spine fracture or traumatic subluxation.    2. Multiple thoracic vertebral metastases, largest at T12. No  associated pathologic fracture.  3. Multilevel mild to moderate thoracic degenerative changes.  4. Multiple metastases in the chest and abdomen. See report for same  day body  CT.    ZENA GALLO MD         SYSTEM ID:  [id number]   CT Lumbar Spine Reconstructed    Narrative    CT LUMBAR SPINE RECONSTRUCTED 1/10/2024 1:56 PM    History: back pain.  ICD-10:    Comparison: Same day body CT and thoracic spine CT.    Technique: Using multidetector thin collimation helical acquisition  technique, axial, coronal and sagittal CT images through the lumbar  spine were obtained.    Findings: There are 5 lumbar type vertebrae. Normal lumbar spine  alignment. Moderate loss of intervertebral disc height at L3-4. Mild  loss of disc height at L2-3 and L4-5. Scattered regions of  hypoattenuation in the lumbar vertebral bodies, most conspicuous at L3  measuring up to 9 mm. Additional lesions in the T12 vertebral body,  posterior S1 vertebral body, and in the right ilium. Findings on a  level by level basis are as follows:    L1-L2: No spinal canal or neural foraminal stenosis.    L2-L3: Disc bulge and bilateral facet hypertrophy. Mild to moderate  spinal canal stenosis. Mild bilateral neural foraminal narrowing.    L3-L4: Circumference of disc bulge and bilateral facet hypertrophy.  Mild to moderate spinal canal stenosis. Mild bilateral neural  foraminal narrowing.    L4-L5: Circumferential disc bulge and bilateral facet hypertrophy.  Moderate spinal canal stenosis. Mild bilateral neural foraminal  narrowing.    L5-S1: Posterior disc bulge and bilateral facet hypertrophy. No spinal  canal stenosis. Mild bilateral neural foraminal narrowing.    The visualized adjacent paraspinous tissues are grossly within normal  limits.    Prominence of the right renal pelvis, unchanged since 11/1/2023.  Multiple hepatic, adrenal, splenic, and lymph node metastases.      Impression    Impression:  1. Thoracic, lumbar, and sacral vertebral metastases. No pathologic  fracture.  2. Right iliac metastases.  3. Multilevel lumbar degenerative changes, greatest at L4-5.  4. Multiple abdominal metastases. See report for same day  body CT.    ZENA GALLO MD         SYSTEM ID:  [id number]   CT Chest/Abdomen/Pelvis w Contrast    Narrative    EXAMINATION: CT CHEST/ABDOMEN/PELVIS W CONTRAST, 1/10/2024 2:02 PM    TECHNIQUE:  Helical CT images from the thoracic inlet through the  symphysis pubis were obtained with contrast. Contrast dose: 107cc of  isovue 370    COMPARISON: CT of chest abdomen with contrast 11/1/2023.    HISTORY: Hx SCC r/o metastatic disease,    FINDINGS:  Lines and tubes:  -Upper chest port catheter terminates in the low SVC.    Chest: Multiple prominent axillary lymph nodes similar to prior. There  are multiple enlarged lymph nodes throughout the mediastinum, several  which are increased in conspicuity compared to prior exam 11/1/2023.  Conglomerate of necrotic nodes along the right tracheal border  extending up to the great vessels, and in the subcarinal region. For  example, necrotic right paratracheal node measuring 2.8 x 2.2 cm at  its largest (3/131), previously 2.5 x 2 cm.    Heart: Heart is normal, extensive coronary artery calcifications and  calcifications of the thoracic aorta. Borderline enlarged main  pulmonary artery measuring 3.1 cm.    Lungs: No consolidations, pneumothorax, or pleural effusion. Right  upper lobe mosaic groundglass attenuation. Scattered atelectasis  throughout the right greater than left lung. There is increased  prominence of multiple pulmonary nodules, most significantly in the  right lung, additionally multiple increased and new fissural nodules,  as well as a peribronchial micronodular distribution, concerning for  metastases.    Abdomen and pelvis:  Liver: Numerous new heterogeneously enhancing nodular masses  throughout both hepatic lobes, for example enhancing right hepatic  lobe mass measuring 2.1 x 2.3 cm (3/363) with central hypoattenuation.    Biliary System: Unremarkable, common bile duct is normal caliber.    Pancreas: Unchanged 5 mm fat density lesion pancreatic tail  (3/331).  Remainder of pancreas unremarkable, no pancreatic ductal dilation.    Adrenal glands: New enhancing right adrenal nodule measuring 1.4 x 1.4  cm (3/340).    Spleen: New hypoattenuating lesions throughout the spleen concerning  for metastatic disease. Otherwise unremarkable.    Kidneys: Normal cortical medullary enhancement of the kidneys. Left  kidney unremarkable, unchanged moderate to severe hydronephrosis of  the right kidney secondary to ureteropelvic junction obstruction.     Gastrointestinal tract: Appendix is not well-visualized, no dilated  segments of small and large bowel, no definitive focal mural  thickening. Moderate stool burden in the transverse colon.    Mesentery/peritoneum/retroperitoneum: Numerous scattered metastatic  implants along the anterior abdominal wall, peritoneum, throughout the  retroperitoneum and paracolic gutters. No intra-abdominal fluid  collections. No free intra-abdominal air.    Lymph nodes: Multiple prominent lymph nodes along the periaortic  chains    Vasculature: Patent abdominal aorta and branch vessels, calcified  atherosclerosis. Nonaneurysmal aorta. The portal vein, superior  mesenteric vein, and splenic vein are patent without obstruction.    Pelvis: Prostatic calcifications, mild concentric wall thickening of  the bladder, likely chronic. Fat-containing right greater than left  inguinal hernias.     Bones: New pelvic lytic lesions including the right iliac wing,  scattered vertebral body lytic lesions, for example T12, T11, T7.    Soft Tissues: New presumably metastatic metastatic soft tissue lesion  posterior superior right iliac wing (3/411).      Impression    IMPRESSION: In this patient with a known history of metastatic  skeletal carcinoma, there is evidence for disease progression with  multiple new metastatic lesions throughout the abdomen and pelvis.  1. Increased size of multiple pulmonary nodules, additional increased  nodules along the pulmonary  fissures.  2. Increased size of multiple necrotic appearing and coalescing  mediastinal lymph nodes as per above.   3. New diffuse metastatic lesions throughout the liver, as well as  numerous implants throughout the peritoneum, anterior abdominal wall,  retroperitoneum, mesentery, spleen, and new right adrenal metastatic  lesion.  4. New bony metastatic lesions throughout the pelvis including the  right iliac wing, and throughout the spine as detailed above.   5. Unchanged moderate to severe hydronephrosis of the right kidney  secondary to ureteropelvic junction obstruction.     I have personally reviewed the examination and initial interpretation  and I agree with the findings.    MARIA T MOODY MD         SYSTEM ID:  [id number]   Head CT w/o contrast    Narrative    EXAM: CT HEAD W/O CONTRAST  1/10/2024 2:03 PM     HISTORY:  HA, hx CA r/o mets       COMPARISON:  Neck CT 11/1/2023. Body PET CT 6/5/2023.    TECHNIQUE: Using multidetector thin collimation helical acquisition  technique, axial, coronal and sagittal CT images from the skull base  to the vertex were obtained without intravenous contrast.   (topogram) image(s) also obtained and reviewed.    FINDINGS:  No acute intracranial hemorrhage, mass effect, or midline shift. No  acute loss of gray-white matter differentiation in the cerebral  hemispheres. Ventricles are proportionate to the cerebral sulci. Clear  basal cisterns. Moderate leukoaraiosis.    The bony calvaria and the bones of the skull base are normal.  Incidental 6 mm left frontoethmoidal osteoma, unchanged. Mastoid air  cells are clear. Grossly normal orbits.       Impression    IMPRESSION:  1. No acute intracranial pathology.   2. Noncontrast head CT of limited sensitivity for detection of brain  metastases. Consider further evaluation with contrast-enhanced brain  MRI.    ZENA GALLO MD         SYSTEM ID:  [id number]   Soft tissue neck CT w contrast    Narrative    EXAM: CT SOFT TISSUE NECK  W CONTRAST  1/10/2024 2:05 PM     HISTORY:  Hx mandibular SCC, assess worsing spread         COMPARISON:  Neck CT 11/1/2023.     TECHNIQUE: Following intravenous administration of nonionic iodinated  contrast medium, thin section helical CT images were obtained from the  skull base down to the level of the aortic arch.  Axial, coronal and  sagittal reformations were performed with 2-3 mm slice thickness  reconstruction. Images were reviewed in soft tissue, lung and bone  windows.    CONTRAST: 107cc of isovue 370    FINDINGS:   Status post left neck dissection with decreased associated edema.  Fixation hardware spanning the anterior mandible, left mandibular  ramus, and left mandibular angle. Increase in extent of exposed left  mandible and fixation hardware at the posterior body and proximal  mandibular angle. Persistent coarsened trabeculated and sclerotic left  hemimandible.    Edema and soft tissue fullness in the left  space with  effacement of the retromaxillary fat, unchanged.    Enlarged right subclavicular lymph node measuring up to 2 cm (series  3, image 69) previously 1 cm. Partially visualized enlarged  mediastinal lymph nodes.    Atrophic left thyroid lobe, unchanged.    Atherosclerotic calcification of the bilateral carotid bifurcations.  Partially visualized right chest wall Port-A-Cath.    Multilevel cervical degenerative changes. New lytic lesion in the  right posterior C6 vertebral body.    Unchanged mucosal thickening along the floor of the left maxillary  sinus, suspected associated oroantral fistula, and multiple dental  caries.    Emphysematous changes of the right greater than left lung apices.  Nodularity along the upper right major fissure.      Impression    IMPRESSION:  Impression:  1. Extensive degree of postsurgical and postradiation changes  throughout the left neck. No specific features of local recurrence.  2. Increased exposure of the left hemimandible and associated  hardware  with persistent features concerning for osteomyelitis and/or  osteoradionecrosis.  3. New C6 vertebral metastasis.  4. Odontogenic sinusitis of the left maxillary sinus.  5. Increased size of presumed metastatic right subclavicular lymph  node and partially visualized pathologic mediastinal lymph nodes.    Primary: NI-RADS 1.    Neck: NI-RADS 4.    NI-RADS CECT Surveillance Legend:    Primary  1: No evidence of recurrence: routine surveillance  2: Low suspicion    a) Superficial abnormality (skin, mucosal surface): direct visual  inspection    b) Ill-defined deep abnormality: short interval follow-up* or PET  3: High suspicion (new or enlarging discrete nodule/mass): biopsy  4: Definitive recurrence (path proven or clinical progression): no  biopsy needed    Nodes  1: No evidence of recurrence: routine surveillance  2: Low suspicion (ill-defined): short interval follow-up or PET  3: High suspicion (new or enlarging lymph node): biopsy if clinically  needed  4: Definitive recurrence (path proven or clinical progression): no  biopsy needed    *short interval follow-up: 3 months at our institution    ZENA GALLO MD         SYSTEM ID:  [id number]     Recent Labs   Lab 01/10/24  1651 01/10/24  1232   WBC  --  12.0*   HGB  --  13.5   MCV  --  92   PLT  --  300   * 122*   POTASSIUM  --  4.8   CHLORIDE  --  85*   CO2  --  24   BUN  --  14.1   CR  --  0.64*   ANIONGAP  --  13   PRINCE  --  10.3*   GLC  --  98   ALBUMIN  --  4.0   PROTTOTAL  --  7.4   BILITOTAL  --  0.3   ALKPHOS  --  106   ALT  --  12   AST  --  33

## 2024-01-10 NOTE — ED TRIAGE NOTES
Pt ambulatory from home with neck and back pain X1 month. Unable to manage pain at home with oxy. Pt has metastatic buccal cavity cancer.     Triage Assessment (Adult)       Row Name 01/10/24 1106          Triage Assessment    Airway WDL WDL        Respiratory WDL    Respiratory WDL WDL        Skin Circulation/Temperature WDL    Skin Circulation/Temperature WDL WDL        Cardiac WDL    Cardiac WDL WDL        Peripheral/Neurovascular WDL    Peripheral Neurovascular WDL WDL        Cognitive/Neuro/Behavioral WDL    Cognitive/Neuro/Behavioral WDL WDL

## 2024-01-11 NOTE — PROGRESS NOTES
Medicine cross cover    1am Na 122 slightly down from 124.    - added 1200 ml fluid restriction,   - next Na 6am

## 2024-01-11 NOTE — CONSULTS
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 He notes that he has been unable to lay down because of this pain and that it wakes him up from sleep. Otherwise, he denies most symptoms including no fever, chills, N/V/D, SOB, chest pain, rashes.     On arrival, he was hypertensive up to 170/80s, but otherwise HDS and sating well on room air. Labs notable for Na 122, hgb 12.7 (baseline ~13.5-14), UA bland. He had a CT head, neck, CAP showed progression of disease with new C6 vertebral met, increased size of presumed metastatic right subclavicular LN, multiple metastatic lesions throughout abdomen and pelvis, increase size of pulmonary nodules, increase size of mediastinal lymph nodes, diffuse metastatic lesions throughout liver, new right adrenal lesion. 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     At his last oncology visit (11/8/23) he had mediastinal, left hilar, and a subcarinal LN that was enlarging. However, he had concerns for a fistula and active infection. The team discussed ideally starting carboplatin + paclitaxel + pembro, but had not started yet due to concerns for infection. He was seen by ENT and was being treated with ABX for recurrent fistula infection. He then travelled to [place] for the winter with plans to return in January with repeat imaging. Recently, he reports that his biggest symptom is pain with his right hip. He does  "not have worsening pain or erythema with his left mandible, but when he pressed down on his face, there was purulent discharge expressed.     Oncologic History:  From Dr. Buckley's outpatient note:   \"4/28/20                     Left marginal mandibulectomy, WLE of the left buccal mucosa, left posterior maxillectomy, left partial pharyngectomy, left selective neck dissection, right radial forearm free flap, mandibular plating, tracheostomy, and feeding tube placement (Dr. Triana at [facility]). Path: SCC, 5.7 cm moderately differentiated, DOI 1.5 cm, + PNI, no LVI, negative margins, 5/33 nodes positive, + MARIA ELENA  6/30~8/17/20            Chemoradiation 6600 cGy with HD cisplatin (Dr. Serna at [facility])  Care was at [facility] with Dr. Tarango, Dr. Christ Stewart (ENT), Dr. Kong Reece (Otolaryngology at [facility]), and Dr. Serna (Radiation Oncology)     8/11/21                     CT neck. 3 x 2.8 x 3.6 cm mass at the reconstruction flap within the subcutaneous fat of the L cheek abutting masseter medially and skin laterally, extending to parotid, abutting carotid.   8/16/21                     FNA L cheek. Path: Highly suspicious for SCC  8/26/21                     PET/CT.   9/2/21                       MRI neck.   9/8/21                       FNA L cheek lesion (Dr. Swartz). Path: SCC. TPS 2%   10/21/21~2/15/22     C1-6 carboplatin 5FU pembrolizumab c/b severe mucositis. C2 pembro alone due to ongoing resolving mucositis. DYPD testing negative. 5FU dose reduced 50% for C3-6. 5FU omitted C5-6.  3/8/22                       PET/CT. Decreased size and FDG uptake of the mass along the temporalis muscle, deep to the L zygomatic arch, 1.5  1.3 cm (SUV 10.7), peristent linear lucency on CT immediately underneath the anterior cortical bone of the anterior mandible at the  Midline (SUV 10.5), extending to the medullary cavity of the anterior mandible where there is subtle corresponding lucency, similar to prior, " likely post surgical. Unchanged thyroid nodule. Gastric fundus thickening which can be seen with gastritis, recommend direct visualization, focal anorectal uptake, likely physiologic, attention on follow up  4/11~5/3/22              Chemoradiation with weekly carboplatin paclitaxel, intended 70 Gy, 35 fractions (Dr. Amezquita, summary 6/11/23). No-showed starting the 4th week of radiation, hence received 3400 cGy to residual tumor, 3060 cGy high risk tissue. Turned out he lost insurance coverage.  12/19/22                   Re-established care with Dr. Swartz after developing R jaw pain.   12/19/22                   CT neck. 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 Increased subcutaneous edema L lower face and submandibular space. Effaced fat planes. Questionable 3.4 x 1.4 cm rim enhancing area in the L submandibular space. Treated with vitamin D and pentoxifylline. Infected hardware April-May 2023 treated with antibiotics.  5/11~5/12/23            [facility] for facial swelling. Found to have L facial abscess s/p I&D  6/5/23                       PET/CT. Changes felt to be more related to infection/inflammation based on changes since 5/11/23 CT. However, had new enlarged R paratracheal, subcarinal, R hilar adenopathy (SUB 16.85 in R paratracheal node, 6.6 subcarinal, 3.91 R hilar). EBUS recommended.  7/19/23                     Bronch, EBUS (Dr. Santos) with conscious sedation. 4R biopsied. Limited LN evaluation due to limitations from trismus and osteo. Path: Non diagnostic.  8/1/23                       CT chest. Increased conspicuity of R minor fissure nodules, including 5 x 3 mm nodule, previously 3 x 3 mm. 7 x 5 mm R major fissure nodule. Additional scattered nodules are larger compared to 4/17/23. 1.8 cm R paratracheal LN. 1.5 cm  "area of peripherally enhancing hypoattenuation in the mediastinum posterior to the trachea corresponding to FDG avid are on PET 6/5/23. Unchanged 9-10 mm R hilar node. Gastric mucosa thickening. 6 mm tail of pancreas nodule  8/17/23                     Bronch, EBUS under general anesthesia (Dr. Holm). 4R biopsied. Path: SCC. TPS <1%\"    Review of Systems:  A comprehensive ROS was performed and found to be negative or non-contributory with the exception of that noted in the HPI above.    Past Medical History:  Past Medical History:   Diagnosis Date    Squamous cell carcinoma of mandible (H)     Testicular cancer (H)        Past Surgical History:  Past Surgical History:   Procedure Laterality Date    BRONCHOSCOPY RIDID OR FLEXIBLE W/ENDOBRONCHIAL ULTRASOUND GUIDED 1 OR 2 NODE STATIONS N/A 7/19/2023    Procedure: BRONCHOSCOPY, WITH BIOPSY OF 1 OR 2 LYMPH NODE STATIONS WITH ENDOBRONCHIAL ULTRASOUND GUIDANCE;  Surgeon: Abbi Santos MD;  Location:  GI    BRONCHOSCOPY RIDID OR FLEXIBLE W/ENDOBRONCHIAL ULTRASOUND GUIDED 1 OR 2 NODE STATIONS N/A 8/17/2023    Procedure: BRONCHOSCOPY, WITH BIOPSY OF 1 OR 2 LYMPH NODE STATIONS WITH ENDOBRONCHIAL ULTRASOUND GUIDANCE;  Surgeon: Chris Holm MD;  Location:  OR       Social History:  Social History     Socioeconomic History    Marital status:    Tobacco Use    Smoking status: Every Day     Packs/day: 1     Types: Cigarettes     Start date: 1976    Smokeless tobacco: Never   Substance and Sexual Activity    Alcohol use: Yes     Comment: case a week    Drug use: Not Currently      Outpatient Medications:  No current facility-administered medications on file prior to encounter.  acetaminophen (TYLENOL) 325 MG tablet, Take 2 tablets (650 mg) by mouth every 4 hours as needed for mild pain or fever  amoxicillin-clavulanate (AUGMENTIN) 875-125 MG tablet, Take 1 tablet by mouth 2 times daily For 10 days  chlorhexidine (PERIDEX) 0.12 % solution, Swish and spit 10 mLs in " "mouth 4 times daily  ibuprofen (ADVIL/MOTRIN) 200 MG tablet, Take 600 mg by mouth every 6 hours as needed for mild pain  Wound Dressings (MEPILEX BORDER FLEX) PADS, Externally apply 1 Application topically 4 times daily as needed       Physical Exam:    Blood pressure (!) 153/80, pulse 86, temperature 98.4  F (36.9  C), temperature source Axillary, resp. rate 18, height 1.753 m (5' 9\"), weight 84.2 kg (185 lb 10 oz), SpO2 93%.  General: Uncomfortable, but NAD   HEENT: AC/NT, MMM  Neck: supple  CV: RRR  Resp: CTAB, normal respiratory effort on ambient air  GI: soft, non-tender, non-distended  MSK: warm and well-perfused, normal tone  Skin: Multiple flesh-colored fluctuant nodules on skin including chest, arm, abdomen   Neuro: Alert and interactive, moves all extremities equally      Labs & Studies: I personally reviewed the following studies:  ROUTINE LABS (Last four results):  CMP  Recent Labs   Lab 01/11/24  1411 01/11/24  0529 01/11/24  0113 01/10/24  1651 01/10/24  1232   * 122*  122* 122* 124* 122*   POTASSIUM  --  4.4  --   --  4.8   CHLORIDE  --  85*  --   --  85*   CO2  --  24  --   --  24   ANIONGAP  --  13  --   --  13   GLC  --  89  --   --  98   BUN  --  15.5  --   --  14.1   CR  --  0.65*  --   --  0.64*   GFRESTIMATED  --  >90  --   --  >90   PRINCE  --  10.1  --   --  10.3*   PROTTOTAL  --   --   --   --  7.4   ALBUMIN  --   --   --   --  4.0   BILITOTAL  --   --   --   --  0.3   ALKPHOS  --   --   --   --  106   AST  --   --   --   --  33   ALT  --   --   --   --  12     CBC  Recent Labs   Lab 01/11/24  0529 01/10/24  1232   WBC 9.4 12.0*   RBC 4.01* 4.19*   HGB 12.7* 13.5   HCT 37.3* 38.7*   MCV 93 92   MCH 31.7 32.2   MCHC 34.0 34.9   RDW 12.5 12.5    300     Assessment & Plan:   Antonio Sharpe is a 58 year old man with a 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 On arrival he had a CT head, neck, and CAP which " showed progression of disease including new C6 vertebral met, increased size of presumed metastatic right subclavicular LN, multiple metastatic lesions throughout abdomen and pelvis, increase size of pulmonary nodules, increase size of mediastinal lymph nodes, diffuse metastatic lesions throughout liver, new right adrenal lesion. He also has multiple scattered, fluctuate subcutaneous nodules scattered on his chest, abdomen, and arms, which likely also represent metastases.     #IV SCC of mandible and buccal mucosa   -At last oncology visit (11/8/23) he had enlarging lymph nodes. Oncology discussed ideally starting carboplatin _ paclitaxel + pembro, but there were concerns for infection He has a fistula with recurrent infection; he continued to be seen by ENT and had required intermittent ABX. They had also discussed starting pembrolizumab if he continued to have infections. Pt recently returned from [place] and was planning to get restaging images, but he presented to the ED before that.     Appreciate ID input regarding if there are concerns for active infection with his left mandible fistula or associated hardware. Pt had expressible purulent drainage when he pressed on his face, but otherwise did not have localizing signs or leukocytosis.     Recommendations:   - Appreciate ID input regarding risk for active infection   - Blood cultures   - Radiation/oncology consult for palliative radiation to right hip   - Follow-up with Dr. Buckley 1/18/24 to discuss next cancer regimen     Patient was seen and plan of care was discussed with attending physician Dr. iJmenez. Attestation to follow.     Kvng Galan MD PGY5  Hematology/Oncology   Pager: [phone]

## 2024-01-11 NOTE — PLAN OF CARE
Admission and Shift Summary        1/10/2024 12:02 PM  -----------------------------------------------------------  Reason for admission: Pain Management, Primary team notified of pt arrival.  Admitted from: ED Via: stretcher  Accompanied by: Staff  Belongings: Placed in closet  Admission Profile: complete  Teaching: orientation to unit and call light- call light within reach, call don't fall, use of console, meal times, when to call for the RN, and enforced importance of safety   Access: PIV. Has a port but it is unable to be accessed per patient.  Telemetry:Not ordered  Ht./Wt.: complete  Code Status verified on armband: yes  2 RN Skin Assessment Completed: Yes  Med Rec completed: No  Suction/Ambu bag/Flowmeter at bedside: yes  Is patient having diarrhea upon admission- if YES fill out testing algorithm : NO    C. Diff Testing Algorithm (MUST be marked YES)   3 or more loose stools in 24 hrs. [] Yes [x] No       Additional symptoms:(At least ONE must be marked yes)   Abdominal pain/discomfort [] Yes [x] No   Fever at least 38C (100.4 F) [] Yes [x] No   Elevated WBC(>11,000) [x] Yes [] No       Exclusion Criteria:  (MUST be marked YES)   Off laxatives for at least 48 hrs. [x] Yes [] No       Pt status/Shift Summary: Vitally stable. AOx4. Room air. Independent in the room. Not on telemetry. Up to bathroom independently. Rates pain 9/10 consistently, PRN medications offered. Mandible dressing changed, patient likes to do it independently.    Temp:  [97.5  F (36.4  C)-98.7  F (37.1  C)] 98.7  F (37.1  C)  Pulse:  [] 94  Resp:  [16-18] 18  BP: (156-171)/(84-94) 156/94  SpO2:  [96 %-98 %] 96 %

## 2024-01-11 NOTE — PROGRESS NOTES
[facility]    Medicine Progress Note - Hospitalist Service, GOLD TEAM 9    Date of Admission:  1/10/2024    Assessment & Plan      Antonio Sharpe is a 58 year old male with a history of metastatic SCC of mandible and buccal mucosa, who was admitted to [facility] on 1/10/2024 for further evaluation and treatment of pain related to worsening metastatic disease.     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 Initially underwent extensive resection of mandible and surrounding structures with skin grafts. Has completed multiple courses of chemoradiation and follows regularly with oncology and ENT. Most recent imaging in November of 2023 showed some further progression of his malignancy. Presented to [facility] with 1.5 months of worsening back and hip pain. Imaging showed worsening metastatic disease. Plan for admission for oncology and palliative care evaluation and pain management. Overall hemodynamically stable and appropriate for admission to the floor.   - Oncology consult   -Discussed with onc and would like ID consult if chemo to be considered, picture expressed lesion in chart  - Palliative care consult to discuss pain management and potentially goals of care pending oncology recs  - Pain plan   - Oxycodone 5-10 mg PO q4h    - Dilaudid 1 mg IV q4h    - Lidocaine patches    Hyponatremia, stable  On admission, sodium of 122 previously minimally low in the 130's. No change oral intake. Urine studies were done in the ED, but unfortunately after patient was given 1 L bolus of NaCl. Recheck showed minimal improvement to 124. Initial concern for SIADH in the setting of metastatic disease, but would not expect improvement in sodium. Overall, asymptomatic so will continue to monitor for now  - Follow sodium q6h  - Fluid restriction  - Repeat urine  "studies in AM    Leukocytosis, resolved  On admission, WBC of 12. No evidence of acute infection at this time or concern for acute infection. Likely stress response in the setting of pain.  - Follow CBC    Elevated Blood Pressure  Blood pressure elevated in the setting of acute pain. No known history of high blood pressure. No symptoms at this time.   - Treatment of pain as noted above          Diet: Regular Diet Adult  Fluid restriction 1200 ML FLUID    DVT Prophylaxis: Low Risk/Ambulatory with no VTE prophylaxis indicated  Salas Catheter: Not present  Lines: PRESENT             Cardiac Monitoring: None  Code Status: No CPR- Do NOT Intubate      Clinically Significant Risk Factors Present on Admission         # Hyponatremia: Lowest Na = 122 mmol/L in last 2 days, will monitor as appropriate   # Hypercalcemia: Highest Ca = 10.3 mg/dL in last 2 days, will monitor as appropriate             # Overweight: Estimated body mass index is 27.41 kg/m  as calculated from the following:    Height as of this encounter: 1.753 m (5' 9\").    Weight as of this encounter: 84.2 kg (185 lb 10 oz).              Disposition Plan     Expected Discharge Date: 01/12/2024      Destination: home with family              Navi Simmons MD  Hospitalist Service, [facility] 9  [facility]  Securely message with [employer] (more info)  Text page via [facility] Paging/Directory   See signed in provider for up to date coverage information  ______________________________________________________________________    Interval History   Ongoing pain this morning. Would like to talk to palliative care regarding other pain treatment options. No fevers or chills. No chest pain or SOB. No headaches or confusion.     Physical Exam   Vital Signs: Temp: 98.4  F (36.9  C) Temp src: Axillary BP: (!) 153/80 Pulse: 86   Resp: 18 SpO2: 93 % O2 Device: None (Room air)    Weight: 185 lbs 10.04 oz    General Appearance: NAD  HEENT: " Atraumatic. Bandage over left mandible.  Respiratory: Breathing comfortably  Musculoskeletal: Moving all extremities spontaneously.  Psychiatric: Mood appropriate.    Medical Decision Making       MANAGEMENT DISCUSSED with the following over the past 24 hours: oncology and nursing       Data     I have personally reviewed the following data over the past 24 hrs:    9.4  \   12.7 (L)   / 206     122 (L); 122 (L) 85 (L) 15.5 /  89   4.4 24 0.65 (L) \     ALT: 12 AST: 33 AP: 106 TBILI: 0.3   ALB: 4.0 TOT PROTEIN: 7.4 LIPASE: N/A

## 2024-01-11 NOTE — PLAN OF CARE
"Goal Outcome Evaluation:    Shift: 9469-5912    BP (!) 153/80 (BP Location: Left arm)   Pulse 86   Temp 98.4  F (36.9  C) (Axillary)   Resp 18   Ht 1.753 m (5' 9\")   Wt 84.2 kg (185 lb 10 oz)   SpO2 93%   BMI 27.41 kg/m       Overall Patient Progress: improving    Neuro: A&Ox4.   Cardiac: SR. Hypertensive but VSS otherwise  Respiratory: Sating above 90% on RA.  GI/: Adequate urine output. Last BM 1/9, pt feels constipated at baseline, 1 PRN stool softener given  Diet/appetite: Tolerating regular diet. Eating well; on 1200ml fluid restriction, had about 900ml fluid today  Activity: Independent, ambulating frequently in halls  Pain: endorses pain rated 6-10/10, worse in upper back and hips, managed with PRN dilaudid and oxycodone with some relief  Skin: No new deficits noted. Pt has surgical would on L jaw from resection; metal hardware exposed, purulent drainage above incision site, pt self-manages dressing changes (done twice this shift)  LDA's: L AC PIV SL; pt has chest port that is not accessed and pt does not want it accessed    Plan: Continue with POC. Notify primary team with changes.      "

## 2024-01-12 NOTE — PLAN OF CARE
"/73 (BP Location: Left arm)   Pulse 83   Temp 98.3  F (36.8  C) (Oral)   Resp 18   Ht 1.753 m (5' 9\")   Wt 81.1 kg (178 lb 12.7 oz)   SpO2 100%   BMI 26.40 kg/m      Hours of Care: 9879-5860.    Neuro: AO x 4.  Vitals: VSS   Respiratory: WDL on RA.  Cardiac: No tele orders. SR.    GI/: Voiding spontaneously.  No BM since admission.  Senna given.  Skin/Wounds: L jaw wound. Pt dresses. No change.  Lines: L PIV SL.  Diet: Regular with 1600 mL FR.  Activity: Up independently.  Pain: Pain controlled with Dilaudid and oxycodone, switched to oral medications only today in order to allow him to get home.  Labs: Trending Na.      Continue to monitor and follow POC    Krystian Duke RN on 1/12/2024 at 6:11 PM    6B-Intermediate Care    "

## 2024-01-12 NOTE — CONSULTS
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 Amezquita    HPI:    58-year-old male with a history of IV SCC of mandible and buccal mucosa presents with 1 month of worsening lower back and right hip pain, unable to lay down or sleep. Previously, he received adjuvant chemoradiation (6600 cGy to the left oral cavity and neck, 3 doses of high-dose cisplatin) completed 2020 at [facility] by Dr. Serna. Following local disease failure, he underwent palliative chemo (carboplatin, taxol) and pembrolizumab from 10/21/21 to 2022, and treatment at [facility] from April to May 2022 (residual tumor: 3400 cGy, high-risk tissues: 3060 cGy).    His last oncology visit on 23 showed enlarging mediastinal, left hilar, and subcarinal LN. Imaging on 1/10/24 (CT head, neck, CAP) revealed disease progression: new C6 vertebral met, increased size of right subclavicular LN, multiple abdominal and pelvic metastatic lesions, enlarged pulmonary nodules and mediastinal lymph nodes, diffuse liver metastases, new right adrenal lesion, exposed left hemimandible hardware, and concerns for osteomyelitis or osteoradionecrosis. Treatment with carboplatin, paclitaxel, and pembrolizumab was discussed but not started due to infection concerns.    Last seen in radiation oncology, he no-showed for multiple radiation and chemotherapy infusion visits due to insurance. Mr. Sharpe's dose at the time of treatment discontinuation was 34 Gy to the residual tumor with 30.6 Gy delivered to the surrounding inferior soft tissues along the cheek. He received a total of 4 infusions of  carboplatin/paclitaxel.        CT T spine 1/10/24  Multiple thoracic vertebral metastases, largest at T12. No associated pathologic fracture.        CT L spine 1/10/24  Thoracic, lumbar, and sacral vertebral metastases. No pathologic fracture. Multilevel lumbar degenerative changes, greatest at L4-5.        CT CAP 1/10/24  New bony metastatic lesions throughout the pelvis including the right iliac wing, and throughout the spine as detailed above.           The patient does state he has a history of pelvic irradiation secondary to testicular cancer, which happened approximately 40 years ago.  He does not recall the location which he receives radiation, but states that last approximately 4 weeks.  Based on his imaging, it appears he had a left orchiectomy, therefore he likely received left-sided pelvic RT, of an unknown dose.      During the interview today, the patient reported feeling much improved since admission, attributing this to the uptitration of pain medications. He is currently receiving Dilaudid 1 mg every 2 hours and oxycodone every 4 hours as needed. With this pain management regimen, he is able to ambulate and engage in his usual activities. Prior to admission, he experienced excruciating right hip pain, rating it 10 out of 10 in severity. This pain was localized primarily to the superior and posterior aspects of his right hip, radiating to the lateral hip and down his lateral leg. Although he has a history of sciatica, he notes that the current pain symptoms are distinctly different from his previous sciatic pain. He denies pain elsewhere in his body, except for his right shoulder, which he attributes to his sleeping position. The patient continues to smoke actively. He was informed by another provider that a biopsy of his right hip might be required before proceeding with additional treatment.    Past Medical History:  Past Medical History:   Diagnosis Date    Squamous cell carcinoma of mandible (H)      Testicular cancer (H)      Past Surgical History:  Past Surgical History:   Procedure Laterality Date    BRONCHOSCOPY RIDID OR FLEXIBLE W/ENDOBRONCHIAL ULTRASOUND GUIDED 1 OR 2 NODE STATIONS N/A 7/19/2023    Procedure: BRONCHOSCOPY, WITH BIOPSY OF 1 OR 2 LYMPH NODE STATIONS WITH ENDOBRONCHIAL ULTRASOUND GUIDANCE;  Surgeon: Abbi Santos MD;  Location:  GI    BRONCHOSCOPY RIDID OR FLEXIBLE W/ENDOBRONCHIAL ULTRASOUND GUIDED 1 OR 2 NODE STATIONS N/A 8/17/2023    Procedure: BRONCHOSCOPY, WITH BIOPSY OF 1 OR 2 LYMPH NODE STATIONS WITH ENDOBRONCHIAL ULTRASOUND GUIDANCE;  Surgeon: Chris Holm MD;  Location:  OR     Medications:  Current Facility-Administered Medications   Medication    acetaminophen (TYLENOL) tablet 975 mg    calcium carbonate (TUMS) chewable tablet 1,000 mg    chlorhexidine (PERIDEX) 0.12 % solution 10 mL    Lidocaine (LIDOCARE) 4 % Patch 2 patch    lidocaine (LMX4) cream    lidocaine 1 % 0.1-1 mL    naloxone (NARCAN) injection 0.2 mg    Or    naloxone (NARCAN) injection 0.4 mg    Or    naloxone (NARCAN) injection 0.2 mg    Or    naloxone (NARCAN) injection 0.4 mg    oxyCODONE (ROXICODONE) solution 10 mg    Or    oxyCODONE (ROXICODONE) solution 20 mg    polyethylene glycol (MIRALAX) Packet 17 g    senna-docusate (SENOKOT-S/PERICOLACE) 8.6-50 MG per tablet 2 tablet    Or    senna-docusate (SENOKOT-S/PERICOLACE) 8.6-50 MG per tablet 3 tablet    sodium chloride (PF) 0.9% PF flush 3 mL    sodium chloride (PF) 0.9% PF flush 3 mL     Allergies:  No Known Allergies  Social History:  Social History     Socioeconomic History    Marital status:      Spouse name: Not on file    Number of children: Not on file    Years of education: Not on file    Highest education level: Not on file   Occupational History    Not on file   Tobacco Use    Smoking status: Every Day     Packs/day: 1     Types: Cigarettes     Start date: 1976    Smokeless tobacco: Never   Substance and Sexual Activity    Alcohol  "use: Yes     Comment: case a week    Drug use: Not Currently    Sexual activity: Not on file   Other Topics Concern    Parent/sibling w/ CABG, MI or angioplasty before 65F 55M? Not Asked   Social History Narrative    Not on file     Social Determinants of Health     Financial Resource Strain: Not on file   Food Insecurity: Not on file   Transportation Needs: Not on file   Physical Activity: Not on file   Stress: Not on file   Social Connections: Not on file   Interpersonal Safety: Not on file   Housing Stability: Not on file     Family History:  No family history on file.    Review of Systems   A 12-point review of systems was performed. Pertinent findings are noted in the HPI.    Physical Exam   ECOG Status: 1    VITALS: /73 (BP Location: Left arm)   Pulse 93   Temp 98.8  F (37.1  C) (Oral)   Resp 18   Ht 1.753 m (5' 9\")   Wt 81.1 kg (178 lb 12.7 oz)   SpO2 96%   BMI 26.40 kg/m    GEN: Appears well, alert, oriented, and in NAD  HEENT: EOMI, normal conjunctiva, notable left jawline mandible lesion, wound dry and nonpurulent/nondraining, no erythema.  NECK: Supple, full ROM, no visible cervical or clavicular lymphadenopathy  NEURO: No focal deficits, CN 3-12 grossly intact, normal and symmetric motor and sensory exam in bilateral upper and lower extremities, normal gait, up and move around without assist    Imaging/Path/Labs   Imaging/Path: See HPI    Assessment    Mr. Sharpe is a 58 year old male with metastatic squamous cell cancer of the head and neck, now with bony mets visible in the right iliac wing as well as his right femur, requiring high-dose opioid pain medications, warranting palliative radiation therapy.    Plan   We reviewed the patient's pathology, imaging, and recent presentation. The presence of lytic lesions in his right iliac wing and right femur, which have progressed according to prior imaging and PET/CT, suggests the need for palliative RT at this time. We proposed simulating him " today and initiating palliative RT in 5 fractions early next week. The patient is currently undergoing an infectious workup, and the oncology team is considering systemic therapy with carboplatin/paclitaxel/pembrolizumab. However, the decision regarding systemic therapy is pending based on the results of the infectious disease workup.    The patient was informed of the possibility of requiring a biopsy of his right hip and expressed hesitation about proceeding with radiation therapy at this time. We offered to simulate him today at 12:30 PM so that the planning aspect of his radiation therapy could be completed. However, the patient declined, preferring to have a definitive plan regarding the biopsy before undergoing CT simulation for palliative radiation.    We will coordinate with his oncology and primary care teams regarding the plan for the biopsy and see the patient again for CT simulation based on the outcome of these discussions.    Plan is tentatively 2000 cGy in 5 fractions to the symptomatic areas of his R hip, with consideration that he has received likely L sided pelvic RT in the distant past, and that his symptoms may be secondary to his R iliac wing lesion vs. His R femoral head lesion.    Patient was seen and discussed with my attending physician, Dr. Torres.    Kobi Murguia MD, MS PGY-2  Department of Radiation Oncology  [facility]  Phone: [phone]

## 2024-01-12 NOTE — PLAN OF CARE
Neuro: A&Ox4.   Cardiac: Not on tele. HR 80-90s. Systolics 140s.   Respiratory: Sating >95% on RA.  GI/: Independent use of the bathroom.  Diet/appetite: Regular diet with a fluid restriction. Eating well.   Activity:  Independent.   Pain: At acceptable level on current regimen. PRN oxy Q4 and Dilaudid Q4 given frequently. Pain has improved according to patient.  Skin: No new deficits noted. Patient changes his own dressing on his mandible when needed.  LDA's: 1PIV saline locked, port is not accessed and cannot be accessed according to patient.    Plan: Patient would like to put a pain plan into motion so he could possibly be discharged today or tomorrow. Continue with POC. Notify primary team with changes.

## 2024-01-12 NOTE — PROGRESS NOTES
[facility]    Medicine Progress Note - Hospitalist Service, GOLD TEAM 9    Date of Admission:  1/10/2024    Assessment & Plan   Antonio Sharpe is a 58 year old male with a history of metastatic SCC of mandible and buccal mucosa, who was admitted to [facility] on 1/10/2024 for further evaluation and treatment of pain related to worsening metastatic disease.     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 Initially underwent extensive resection of mandible and surrounding structures with skin grafts. Has completed multiple courses of chemoradiation and follows regularly with oncology and ENT. Most recent imaging in November of 2023 showed some further progression of his malignancy. Presented to [facility] with 1.5 months of worsening back and hip pain. Imaging showed worsening metastatic disease. Plan for admission for oncology and palliative care evaluation and pain management. Overall hemodynamically stable and appropriate for admission to the floor.   - Oncology consult   -Discussed with onc and would like ID consult if chemo to be considered, picture expressed lesion in chart   -Follow blood culture  -Rad onc consult, will simulate him 1/12/2024  - Palliative care consult to discuss pain management and potentially goals of care pending oncology recs  -Continue chlorhexidine   - Pain plan   - Oxycodone 5-10 mg PO q4h    - Dilaudid 1 mg IV q4h    - Lidocaine patches    Hyponatremia, improving  On admission, sodium of 122 previously minimally low in the 130's. No change oral intake. Urine studies were done in the ED, but unfortunately after patient was given 1 L bolus of NaCl. Recheck showed minimal improvement to 124. Initial concern for SIADH in the setting of metastatic disease, but would not expect improvement in sodium. Overall,  "asymptomatic so will continue to monitor for now  - Follow sodium q6h then less often pending next check  - Fluid restriction liberalized to 1800    Leukocytosis, resolved  On admission, WBC of 12. No evidence of acute infection at this time or concern for acute infection. Likely stress response in the setting of pain.  - Follow CBC    Elevated Blood Pressure  Blood pressure elevated in the setting of acute pain. No known history of high blood pressure. No symptoms at this time.   - Treatment of pain as noted above          Diet: Regular Diet Adult  Fluid restriction 1500 ML FLUID    DVT Prophylaxis: PCD  Salas Catheter: Not present  Lines: PRESENT             Cardiac Monitoring: None  Code Status: No CPR- Do NOT Intubate      Clinically Significant Risk Factors         # Hyponatremia: Lowest Na = 122 mmol/L in last 2 days, will monitor as appropriate   # Hypercalcemia: Highest Ca = 10.8 mg/dL in last 2 days, will monitor as appropriate               # Overweight: Estimated body mass index is 26.4 kg/m  as calculated from the following:    Height as of this encounter: 1.753 m (5' 9\").    Weight as of this encounter: 81.1 kg (178 lb 12.7 oz)., PRESENT ON ADMISSION            Disposition Plan      Expected Discharge Date: 01/13/2024      Destination: home with family              Navi Simmons MD  Hospitalist Service, GOLD TEAM 9  [facility]  Securely message with [employer] (more info)  Text page via [employer] Paging/Directory   See signed in provider for up to date coverage information  ______________________________________________________________________    Interval History   Pain a little bit better this AM. Ok with seeing palliative care and ID later today. No fever but always feels chilly (after he lost weight). No chest pain or SOB. No headaches or confusion.     Physical Exam   Vital Signs: Temp: 98.8  F (37.1  C) Temp src: Oral BP: 137/73 Pulse: 93   Resp: 18 SpO2: 96 " % O2 Device: None (Room air)    Weight: 178 lbs 12.69 oz    General Appearance: NAD  HEENT: Atraumatic. No pus seen from L mandible.  Respiratory: Breathing comfortably  Musculoskeletal: Moving all extremities spontaneously.  Psychiatric: Mood appropriate.    Medical Decision Making       MANAGEMENT DISCUSSED with the following over the past 24 hours: oncology and rad onc       Data     I have personally reviewed the following data over the past 24 hrs:    10.1  \   12.7 (L)   / 261     128 (L); 128 (L) 89 (L) 14.9 /  105 (H)   5.2 30 (H) 0.83 \

## 2024-01-12 NOTE — CONSULTS
Palliative Care Consultation Note  [facility]      Patient: Antonio Sharpe  Date of Admission:  1/10/2024    Requesting Clinician / Team: Medicine team  Reason for consult: Pain management     Recommendations & Counseling     GOALS OF CARE:   Goals of care were not discussed during our encounter today.  Today's encounter was focused on pain and symptom management.    ADVANCE CARE PLANNING:  No health care directive on file. Per  informed consent policy, next of kin should be involved if patient becomes unable.    Code status: No CPR- Do NOT Intubate    MEDICAL MANAGEMENT:   Pain control: Jeremi has had increasing hip pain for the past 2 months.  He was trying to control the pain with Tylenol and ibuprofen and seeing his chiropractor.  The pain became so severe that he was not able to eat or sleep and therefore came to the emergency room.  Today pain is well-controlled with current doses of opioids. Patient hoping to go home today or tomorrow.  I explained to Jeremi that in order to go home, he needed to get off hydromorphone IV before going home. Pt agrees to stopping hydromorphone IV and will start scheduled, non opioid analgesics  Tylenol 973mg TID scheduled - ordered for you   Recommend if pt not having biopsy, start scheduled Celebrex 200mg BID-   Oxycodone solution 10-15mg PO q3hrs PRN - ordered for you   Discontinue hydromorphone IV  If patient is discharged home tomorrow, recommend discharging with a 2-week supply of oxycodone.    Opioid induced constipation - last BM 6 days ago. Start scheduled bowel meds as indicated below:   Senna 2-3 tabs BID - ordered for you -increase senna to 4 tabs BID if no bowel movement in the coming days  Miralax BID - ordered for you    Outpatient palliative care clinic referral placed in Jeremi's discharge orders.    For any weekend needs, please page on-call palliative care physician.        PSYCHOSOCIAL/SPIRITUAL:  Family patient's wife  at bedside    Our team: will continue to follow.. Thank you for the consult and allowing us to aid in the care of Antonio Sharpe.    These recommendations have been discussed with Dr Simmons.    Fauzia Miramontes MD  Securely message with [employer] (more info)  Text page via [employer] Paging/Directory     During regular M-F work hours -- please contact team via Securely message with the [employer] Web Console (learn more here)  After regular work hours and on weekends/holidays, you can call our answering service at [phone]. Also, who's on call for us is available in [employer] Smart Web.       Palliative Summary/HPI     Antonio Sharpe is a 58 year old male with a past medical history of SCC of mandible and buccal mucosa who presents with 1-2 months of worsening lower back and right hip pain.  Jeremi was managing his pain with Tylenol and ibuprofen and was seeing a chiropractor.  The pain gradually worsened to the point that he was unable to lay down due to the pain and the pain was waking him up from his sleep.  He therefore decided to come into the hospital for evaluation.  Palliative care was consulted for symptom management.      Today, the patient was seen for:  Metastatic squamous cell carcinoma of the mandible and buccal mucosa  Right hip pain due to above  Constipation  Support  Palliative care encounter    History of Present Illness:  History gathered today from: patient, family/loved ones, medical chart, medical team members  Introduced the role of palliative care as an interdisciplinary team that cares for patients with serious illness to help support symptom management, communication, coping for patients and their families as well as support with medical decision making.    Antonio Sharpe is a 58 year old male with a past medical history of SCC of mandible and buccal mucosa who presents with 1-2 months of worsening lower back and right hip pain.  Jeremi was managing his pain with Tylenol and ibuprofen and was  seeing a chiropractor.  The pain gradually worsened to the point that he was unable to lay down due to the pain and the pain was waking him up from his sleep.  He therefore decided to come into the hospital for evaluation.    Initially Jeremi stated that the Tylenol 1000 mg twice a day and ibuprofen 600 mg twice daily was controlling the pain.  Gradually the pain worsened and he went to see his chiropractor but the pain did not improve.  He denies all other symptoms of nausea vomiting diarrhea.  He states his appetite is good.  His last bowel movement was 6 days ago      Prognosis, Goals, & Planning:    Functional Status just prior to this current hospitalization:  ECOG1 (Restricted in physically strenuous activity but ambulatory and able to carry out work of a light or sedentary nature)    Prognosis, Goals, and/or Advance Care Planning:  Goals were not discussed during our encounter today.    Code Status was addressed today:   No Jeremi was very focused on his pain and how he could improve the pain and get discharged from the hospital.    Patient's decision making preferences: shared with support from loved ones        Patient has decision-making capacity today for complex decisions:Intact            Coping, Meaning, & Spirituality:   Mood, coping, and/or meaning in the context of serious illness were addressed today: No    Social:   Living situation:lives with significant other/spouse  Occupation: Retired about 2 years ago, now feels his days by fixing up their house, painting the inside of the house and in the summer they go up to their cabin and are very active  Areas of fulfillment/delfin: Being active  3 adult sons who live in the area and 5 grandchildren    ROS:  Comprehensive ROS is reviewed and is negative except as here & per HPI:     Medications:  I have reviewed this patient's medication profile and medications from this hospitalization.   Noted meds are:  Hydromorphone 1 mg IV q4hrs PRN - 4mg x 24hrs  Oxycodone  5-10 mg q4hrs PRN -50 mg x 24 hours    Physical Exam:  Vital Signs: Temp: 98.8  F (37.1  C) Temp src: Oral BP: 137/73 Pulse: 93   Resp: 18 SpO2: 96 % O2 Device: None (Room air)    Weight: 178 lbs 12.69 oz  Gen: Alert, wearing regular clothes, standing during our encounter, appears comfortable, in no acute distress, engaged, appropriate, sensorium intact    Data reviewed:  Reviewed recent labs and pertinent imaging  ROUTINE ICU LABS (Last four results)  CMP  Recent Labs   Lab 01/12/24  1137 01/12/24  0531 01/12/24  0135 01/11/24  1730 01/11/24  1411 01/11/24  0529 01/10/24  1651 01/10/24  1232   * 128*  128* 125* 126*   < > 122*  122*   < > 122*   POTASSIUM  --  5.2  --   --   --  4.4  --  4.8   CHLORIDE  --  89*  --   --   --  85*  --  85*   CO2  --  30*  --   --   --  24 --  24   ANIONGAP  --  9  --   --   --  13  --  13   GLC  --  105*  --   --   --  89  --  98   BUN  --  14.9  --   --   --  15.5  --  14.1   CR  --  0.83  --   --   --  0.65*  --  0.64*   GFRESTIMATED  --  >90  --   --   --  >90  --  >90   PRINCE  --  10.8*  --   --   --  10.1  --  10.3*   PROTTOTAL  --   --   --   --   --   --   --  7.4   ALBUMIN  --   --   --   --   --   --   --  4.0   BILITOTAL  --   --   --   --   --   --   --  0.3   ALKPHOS  --   --   --   --   --   --   --  106   AST  --   --   --   --   --   --   --  33   ALT  --   --   --   --   --   --   --  12    < > = values in this interval not displayed.     CBC  Recent Labs   Lab 01/12/24  0531 01/11/24  0529 01/10/24  1232   WBC 10.1 9.4 12.0*   RBC 4.00* 4.01* 4.19*   HGB 12.7* 12.7* 13.5   HCT 37.8* 37.3* 38.7*   MCV 95 93 92   MCH 31.8 31.7 32.2   MCHC 33.6 34.0 34.9   RDW 12.5 12.5 12.5    206 300     INRNo lab results found in last 7 days.  Arterial Blood GasNo lab results found in last 7 days.    CT scan chest/ab/pelvis:   IMPRESSION: In this patient with a known history of metastatic  skeletal carcinoma, there is evidence for disease progression with  multiple new  metastatic lesions throughout the abdomen and pelvis.  1. Increased size of multiple pulmonary nodules, additional increased  nodules along the pulmonary fissures.  2. Increased size of multiple necrotic appearing and coalescing  mediastinal lymph nodes as per above.   3. New diffuse metastatic lesions throughout the liver, as well as  numerous implants throughout the peritoneum, anterior abdominal wall,  retroperitoneum, mesentery, spleen, and new right adrenal metastatic  lesion.  4. New bony metastatic lesions throughout the pelvis including the  right iliac wing, and throughout the spine as detailed above.   5. Unchanged moderate to severe hydronephrosis of the right kidney  secondary to ureteropelvic junction obstruction.     Medical Decision Making       Please see A&P for additional details of medical decision making.  MANAGEMENT DISCUSSED with the following over the past 24 hours: Dr. Simmons   NOTE(S)/MEDICAL RECORDS REVIEWED over the past 24 hours: Medicine, nursing, heme-onc  Tests personally interpreted in the past 24 hours:  - CHEST CT showing increased size of pulmonary nodules, new diffuse metastatic lesions throughout the liver, new bony metastatic lesions throughout the pelvis  Tests REVIEWED in the past 24 hours:  - BMP  - CBC  SUPPLEMENTAL HISTORY, in addition to the patient's history, over the past 24 hours obtained from:   - Spouse or significant other

## 2024-01-13 NOTE — PLAN OF CARE
DISCHARGE                         No discharge date for patient encounter.  ----------------------------------------------------------------------------  Discharged to: Home  Via: private transportation  Accompanied by: Family  Discharge Instructions: *diet, *activity, medications, follow up appointments, when to call the MD, aftercare instructions.  Prescriptions: To be filled by   [facility]    pharmacy; medication list reviewed & sent with pt  Follow Up Appointments: arranged; information given  Belongings: All sent with pt  IV: d/c'd  Telemetry: d/c'd  Pt exhibits understanding of above discharge instructions; all questions answered.    Discharge Paperwork: Signed, copied, and sent home with patient.

## 2024-01-13 NOTE — DISCHARGE SUMMARY
"[facility]  Hospitalist Discharge Summary      Date of Admission:  1/10/2024  Date of Discharge:  1/13/2024 10:30 AM  Discharging Provider: Navi Simmons MD  Discharge Service: Hospitalist Service, GOLD TEAM 9    Discharge Diagnoses   See below     Clinically Significant Risk Factors     # Overweight: Estimated body mass index is 26.66 kg/m  as calculated from the following:    Height as of this encounter: 1.753 m (5' 9\").    Weight as of this encounter: 81.9 kg (180 lb 8.9 oz).       Follow-ups Needed After Discharge   Follow-up Appointments     Adult [facility] Follow-up and recommended labs and tests      Follow up with primary care provider, Physician No Ref-Primary, within 7   days for hospital follow- up.  The following labs/tests are recommended:   BMP and CBC.      Appointments on [place] and/or [facility] (with [facility] or [facility]   provider or service). Call [phone] if you haven't heard regarding   these appointments within 7 days of discharge.            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 Initially underwent extensive resection of mandible and surrounding structures with skin grafts. Has completed multiple " courses of chemoradiation and follows regularly with oncology and ENT. Most recent imaging in November of 2023 showed some further progression of his malignancy. Presented to [facility] with 1.5 months of worsening back and hip pain. Imaging showed worsening metastatic disease.   - Oncology will also see as outpatient  -Rad onc consult (also placed as outpatient)   -Discussed rad onc with patient who would like to talk to their OP oncologist (also re its scheduling)  - ID/ENT have seen and do not think current infection that would warrant antibiotics   -Continue mepilex dressings  -Continue chlorhexidine   -OP palliative care consult   -Seen by palliative care inpatient, consideration for celebrex as outpatient if not having adequate pain control  - Pain plan   - Oxycodone 10-15 mg PO q3h PRN   - Lidocaine patches  -Continue bowel meds    Patient instructed to follow up at PCP clinic, when to return to the ED, regarding upcoming tests, and medication changes. Patient understood instructions and all questions answered.     Hyponatremia, improving  On admission, sodium of 122 previously minimally low in the 130's. No change oral intake. Urine studies were done in the ED, but unfortunately after patient was given 1 L bolus of NaCl. Recheck showed minimal improvement to 124. Initial concern for SIADH in the setting of metastatic disease, but would not expect improvement in sodium. Overall, asymptomatic so will continue to monitor for now.  - Fluid restriction as outpatient and follow up  -BMP as outpatient    Leukocytosis, resolved  On admission, WBC of 12. No evidence of acute infection at this time or concern for acute infection. Likely stress response in the setting of pain.  - Follow CBC as outpatient    Elevated Blood Pressure  Blood pressure elevated in the setting of acute pain. No known history of high blood pressure. No symptoms at this time.   - Monitor      Consultations This Hospital Stay   ONCOLOGY ADULT IP  CONSULT  INFECTIOUS DISEASE GENERAL ADULT IP CONSULT  PALLIATIVE CARE ADULT IP CONSULT  RADIATION ONCOLOGY IP CONSULT  ENT IP CONSULT  ONCOLOGY ADULT IP CONSULT  PAIN MANAGEMENT ADULT IP CONSULT    Code Status   No CPR- Do NOT Intubate    Time Spent on this Encounter   I, Navi Simmons MD, personally saw the patient today and spent greater than 30 minutes discharging this patient.       Navi Simmons MD  [facility] [address] [address]  Phone: [phone]  ______________________________________________________________________    Physical Exam   Vital Signs: Temp: 98.3  F (36.8  C) Temp src: Axillary BP: 126/68 Pulse: 88   Resp: 18 SpO2: 93 % O2 Device: None (Room air)    Weight: 180 lbs 8.91 oz  General Appearance: NAD  HEENT: Atraumatic. No pus seen from L mandible.  Respiratory: Breathing comfortably on room air  Musculoskeletal: Moving all extremities spontaneously.  Psychiatric: Mood appropriate.       Primary Care Physician   Physician No Ref-Primary    Discharge Orders      Adult Palliative Care  Referral      Radiation Therapy Referral      Reason for your hospital stay    You were hospitalized for cancer related pain.     Activity    Your activity upon discharge: activity as tolerated     Adult [facility] Follow-up and recommended labs and tests    Follow up with primary care provider, Physician No Ref-Primary, within 7 days for hospital follow- up.  The following labs/tests are recommended: BMP and CBC.      Appointments on [place] and/or [facility] (with [facility] or [facility] provider or service). Call [phone] if you haven't heard regarding these appointments within 7 days of discharge.     Diet    Follow this diet upon discharge: Orders Placed This Encounter      Fluid restriction 1200 ML FLUID      Regular Diet Adult       Significant Results and Procedures   Most Recent 3 CBC's:Recent Labs   Lab Test 01/13/24  0331 01/12/24  0531  01/11/24  0529   WBC 7.6 10.1 9.4   HGB 12.1* 12.7* 12.7*   MCV 96 95 93    261 206     Most Recent 3 BMP's:Recent Labs   Lab Test 01/13/24  0748 01/13/24  0331 01/12/24  1915 01/12/24  1137 01/12/24  0531 01/11/24  1411 01/11/24  0529   * 128*  128* 125*   < > 128*  128*   < > 122*  122*   POTASSIUM  --  4.2  --   --  5.2  --  4.4   CHLORIDE  --  92*  --   --  89*  --  85*   CO2  --  27  --   --  30*  --  24   BUN  --  14.8  --   --  14.9  --  15.5   CR  --  0.74  --   --  0.83  --  0.65*   ANIONGAP  --  9  --   --  9  --  13   PRINCE  --  10.5*  --   --  10.8*  --  10.1   GLC  --  87  --   --  105*  --  89    < > = values in this interval not displayed.   ,   Results for orders placed or performed during the hospital encounter of 01/10/24   CT Thoracic Spine Reconstructed    Narrative    CT THORACIC SPINE RECONSTRUCTED 1/10/2024 1:55 PM    Provided History: back pain   ICD-10:    Comparison: Same day body CT. Chest CT 8/1/2023.    Technique: Using multidetector thin collimation helical acquisition  technique, axial, sagittal and coronal 2 mm thickness CT  reconstructions were obtained through the thoracic spine.    Findings:  Normal thoracic vertebral alignment multiple hypodense attenuating  masses within the thoracic vertebrae, largest at T12, new since  8/1/2023. No loss of vertebral body height. There is no evidence of  fracture or significant prevertebral soft tissue swelling. Multilevel  loss of intervertebral disc height, endplate osteophytosis, and facet  hypertrophy. No high-grade spinal canal or neural foraminal stenosis.    Partially visualized right chest wall Port-A-Cath.    See report for same day body CT for findings in the chest, including  mediastinal adenopathy, as well as abdominal findings including  multiple hepatic and splenic lesions.      Impression    Impression:   1. No acute thoracic spine fracture or traumatic subluxation.    2. Multiple thoracic vertebral metastases,  largest at T12. No  associated pathologic fracture.  3. Multilevel mild to moderate thoracic degenerative changes.  4. Multiple metastases in the chest and abdomen. See report for same  day body CT.    ZENA GALLO MD         SYSTEM ID:  [id number]   CT Lumbar Spine Reconstructed    Narrative    CT LUMBAR SPINE RECONSTRUCTED 1/10/2024 1:56 PM    History: back pain.  ICD-10:    Comparison: Same day body CT and thoracic spine CT.    Technique: Using multidetector thin collimation helical acquisition  technique, axial, coronal and sagittal CT images through the lumbar  spine were obtained.    Findings: There are 5 lumbar type vertebrae. Normal lumbar spine  alignment. Moderate loss of intervertebral disc height at L3-4. Mild  loss of disc height at L2-3 and L4-5. Scattered regions of  hypoattenuation in the lumbar vertebral bodies, most conspicuous at L3  measuring up to 9 mm. Additional lesions in the T12 vertebral body,  posterior S1 vertebral body, and in the right ilium. Findings on a  level by level basis are as follows:    L1-L2: No spinal canal or neural foraminal stenosis.    L2-L3: Disc bulge and bilateral facet hypertrophy. Mild to moderate  spinal canal stenosis. Mild bilateral neural foraminal narrowing.    L3-L4: Circumference of disc bulge and bilateral facet hypertrophy.  Mild to moderate spinal canal stenosis. Mild bilateral neural  foraminal narrowing.    L4-L5: Circumferential disc bulge and bilateral facet hypertrophy.  Moderate spinal canal stenosis. Mild bilateral neural foraminal  narrowing.    L5-S1: Posterior disc bulge and bilateral facet hypertrophy. No spinal  canal stenosis. Mild bilateral neural foraminal narrowing.    The visualized adjacent paraspinous tissues are grossly within normal  limits.    Prominence of the right renal pelvis, unchanged since 11/1/2023.  Multiple hepatic, adrenal, splenic, and lymph node metastases.      Impression    Impression:  1. Thoracic, lumbar, and sacral  vertebral metastases. No pathologic  fracture.  2. Right iliac metastases.  3. Multilevel lumbar degenerative changes, greatest at L4-5.  4. Multiple abdominal metastases. See report for same day body CT.    ZENA GALLO MD         SYSTEM ID:  [id number]   CT Chest/Abdomen/Pelvis w Contrast    Narrative    EXAMINATION: CT CHEST/ABDOMEN/PELVIS W CONTRAST, 1/10/2024 2:02 PM    TECHNIQUE:  Helical CT images from the thoracic inlet through the  symphysis pubis were obtained with contrast. Contrast dose: 107cc of  isovue 370    COMPARISON: CT of chest abdomen with contrast 11/1/2023.    HISTORY: Hx SCC r/o metastatic disease,    FINDINGS:  Lines and tubes:  -Upper chest port catheter terminates in the low SVC.    Chest: Multiple prominent axillary lymph nodes similar to prior. There  are multiple enlarged lymph nodes throughout the mediastinum, several  which are increased in conspicuity compared to prior exam 11/1/2023.  Conglomerate of necrotic nodes along the right tracheal border  extending up to the great vessels, and in the subcarinal region. For  example, necrotic right paratracheal node measuring 2.8 x 2.2 cm at  its largest (3/131), previously 2.5 x 2 cm.    Heart: Heart is normal, extensive coronary artery calcifications and  calcifications of the thoracic aorta. Borderline enlarged main  pulmonary artery measuring 3.1 cm.    Lungs: No consolidations, pneumothorax, or pleural effusion. Right  upper lobe mosaic groundglass attenuation. Scattered atelectasis  throughout the right greater than left lung. There is increased  prominence of multiple pulmonary nodules, most significantly in the  right lung, additionally multiple increased and new fissural nodules,  as well as a peribronchial micronodular distribution, concerning for  metastases.    Abdomen and pelvis:  Liver: Numerous new heterogeneously enhancing nodular masses  throughout both hepatic lobes, for example enhancing right hepatic  lobe mass measuring  2.1 x 2.3 cm (3/363) with central hypoattenuation.    Biliary System: Unremarkable, common bile duct is normal caliber.    Pancreas: Unchanged 5 mm fat density lesion pancreatic tail (3/331).  Remainder of pancreas unremarkable, no pancreatic ductal dilation.    Adrenal glands: New enhancing right adrenal nodule measuring 1.4 x 1.4  cm (3/340).    Spleen: New hypoattenuating lesions throughout the spleen concerning  for metastatic disease. Otherwise unremarkable.    Kidneys: Normal cortical medullary enhancement of the kidneys. Left  kidney unremarkable, unchanged moderate to severe hydronephrosis of  the right kidney secondary to ureteropelvic junction obstruction.     Gastrointestinal tract: Appendix is not well-visualized, no dilated  segments of small and large bowel, no definitive focal mural  thickening. Moderate stool burden in the transverse colon.    Mesentery/peritoneum/retroperitoneum: Numerous scattered metastatic  implants along the anterior abdominal wall, peritoneum, throughout the  retroperitoneum and paracolic gutters. No intra-abdominal fluid  collections. No free intra-abdominal air.    Lymph nodes: Multiple prominent lymph nodes along the periaortic  chains    Vasculature: Patent abdominal aorta and branch vessels, calcified  atherosclerosis. Nonaneurysmal aorta. The portal vein, superior  mesenteric vein, and splenic vein are patent without obstruction.    Pelvis: Prostatic calcifications, mild concentric wall thickening of  the bladder, likely chronic. Fat-containing right greater than left  inguinal hernias.     Bones: New pelvic lytic lesions including the right iliac wing,  scattered vertebral body lytic lesions, for example T12, T11, T7.    Soft Tissues: New presumably metastatic metastatic soft tissue lesion  posterior superior right iliac wing (3/411).      Impression    IMPRESSION: In this patient with a known history of metastatic  skeletal carcinoma, there is evidence for disease  progression with  multiple new metastatic lesions throughout the abdomen and pelvis.  1. Increased size of multiple pulmonary nodules, additional increased  nodules along the pulmonary fissures.  2. Increased size of multiple necrotic appearing and coalescing  mediastinal lymph nodes as per above.   3. New diffuse metastatic lesions throughout the liver, as well as  numerous implants throughout the peritoneum, anterior abdominal wall,  retroperitoneum, mesentery, spleen, and new right adrenal metastatic  lesion.  4. New bony metastatic lesions throughout the pelvis including the  right iliac wing, and throughout the spine as detailed above.   5. Unchanged moderate to severe hydronephrosis of the right kidney  secondary to ureteropelvic junction obstruction.     I have personally reviewed the examination and initial interpretation  and I agree with the findings.    MARIA T MOODY MD         SYSTEM ID:  [id number]   Head CT w/o contrast    Narrative    EXAM: CT HEAD W/O CONTRAST  1/10/2024 2:03 PM     HISTORY:  HA, hx CA r/o mets       COMPARISON:  Neck CT 11/1/2023. Body PET CT 6/5/2023.    TECHNIQUE: Using multidetector thin collimation helical acquisition  technique, axial, coronal and sagittal CT images from the skull base  to the vertex were obtained without intravenous contrast.   (topogram) image(s) also obtained and reviewed.    FINDINGS:  No acute intracranial hemorrhage, mass effect, or midline shift. No  acute loss of gray-white matter differentiation in the cerebral  hemispheres. Ventricles are proportionate to the cerebral sulci. Clear  basal cisterns. Moderate leukoaraiosis.    The bony calvaria and the bones of the skull base are normal.  Incidental 6 mm left frontoethmoidal osteoma, unchanged. Mastoid air  cells are clear. Grossly normal orbits.       Impression    IMPRESSION:  1. No acute intracranial pathology.   2. Noncontrast head CT of limited sensitivity for detection of brain  metastases.  Consider further evaluation with contrast-enhanced brain  MRI.    ZENA GALLO MD         SYSTEM ID:  [id number]   Soft tissue neck CT w contrast    Narrative    EXAM: CT SOFT TISSUE NECK W CONTRAST  1/10/2024 2:05 PM     HISTORY:  Hx mandibular SCC, assess worsing spread         COMPARISON:  Neck CT 11/1/2023.     TECHNIQUE: Following intravenous administration of nonionic iodinated  contrast medium, thin section helical CT images were obtained from the  skull base down to the level of the aortic arch.  Axial, coronal and  sagittal reformations were performed with 2-3 mm slice thickness  reconstruction. Images were reviewed in soft tissue, lung and bone  windows.    CONTRAST: 107cc of isovue 370    FINDINGS:   Status post left neck dissection with decreased associated edema.  Fixation hardware spanning the anterior mandible, left mandibular  ramus, and left mandibular angle. Increase in extent of exposed left  mandible and fixation hardware at the posterior body and proximal  mandibular angle. Persistent coarsened trabeculated and sclerotic left  hemimandible.    Edema and soft tissue fullness in the left  space with  effacement of the retromaxillary fat, unchanged.    Enlarged right subclavicular lymph node measuring up to 2 cm (series  3, image 69) previously 1 cm. Partially visualized enlarged  mediastinal lymph nodes.    Atrophic left thyroid lobe, unchanged.    Atherosclerotic calcification of the bilateral carotid bifurcations.  Partially visualized right chest wall Port-A-Cath.    Multilevel cervical degenerative changes. New lytic lesion in the  right posterior C6 vertebral body.    Unchanged mucosal thickening along the floor of the left maxillary  sinus, suspected associated oroantral fistula, and multiple dental  caries.    Emphysematous changes of the right greater than left lung apices.  Nodularity along the upper right major fissure.      Impression    IMPRESSION:  Impression:  1. Extensive  degree of postsurgical and postradiation changes  throughout the left neck. No specific features of local recurrence.  2. Increased exposure of the left hemimandible and associated hardware  with persistent features concerning for osteomyelitis and/or  osteoradionecrosis.  3. New C6 vertebral metastasis.  4. Odontogenic sinusitis of the left maxillary sinus.  5. Increased size of presumed metastatic right subclavicular lymph  node and partially visualized pathologic mediastinal lymph nodes.    Primary: NI-RADS 1.    Neck: NI-RADS 4.    NI-RADS CECT Surveillance Legend:    Primary  1: No evidence of recurrence: routine surveillance  2: Low suspicion    a) Superficial abnormality (skin, mucosal surface): direct visual  inspection    b) Ill-defined deep abnormality: short interval follow-up* or PET  3: High suspicion (new or enlarging discrete nodule/mass): biopsy  4: Definitive recurrence (path proven or clinical progression): no  biopsy needed    Nodes  1: No evidence of recurrence: routine surveillance  2: Low suspicion (ill-defined): short interval follow-up or PET  3: High suspicion (new or enlarging lymph node): biopsy if clinically  needed  4: Definitive recurrence (path proven or clinical progression): no  biopsy needed    *short interval follow-up: 3 months at our institution    ZENA GALLO MD         SYSTEM ID:  [id number]       Discharge Medications   Current Discharge Medication List        START taking these medications    Details   Lidocaine (LIDOCARE) 4 % Patch Place 2 patches onto the skin every 24 hours To prevent lidocaine toxicity, patient should be patch free for 12 hrs daily.  Qty: 20 patch, Refills: 0    Associated Diagnoses: Cancer associated pain      oxyCODONE (ROXICODONE) 5 MG/5ML solution Take 10-15 mLs (10-15 mg) by mouth every 3 hours as needed for moderate pain  Qty: 840 mL, Refills: 0    Associated Diagnoses: Cancer associated pain      polyethylene glycol (MIRALAX) 17 GM/Dose powder  Take 17 g by mouth 2 times daily as needed for constipation  Qty: 510 g, Refills: 0    Associated Diagnoses: Drug-induced constipation      sennosides (SENOKOT) 8.6 MG tablet Take 2 tablets by mouth 2 times daily as needed for constipation  Qty: 30 tablet, Refills: 0    Associated Diagnoses: Drug-induced constipation           CONTINUE these medications which have NOT CHANGED    Details   acetaminophen (TYLENOL) 325 MG tablet Take 2 tablets (650 mg) by mouth every 4 hours as needed for mild pain or fever  Qty: 90 tablet, Refills: 0    Associated Diagnoses: Facial abscess      chlorhexidine (PERIDEX) 0.12 % solution Swish and spit 10 mLs in mouth 4 times daily  Qty: 1200 mL, Refills: 4    Associated Diagnoses: Facial abscess      Wound Dressings (MEPILEX BORDER FLEX) PADS Externally apply 1 Application topically 4 times daily as needed  Qty: 180 each, Refills: 10    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

## 2024-01-13 NOTE — PROGRESS NOTES
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 On arrival he had a CT head, neck, and CAP which showed progression of disease including new C6 vertebral met, increased size of presumed metastatic right subclavicular LN, multiple metastatic lesions throughout abdomen and pelvis, increase size of pulmonary nodules, increase size of mediastinal lymph nodes, diffuse metastatic lesions throughout liver, new right adrenal lesion. He also has multiple scattered, fluctuate subcutaneous nodules scattered on his chest, abdomen, and arms, which likely also represent metastases.      #IV SCC of mandible and buccal mucosa   At his last oncology visit (11/8/23) he had enlarging lymph nodes. 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 He had been seen by ENT and treated  with ABX. During this admission, ID and ENT evaluated him and there did not appear to be any signs of active infection.     We had recommended that pt receive palliative radiation to his right hip, as he had severe pain in this region. He  was initially hesistant about this, but after talking with rad/onc he was agreeable to starting radiation. He wishes to do this as an outpatient though and will discharged today. We messaged with Dr. Buckley, who would plan to start chemotherapy after he has radiation. Likely with carboplatin + paclitaxel +/- pembrolizumab.      Recommendations:   - Agree with starting palliative radiation to right hip - pt wants to start as an  "outpatient   - Follow-up with Dr. Buckley 1/18/24 to discuss starting chemotherapy     Patient was seen and plan of care was discussed with attending physician Dr. Wolf.    Thank you for the opportunity to partake in this patients plan of care. Please do not hesitate to page with questions.     Kvng Galan MD PGY5  Hematology/Oncology   ___________________________________________________________________    Subjective & Interval History:    Pt had declined radiation yesterday, but after talking with rad/onc again, he is agreeable to starting radiation. He wants to start this as an outpatient. He continues to have pain, especially with his right hip, but reports that this has been improved with pain PRNs.     Physical Exam:    Blood pressure 126/68, pulse 88, temperature 98.3  F (36.8  C), temperature source Axillary, resp. rate 18, height 1.753 m (5' 9\"), weight 81.9 kg (180 lb 8.9 oz), SpO2 93%.    General: standing, no acute distress  HEENT: AC/NT, MMM  Neck: supple  Resp: normal respiratory effort on ambient air  GI: soft, non-tender, non-distended  MSK: warm and well-perfused, normal tone  Neuro: ambulates independently, alert and interactive     Labs & Studies: I personally reviewed the following studies:  ROUTINE LABS (Last four results):  CMP  Recent Labs   Lab 01/13/24  0748 01/13/24  0331 01/12/24  1915 01/12/24  1137 01/12/24  0531 01/11/24  1411 01/11/24  0529 01/10/24  1651 01/10/24  1232   * 128*  128* 125* 126* 128*  128*   < > 122*  122*   < > 122*   POTASSIUM  --  4.2  --   --  5.2  --  4.4  --  4.8   CHLORIDE  --  92*  --   --  89*  --  85*  --  85*   CO2  --  27  --   --  30*  --  24  --  24   ANIONGAP  --  9  --   --  9  --  13  --  13   GLC  --  87  --   --  105*  --  89  --  98   BUN  --  14.8  --   --  14.9  --  15.5  --  14.1   CR  --  0.74  --   --  0.83  --  0.65*  --  0.64*   GFRESTIMATED  --  >90  --   --  >90  --  >90  --  >90   PRINCE  --  10.5*  --   --  10.8*  --  10.1  --  10.3* "   PROTTOTAL  --   --   --   --   --   --   --   --  7.4   ALBUMIN  --   --   --   --   --   --   --   --  4.0   BILITOTAL  --   --   --   --   --   --   --   --  0.3   ALKPHOS  --   --   --   --   --   --   --   --  106   AST  --   --   --   --   --   --   --   --  33   ALT  --   --   --   --   --   --   --   --  12    < > = values in this interval not displayed.     CBC  Recent Labs   Lab 01/13/24  0331 01/12/24  0531 01/11/24  0529 01/10/24  1232   WBC 7.6 10.1 9.4 12.0*   RBC 3.82* 4.00* 4.01* 4.19*   HGB 12.1* 12.7* 12.7* 13.5   HCT 36.5* 37.8* 37.3* 38.7*   MCV 96 95 93 92   MCH 31.7 31.8 31.7 32.2   MCHC 33.2 33.6 34.0 34.9   RDW 12.3 12.5 12.5 12.5    261 206 300     INRNo lab results found in last 7 days.    Medications list for reference:  No current facility-administered medications for this encounter.     Current Outpatient Medications   Medication    acetaminophen (TYLENOL) 325 MG tablet    chlorhexidine (PERIDEX) 0.12 % solution    Lidocaine (LIDOCARE) 4 % Patch    oxyCODONE (ROXICODONE) 5 MG/5ML solution    polyethylene glycol (MIRALAX) 17 GM/Dose powder    sennosides (SENOKOT) 8.6 MG tablet    Wound Dressings (MEPILEX BORDER FLEX) PADS

## 2024-01-13 NOTE — PLAN OF CARE
[id number]  Neuro: A&Ox4.   Cardiac: Not on tele. HR 70's-80's. VSS.   Respiratory: Sating >92% on RA spot checks.  GI/: Adequate urine output. No BM on this shift. Feels constipated. Scheduled Miralax and senna given.  Diet/appetite: Tolerating regular diet w/ 1200ml FR. Fair appetite.  Activity:  Independent, up to halls.  Pain: At acceptable level on current regimen. Oxy 10-20mg given per pt request. Only given x1 this shift.  Skin: No new deficits noted.  LDA's: 1 L PIV, SL.    Plan: Continue with POC. Notify primary team with changes.

## 2024-01-15 NOTE — PROGRESS NOTES
New Patient Oncology Nurse Navigator Note     Referring provider: Navi Simmons MD      Referring Clinic/Organization: [facility]     Referred to (specialty:) Radiation Oncology     Requested provider (if applicable): NA -Location [place]      Date Referral Received: January 13, 2024     Evaluation for:  G89.3 (ICD-10-CM) - Cancer associated pain      Clinical History (per Nurse review of records provided):      Antonio Sharpe is a 58 year old male with a history of metastatic SCC of mandible and buccal mucosa, who was admitted to [facility] on 1/10/2024 for further evaluation and treatment of pain related to worsening metastatic disease.     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 Initially underwent extensive resection of mandible and surrounding structures with skin grafts. Has completed multiple courses of chemoradiation and follows regularly with oncology and ENT. Most recent imaging in November of 2023 showed some further progression of his malignancy. Presented to [facility] with 1.5 months of worsening back and hip pain. Imaging showed worsening metastatic disease.   - Oncology will also see as outpatient  -Rad onc consult (also placed as outpatient)  -Discussed rad onc with patient who would like to talk to their OP oncologist (also re its scheduling)       CT scans done on 1/10/24. See bookmarks.   Labs done on 1/13/24. See bookmarks.     Records Location: See Bookmarked material     Records Needed: NA     Additional testing needed prior to consult: NA    Payor: BLUE PLUS / Plan: BLUE PLUS ADVANTAGE [place] / Product Type: HMO /     January 15, 2024  Referral received and reviewed. Looks like patient has follow up with DR. Buckley this week on 1/18/24 and is scheduled to see Dr. Torres next week on 1/24. Message sent to  Dr. Buckley for clarification on appointments and timing.     Colleen BOYDN, RN   Oncology Nurse Navigator   [facility]   [phone] / [phone]

## 2024-01-18 NOTE — PROGRESS NOTES
[facility] CANCER [facility]    PATIENT NAME: Antonio Sharpe  MRN # [id number]   DATE OF VISIT: January 18, 2024  YOB: 1965     Otolaryngology Dr. Aditya Swartz  PCP: None    CANCER TYPE: SCC L mandible/alveolar ridge, buccal mucosa  STAGE: [id number]  ECOG PS: 1    PD-L1: 2% on L cheek lesion 9/2021  NGS:     SUMMARY  4/28/20 Left marginal mandibulectomy, WLE of the left buccal mucosa, left posterior maxillectomy, left partial pharyngectomy, left selective neck dissection, right radial forearm free flap, mandibular plating, tracheostomy, and feeding tube placement (Dr. Triana at [facility]). Path: SCC, 5.7 cm moderately differentiated, DOI 1.5 cm, + PNI, no LVI, negative margins, 5/33 nodes positive, + MARIA ELENA  6/30~8/17/20 Chemoradiation 6600 cGy with HD cisplatin (Dr. Serna at [facility])  Care was at [facility] with Dr. Tarango, Dr. Christ Stewart (ENT), Dr. Kong Reece (Otolaryngology at [facility]), and Dr. Serna (Radiation Oncology)    8/11/21 CT neck. 3 x 2.8 x 3.6 cm mass at the reconstruction flap within the subcutaneous fat of the L cheek abutting masseter medially and skin laterally, extending to parotid, abutting carotid.   8/16/21 FNA L cheek. Path: Highly suspicious for SCC  8/26/21 PET/CT.   9/2/21 MRI neck.   9/8/21 FNA L cheek lesion (Dr. Swartz). Path: SCC. TPS 2%   10/21/21~2/15/22 C1-6 carboplatin 5FU pembrolizumab c/b severe mucositis. C2 pembro alone due to ongoing resolving mucositis. DYPD testing negative. 5FU dose reduced 50% for C3-6. 5FU omitted C5-6.  3/8/22 PET/CT. Decreased size and FDG uptake of the mass along the temporalis muscle, deep to the L zygomatic arch, 1.5  1.3 cm (SUV 10.7), peristent linear lucency on CT immediately underneath the anterior cortical bone of the anterior mandible at the  Midline (SUV 10.5), extending to the medullary cavity of the anterior mandible where there is subtle corresponding lucency, similar to prior, likely post surgical. Unchanged  thyroid nodule. Gastric fundus thickening which can be seen with gastritis, recommend direct visualization, focal anorectal uptake, likely physiologic, attention on follow up  4/11~5/3/22 Chemoradiation with weekly carboplatin paclitaxel, intended 70 Gy, 35 fractions (Dr. Amezquita, summary 6/11/23). No-showed starting the 4th week of radiation, hence received 3400 cGy to residual tumor, 3060 cGy high risk tissue. Turned out he lost insurance coverage.  12/19/22 Re-established care with Dr. Swartz after developing R jaw pain.   12/19/22 CT neck. 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 Increased subcutaneous edema L lower face and submandibular space. Effaced fat planes. Questionable 3.4 x 1.4 cm rim enhancing area in the L submandibular space. Treated with vitamin D and pentoxifylline. Infected hardware April-May 2023 treated with antibiotics.  5/11~5/12/23 [facility] for facial swelling. Found to have L facial abscess s/p I&D  6/5/23 PET/CT. Changes felt to be more related to infection/inflammation based on changes since 5/11/23 CT. However, had new enlarged R paratracheal, subcarinal, R hilar adenopathy (SUB 16.85 in R paratracheal node, 6.6 subcarinal, 3.91 R hilar). EBUS recommended.  7/19/23 Bronch, EBUS (Dr. Santos) with conscious sedation. 4R biopsied. Limited LN evaluation due to limitations from trismus and osteo. Path: Non diagnostic.  8/1/23 CT chest. Increased conspicuity of R minor fissure nodules, including 5 x 3 mm nodule, previously 3 x 3 mm. 7 x 5 mm R major fissure nodule. Additional scattered nodules are larger compared to 4/17/23. 1.8 cm R paratracheal LN. 1.5 cm area of peripherally enhancing hypoattenuation in the mediastinum posterior to the trachea corresponding to FDG avid are on PET 6/5/23. Unchanged 9-10 mm R  hilar node. Gastric mucosa thickening. 6 mm tail of pancreas nodule  8/17/23 Bronch, EBUS under general anesthesia (Dr. Holm). 4R biopsied. Path: SCC. TPS <1%  1/10~1/13/24 [facility] for cancer-related pain from back and hip, hyponatremia Na 122.   1/10/24 CT chest abd and CT neck while inpt. Increased exposure of L hemimandible and hardware with persistent features concerning for osteomyelitis and/or ORN. New C6 met. Increased R supraclav node. Multiple similar prominent axillary nodes. Mediastinal adenopathy increased compared to 11/1. Increased nodules, most significantly R lung, additionally multiply increased and new fissural nodules concerning for mets, numerous new liver mets, for example 2.1 x 2.3 cm R liver met, 1.4 x 1.4 cm R adrenal nodule, new splenic lesions concerning for metastatic disease. Unchanged mod-severe R hydronephrosis secondary to UPJ obstruction. Numerous scattered metastatic implants along the anterior abdominal wall, peritoneum, retroperitoneum and paracolic gutters, multiple prominent periaortic LNs. New pelvic lytic lesions including R iliac wing, T12, T11, T7, R posterior superior iliac wing.   1/10/24 CT L spine, T spine. T12, S1, R ilium, L3 mets. DJD greatest at L4-5     ASSESSMENT AND PLAN   SCC oral cavity, recurrent, now bx proven metastatic disease, TPS <1%: Had PD in Nov to the point I recommended starting systemic therapy. He went to [place] and returned in Jan 1. Now with significant progression. Discussed starting systemic therapy again with carboplatin paclitaxel pembro (or cemiplimab if pembro not covered). He had paclitaxel before with chemorads. Discussed goal is to control cancer for as long as possible but won't be with curative intent. Discussed impact of PD-L1 expression, small chance of long-term control, data from Keynote 048 study and cemiplimab study. We talked about median survival from the CPS 1-19 subset (not pre-planned) from Keynote 048. Fortunately, ID didn't  think the hardware/fistula was infected when they evaluated during his hospitalization but this will be a unique risk. Discussed that we'll need more in person visits and telephone visits as we had to do today are not going to be safe enough. He understands. I'm hopeful he can figure out a way to do video visits as well. Prefers treatment in [place]. We also need to examine him to look at the bumps he told us about today    Cancer-related pain: Declined radiation start in, scheduled for sim 1/24 with Dr Torres. Continue PRN oxycodone, using about every 3 hours regularly. Consider MS contin at night to help him get better sleep. He really does not want to see Palliative Care team right now. Discussed that establishing care with them now will allow them to be more helpful in the future as he needs to be able to build a relationship with them, etc. He really wants to see providers he knows and who know him. Discussed that they can do more than pain management and we reviewed the spectrum of support they can provide. Will hold on referral from today's visit but will continue to encourage him to continue care with them, especially before we encounter a crisis.    Screening for hypothyroidism: TSH 1.51 on 6/5/23. Will be checked regularly as we start pembro     Opioid induced constipation: Can increase miralax some. Discussed he might have some catch up diarrhea. Can stop senna if causing too much cramping.     Fistula, ORN: Per Dr. Swartz. ID note from 1/12 reviewed. Not felt to be actively infected. Need to monitor closely. Jeremi tells us he can tell when its infected.     Port: Likely not functional, will check next visit with labs. He'd rather wait than address now. Remove if not working.      Hearing loss: Chronic.     Tobacco dependence, ETOH: Not discussed today, will discuss next visit.     Pt was seen in conjunction with Dr. Lennie Johnson, Heme/Onc fellow. Note was written by me.     40 minutes spent by me on  the date of the encounter doing chart review, history and exam, documentation and further activities per the note     Delores Buckley MD  Associate Professor of Medicine  Hematology, Oncology and Transplantation      KESHIA Alvarez returns for follow up.   Hospitalized last week with hip pain - found to have much more widely metastatic disease compared to Nov and R iliac met causing pain  Taking oxycodone every 3 hours, which helps. However, he is waking up in the middle of the night with pain, making it hard to sleep a full night.   Eating ok  Quite constipated. No BM for more than a few days. Some cramping. Taking senna and miralax He is still able to ambulate, walking helps his hip pain.   He has an appointment with Dr. Torres on 1/24 for simulation and radiation of his hip.  He would like to start systemic therapy as soon as possible.  Bumps throughout - on top of head, ribcage. Looks like pimples that never get a head on them. Nothing below the navel.   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 Treated at [facility] C spine  [facility] 1971  Hearing loss L ear after radiation s/p myringotomy  Chronic tinnitus secondary to cisplatin  Hemorrhoids    CURRENT OUTPATIENT MEDICATIONS  Reviewed    ALLERGIES  No Known Allergies     PHYSICAL EXAM  There were no vitals taken for this visit.    Remainder of physical exam deferred due to public health emergency and limitations of  telephone visit.    LABORATORY AND IMAGING STUDIES    Labs 1/13, 1/12, 1/11, 1/10, were independently reviewed and interpreted by me    Soft tissue neck CT w contrast  Narrative: EXAM: CT SOFT TISSUE NECK W CONTRAST  1/10/2024 2:05 PM     HISTORY:  Hx mandibular SCC, assess worsing spread         COMPARISON:  Neck CT 11/1/2023.     TECHNIQUE: Following intravenous administration of nonionic iodinated  contrast  medium, thin section helical CT images were obtained from the  skull base down to the level of the aortic arch.  Axial, coronal and  sagittal reformations were performed with 2-3 mm slice thickness  reconstruction. Images were reviewed in soft tissue, lung and bone  windows.    CONTRAST: 107cc of isovue 370    FINDINGS:   Status post left neck dissection with decreased associated edema.  Fixation hardware spanning the anterior mandible, left mandibular  ramus, and left mandibular angle. Increase in extent of exposed left  mandible and fixation hardware at the posterior body and proximal  mandibular angle. Persistent coarsened trabeculated and sclerotic left  hemimandible.    Edema and soft tissue fullness in the left  space with  effacement of the retromaxillary fat, unchanged.    Enlarged right subclavicular lymph node measuring up to 2 cm (series  3, image 69) previously 1 cm. Partially visualized enlarged  mediastinal lymph nodes.    Atrophic left thyroid lobe, unchanged.    Atherosclerotic calcification of the bilateral carotid bifurcations.  Partially visualized right chest wall Port-A-Cath.    Multilevel cervical degenerative changes. New lytic lesion in the  right posterior C6 vertebral body.    Unchanged mucosal thickening along the floor of the left maxillary  sinus, suspected associated oroantral fistula, and multiple dental  caries.    Emphysematous changes of the right greater than left lung apices.  Nodularity along the upper right major fissure.  Impression: IMPRESSION:  Impression:  1. Extensive degree of postsurgical and postradiation changes  throughout the left neck. No specific features of local recurrence.  2. Increased exposure of the left hemimandible and associated hardware  with persistent features concerning for osteomyelitis and/or  osteoradionecrosis.  3. New C6 vertebral metastasis.  4. Odontogenic sinusitis of the left maxillary sinus.  5. Increased size of presumed metastatic  right subclavicular lymph  node and partially visualized pathologic mediastinal lymph nodes.    Primary: NI-RADS 1.    Neck: NI-RADS 4.    NI-RADS CECT Surveillance Legend:    Primary  1: No evidence of recurrence: routine surveillance  2: Low suspicion    a) Superficial abnormality (skin, mucosal surface): direct visual  inspection    b) Ill-defined deep abnormality: short interval follow-up* or PET  3: High suspicion (new or enlarging discrete nodule/mass): biopsy  4: Definitive recurrence (path proven or clinical progression): no  biopsy needed    Nodes  1: No evidence of recurrence: routine surveillance  2: Low suspicion (ill-defined): short interval follow-up or PET  3: High suspicion (new or enlarging lymph node): biopsy if clinically  needed  4: Definitive recurrence (path proven or clinical progression): no  biopsy needed    *short interval follow-up: 3 months at our institution    ZENA GALLO MD         SYSTEM ID:  [id number]  CT Chest/Abdomen/Pelvis w Contrast  Narrative: EXAMINATION: CT CHEST/ABDOMEN/PELVIS W CONTRAST, 1/10/2024 2:02 PM    TECHNIQUE:  Helical CT images from the thoracic inlet through the  symphysis pubis were obtained with contrast. Contrast dose: 107cc of  isovue 370    COMPARISON: CT of chest abdomen with contrast 11/1/2023.    HISTORY: Hx SCC r/o metastatic disease,    FINDINGS:  Lines and tubes:  -Upper chest port catheter terminates in the low SVC.    Chest: Multiple prominent axillary lymph nodes similar to prior. There  are multiple enlarged lymph nodes throughout the mediastinum, several  which are increased in conspicuity compared to prior exam 11/1/2023.  Conglomerate of necrotic nodes along the right tracheal border  extending up to the great vessels, and in the subcarinal region. For  example, necrotic right paratracheal node measuring 2.8 x 2.2 cm at  its largest (3/131), previously 2.5 x 2 cm.    Heart: Heart is normal, extensive coronary artery calcifications  and  calcifications of the thoracic aorta. Borderline enlarged main  pulmonary artery measuring 3.1 cm.    Lungs: No consolidations, pneumothorax, or pleural effusion. Right  upper lobe mosaic groundglass attenuation. Scattered atelectasis  throughout the right greater than left lung. There is increased  prominence of multiple pulmonary nodules, most significantly in the  right lung, additionally multiple increased and new fissural nodules,  as well as a peribronchial micronodular distribution, concerning for  metastases.    Abdomen and pelvis:  Liver: Numerous new heterogeneously enhancing nodular masses  throughout both hepatic lobes, for example enhancing right hepatic  lobe mass measuring 2.1 x 2.3 cm (3/363) with central hypoattenuation.    Biliary System: Unremarkable, common bile duct is normal caliber.    Pancreas: Unchanged 5 mm fat density lesion pancreatic tail (3/331).  Remainder of pancreas unremarkable, no pancreatic ductal dilation.    Adrenal glands: New enhancing right adrenal nodule measuring 1.4 x 1.4  cm (3/340).    Spleen: New hypoattenuating lesions throughout the spleen concerning  for metastatic disease. Otherwise unremarkable.    Kidneys: Normal cortical medullary enhancement of the kidneys. Left  kidney unremarkable, unchanged moderate to severe hydronephrosis of  the right kidney secondary to ureteropelvic junction obstruction.     Gastrointestinal tract: Appendix is not well-visualized, no dilated  segments of small and large bowel, no definitive focal mural  thickening. Moderate stool burden in the transverse colon.    Mesentery/peritoneum/retroperitoneum: Numerous scattered metastatic  implants along the anterior abdominal wall, peritoneum, throughout the  retroperitoneum and paracolic gutters. No intra-abdominal fluid  collections. No free intra-abdominal air.    Lymph nodes: Multiple prominent lymph nodes along the periaortic  chains    Vasculature: Patent abdominal aorta and branch  vessels, calcified  atherosclerosis. Nonaneurysmal aorta. The portal vein, superior  mesenteric vein, and splenic vein are patent without obstruction.    Pelvis: Prostatic calcifications, mild concentric wall thickening of  the bladder, likely chronic. Fat-containing right greater than left  inguinal hernias.     Bones: New pelvic lytic lesions including the right iliac wing,  scattered vertebral body lytic lesions, for example T12, T11, T7.    Soft Tissues: New presumably metastatic metastatic soft tissue lesion  posterior superior right iliac wing (3/411).  Impression: IMPRESSION: In this patient with a known history of metastatic  skeletal carcinoma, there is evidence for disease progression with  multiple new metastatic lesions throughout the abdomen and pelvis.  1. Increased size of multiple pulmonary nodules, additional increased  nodules along the pulmonary fissures.  2. Increased size of multiple necrotic appearing and coalescing  mediastinal lymph nodes as per above.   3. New diffuse metastatic lesions throughout the liver, as well as  numerous implants throughout the peritoneum, anterior abdominal wall,  retroperitoneum, mesentery, spleen, and new right adrenal metastatic  lesion.  4. New bony metastatic lesions throughout the pelvis including the  right iliac wing, and throughout the spine as detailed above.   5. Unchanged moderate to severe hydronephrosis of the right kidney  secondary to ureteropelvic junction obstruction.     I have personally reviewed the examination and initial interpretation  and I agree with the findings.    MARIA T MOODY MD         SYSTEM ID:  [id number]  CT Lumbar Spine Reconstructed  Narrative: CT LUMBAR SPINE RECONSTRUCTED 1/10/2024 1:56 PM    History: back pain.  ICD-10:    Comparison: Same day body CT and thoracic spine CT.    Technique: Using multidetector thin collimation helical acquisition  technique, axial, coronal and sagittal CT images through the lumbar  spine were  obtained.    Findings: There are 5 lumbar type vertebrae. Normal lumbar spine  alignment. Moderate loss of intervertebral disc height at L3-4. Mild  loss of disc height at L2-3 and L4-5. Scattered regions of  hypoattenuation in the lumbar vertebral bodies, most conspicuous at L3  measuring up to 9 mm. Additional lesions in the T12 vertebral body,  posterior S1 vertebral body, and in the right ilium. Findings on a  level by level basis are as follows:    L1-L2: No spinal canal or neural foraminal stenosis.    L2-L3: Disc bulge and bilateral facet hypertrophy. Mild to moderate  spinal canal stenosis. Mild bilateral neural foraminal narrowing.    L3-L4: Circumference of disc bulge and bilateral facet hypertrophy.  Mild to moderate spinal canal stenosis. Mild bilateral neural  foraminal narrowing.    L4-L5: Circumferential disc bulge and bilateral facet hypertrophy.  Moderate spinal canal stenosis. Mild bilateral neural foraminal  narrowing.    L5-S1: Posterior disc bulge and bilateral facet hypertrophy. No spinal  canal stenosis. Mild bilateral neural foraminal narrowing.    The visualized adjacent paraspinous tissues are grossly within normal  limits.    Prominence of the right renal pelvis, unchanged since 11/1/2023.  Multiple hepatic, adrenal, splenic, and lymph node metastases.  Impression: Impression:  1. Thoracic, lumbar, and sacral vertebral metastases. No pathologic  fracture.  2. Right iliac metastases.  3. Multilevel lumbar degenerative changes, greatest at L4-5.  4. Multiple abdominal metastases. See report for same day body CT.    ZENA GALLO MD         SYSTEM ID:  [id number]  Head CT w/o contrast  Narrative: EXAM: CT HEAD W/O CONTRAST  1/10/2024 2:03 PM     HISTORY:  HA, hx CA r/o mets       COMPARISON:  Neck CT 11/1/2023. Body PET CT 6/5/2023.    TECHNIQUE: Using multidetector thin collimation helical acquisition  technique, axial, coronal and sagittal CT images from the skull base  to the vertex were  obtained without intravenous contrast.   (topogram) image(s) also obtained and reviewed.    FINDINGS:  No acute intracranial hemorrhage, mass effect, or midline shift. No  acute loss of gray-white matter differentiation in the cerebral  hemispheres. Ventricles are proportionate to the cerebral sulci. Clear  basal cisterns. Moderate leukoaraiosis.    The bony calvaria and the bones of the skull base are normal.  Incidental 6 mm left frontoethmoidal osteoma, unchanged. Mastoid air  cells are clear. Grossly normal orbits.   Impression: IMPRESSION:  1. No acute intracranial pathology.   2. Noncontrast head CT of limited sensitivity for detection of brain  metastases. Consider further evaluation with contrast-enhanced brain  MRI.    ZENA GALLO MD         SYSTEM ID:  [id number]  CT Thoracic Spine Reconstructed  Narrative: CT THORACIC SPINE RECONSTRUCTED 1/10/2024 1:55 PM    Provided History: back pain   ICD-10:    Comparison: Same day body CT. Chest CT 8/1/2023.    Technique: Using multidetector thin collimation helical acquisition  technique, axial, sagittal and coronal 2 mm thickness CT  reconstructions were obtained through the thoracic spine.    Findings:  Normal thoracic vertebral alignment multiple hypodense attenuating  masses within the thoracic vertebrae, largest at T12, new since  8/1/2023. No loss of vertebral body height. There is no evidence of  fracture or significant prevertebral soft tissue swelling. Multilevel  loss of intervertebral disc height, endplate osteophytosis, and facet  hypertrophy. No high-grade spinal canal or neural foraminal stenosis.    Partially visualized right chest wall Port-A-Cath.    See report for same day body CT for findings in the chest, including  mediastinal adenopathy, as well as abdominal findings including  multiple hepatic and splenic lesions.  Impression: Impression:   1. No acute thoracic spine fracture or traumatic subluxation.    2. Multiple thoracic vertebral  metastases, largest at T12. No  associated pathologic fracture.  3. Multilevel mild to moderate thoracic degenerative changes.  4. Multiple metastases in the chest and abdomen. See report for same  day body CT.    ZENA GALLO MD         SYSTEM ID:  [id number]     Imaging was personally reviewed and interpreted by me. Significant PD as above     Virtual Visit Details  Type of service:  Telephone Visit   Phone call duration: 40 minutes

## 2024-01-18 NOTE — LETTER
1/18/2024         RE: Antonio Sharpe  [address] [place] [address]        Dear Colleague,    Thank you for referring your patient, Antonio Sharpe, to the [facility]. Please see a copy of my visit note below.         [facility]    PATIENT NAME: Antonio Sharpe  MRN # [id number]   DATE OF VISIT: January 18, 2024  YOB: 1965     Otolaryngology Dr. Aditya Swartz  PCP: None    CANCER TYPE: SCC L mandible/alveolar ridge, buccal mucosa  STAGE: [id number]  ECOG PS: 1    PD-L1: 2% on L cheek lesion 9/2021  NGS:     SUMMARY  4/28/20 Left marginal mandibulectomy, WLE of the left buccal mucosa, left posterior maxillectomy, left partial pharyngectomy, left selective neck dissection, right radial forearm free flap, mandibular plating, tracheostomy, and feeding tube placement (Dr. Triana at [facility]). Path: SCC, 5.7 cm moderately differentiated, DOI 1.5 cm, + PNI, no LVI, negative margins, 5/33 nodes positive, + MARIA ELENA  6/30~8/17/20 Chemoradiation 6600 cGy with HD cisplatin (Dr. Serna at [facility])  Care was at [facility] with Dr. Tarango, Dr. Christ Stewart (ENT), Dr. Kong Reece (Otolaryngology at [facility]), and Dr. Serna (Radiation Oncology)    8/11/21 CT neck. 3 x 2.8 x 3.6 cm mass at the reconstruction flap within the subcutaneous fat of the L cheek abutting masseter medially and skin laterally, extending to parotid, abutting carotid.   8/16/21 FNA L cheek. Path: Highly suspicious for SCC  8/26/21 PET/CT.   9/2/21 MRI neck.   9/8/21 FNA L cheek lesion (Dr. Swartz). Path: SCC. TPS 2%   10/21/21~2/15/22 C1-6 carboplatin 5FU pembrolizumab c/b severe mucositis. C2 pembro alone due to ongoing resolving mucositis. DYPD testing negative. 5FU dose reduced 50% for C3-6. 5FU omitted C5-6.  3/8/22 PET/CT. Decreased size and FDG uptake of the mass along the temporalis muscle, deep to the L zygomatic arch, 1.5  1.3 cm (SUV 10.7), peristent linear lucency on CT  immediately underneath the anterior cortical bone of the anterior mandible at the  Midline (SUV 10.5), extending to the medullary cavity of the anterior mandible where there is subtle corresponding lucency, similar to prior, likely post surgical. Unchanged thyroid nodule. Gastric fundus thickening which can be seen with gastritis, recommend direct visualization, focal anorectal uptake, likely physiologic, attention on follow up  4/11~5/3/22 Chemoradiation with weekly carboplatin paclitaxel, intended 70 Gy, 35 fractions (Dr. Amezquita, summary 6/11/23). No-showed starting the 4th week of radiation, hence received 3400 cGy to residual tumor, 3060 cGy high risk tissue. Turned out he lost insurance coverage.  12/19/22 Re-established care with Dr. Swartz after developing R jaw pain.   12/19/22 CT neck. 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 Increased subcutaneous edema L lower face and submandibular space. Effaced fat planes. Questionable 3.4 x 1.4 cm rim enhancing area in the L submandibular space. Treated with vitamin D and pentoxifylline. Infected hardware April-May 2023 treated with antibiotics.  5/11~5/12/23 [facility] for facial swelling. Found to have L facial abscess s/p I&D  6/5/23 PET/CT. Changes felt to be more related to infection/inflammation based on changes since 5/11/23 CT. However, had new enlarged R paratracheal, subcarinal, R hilar adenopathy (SUB 16.85 in R paratracheal node, 6.6 subcarinal, 3.91 R hilar). EBUS recommended.  7/19/23 Bronch, EBUS (Dr. Santos) with conscious sedation. 4R biopsied. Limited LN evaluation due to limitations from trismus and osteo. Path: Non diagnostic.  8/1/23 CT chest. Increased conspicuity of R minor fissure nodules, including 5 x 3 mm nodule, previously 3 x 3 mm. 7 x 5 mm R major fissure  nodule. Additional scattered nodules are larger compared to 4/17/23. 1.8 cm R paratracheal LN. 1.5 cm area of peripherally enhancing hypoattenuation in the mediastinum posterior to the trachea corresponding to FDG avid are on PET 6/5/23. Unchanged 9-10 mm R hilar node. Gastric mucosa thickening. 6 mm tail of pancreas nodule  8/17/23 Bronch, EBUS under general anesthesia (Dr. Holm). 4R biopsied. Path: SCC. TPS <1%  1/10~1/13/24 [facility] for cancer-related pain from back and hip, hyponatremia Na 122.   1/10/24 CT chest abd and CT neck while inpt. Increased exposure of L hemimandible and hardware with persistent features concerning for osteomyelitis and/or ORN. New C6 met. Increased R supraclav node. Multiple similar prominent axillary nodes. Mediastinal adenopathy increased compared to 11/1. Increased nodules, most significantly R lung, additionally multiply increased and new fissural nodules concerning for mets, numerous new liver mets, for example 2.1 x 2.3 cm R liver met, 1.4 x 1.4 cm R adrenal nodule, new splenic lesions concerning for metastatic disease. Unchanged mod-severe R hydronephrosis secondary to UPJ obstruction. Numerous scattered metastatic implants along the anterior abdominal wall, peritoneum, retroperitoneum and paracolic gutters, multiple prominent periaortic LNs. New pelvic lytic lesions including R iliac wing, T12, T11, T7, R posterior superior iliac wing.   1/10/24 CT L spine, T spine. T12, S1, R ilium, L3 mets. DJD greatest at L4-5     ASSESSMENT AND PLAN   SCC oral cavity, recurrent, now bx proven metastatic disease, TPS <1%: Had PD in Nov to the point I recommended starting systemic therapy. He went to [place] and returned in Jan 1. Now with significant progression. Discussed starting systemic therapy again with carboplatin paclitaxel pembro (or cemiplimab if pembro not covered). He had paclitaxel before with chemorads. Discussed goal is to control cancer for as long as possible but won't be  with curative intent. Discussed impact of PD-L1 expression, small chance of long-term control, data from Keynote 048 study and cemiplimab study. We talked about median survival from the CPS 1-19 subset (not pre-planned) from Keynote 048. Fortunately, ID didn't think the hardware/fistula was infected when they evaluated during his hospitalization but this will be a unique risk. Discussed that we'll need more in person visits and telephone visits as we had to do today are not going to be safe enough. He understands. I'm hopeful he can figure out a way to do video visits as well. Prefers treatment in [place]. We also need to examine him to look at the bumps he told us about today    Cancer-related pain: Declined radiation start in, scheduled for sim 1/24 with Dr Torres. Continue PRN oxycodone, using about every 3 hours regularly. Consider MS contin at night to help him get better sleep. He really does not want to see Palliative Care team right now. Discussed that establishing care with them now will allow them to be more helpful in the future as he needs to be able to build a relationship with them, etc. He really wants to see providers he knows and who know him. Discussed that they can do more than pain management and we reviewed the spectrum of support they can provide. Will hold on referral from today's visit but will continue to encourage him to continue care with them, especially before we encounter a crisis.    Screening for hypothyroidism: TSH 1.51 on 6/5/23. Will be checked regularly as we start pembro     Opioid induced constipation: Can increase miralax some. Discussed he might have some catch up diarrhea. Can stop senna if causing too much cramping.     Fistula, ORN: Per Dr. Swartz. ID note from 1/12 reviewed. Not felt to be actively infected. Need to monitor closely. Jeremi tells us he can tell when its infected.     Port: Likely not functional, will check next visit with labs. He'd rather wait than  address now. Remove if not working.      Hearing loss: Chronic.     Tobacco dependence, ETOH: Not discussed today, will discuss next visit.     Pt was seen in conjunction with Dr. Lennie Johnson, Heme/Onc fellow. Note was written by me.     40 minutes spent by me on the date of the encounter doing chart review, history and exam, documentation and further activities per the note     Delores Buckley MD  Associate Professor of Medicine  Hematology, Oncology and Transplantation      KESHIA Alvarez returns for follow up.   Hospitalized last week with hip pain - found to have much more widely metastatic disease compared to Nov and R iliac met causing pain  Taking oxycodone every 3 hours, which helps. However, he is waking up in the middle of the night with pain, making it hard to sleep a full night.   Eating ok  Quite constipated. No BM for more than a few days. Some cramping. Taking senna and miralax He is still able to ambulate, walking helps his hip pain.   He has an appointment with Dr. Torres on 1/24 for simulation and radiation of his hip.  He would like to start systemic therapy as soon as possible.  Bumps throughout - on top of head, ribcage. Looks like pimples that never get a head on them. Nothing below the navel.   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 Treated at [facility]   DJ C spine  Appy 1971  Hearing loss L ear after radiation s/p myringotomy  Chronic tinnitus secondary to cisplatin  Hemorrhoids    CURRENT OUTPATIENT MEDICATIONS  Reviewed    ALLERGIES  No Known Allergies     PHYSICAL EXAM  There were no vitals taken for this visit.    Remainder of physical exam deferred due to public health emergency and limitations of  telephone visit.    LABORATORY AND IMAGING STUDIES    Labs 1/13, 1/12, 1/11, 1/10, were independently reviewed and interpreted by me    Soft tissue  neck CT w contrast  Narrative: EXAM: CT SOFT TISSUE NECK W CONTRAST  1/10/2024 2:05 PM     HISTORY:  Hx mandibular SCC, assess worsing spread         COMPARISON:  Neck CT 11/1/2023.     TECHNIQUE: Following intravenous administration of nonionic iodinated  contrast medium, thin section helical CT images were obtained from the  skull base down to the level of the aortic arch.  Axial, coronal and  sagittal reformations were performed with 2-3 mm slice thickness  reconstruction. Images were reviewed in soft tissue, lung and bone  windows.    CONTRAST: 107cc of isovue 370    FINDINGS:   Status post left neck dissection with decreased associated edema.  Fixation hardware spanning the anterior mandible, left mandibular  ramus, and left mandibular angle. Increase in extent of exposed left  mandible and fixation hardware at the posterior body and proximal  mandibular angle. Persistent coarsened trabeculated and sclerotic left  hemimandible.    Edema and soft tissue fullness in the left  space with  effacement of the retromaxillary fat, unchanged.    Enlarged right subclavicular lymph node measuring up to 2 cm (series  3, image 69) previously 1 cm. Partially visualized enlarged  mediastinal lymph nodes.    Atrophic left thyroid lobe, unchanged.    Atherosclerotic calcification of the bilateral carotid bifurcations.  Partially visualized right chest wall Port-A-Cath.    Multilevel cervical degenerative changes. New lytic lesion in the  right posterior C6 vertebral body.    Unchanged mucosal thickening along the floor of the left maxillary  sinus, suspected associated oroantral fistula, and multiple dental  caries.    Emphysematous changes of the right greater than left lung apices.  Nodularity along the upper right major fissure.  Impression: IMPRESSION:  Impression:  1. Extensive degree of postsurgical and postradiation changes  throughout the left neck. No specific features of local recurrence.  2. Increased  exposure of the left hemimandible and associated hardware  with persistent features concerning for osteomyelitis and/or  osteoradionecrosis.  3. New C6 vertebral metastasis.  4. Odontogenic sinusitis of the left maxillary sinus.  5. Increased size of presumed metastatic right subclavicular lymph  node and partially visualized pathologic mediastinal lymph nodes.    Primary: NI-RADS 1.    Neck: NI-RADS 4.    NI-RADS CECT Surveillance Legend:    Primary  1: No evidence of recurrence: routine surveillance  2: Low suspicion    a) Superficial abnormality (skin, mucosal surface): direct visual  inspection    b) Ill-defined deep abnormality: short interval follow-up* or PET  3: High suspicion (new or enlarging discrete nodule/mass): biopsy  4: Definitive recurrence (path proven or clinical progression): no  biopsy needed    Nodes  1: No evidence of recurrence: routine surveillance  2: Low suspicion (ill-defined): short interval follow-up or PET  3: High suspicion (new or enlarging lymph node): biopsy if clinically  needed  4: Definitive recurrence (path proven or clinical progression): no  biopsy needed    *short interval follow-up: 3 months at our institution    ZENA GALLO MD         SYSTEM ID:  [id number]  CT Chest/Abdomen/Pelvis w Contrast  Narrative: EXAMINATION: CT CHEST/ABDOMEN/PELVIS W CONTRAST, 1/10/2024 2:02 PM    TECHNIQUE:  Helical CT images from the thoracic inlet through the  symphysis pubis were obtained with contrast. Contrast dose: 107cc of  isovue 370    COMPARISON: CT of chest abdomen with contrast 11/1/2023.    HISTORY: Hx SCC r/o metastatic disease,    FINDINGS:  Lines and tubes:  -Upper chest port catheter terminates in the low SVC.    Chest: Multiple prominent axillary lymph nodes similar to prior. There  are multiple enlarged lymph nodes throughout the mediastinum, several  which are increased in conspicuity compared to prior exam 11/1/2023.  Conglomerate of necrotic nodes along the right tracheal  border  extending up to the great vessels, and in the subcarinal region. For  example, necrotic right paratracheal node measuring 2.8 x 2.2 cm at  its largest (3/131), previously 2.5 x 2 cm.    Heart: Heart is normal, extensive coronary artery calcifications and  calcifications of the thoracic aorta. Borderline enlarged main  pulmonary artery measuring 3.1 cm.    Lungs: No consolidations, pneumothorax, or pleural effusion. Right  upper lobe mosaic groundglass attenuation. Scattered atelectasis  throughout the right greater than left lung. There is increased  prominence of multiple pulmonary nodules, most significantly in the  right lung, additionally multiple increased and new fissural nodules,  as well as a peribronchial micronodular distribution, concerning for  metastases.    Abdomen and pelvis:  Liver: Numerous new heterogeneously enhancing nodular masses  throughout both hepatic lobes, for example enhancing right hepatic  lobe mass measuring 2.1 x 2.3 cm (3/363) with central hypoattenuation.    Biliary System: Unremarkable, common bile duct is normal caliber.    Pancreas: Unchanged 5 mm fat density lesion pancreatic tail (3/331).  Remainder of pancreas unremarkable, no pancreatic ductal dilation.    Adrenal glands: New enhancing right adrenal nodule measuring 1.4 x 1.4  cm (3/340).    Spleen: New hypoattenuating lesions throughout the spleen concerning  for metastatic disease. Otherwise unremarkable.    Kidneys: Normal cortical medullary enhancement of the kidneys. Left  kidney unremarkable, unchanged moderate to severe hydronephrosis of  the right kidney secondary to ureteropelvic junction obstruction.     Gastrointestinal tract: Appendix is not well-visualized, no dilated  segments of small and large bowel, no definitive focal mural  thickening. Moderate stool burden in the transverse colon.    Mesentery/peritoneum/retroperitoneum: Numerous scattered metastatic  implants along the anterior abdominal wall,  peritoneum, throughout the  retroperitoneum and paracolic gutters. No intra-abdominal fluid  collections. No free intra-abdominal air.    Lymph nodes: Multiple prominent lymph nodes along the periaortic  chains    Vasculature: Patent abdominal aorta and branch vessels, calcified  atherosclerosis. Nonaneurysmal aorta. The portal vein, superior  mesenteric vein, and splenic vein are patent without obstruction.    Pelvis: Prostatic calcifications, mild concentric wall thickening of  the bladder, likely chronic. Fat-containing right greater than left  inguinal hernias.     Bones: New pelvic lytic lesions including the right iliac wing,  scattered vertebral body lytic lesions, for example T12, T11, T7.    Soft Tissues: New presumably metastatic metastatic soft tissue lesion  posterior superior right iliac wing (3/411).  Impression: IMPRESSION: In this patient with a known history of metastatic  skeletal carcinoma, there is evidence for disease progression with  multiple new metastatic lesions throughout the abdomen and pelvis.  1. Increased size of multiple pulmonary nodules, additional increased  nodules along the pulmonary fissures.  2. Increased size of multiple necrotic appearing and coalescing  mediastinal lymph nodes as per above.   3. New diffuse metastatic lesions throughout the liver, as well as  numerous implants throughout the peritoneum, anterior abdominal wall,  retroperitoneum, mesentery, spleen, and new right adrenal metastatic  lesion.  4. New bony metastatic lesions throughout the pelvis including the  right iliac wing, and throughout the spine as detailed above.   5. Unchanged moderate to severe hydronephrosis of the right kidney  secondary to ureteropelvic junction obstruction.     I have personally reviewed the examination and initial interpretation  and I agree with the findings.    MARIA T MOODY MD         SYSTEM ID:  [id number]  CT Lumbar Spine Reconstructed  Narrative: CT LUMBAR SPINE RECONSTRUCTED  1/10/2024 1:56 PM    History: back pain.  ICD-10:    Comparison: Same day body CT and thoracic spine CT.    Technique: Using multidetector thin collimation helical acquisition  technique, axial, coronal and sagittal CT images through the lumbar  spine were obtained.    Findings: There are 5 lumbar type vertebrae. Normal lumbar spine  alignment. Moderate loss of intervertebral disc height at L3-4. Mild  loss of disc height at L2-3 and L4-5. Scattered regions of  hypoattenuation in the lumbar vertebral bodies, most conspicuous at L3  measuring up to 9 mm. Additional lesions in the T12 vertebral body,  posterior S1 vertebral body, and in the right ilium. Findings on a  level by level basis are as follows:    L1-L2: No spinal canal or neural foraminal stenosis.    L2-L3: Disc bulge and bilateral facet hypertrophy. Mild to moderate  spinal canal stenosis. Mild bilateral neural foraminal narrowing.    L3-L4: Circumference of disc bulge and bilateral facet hypertrophy.  Mild to moderate spinal canal stenosis. Mild bilateral neural  foraminal narrowing.    L4-L5: Circumferential disc bulge and bilateral facet hypertrophy.  Moderate spinal canal stenosis. Mild bilateral neural foraminal  narrowing.    L5-S1: Posterior disc bulge and bilateral facet hypertrophy. No spinal  canal stenosis. Mild bilateral neural foraminal narrowing.    The visualized adjacent paraspinous tissues are grossly within normal  limits.    Prominence of the right renal pelvis, unchanged since 11/1/2023.  Multiple hepatic, adrenal, splenic, and lymph node metastases.  Impression: Impression:  1. Thoracic, lumbar, and sacral vertebral metastases. No pathologic  fracture.  2. Right iliac metastases.  3. Multilevel lumbar degenerative changes, greatest at L4-5.  4. Multiple abdominal metastases. See report for same day body CT.    ZENA GALLO MD         SYSTEM ID:  [id number]  Head CT w/o contrast  Narrative: EXAM: CT HEAD W/O CONTRAST  1/10/2024 2:03  PM     HISTORY:  HA, hx CA r/o mets       COMPARISON:  Neck CT 11/1/2023. Body PET CT 6/5/2023.    TECHNIQUE: Using multidetector thin collimation helical acquisition  technique, axial, coronal and sagittal CT images from the skull base  to the vertex were obtained without intravenous contrast.   (topogram) image(s) also obtained and reviewed.    FINDINGS:  No acute intracranial hemorrhage, mass effect, or midline shift. No  acute loss of gray-white matter differentiation in the cerebral  hemispheres. Ventricles are proportionate to the cerebral sulci. Clear  basal cisterns. Moderate leukoaraiosis.    The bony calvaria and the bones of the skull base are normal.  Incidental 6 mm left frontoethmoidal osteoma, unchanged. Mastoid air  cells are clear. Grossly normal orbits.   Impression: IMPRESSION:  1. No acute intracranial pathology.   2. Noncontrast head CT of limited sensitivity for detection of brain  metastases. Consider further evaluation with contrast-enhanced brain  MRI.    ZENA GALLO MD         SYSTEM ID:  [id number]  CT Thoracic Spine Reconstructed  Narrative: CT THORACIC SPINE RECONSTRUCTED 1/10/2024 1:55 PM    Provided History: back pain   ICD-10:    Comparison: Same day body CT. Chest CT 8/1/2023.    Technique: Using multidetector thin collimation helical acquisition  technique, axial, sagittal and coronal 2 mm thickness CT  reconstructions were obtained through the thoracic spine.    Findings:  Normal thoracic vertebral alignment multiple hypodense attenuating  masses within the thoracic vertebrae, largest at T12, new since  8/1/2023. No loss of vertebral body height. There is no evidence of  fracture or significant prevertebral soft tissue swelling. Multilevel  loss of intervertebral disc height, endplate osteophytosis, and facet  hypertrophy. No high-grade spinal canal or neural foraminal stenosis.    Partially visualized right chest wall Port-A-Cath.    See report for same day body CT for  findings in the chest, including  mediastinal adenopathy, as well as abdominal findings including  multiple hepatic and splenic lesions.  Impression: Impression:   1. No acute thoracic spine fracture or traumatic subluxation.    2. Multiple thoracic vertebral metastases, largest at T12. No  associated pathologic fracture.  3. Multilevel mild to moderate thoracic degenerative changes.  4. Multiple metastases in the chest and abdomen. See report for same  day body CT.    ZENA GALLO MD         SYSTEM ID:  [id number]     Imaging was personally reviewed and interpreted by me. Significant PD as above     Virtual Visit Details  Type of service:  Telephone Visit   Phone call duration: 40 minutes            Delores Buckley MD

## 2024-01-18 NOTE — NURSING NOTE
Is the patient currently in the state of [place]? YES    Visit mode:TELEPHONE    If the visit is dropped, the patient can be reconnected by: VIDEO VISIT: Text to cell phone:   Telephone Information:   Mobile [phone]       Will anyone else be joining the visit? NO  (If patient encounters technical issues they should call [phone]    How would you like to obtain your AVS? MyChart    Are changes needed to the allergy or medication list? No    Reason for visit: RECHECK (Follow up - updated medications, bumps on body)    Quintin ABADF

## 2024-01-22 NOTE — TELEPHONE ENCOUNTER
Retail Pharmacy Prior Authorization Team   Phone: [phone]    PA Initiation    Medication: OXYCODONE HCL 5 MG/5ML PO SOLN  Insurance Company: [employer] - Phone [phone] Fax [phone]  Pharmacy Filling the Rx: [facility] [place] - [place], [place] - [address] [place] [address]  Filling Pharmacy Phone: [phone]  Filling Pharmacy Fax:    Start Date: 1/22/2024

## 2024-01-23 PROBLEM — E83.52 HYPERCALCEMIA: Status: ACTIVE | Noted: 2024-01-01

## 2024-01-23 NOTE — PATIENT INSTRUCTIONS
Begin MS Contin (long-acting morphine) 30 mg at bedtime only.    You can continue to use oxycodone, 10-20 mg, every 4 hours as needed in addition to the MS Contin. Please start with the lower dose, 10 mg, per administration and increase if needed with subsequent administrations if pain is not controlled.    Begin dexamethasone 4 mg daily. Take in the morning with food.     Resume Miralax 1 capful once a day and Senna 1 tablet twice a day for constipation. If you develop loose stools, please either stop the Senna or the Miralax. You can further adjust those medications if needed.     You are receiving an infusion today of Zometa which is intended to lower your calcium level.

## 2024-01-23 NOTE — TELEPHONE ENCOUNTER
Prior Authorization Not Needed per Insurance    Medication: OXYCODONE HCL 5 MG/5ML PO SOLN  Insurance Company: [employer] - Phone [phone] Fax [phone]  Expected CoPay: $    Pharmacy Filling the Rx: [facility] - [place], [place] - [address] [place] [address]  Pharmacy Notified: Yes  Patient Notified: No

## 2024-01-23 NOTE — PATIENT INSTRUCTIONS
Begin MS Contin (long-acting morphine) 30 mg at bedtime only.     You can continue to use oxycodone, 10-20 mg, every 4 hours as needed in addition to the MS Contin. Please start with the lower dose, 10 mg, per administration and increase if needed with subsequent administrations if pain is not controlled.     Begin dexamethasone 4 mg daily. Take in the morning with food.      Resume Miralax 1 capful once a day and Senna 1 tablet twice a day for constipation. If you develop loose stools, please either stop the Senna or the Miralax. You can further adjust those medications if needed.      You are receiving an infusion today of Zometa which is intended to lower your calcium level.      called and spoke w pt son in regards to missed appt pt did not want to talk on the phone and said hed call to reschedule appt

## 2024-01-23 NOTE — Clinical Note
1/23/2024         RE: Antonio Sharpe  [address] [place] [address]        Dear Colleague,    Thank you for referring your patient, Antonio Sharpe, to the [facility]. Please see a copy of my visit note below.         [facility]    PATIENT NAME: Antonio Sharpe  MRN # [id number]   DATE OF VISIT: January 23, 2024  YOB: 1965     Otolaryngology Dr. Aditya Swartz  PCP: None    CANCER TYPE: SCC L mandible/alveolar ridge, buccal mucosa  STAGE: [id number]  ECOG PS: 1    PD-L1: 2% on L cheek lesion 9/2021  NGS:     SUMMARY  4/28/20 Left marginal mandibulectomy, WLE of the left buccal mucosa, left posterior maxillectomy, left partial pharyngectomy, left selective neck dissection, right radial forearm free flap, mandibular plating, tracheostomy, and feeding tube placement (Dr. Triana at [facility]). Path: SCC, 5.7 cm moderately differentiated, DOI 1.5 cm, + PNI, no LVI, negative margins, 5/33 nodes positive, + MARIA ELENA  6/30~8/17/20 Chemoradiation 6600 cGy with HD cisplatin (Dr. Serna at [facility])  Care was at [facility] with Dr. Tarango, Dr. Christ Stewart (ENT), Dr. Kong Reece (Otolaryngology at [facility]), and Dr. Serna (Radiation Oncology)    8/11/21 CT neck. 3 x 2.8 x 3.6 cm mass at the reconstruction flap within the subcutaneous fat of the L cheek abutting masseter medially and skin laterally, extending to parotid, abutting carotid.   8/16/21 FNA L cheek. Path: Highly suspicious for SCC  8/26/21 PET/CT.   9/2/21 MRI neck.   9/8/21 FNA L cheek lesion (Dr. Swartz). Path: SCC. TPS 2%   10/21/21~2/15/22 C1-6 carboplatin 5FU pembrolizumab c/b severe mucositis. C2 pembro alone due to ongoing resolving mucositis. DYPD testing negative. 5FU dose reduced 50% for C3-6. 5FU omitted C5-6.  3/8/22 PET/CT. Decreased size and FDG uptake of the mass along the temporalis muscle, deep to the L zygomatic arch, 1.5  1.3 cm (SUV 10.7), peristent linear lucency on CT  immediately underneath the anterior cortical bone of the anterior mandible at the  Midline (SUV 10.5), extending to the medullary cavity of the anterior mandible where there is subtle corresponding lucency, similar to prior, likely post surgical. Unchanged thyroid nodule. Gastric fundus thickening which can be seen with gastritis, recommend direct visualization, focal anorectal uptake, likely physiologic, attention on follow up  4/11~5/3/22 Chemoradiation with weekly carboplatin paclitaxel, intended 70 Gy, 35 fractions (Dr. Amezquita, summary 6/11/23). No-showed starting the 4th week of radiation, hence received 3400 cGy to residual tumor, 3060 cGy high risk tissue. Turned out he lost insurance coverage.  12/19/22 Re-established care with Dr. Swartz after developing R jaw pain.   12/19/22 CT neck. 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 Increased subcutaneous edema L lower face and submandibular space. Effaced fat planes. Questionable 3.4 x 1.4 cm rim enhancing area in the L submandibular space. Treated with vitamin D and pentoxifylline. Infected hardware April-May 2023 treated with antibiotics.  5/11~5/12/23 [facility] for facial swelling. Found to have L facial abscess s/p I&D  6/5/23 PET/CT. Changes felt to be more related to infection/inflammation based on changes since 5/11/23 CT. However, had new enlarged R paratracheal, subcarinal, R hilar adenopathy (SUB 16.85 in R paratracheal node, 6.6 subcarinal, 3.91 R hilar). EBUS recommended.  7/19/23 Bronch, EBUS (Dr. Santos) with conscious sedation. 4R biopsied. Limited LN evaluation due to limitations from trismus and osteo. Path: Non diagnostic.  8/1/23 CT chest. Increased conspicuity of R minor fissure nodules, including 5 x 3 mm nodule, previously 3 x 3 mm. 7 x 5 mm R major fissure  nodule. Additional scattered nodules are larger compared to 4/17/23. 1.8 cm R paratracheal LN. 1.5 cm area of peripherally enhancing hypoattenuation in the mediastinum posterior to the trachea corresponding to FDG avid are on PET 6/5/23. Unchanged 9-10 mm R hilar node. Gastric mucosa thickening. 6 mm tail of pancreas nodule  8/17/23 Bronch, EBUS under general anesthesia (Dr. Holm). 4R biopsied. Path: SCC. TPS <1%  1/10~1/13/24 [facility] for cancer-related pain from back and hip, hyponatremia Na 122.   1/10/24 CT chest abd and CT neck while inpt. Increased exposure of L hemimandible and hardware with persistent features concerning for osteomyelitis and/or ORN. New C6 met. Increased R supraclav node. Multiple similar prominent axillary nodes. Mediastinal adenopathy increased compared to 11/1. Increased nodules, most significantly R lung, additionally multiply increased and new fissural nodules concerning for mets, numerous new liver mets, for example 2.1 x 2.3 cm R liver met, 1.4 x 1.4 cm R adrenal nodule, new splenic lesions concerning for metastatic disease. Unchanged mod-severe R hydronephrosis secondary to UPJ obstruction. Numerous scattered metastatic implants along the anterior abdominal wall, peritoneum, retroperitoneum and paracolic gutters, multiple prominent periaortic LNs. New pelvic lytic lesions including R iliac wing, T12, T11, T7, R posterior superior iliac wing.   1/10/24 CT L spine, T spine. T12, S1, R ilium, L3 mets. DJD greatest at L4-5     ASSESSMENT AND PLAN   SCC oral cavity, recurrent, now bx proven metastatic disease, TPS <1%: Had PD in Nov to the point I recommended starting systemic therapy. He went to [place] and returned in Jan 1. Now with significant progression. Discussed starting systemic therapy again with carboplatin paclitaxel pembro (or cemiplimab if pembro not covered). He had paclitaxel before with chemorads. Discussed goal is to control cancer for as long as possible but won't be  with curative intent. Discussed impact of PD-L1 expression, small chance of long-term control, data from Keynote 048 study and cemiplimab study. We talked about median survival from the CPS 1-19 subset (not pre-planned) from Keynote 048. Fortunately, ID didn't think the hardware/fistula was infected when they evaluated during his hospitalization but this will be a unique risk. Discussed that we'll need more in person visits and telephone visits as we had to do today are not going to be safe enough. He understands. I'm hopeful he can figure out a way to do video visits as well. Prefers treatment in [place]. We also need to examine him to look at the bumps he told us about today    Cancer-related pain: Declined radiation start in, scheduled for sim 1/24 with Dr Torres. Continue PRN oxycodone, using about every 3 hours regularly. Consider MS contin at night to help him get better sleep. He really does not want to see Palliative Care team right now. Discussed that establishing care with them now will allow them to be more helpful in the future as he needs to be able to build a relationship with them, etc. He really wants to see providers he knows and who know him. Discussed that they can do more than pain management and we reviewed the spectrum of support they can provide. Will hold on referral from today's visit but will continue to encourage him to continue care with them, especially before we encounter a crisis.    Screening for hypothyroidism: TSH 1.51 on 6/5/23. Will be checked regularly as we start pembro     Opioid induced constipation: Can increase miralax some. Discussed he might have some catch up diarrhea. Can stop senna if causing too much cramping.     Fistula, ORN: Per Dr. Swartz. ID note from 1/12 reviewed. Not felt to be actively infected. Need to monitor closely. Jeremi tells us he can tell when its infected.     Port: Likely not functional, will check next visit with labs. He'd rather wait than  address now. Remove if not working.      Hearing loss: Chronic    Tobacco dependence, ETOH: Not discussed today, will discuss next visit.     Pt was seen in conjunction with Dr. Lennie Johnson, Heme/Onc fellow. Note was written by me.     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 Treated at [facility] spine  Appy 1971  Hearing loss L ear after radiation s/p myringotomy  Chronic tinnitus secondary to cisplatin  Hemorrhoids    CURRENT OUTPATIENT MEDICATIONS  Reviewed    ALLERGIES  No Known Allergies     PHYSICAL EXAM  There were no vitals taken for this visit.    General: Well-appearing male in no acute distress.  Eyes: EOMI, PERRL. No scleral icterus.  ENT: Oral mucosa is moist without lesions or thrush.   Lymphatic: Neck is supple without cervical or supraclavicular lymphadenopathy.   Cardiovascular: RRR No murmurs, gallops, or rubs. No peripheral edema.  Respiratory: CTA bilaterally. No wheezes or crackles.  Gastrointestinal: BS +. Abdomen soft, non-tender. No palpable hepatosplenomegaly or masses.   Neurologic: Cranial nerves II through XII are grossly intact.  Skin: No rashes, petechiae, or bruising noted on exposed skin.    LABORATORY AND IMAGING STUDIES  Most Recent 3 CBC's:  Recent Labs   Lab Test 01/13/24  0331 01/12/24  0531 01/11/24  0529 01/10/24  1232 05/12/23  0621 05/11/23  1025 05/09/22  0836   WBC 7.6 10.1 9.4 12.0*   < > 9.7 3.4*   HGB 12.1* 12.7* 12.7* 13.5   < > 15.2 12.7*   MCV 96 95 93 92   < > 95 102*    261 206 300   < > 248 157   ANEUTAUTO  --   --   --  10.1*  --  8.0 2.4    < > = values in this interval not displayed.    Most Recent 3 BMP's:  Recent Labs   Lab Test 01/13/24  0748 01/13/24  0331 01/12/24  1915 01/12/24  1137 01/12/24  0531  01/11/24  1411 01/11/24  0529 01/10/24  1651 01/10/24  1232 05/11/23  1025 05/09/22  0836 05/02/22  0707   * 128*  128* 125*   < > 128*  128*   < > 122*  122*   < > 122*   < > 136 137   POTASSIUM  --  4.2  --   --  5.2  --  4.4  --  4.8   < > 4.5 4.2   CHLORIDE  --  92*  --   --  89*  --  85*  --  85*   < > 105 103   CO2  --  27  --   --  30*  --  24  --  24   < > 27 27   BUN  --  14.8  --   --  14.9  --  15.5  --  14.1   < > 18 22   CR  --  0.74  --   --  0.83  --  0.65*  --  0.64*   < > 0.82 0.85   ANIONGAP  --  9  --   --  9  --  13  --  13   < > 4 7   PRINCE  --  10.5*  --   --  10.8*  --  10.1  --  10.3*   < > 9.0 9.2   GLC  --  87  --   --  105*  --  89  --  98   < > 88 105*   PROTTOTAL  --   --   --   --   --   --   --   --  7.4  --  6.6* 6.6*   ALBUMIN  --   --   --   --   --   --   --   --  4.0  --  3.2* 3.3*    < > = values in this interval not displayed.    Most Recent 2 LFT's:  Recent Labs   Lab Test 01/10/24  1232 05/09/22  0836   AST 33 25   ALT 12 31   ALKPHOS 106 53   BILITOTAL 0.3 0.3    Most Recent TSH and T4:  Recent Labs   Lab Test 06/05/23  1438 09/22/21  1039   TSH 1.51 1.54   T4  --  0.78     Phos/Mag:  Lab Results   Component Value Date    PHOS 3.0 05/12/2023    MAG 1.9 05/12/2023    MAG 1.8 05/09/2022    MAG 1.8 05/02/2022      I reviewed the above labs today.         Again, thank you for allowing me to participate in the care of your patient.        Sincerely,        Yaquelin Tovar CNP

## 2024-01-23 NOTE — PROGRESS NOTES
[facility] CANCER [facility]    PATIENT NAME: Antonio Sharpe  MRN # [id number]   DATE OF VISIT: January 23, 2024  YOB: 1965     Otolaryngology Dr. Aditya Swartz  PCP: None    CANCER TYPE: SCC L mandible/alveolar ridge, buccal mucosa  STAGE: [id number]  ECOG PS: 1    PD-L1: 2% on L cheek lesion 9/2021  NGS:     SUMMARY  4/28/20 Left marginal mandibulectomy, WLE of the left buccal mucosa, left posterior maxillectomy, left partial pharyngectomy, left selective neck dissection, right radial forearm free flap, mandibular plating, tracheostomy, and feeding tube placement (Dr. Triana at [facility]). Path: SCC, 5.7 cm moderately differentiated, DOI 1.5 cm, + PNI, no LVI, negative margins, 5/33 nodes positive, + MARIA ELENA  6/30~8/17/20 Chemoradiation 6600 cGy with HD cisplatin (Dr. Serna at [facility])  Care was at [facility] with Dr. Tarango, Dr. Christ Stewart (ENT), Dr. Kong Reece (Otolaryngology at [facility]), and Dr. Serna (Radiation Oncology)    8/11/21 CT neck. 3 x 2.8 x 3.6 cm mass at the reconstruction flap within the subcutaneous fat of the L cheek abutting masseter medially and skin laterally, extending to parotid, abutting carotid.   8/16/21 FNA L cheek. Path: Highly suspicious for SCC  8/26/21 PET/CT.   9/2/21 MRI neck.   9/8/21 FNA L cheek lesion (Dr. Swartz). Path: SCC. TPS 2%   10/21/21~2/15/22 C1-6 carboplatin 5FU pembrolizumab c/b severe mucositis. C2 pembro alone due to ongoing resolving mucositis. DYPD testing negative. 5FU dose reduced 50% for C3-6. 5FU omitted C5-6.  3/8/22 PET/CT. Decreased size and FDG uptake of the mass along the temporalis muscle, deep to the L zygomatic arch, 1.5  1.3 cm (SUV 10.7), peristent linear lucency on CT immediately underneath the anterior cortical bone of the anterior mandible at the  Midline (SUV 10.5), extending to the medullary cavity of the anterior mandible where there is subtle corresponding lucency, similar to prior, likely post surgical. Unchanged  thyroid nodule. Gastric fundus thickening which can be seen with gastritis, recommend direct visualization, focal anorectal uptake, likely physiologic, attention on follow up  4/11~5/3/22 Chemoradiation with weekly carboplatin paclitaxel, intended 70 Gy, 35 fractions (Dr. Amezquita, summary 6/11/23). No-showed starting the 4th week of radiation, hence received 3400 cGy to residual tumor, 3060 cGy high risk tissue. Turned out he lost insurance coverage.  12/19/22 Re-established care with Dr. Swartz after developing R jaw pain.   12/19/22 CT neck. 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 Increased subcutaneous edema L lower face and submandibular space. Effaced fat planes. Questionable 3.4 x 1.4 cm rim enhancing area in the L submandibular space. Treated with vitamin D and pentoxifylline. Infected hardware April-May 2023 treated with antibiotics.  5/11~5/12/23 [facility] for facial swelling. Found to have L facial abscess s/p I&D  6/5/23 PET/CT. Changes felt to be more related to infection/inflammation based on changes since 5/11/23 CT. However, had new enlarged R paratracheal, subcarinal, R hilar adenopathy (SUB 16.85 in R paratracheal node, 6.6 subcarinal, 3.91 R hilar). EBUS recommended.  7/19/23 Bronch, EBUS (Dr. Santos) with conscious sedation. 4R biopsied. Limited LN evaluation due to limitations from trismus and osteo. Path: Non diagnostic.  8/1/23 CT chest. Increased conspicuity of R minor fissure nodules, including 5 x 3 mm nodule, previously 3 x 3 mm. 7 x 5 mm R major fissure nodule. Additional scattered nodules are larger compared to 4/17/23. 1.8 cm R paratracheal LN. 1.5 cm area of peripherally enhancing hypoattenuation in the mediastinum posterior to the trachea corresponding to FDG avid are on PET 6/5/23. Unchanged 9-10 mm R  hilar node. Gastric mucosa thickening. 6 mm tail of pancreas nodule  8/17/23 Bronch, EBUS under general anesthesia (Dr. Holm). 4R biopsied. Path: SCC. TPS <1%  1/10~1/13/24 [facility] for cancer-related pain from back and hip, hyponatremia Na 122.   1/10/24 CT chest abd and CT neck while inpt. Increased exposure of L hemimandible and hardware with persistent features concerning for osteomyelitis and/or ORN. New C6 met. Increased R supraclav node. Multiple similar prominent axillary nodes. Mediastinal adenopathy increased compared to 11/1. Increased nodules, most significantly R lung, additionally multiply increased and new fissural nodules concerning for mets, numerous new liver mets, for example 2.1 x 2.3 cm R liver met, 1.4 x 1.4 cm R adrenal nodule, new splenic lesions concerning for metastatic disease. Unchanged mod-severe R hydronephrosis secondary to UPJ obstruction. Numerous scattered metastatic implants along the anterior abdominal wall, peritoneum, retroperitoneum and paracolic gutters, multiple prominent periaortic LNs. New pelvic lytic lesions including R iliac wing, T12, T11, T7, R posterior superior iliac wing.   1/10/24 CT L spine, T spine. T12, S1, R ilium, L3 mets. DJD greatest at L4-5     ASSESSMENT AND PLAN   SCC oral cavity, recurrent, now bx proven metastatic disease, TPS <1%: Had PD in Nov to the point systemic therapy was recommended. He went to [place] and returned in Jan 1. Hospitalized 1/10-1/13 for pain related to significant progression including multiple bony mets. Plan for carbo/taxol/Keytruda to start soon, awaiting scheduling. In the meantime will be pursuing radiation starting tomorrow.    Cancer-related pain: Declined radiation start inpt, scheduled for sim tomorrow, 1/24 with Dr Torres. Discussed RT will not provide immediate improvement so we need to make adjustments in the meantime. Palliative care has been discussed inpatient and recently during 1/18 visit with Dr. Buckley I  strongly encouraged this again. He will need ample symptom support due to his rate of progression and complex pain. He seemed more open to this but he and his wife would still like to think it over. We reviewed his current use of oxycodone in detail. He is taking anywhere from 15-30 mg every 3-6 hours along with Tylenol 650 mg usually every 3 hours but not always. It is difficult to calculate his 24 hour MME but it's approximately 180 MME/24 hours. Reviewed plan for additional of long-acting opioid and short-term use of dexamethasone which he is a bit hesitant about due to previous issues with insomnia.  -Begin MS Contin 30 mg at bedtime  -Continue oxycodone, dose adjusted to 10-20 mg every 4 hours  -Begin dexamethasone 4 mg every morning. Will avoid second daily dose d/t concern this will contribute to worsening insomnia  -Narcan prescribed, reviewed rationale   -Continue Tylenol 650 mg every 4 hours PRN  -Rad onc sim tomorrow as planned  -Consider palliative care    Hypercalcemia: s/t malignancy. Calcium 11.6 today. Renal function stable today. Will proceed with Zometa 4 mg IV today rather than fluids given recent issues with hyponatremia and concern for SIADH.    Opioid induced constipation: Recently stopped bowel meds. Discussed need to resume with regular/increased opioid use  -Resume Miralax 1 capful daily  -Senna 1 tablet BID  -Reviewed plan to titrate meds if developing loose stools but advised against stopping completely    Screening for hypothyroidism: TSH 4.81 today. FT4 normal. Check regularly with pembro.      Fistula, ORN: Per Dr. Swartz. ID note from 1/12 reviewed by me today as well. Not felt to be actively infected. Need to monitor closely    Port: Hadn't been used for many months. Infusion RN was able to access today and get positive blood return. Okay to use      Hearing loss: Chronic    Tobacco dependence, ETOH: Still drinking but has decreased, now 2 beers per day. Congratulated him on this  effort. Encouraged continued abstinence.     75 minutes spent on the date of the encounter doing chart review, review of test results, interpretation of tests, patient visit, and documentation     Yaquelin Tovar CNP    SUBJECTIVE  Jeremi is seen today for an add-on visit due to uncontrolled cancer related pain.    He's scheduled for a radiation sim with Dr. Torres tomorrow. Since his recent hospital discharge he has been using oxycodone and Tylenol PRN. He is taking anywhere from 15-30 mg of oxycodone at a time, sometimes 3 hours but also less often. Today when he ran out he went at least 6-8 hours without oxy. This brings the pain down by about 30%. Pain is most prominent in his mid-back and right hip. Lying flat makes this much worse. He gets some relief with ambulation. He was previously using lidocaine patches but not currently. Denies any lethargy or noticeable confusion or respiratory depression. He is experiencing constipation, having only small bowel movements with many days in between. Due to experiencing loose stools he stopped his bowel meds but has Mirala and Senna at home. He is experiencing some tightness and distention in his abdomen. His legs are also swollen but he is having difficulty elevating them because this exacerbates his pain. He has noticed the presence of nodules under his skin which developed over the past ~2 weeks. A larger nodule on the right chest is mildly tender otherwise these are not painful.    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 Treated at [facility] C spine  Appy 1971  Hearing loss L ear after radiation s/p myringotomy  Chronic tinnitus secondary to cisplatin  Hemorrhoids    CURRENT OUTPATIENT MEDICATIONS  Reviewed    ALLERGIES  No Known Allergies     PHYSICAL EXAM  BP (!) 153/77 (BP Location: Right arm, Patient Position: Chair, Cuff Size: Adult Large)    "Pulse 91   Temp 97.8  F (36.6  C)   Resp 16   Ht 1.753 m (5' 9.02\")   Wt 85.5 kg (188 lb 8 oz)   SpO2 96%   BMI 27.82 kg/m      General: NAD  Eyes: EOMI, PERRL. No scleral icterus.  ENT: Trismus. Oral mucosa dry. Fistula covered. No purulent drainage on bandage.  Cardiovascular: rrr, no m/g/r. 2+ LE edema to bilateral ankles and feet  Respiratory: cta bilaterally  GI: abdomen distended, nontender  Neurologic: No focal deficits  Skin: Scattered, firm subcutaneous nodules on right arm, chest wall and posterior scalp with  largest lesion approximately ~1.5 cm to right chest wall    LABORATORY AND IMAGING STUDIES  Most Recent 3 CBC's:  Recent Labs   Lab Test 01/23/24  1542 01/13/24  0331 01/12/24  0531 01/11/24  0529 01/10/24  1232 05/12/23  0621 05/11/23  1025   WBC 10.5 7.6 10.1   < > 12.0*   < > 9.7   HGB 12.4* 12.1* 12.7*   < > 13.5   < > 15.2   MCV 92 96 95   < > 92   < > 95    208 261   < > 300   < > 248   ANEUTAUTO 9.2*  --   --   --  10.1*  --  8.0    < > = values in this interval not displayed.    Most Recent 3 BMP's:  Recent Labs   Lab Test 01/23/24  1542 01/13/24  0748 01/13/24  0331 01/12/24  1137 01/12/24  0531 01/10/24  1651 01/10/24  1232 05/11/23  1025 05/09/22  0836   * 129* 128*  128*   < > 128*  128*   < > 122*   < > 136   POTASSIUM 4.7  --  4.2  --  5.2   < > 4.8   < > 4.5   CHLORIDE 87*  --  92*  --  89*   < > 85*   < > 105   CO2 30*  --  27  --  30*   < > 24   < > 27   BUN 18.7  --  14.8  --  14.9   < > 14.1   < > 18   CR 0.82  --  0.74  --  0.83   < > 0.64*   < > 0.82   ANIONGAP 10  --  9  --  9   < > 13   < > 4   PRINCE 11.6*  --  10.5*  --  10.8*   < > 10.3*   < > 9.0   *  --  87  --  105*   < > 98   < > 88   PROTTOTAL 6.7  --   --   --   --   --  7.4  --  6.6*   ALBUMIN 3.5  --   --   --   --   --  4.0  --  3.2*    < > = values in this interval not displayed.    Most Recent 2 LFT's:  Recent Labs   Lab Test 01/23/24  1542 01/10/24  1232   AST 29 33   ALT 16 12   ALKPHOS " 138 106   BILITOTAL 0.3 0.3    Most Recent TSH and T4:  Recent Labs   Lab Test 01/23/24  1542   TSH 4.81*   T4 1.12     Phos/Mag:  Lab Results   Component Value Date    PHOS 3.0 05/12/2023    MAG 1.9 05/12/2023    MAG 1.8 05/09/2022    MAG 1.8 05/02/2022      I reviewed the above labs today.

## 2024-01-23 NOTE — TELEPHONE ENCOUNTER
Oncology Nurse Triage - Reporting Symptoms    Situation:   Antonio reporting the following symptoms: Pain wants meds adjusted.       Background:   Treating Provider:   Dr Buckley     Date of last office visit: 1/18/24 Dr Buckley     Recent treatments: No      Assessment  Onset of symptoms:   Is suppose to start radiation tomorrow with Dr Torres to the hip.   Having most of pain in the right hip.    When in ER he got oxy and dilaudid and that worked well for him.  Taking 30mg every 3 hours of oxycodone.   Prescription states 10-15mg every 3 hours.   He states someone a nurse told him to take 10-30mg every 3 hours.    Also having pain in back, pain moves around he states.   Right now taking oxy and tylenol for pain, it is not quite doing the job he states.   Having hard time sleeping.   Rates pain a 10/10 he states all day.   He is hoping the radiation takes care of most of the pain   Getting 3-4 hours of sleep per night.       Paged provider: 11:40 paged DR Buckley   12:30 2nd page to Dr Buckley     Recommendations:   Get in to see DIA video or in person visit only.   No telephone visit.   Plan was to use dex and to do MS contin at night.   Palliative Care appt.     Call placed to Antonio: 3:00pm vist with Yaquelin Tovar in person to discuss pain management.     Message sent to [facility] to schedule appointment.     Yaquelin Tovar requests lab draw be added to visit today, will do 3pm labs and 3:30 visit with Yaquelin Tovar.     Call placed to Antonio to update him.

## 2024-01-23 NOTE — PROGRESS NOTES
Infusion Nursing Note:  Antonio Sharpe presents today for Zometa.    Patient seen by provider today: Yes: Yaquelin Tovar NP   present during visit today: Not Applicable.    Note: Pt presents to infusion feeling ok. He declines further intervention for his pain and offers no new concerns following his provider appointment today.    Per secure chat with Yaquelin Tovar NP @ [phone]:  - no IVF today  - no concern about dental clearance/Calcium and Vit D as this is a one-time acute dose    Intravenous Access:  Implanted Port. Per Yaquelin Tovar - plan is to resume chemo, so no plans for Port removal at this time.    Treatment Conditions:  Lab Results   Component Value Date     (L) 01/23/2024    POTASSIUM 4.7 01/23/2024    MAG 1.9 05/12/2023    CR 0.82 01/23/2024    PRINCE 11.6 (H) 01/23/2024    BILITOTAL 0.3 01/23/2024    ALBUMIN 3.5 01/23/2024    ALT 16 01/23/2024    AST 29 01/23/2024     Results reviewed, labs MET treatment parameters, ok to proceed with treatment.  Calcium: 11.6.    Post Infusion Assessment:  Patient tolerated infusion without incident.  Blood return noted pre and post infusion.  Site patent and intact, free from redness, edema or discomfort.  No evidence of extravasations.  Access discontinued per protocol.       Discharge Plan:   Patient declined prescription refills.  Discharge instructions reviewed with: Patient and Family.  Patient and/or family verbalized understanding of discharge instructions and all questions answered.  AVS to patient via [employer].  Patient will return TBD for next appointment - to be requested with checkout orders. Pt starting Radiation tomorrow 1/24.  Patient discharged in stable condition accompanied by: wife.  Departure Mode: Ambulatory.      Denisse Clemente RN

## 2024-01-23 NOTE — NURSING NOTE
"Oncology Rooming Note    January 23, 2024 4:41 PM   Antonio Sharpe is a 58 year old male who presents for:    Chief Complaint   Patient presents with    Oncology Clinic Visit     [facility] RETURN - SCC MANDIBLE     Initial Vitals: BP (!) 153/77 (BP Location: Right arm, Patient Position: Chair, Cuff Size: Adult Large)   Pulse 91   Temp 97.8  F (36.6  C)   Resp 16   Ht 1.753 m (5' 9.02\")   Wt 85.5 kg (188 lb 8 oz)   SpO2 96%   BMI 27.82 kg/m   Estimated body mass index is 27.82 kg/m  as calculated from the following:    Height as of this encounter: 1.753 m (5' 9.02\").    Weight as of this encounter: 85.5 kg (188 lb 8 oz). Body surface area is 2.04 meters squared.  Worst Pain (10) Comment: Data Unavailable   No LMP for male patient.  Allergies reviewed: Yes  Medications reviewed: Yes    Medications: Medication refills not needed today.  Pharmacy name entered into An[employer]:    [id number] - [email or web address], [place] - [address] [facility] [email or web address]  [facility] - [place] - [address] [place] [address] [facility] [facility] - [place], [place] - [address]    Frailty Screening:   Is the patient here for a new oncology consult visit in cancer care? 2. No      Jaydon Moore LPN              "

## 2024-01-24 NOTE — PROGRESS NOTES
2024    Antonio Sharpe  [phone] (home)   : 1965  MRN: [id number]       RADIATION ONCOLOGY F/U    History of Present Illness:  Mr. 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    The patient was seen by our team on 2024 in the inpatient setting, please see note from 2024 for a complete review of this patient's history and presentation. He saw Dr. Buckley on 24 and confirmed plan for CT simulation on , the patient would prefer not to see palliative care at this time. Medical oncology plans to initiate this patient on chemotherapy/immunotherapy. The patient is taking decadron, opioids, and tylenol for pain currently. The patient is here for consent and simulation.    On interview, the patient reports feeling significant pain in his R hip currently. He describes his current pain as 10/10 in severity despite taking pain medications earlier in the day. He is ready to move forward with RT.    Review of Systems: As per HPI; otherwise, a 10 system review is unremarkable     Current Medications:    Current Outpatient Medications:      acetaminophen (TYLENOL) 325 MG tablet, Take 2 tablets (650 mg) by mouth every 4 hours as needed for mild pain or fever, Disp: 90 tablet, Rfl: 0     chlorhexidine (PERIDEX) 0.12 % solution, Swish and spit 10 mLs in mouth 4 times daily, Disp: 1893 mL, Rfl: 11     dexAMETHasone (DECADRON) 4 MG tablet, Take 1 tablet (4 mg) by mouth daily (with breakfast), Disp: 7 tablet, Rfl: 0     Lidocaine (LIDOCARE) 4 % Patch, Place 2 patches onto the skin every 24 hours To prevent lidocaine  toxicity, patient should be patch free for 12 hrs daily., Disp: 20 patch, Rfl: 0     morphine (MS CONTIN) 15 MG CR tablet, Take 2 tablets (30 mg) by mouth at bedtime maximum 2 tablet(s) per day, Disp: 15 tablet, Rfl: 0     naloxone (NARCAN) 4 MG/0.1ML nasal spray, Spray 1 spray (4 mg) into one nostril alternating nostrils as needed for opioid reversal every 2-3 minutes until assistance arrives, Disp: 0.2 mL, Rfl: 1     oxyCODONE (ROXICODONE) 5 MG/5ML solution, Take 10-20 mLs (10-20 mg) by mouth every 4 hours as needed for moderate pain, Disp: , Rfl:      polyethylene glycol (MIRALAX) 17 GM/Dose powder, Take 17 g by mouth 2 times daily as needed for constipation, Disp: 510 g, Rfl: 0     sennosides (SENOKOT) 8.6 MG tablet, Take 2 tablets by mouth 2 times daily as needed for constipation, Disp: 30 tablet, Rfl: 0     Wound Dressings (MEPILEX BORDER FLEX) PADS, Externally apply 1 Application topically 4 times daily as needed, Disp: 180 each, Rfl: 10  No current facility-administered medications for this visit.    Allergies:  No Known Allergies     Physical Exam:  ECOG Status: 1   There were no vitals taken for this visit.  GEN: Appears well, alert, oriented  HEENT: EOMI, normal conjunctiva, notable left jawline mandible lesion, wound dry and nonpurulent/nondraining, no erythema.  NECK: Supple, full ROM, no visible cervical or clavicular lymphadenopathy  NEURO: No focal deficits, CN 3-12 grossly intact, normal and symmetric motor and sensory exam in bilateral upper and lower extremities, normal gait, up and move around without assist    Imaging & Path: No new imaging/path    Impression and Plan:     Mr. Sharpe is a 58 year old male with history of L testicular cancer s/p remote history of RT and orchiectomy now with metastatic squamous cell cancer of the head and neck s/p surgery and adjuvant chemoRT & re-irradiation (partially completed), now with bony mets visible in the right iliac wing as well as his right femur,  requiring high-dose opioid pain medications, warranting palliative radiation therapy with 20 Gy in 5 fractions.    We explained the palliative, symptom-focused intention of such therapy.  We noted that the goal was to reduce pain attributable to cancer activity at the bone site.We specifically noted that such therapy is targeted to a specific area of the body and is not felt to be effective in impacting patient survival time.      We discussed that doses used for palliative radiation therapy are generally not ablative.  This is because out of the curative context, we are balancing a dose capable of reducing or preventing symptoms with minimizing incidental irradiation of adjacent normal structures that may result in morbidity.  Moreover, we use foreshortened courses to reduce the overall treatment duration to optimize quality of life and avoid delaying systemic therapy, other interventions, or transitions in care such as initiation of hospice when applicable.      We discussed the risks (short and long term as listed on consent), benefits, and alternatives to radiotherapy.  The risks of radiation include but are not limited to: pain flare, skin irritation, hair loss in the area treated, fibrosis, joint stiffness, edema, wound healing difficulties, sterility, increased risk of fracture, damage to nerves, and tumors caused by radiation.  We provided educational material regarding self care of the most common side effects. Informed consent was obtained    He expressed clear understanding of the palliative intent of therapy. Mr. Sharpe is in agreement with this plan and will be scheduled for CT-based simulation for radiation planning later today.    Kobi Murguia MD MS PGY2    Physician Attestation   I, Courtney Torres, saw this patient and agree with the findings and plan of care as documented in the note.   I was present for key portions of the history and physical exam. Briefly, this is a 58 year old male with history  of left testicular cancer status post remote history of RT and orchiectomy now with metastatic squamous cell cancer of the head and neck status post surgery and adjuvant chemoradiation & re-irradiation (partially completed), now with symptomatic bone metastases visible in the right iliac wing as well as his right femur, requiring high-dose opioid pain medications. As per above, we have recommended palliative radiotherapy to 20 Gy in 5 fractions. Given evidence of left orchiectomy, we surmise that his prior radiotherapy would have been either left pelvic or left paraaortic and is unlikely to significantly overlap with the present field. Nonetheless, care will be taken to minimize bowel dose with our plan. Informed consent was obtained, and we proceeded with palliative treatment.    We appreciate the opportunity to continue to participate in Mr. Sharpe's care. He was encouraged to contact me with questions or concerns should they arise.    I personally reviewed the available CT of the abdomen and pelvis images from 1/2024. Laboratory data and pathology as noted above was reviewed. I reviewed previous medical records, which are summarized in the HPI. A total of 40 minutes were spent (including face to face time) during this visit, and more than 50% of the time was spent in counseling and coordination of care.     Courtney Torres MD MPH PhD    Department of Radiation Oncology

## 2024-01-24 NOTE — TELEPHONE ENCOUNTER
Prior Authorization Not Needed per Insurance    Medication: MORPHINE SULFATE ER 15 MG PO T12A  Insurance Company: [employer] - Phone [phone] Fax [phone]  Expected CoPay: $    Pharmacy Filling the Rx: [facility] [place], [place] - [address] [place] [address] [id number]  Pharmacy Notified: Yes  Patient Notified:

## 2024-01-24 NOTE — LETTER
1/24/2024         RE: Antonio Sharpe  [address] [place] [address]        Dear Colleague,    Thank you for referring your patient, Antonio Sharpe, to the [facility] RADIATION ONCOLOGY. Please see a copy of my visit note below.    CT simulation completed without incident      Again, thank you for allowing me to participate in the care of your patient.        Sincerely,        Courtney Torres

## 2024-01-24 NOTE — TELEPHONE ENCOUNTER
[facility] Prior Authorization Team   Phone: [phone]    PA Initiation    Medication:    Insurance Company: [employer] - Phone [phone] Fax [phone]  Pharmacy Filling the Rx: [facility] [place], [place] - [address] [place] [address] [phone]  Filling Pharmacy Phone: [phone]  Filling Pharmacy Fax:    Start Date: 1/24/2024

## 2024-01-24 NOTE — LETTER
2024         RE: Antonio Sharpe  [address] [place] [address]        Dear Colleague,    Thank you for referring your patient, Antonio Sharpe, to the [facility] RADIATION ONCOLOGY. Please see a copy of my visit note below.    2024    Antonio Sharpe  [phone] (home)   : 1965  MRN: [id number]       RADIATION ONCOLOGY F/U    History of Present Illness:  Mr. 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    The patient was seen by our team on 2024 in the inpatient setting, please see note from 2024 for a complete review of this patient's history and presentation. He saw Dr. Buckley on 24 and confirmed plan for CT simulation on , the patient would prefer not to see palliative care at this time. Medical oncology plans to initiate this patient on chemotherapy/immunotherapy. The patient is taking decadron, opioids, and tylenol for pain currently. The patient is here for consent and simulation.    On interview, the patient reports feeling significant pain in his R hip currently. He describes his current pain as 10/10 in severity despite taking pain medications earlier in the day. He is ready to move forward with RT.    Review of Systems: As per HPI; otherwise, a 10 system review is unremarkable     Current Medications:    Current Outpatient Medications:     acetaminophen (TYLENOL) 325 MG tablet, Take 2 tablets (650 mg) by mouth every 4 hours as needed for mild pain or fever, Disp: 90 tablet, Rfl: 0    chlorhexidine (PERIDEX) 0.12 % solution, Swish and spit 10 mLs in mouth 4  times daily, Disp: 1893 mL, Rfl: 11    dexAMETHasone (DECADRON) 4 MG tablet, Take 1 tablet (4 mg) by mouth daily (with breakfast), Disp: 7 tablet, Rfl: 0    Lidocaine (LIDOCARE) 4 % Patch, Place 2 patches onto the skin every 24 hours To prevent lidocaine toxicity, patient should be patch free for 12 hrs daily., Disp: 20 patch, Rfl: 0    morphine (MS CONTIN) 15 MG CR tablet, Take 2 tablets (30 mg) by mouth at bedtime maximum 2 tablet(s) per day, Disp: 15 tablet, Rfl: 0    naloxone (NARCAN) 4 MG/0.1ML nasal spray, Spray 1 spray (4 mg) into one nostril alternating nostrils as needed for opioid reversal every 2-3 minutes until assistance arrives, Disp: 0.2 mL, Rfl: 1    oxyCODONE (ROXICODONE) 5 MG/5ML solution, Take 10-20 mLs (10-20 mg) by mouth every 4 hours as needed for moderate pain, Disp: , Rfl:     polyethylene glycol (MIRALAX) 17 GM/Dose powder, Take 17 g by mouth 2 times daily as needed for constipation, Disp: 510 g, Rfl: 0    sennosides (SENOKOT) 8.6 MG tablet, Take 2 tablets by mouth 2 times daily as needed for constipation, Disp: 30 tablet, Rfl: 0    Wound Dressings (MEPILEX BORDER FLEX) PADS, Externally apply 1 Application topically 4 times daily as needed, Disp: 180 each, Rfl: 10  No current facility-administered medications for this visit.    Allergies:  No Known Allergies     Physical Exam:  ECOG Status: 1   There were no vitals taken for this visit.  GEN: Appears well, alert, oriented  HEENT: EOMI, normal conjunctiva, notable left jawline mandible lesion, wound dry and nonpurulent/nondraining, no erythema.  NECK: Supple, full ROM, no visible cervical or clavicular lymphadenopathy  NEURO: No focal deficits, CN 3-12 grossly intact, normal and symmetric motor and sensory exam in bilateral upper and lower extremities, normal gait, up and move around without assist    Imaging & Path: No new imaging/path    Impression and Plan:     Mr. 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    We explained the palliative, symptom-focused intention of such therapy.  We noted that the goal was to reduce pain attributable to cancer activity at the bone site.We specifically noted that such therapy is targeted to a specific area of the body and is not felt to be effective in impacting patient survival time.      We discussed that doses used for palliative radiation therapy are generally not ablative.  This is because out of the curative context, we are balancing a dose capable of reducing or preventing symptoms with minimizing incidental irradiation of adjacent normal structures that may result in morbidity.  Moreover, we use foreshortened courses to reduce the overall treatment duration to optimize quality of life and avoid delaying systemic therapy, other interventions, or transitions in care such as initiation of hospice when applicable.      We discussed the risks (short and long term as listed on consent), benefits, and alternatives to radiotherapy.  The risks of radiation include but are not limited to: pain flare, skin irritation, hair loss in the area treated, fibrosis, joint stiffness, edema, wound healing difficulties, sterility, increased risk of fracture, damage to nerves, and tumors caused by radiation.  We provided educational material regarding self care of the most common side effects. Informed consent was obtained    He expressed clear understanding of the palliative intent of therapy. Mr. Sharpe is in agreement with this plan and will be scheduled for CT-based simulation for radiation planning later today.    Kobi Murguia MD MS  PGY2    Physician Attestation   I, Courtney Torres, saw this patient and agree with the findings and plan of care as documented in the note.   I was present for key portions of the history and physical exam. 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 As per above, we have recommended palliative radiotherapy to 20 Gy in 5 fractions. Given evidence of left orchiectomy, we surmise that his prior radiotherapy would have been either left pelvic or left paraaortic and is unlikely to significantly overlap with the present field. Nonetheless, care will be taken to minimize bowel dose with our plan. Informed consent was obtained, and we proceeded with palliative treatment.    We appreciate the opportunity to continue to participate in Mr. Sharpe's care. He was encouraged to contact me with questions or concerns should they arise.    I personally reviewed the available CT of the abdomen and pelvis images from 1/2024. Laboratory data and pathology as noted above was reviewed. I reviewed previous medical records, which are summarized in the HPI. A total of 40 minutes were spent (including face to face time) during this visit, and more than 50% of the time was spent in counseling and coordination of care.     Courtney Torres MD MPH PhD    Department of Radiation Oncology

## 2024-01-25 NOTE — TELEPHONE ENCOUNTER
Prior Authorization Not Needed per Insurance. Pharmacy needs to dispense brand, per claim rejection message.    Medication: NALOXONE HCL 4 MG/0.1ML NA LIQD  Insurance Company: [employer] - Phone [phone] Fax [phone]  Expected CoPay: $    Pharmacy Filling the Rx: [facility] [place], [place] - [address] [place] [address] [phone]  Pharmacy Notified: Yes  Patient Notified: No           show

## 2024-01-31 PROBLEM — G89.3 CANCER RELATED PAIN: Status: ACTIVE | Noted: 2024-01-01

## 2024-01-31 PROBLEM — C79.51 CANCER, METASTATIC TO BONE (H): Status: ACTIVE | Noted: 2024-01-01

## 2024-01-31 NOTE — ED PROVIDER NOTES
ED Provider Note  [facility]      History     Chief Complaint   Patient presents with    Pain     HPI  Antonio Sharpe is a 58 year old male who has a history of widely metastatic squamous cell cancer originating from the head neck.  He is currently getting radiation he had his first treatment yesterday has another 1 today in a few hours.  He said he has not slept in the last few nights because of the pain he says that he is on oxycodone and log long-acting morphine and currently the pain is being managed by Dr. Buckley.  He does want to have the radiation today.  I will put an IV in and get his pain under control and give a heads up to Dr. Buckley and he can have his radiation treatment today.    Past Medical History  Past Medical History:   Diagnosis Date    Squamous cell carcinoma of mandible (H)     Testicular cancer (H)      Past Surgical History:   Procedure Laterality Date    BRONCHOSCOPY RIDID OR FLEXIBLE W/ENDOBRONCHIAL ULTRASOUND GUIDED 1 OR 2 NODE STATIONS N/A 7/19/2023    Procedure: BRONCHOSCOPY, WITH BIOPSY OF 1 OR 2 LYMPH NODE STATIONS WITH ENDOBRONCHIAL ULTRASOUND GUIDANCE;  Surgeon: Abbi Santos MD;  Location: [facility]    BRONCHOSCOPY RIDID OR FLEXIBLE W/ENDOBRONCHIAL ULTRASOUND GUIDED 1 OR 2 NODE STATIONS N/A 8/17/2023    Procedure: BRONCHOSCOPY, WITH BIOPSY OF 1 OR 2 LYMPH NODE STATIONS WITH ENDOBRONCHIAL ULTRASOUND GUIDANCE;  Surgeon: Chris Holm MD;  [facility]     acetaminophen (TYLENOL) 325 MG tablet  chlorhexidine (PERIDEX) 0.12 % solution  dexAMETHasone (DECADRON) 4 MG tablet  Lidocaine (LIDOCARE) 4 % Patch  morphine (MS CONTIN) 15 MG CR tablet  naloxone (NARCAN) 4 MG/0.1ML nasal spray  oxyCODONE (ROXICODONE) 5 MG/5ML solution  polyethylene glycol (MIRALAX) 17 GM/Dose powder  sennosides (SENOKOT) 8.6 MG tablet  Wound Dressings (MEPILEX BORDER FLEX) PADS      No Known Allergies  Family History  No family history on file.  Social History   Social History      Tobacco Use    Smoking status: Every Day     Packs/day: 1     Types: Cigarettes     Start date: 1976    Smokeless tobacco: Never   Substance Use Topics    Alcohol use: Yes     Comment: case a week    Drug use: Not Currently         A medically appropriate review of systems was performed with pertinent positives and negatives noted in the HPI, and all other systems negative.    Physical Exam   BP: (!) 168/82  Pulse: 88  Temp: 97.4  F (36.3  C)  Resp: 16  SpO2: 99 %  Physical Exam  Vitals and nursing note reviewed.   Constitutional:       General: He is in acute distress.      Appearance: He is ill-appearing.   Cardiovascular:      Rate and Rhythm: Normal rate and regular rhythm.   Pulmonary:      Effort: Pulmonary effort is normal.      Breath sounds: Normal breath sounds.   Abdominal:      General: There is distension.      Comments: ascites   Musculoskeletal:         General: Swelling present.   Skin:     Coloration: Skin is pale.   Neurological:      Mental Status: He is oriented to person, place, and time.   Psychiatric:         Mood and Affect: Mood normal.           ED Course, Procedures, & Data      Procedures              Results for orders placed or performed during the hospital encounter of 01/31/24   Lipase     Status: Normal   Result Value Ref Range    Lipase 37 13 - 60 U/L   Comprehensive metabolic panel     Status: Abnormal   Result Value Ref Range    Sodium 127 (L) 135 - 145 mmol/L    Potassium 4.5 3.4 - 5.3 mmol/L    Carbon Dioxide (CO2) 27 22 - 29 mmol/L    Anion Gap 11 7 - 15 mmol/L    Urea Nitrogen 27.2 (H) 8.0 - 23.0 mg/dL    Creatinine 0.80 0.67 - 1.17 mg/dL    GFR Estimate >90 >60 mL/min/1.73m2    Calcium 8.7 (L) 8.8 - 10.2 mg/dL    Chloride 89 (L) 98 - 107 mmol/L    Glucose 109 (H) 70 - 99 mg/dL    Alkaline Phosphatase 161 (H) 40 - 150 U/L    AST 39 0 - 45 U/L    ALT 15 0 - 70 U/L    Protein Total 6.1 (L) 6.4 - 8.3 g/dL    Albumin 3.1 (L) 3.5 - 5.2 g/dL    Bilirubin Total 0.4 <=1.2 mg/dL    TSH     Status: Normal   Result Value Ref Range    TSH 2.38 0.30 - 4.20 uIU/mL   T4 free     Status: Normal   Result Value Ref Range    Free T4 1.05 0.90 - 1.70 ng/dL   CBC with platelets and differential     Status: Abnormal   Result Value Ref Range    WBC Count 14.4 (H) 4.0 - 11.0 10e3/uL    RBC Count 3.75 (L) 4.40 - 5.90 10e6/uL    Hemoglobin 11.7 (L) 13.3 - 17.7 g/dL    Hematocrit 34.7 (L) 40.0 - 53.0 %    MCV 93 78 - 100 fL    MCH 31.2 26.5 - 33.0 pg    MCHC 33.7 31.5 - 36.5 g/dL    RDW 12.7 10.0 - 15.0 %    Platelet Count 332 150 - 450 10e3/uL    % Neutrophils 89 %    % Lymphocytes 2 %    % Monocytes 7 %    % Eosinophils 0 %    % Basophils 0 %    % Immature Granulocytes 2 %    NRBCs per 100 WBC 0 <1 /100    Absolute Neutrophils 12.8 (H) 1.6 - 8.3 10e3/uL    Absolute Lymphocytes 0.3 (L) 0.8 - 5.3 10e3/uL    Absolute Monocytes 1.1 0.0 - 1.3 10e3/uL    Absolute Eosinophils 0.0 0.0 - 0.7 10e3/uL    Absolute Basophils 0.0 0.0 - 0.2 10e3/uL    Absolute Immature Granulocytes 0.3 <=0.4 10e3/uL    Absolute NRBCs 0.0 10e3/uL   CBC with platelets differential     Status: None ()    Narrative    The following orders were created for panel order CBC with platelets differential.  Procedure                               Abnormality         Status                     ---------                               -----------         ------                     CBC with platelets and d...[[id number]]                                                   Please view results for these tests on the individual orders.   CBC with Platelets & Differential     Status: Abnormal    Narrative    The following orders were created for panel order CBC with Platelets & Differential.  Procedure                               Abnormality         Status                     ---------                               -----------         ------                     CBC with platelets and d...[[id number]]  Abnormal            Final result                 Please  view results for these tests on the individual orders.     Medications   lactated ringers infusion (0 mLs Intravenous Stopped 1/31/24 1005)   ondansetron (ZOFRAN) injection 4 mg (has no administration in time range)   sodium chloride (PF) 0.9% PF flush 10-20 mL (has no administration in time range)   heparin 100 unit/mL injection 5-10 mL (has no administration in time range)   heparin 100 unit/mL injection (has no administration in time range)   HYDROmorphone (DILAUDID) injection 1 mg (1 mg Intravenous $Given 1/31/24 0820)   dexAMETHasone PF (DECADRON) injection 10 mg (10 mg Intravenous $Given 1/31/24 0905)   HYDROmorphone (PF) (DILAUDID) injection 0.5 mg (0.5 mg Intravenous $Given 1/31/24 0909)     Labs Ordered and Resulted from Time of ED Arrival to Time of ED Departure   COMPREHENSIVE METABOLIC PANEL - Abnormal       Result Value    Sodium 127 (*)     Potassium 4.5      Carbon Dioxide (CO2) 27      Anion Gap 11      Urea Nitrogen 27.2 (*)     Creatinine 0.80      GFR Estimate >90      Calcium 8.7 (*)     Chloride 89 (*)     Glucose 109 (*)     Alkaline Phosphatase 161 (*)     AST 39      ALT 15      Protein Total 6.1 (*)     Albumin 3.1 (*)     Bilirubin Total 0.4     CBC WITH PLATELETS AND DIFFERENTIAL - Abnormal    WBC Count 14.4 (*)     RBC Count 3.75 (*)     Hemoglobin 11.7 (*)     Hematocrit 34.7 (*)     MCV 93      MCH 31.2      MCHC 33.7      RDW 12.7      Platelet Count 332      % Neutrophils 89      % Lymphocytes 2      % Monocytes 7      % Eosinophils 0      % Basophils 0      % Immature Granulocytes 2      NRBCs per 100 WBC 0      Absolute Neutrophils 12.8 (*)     Absolute Lymphocytes 0.3 (*)     Absolute Monocytes 1.1      Absolute Eosinophils 0.0      Absolute Basophils 0.0      Absolute Immature Granulocytes 0.3      Absolute NRBCs 0.0     LIPASE - Normal    Lipase 37     TSH - Normal    TSH 2.38     T4 FREE - Normal    Free T4 1.05     CBC WITH PLATELETS AND DIFFERENTIAL     No orders to display       Case was discussed with the patient's oncologist Dr. Buckley    Critical care was not performed.     Medical Decision Making  The patient's presentation was of high complexity (metastatic squamous cell cancer undergoing radiation therapy and on opiate pain management now with poorly controlled right hip pain and difficulty sleeping).    The patient's evaluation involved:  ordering and/or review of 3+ test(s) in this encounter (CBC, metabolic panel)  discussion of management or test interpretation with another health professional (oncology)    The patient's management necessitated high risk (a parenteral controlled substance).    Assessment & Plan    58-year-old male with widely metastatic squamous cell cancer here prior to his radiation therapy treatment to the right hip for pain management he is currently on oxycodone and long-acting morphine.  Here he had routine labs done he was given IV fluids and IV pain medication Dilaudid specifically with improvement of his symptoms he will be discharged from here to go right to radiation therapy.  He was also given a dose of Decadron and his oncologist is aware I do not think that he needs to return to the ED we are adjusting his pain regimen he is to add a 30 mg dose of MS Contin in the morning about 12 hours after he takes his evening dose.    I have reviewed the nursing notes. I have reviewed the findings, diagnosis, plan and need for follow up with the patient.    New Prescriptions    No medications on file       Final diagnoses:   Metastatic squamous cell carcinoma to bone (H)   Right hip pain   Insomnia, unspecified type       Devyn Mendosa MD  [facility] EMERGENCY DEPARTMENT  1/31/2024     Devyn Mendosa MD  01/31/24 1018       Devyn Mendosa MD  01/31/24 1018

## 2024-01-31 NOTE — ED TRIAGE NOTES
Pt arrives ambulatory to triage c/o worsening cancer related pain.  Hx bone cancer with mets.     Triage Assessment (Adult)       Row Name 01/31/24 0724          Triage Assessment    Airway WDL WDL        Respiratory WDL    Respiratory WDL WDL        Cardiac WDL    Cardiac WDL WDL        Peripheral/Neurovascular WDL    Peripheral Neurovascular WDL WDL        Cognitive/Neuro/Behavioral WDL    Cognitive/Neuro/Behavioral WDL WDL

## 2024-01-31 NOTE — PROGRESS NOTES
"[facility]: Cancer Care                                                                                          Phone call with Antonio to discuss pain management recommendations per Dr. Buckley.    Discussed Dr. Buckley's recommendations for celecoxib 100 mg BID. Antonio is agreeable to starting this.     Additionally discussed recommendations to start zometa. Antonio needs some time to think over this and agreed to a phone call tomorrow to discuss further.    Addendum 02/01/24 11:04 AM  Phone call with Jeremi. He says he will start the celecoxib today - he is getting it from the hospital discharge pharmacy.    He says that his pain last night \"wasn't too bad\", and he wants to \"keep with what we are doing right now\". We discussed the logistics and rational of zometa, and he wants to hold off for how. I encouraged him to reach out of he changes his mind about this. Also instructed him to reach out for worsening pain symptoms at all.    Mahogany Hillman, RN, BSN  RN Care Coordinator  [facility]    "

## 2024-01-31 NOTE — LETTER
2024         RE: Antonio Sharpe  [address] [place] [address]        Dear Colleague,    Thank you for referring your patient, Antonio Sharpe, to the [facility] RADIATION ONCOLOGY. Please see a copy of my visit note below.    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 Sharpe presents to clinic today for his weekly on-treatment visit.  Overall, he is doing well.  He has 3 out of 10 pain in his right hip, feels constipated, taking senna currently for constipation with occasional MiraLAX.  He did have a ED visit for pain in his right hip, they added 30 mg of MS Contin in the morning, in addition to his evening dose.  He otherwise feels well, no other acute complaints.    Nursing ROS:   Nutrition Alteration  Diet Type: Patient's Preference  Nutrition Note: Pt states he was struggling to eat due to the severity of his pain as well as the feeling of being very bloated/uncomfortable in the abdomen.  Skin  Skin Reaction: 0 - No changes        Cardiovascular  Respiratory effort: 1 - Normal - without distress  Gastrointestinal  Constipation NCI: 2 - Persistent symptoms  GI Note: Pt was taking senna BID but then due to having diarrhea he stopped and now has been constipated for 3 days and is very uncomfortable.  This RN recommended resuming senna BID and if diarrhea occurs again to only back off with one dose at first.     Psychosocial  Mood - Anxiety: 0 - Normal  Pain Assessment  0-10 Pain Scale: 3  Pain Descriptors: Constant  Pain Treatment: Pt received IV  dilaudid and decadron in the ED prior to his discharge.  His pain regimen now consists of decadron 4 mg daily, oxycodone 10-20 mg q4h PRN, and an additional long acting dose of morphine 30 mg has been added to take in the am as well as at bedtime.    Objective:   There were no vitals taken for this visit.   KPS 70  ECOG 2  Pain Score Mild Pain (3)/10  GEN: Appears well, alert, oriented  HEENT: EOMI, normal conjunctiva, notable left jawline mandible lesion, wound dry and nonpurulent/nondraining, no erythema.  NECK: Supple, full ROM, no visible cervical or clavicular lymphadenopathy  NEURO: No focal deficits, CN 3-12 grossly intact, utilizing wheelchair currently so gait not assessed    Treatment-related toxicities (CTCAE v5.0):  Fatigue: Grade 1: Fatigue relieved by rest  Nausea: Grade 0: No toxicity  Pain: Grade 1: Mild pain  Diarrhea: Grade 0: No toxicity  Dermatitis: Grade 0: No toxicity    Assessment:    Mr. 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     Plan:   Continue radiation therapy:  No significant toxicity at this time  6 weeks fup by video    Pain management:  Pain medications adjusted at recent ED visit, advised to continue MS Contin a.m. and p.m.    Dermatitis:  None    Constipation:  Advised to take senna and MiraLAX twice daily until next on treatment visit, will assess constipation at that time and uptitrate bowel regimen as needed.    Kobi Murguia MD MS PGY2     Physician Attestation   I, Courtney Torres, saw this patient and agree with the findings and plan of care as documented in the note.   I was present for key  portions of the history and physical exam.    I personally reviewed the available treatment setup images and vital signs listed.           Again, thank you for allowing me to participate in the care of your patient.      Sincerely,    Courtney Torres

## 2024-01-31 NOTE — PROGRESS NOTES
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 Sharpe presents to clinic today for his weekly on-treatment visit.  Overall, he is doing well.  He has 3 out of 10 pain in his right hip, feels constipated, taking senna currently for constipation with occasional MiraLAX.  He did have a ED visit for pain in his right hip, they added 30 mg of MS Contin in the morning, in addition to his evening dose.  He otherwise feels well, no other acute complaints.    Nursing ROS:   Nutrition Alteration  Diet Type: Patient's Preference  Nutrition Note: Pt states he was struggling to eat due to the severity of his pain as well as the feeling of being very bloated/uncomfortable in the abdomen.  Skin  Skin Reaction: 0 - No changes        Cardiovascular  Respiratory effort: 1 - Normal - without distress  Gastrointestinal  Constipation NCI: 2 - Persistent symptoms  GI Note: Pt was taking senna BID but then due to having diarrhea he stopped and now has been constipated for 3 days and is very uncomfortable.  This RN recommended resuming senna BID and if diarrhea occurs again to only back off with one dose at first.     Psychosocial  Mood - Anxiety: 0 - Normal  Pain Assessment  0-10 Pain Scale: 3  Pain Descriptors: Constant  Pain Treatment: Pt received IV dilaudid and decadron in the ED prior to his discharge.  His pain regimen now consists of decadron 4 mg daily, oxycodone 10-20 mg q4h PRN, and an additional long acting dose of morphine 30 mg has been added to take in the am as well as at bedtime.    Objective:    There were no vitals taken for this visit.   KPS 70  ECOG 2  Pain Score Mild Pain (3)/10  GEN: Appears well, alert, oriented  HEENT: EOMI, normal conjunctiva, notable left jawline mandible lesion, wound dry and nonpurulent/nondraining, no erythema.  NECK: Supple, full ROM, no visible cervical or clavicular lymphadenopathy  NEURO: No focal deficits, CN 3-12 grossly intact, utilizing wheelchair currently so gait not assessed    Treatment-related toxicities (CTCAE v5.0):  1. Fatigue: Grade 1: Fatigue relieved by rest  2. Nausea: Grade 0: No toxicity  3. Pain: Grade 1: Mild pain  4. Diarrhea: Grade 0: No toxicity  5. Dermatitis: Grade 0: No toxicity    Assessment:    Mr. 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     Plan:   Continue radiation therapy:    No significant toxicity at this time    6 weeks fup by video    Pain management:    Pain medications adjusted at recent ED visit, advised to continue MS Contin a.m. and p.m.    Dermatitis:    None    Constipation:  Advised to take senna and MiraLAX twice daily until next on treatment visit, will assess constipation at that time and uptitrate bowel regimen as needed.    Kobi Murguia MD MS PGY2     Physician Attestation   I, Courtney Torres, saw this patient and agree with the findings and plan of care as documented in the note.   I was present for key portions of the history and physical exam.    I personally reviewed the available treatment setup images and vital signs listed.     Courtney Torres MD MPH PhD    Department of Radiation Oncology

## 2024-01-31 NOTE — DISCHARGE INSTRUCTIONS
Go directly to radiation therapy  Dr. Buckley knows that you are here and suggests that you add   15 mg, (1 tablet), of the MS Contin 12 hours after you took the 2 tablet dose

## 2024-02-07 PROBLEM — R18.0 MALIGNANT ASCITES (H): Status: ACTIVE | Noted: 2024-01-01

## 2024-02-07 PROBLEM — E87.5 HYPERKALEMIA: Status: ACTIVE | Noted: 2024-01-01

## 2024-02-07 PROBLEM — N17.9 AKI (ACUTE KIDNEY INJURY) (H): Status: ACTIVE | Noted: 2024-01-01

## 2024-02-07 NOTE — MEDICATION SCRIBE - ADMISSION MEDICATION HISTORY
Medication Scribe Admission Medication History    Admission medication history is complete. The information provided in this note is only as accurate as the sources available at the time of the update.    Information Source(s): Patient and CareEverywhere/SureScripts via in-person    Pertinent Information: None    Changes made to PTA medication list:  Added: None  Deleted: Sennosides 8.6 mg, Miralax, Narcan,   Changed: None    Allergies reviewed with patient and updates made in EHR: yes    Medication History Completed By: Carrie Randolph 2/7/2024 3:53 PM    PTA Med List   Medication Sig Last Dose    acetaminophen (TYLENOL) 325 MG tablet Take 2 tablets (650 mg) by mouth every 4 hours as needed for mild pain or fever Past Week at unknown    celecoxib (CELEBREX) 100 MG capsule Take 1 capsule (100 mg) by mouth 2 times daily 2/7/2024 at am    chlorhexidine (PERIDEX) 0.12 % solution Swish and spit 10 mLs in mouth 4 times daily 2/7/2024 at am    dexAMETHasone (DECADRON) 4 MG tablet Take 1 tablet (4 mg) by mouth daily (with breakfast) 2/7/2024 at am    Lidocaine (LIDOCARE) 4 % Patch Place 2 patches onto the skin every 24 hours To prevent lidocaine toxicity, patient should be patch free for 12 hrs daily.     morphine (MS CONTIN) 30 MG CR tablet Take 1 tablet (30 mg) by mouth every 12 hours 2/7/2024 at am    oxyCODONE (ROXICODONE) 5 MG/5ML solution Take 10-20 mLs (10-20 mg) by mouth every 4 hours as needed for moderate pain 2/7/2024 at am    Wound Dressings (MEPILEX BORDER FLEX) PADS Externally apply 1 Application topically 4 times daily as needed

## 2024-02-07 NOTE — H&P
[facility]    History and Physical - Hospitalist Service, GOLD TEAM        Date of Admission:  2/7/2024    Assessment & Plan    Antonio Sharpe is a 58 year old male with past medical history SCC of the L mandible/alveolar ridge, and buccal mucosa s/p extensive L mandibular + maxillary surgery with adjuvant chemoradiation c/b extensive metastases (spine, R iliac, now with lesions in liver and spleen) admitted 2/7/24 with acute abdominal distention found to have large volume ascites (presumed malignant), R renal hydronephrosis with subsequent post renal KATHERINE c/b hyperkalemia    R renal hydronephrosis  Post renal KATHERINE c/b hyperkalemia and AGMA (uremia)  EKG w/o acute ST/QRS changes. Hydronephrosis could be 2/2 large volume ascites exerting pressure on ureters/bladder. Monitor Cr after paracentesis  - Shifted with insulin x 1, s/p azalia gluconate x 1  - Recheck K, if < 6.0 may trial lokelma  - Cr trend in AM    SCC of L mandible with metastases to R hip, spine, new evidence of liver, spleen and omental involvement s/p extensive H/N surgery and adjuvant chemoradiation  Acute ascites (likely malignant)  Cancer-related chronic pain  - Oncology consult (follows with Dr. Buckley); help with prognostication  - Palliative care consult; patient expressing that he wants to focus on comfort  - Pain control   - PTA PO morphine 10-20 mg q6h prn   - IV morphine 2 mg q4h prn (2nd line)   - PTA dexamethasone  - CAPS consult for paracentesis (5L removed, with significant improvement in pain)    Chronic hyponatremia   Likely multifactorial with SIADH + low protein intake  - CTM    Elevated alkaline phosphatase  Normal bili; AST elevation could be related to bone mets  - Trend        Diet:  Regular  DVT Prophylaxis: Enoxaparin (Lovenox) SQ  Salas Catheter: Not present  Fluids: No IVF  Lines: PRESENT             Cardiac Monitoring: Tele  Code Status:  DNR/DNI    Clinically Significant Risk  "Factors Present on Admission        # Hyperkalemia: Highest K = 6 mmol/L in last 2 days, will monitor as appropriate  # Hyponatremia: Lowest Na = 125 mmol/L in last 2 days, will monitor as appropriate      # Hypoalbuminemia: Lowest albumin = 3.3 g/dL at 2/7/2024 11:20 AM, will monitor as appropriate  # Coagulation Defect: INR = 2.12 (Ref range: 0.85 - 1.15) and/or PTT = 40 Seconds (Ref range: 22 - 38 Seconds), will monitor for bleeding   # Acute Kidney Injury, unspecified: based on a >150% or 0.3 mg/dL increase in last creatinine compared to past 90 day average, will monitor renal function       # Overweight: Estimated body mass index is 27.82 kg/m  as calculated from the following:    Height as of 1/23/24: 1.753 m (5' 9.02\").    Weight as of 1/23/24: 85.5 kg (188 lb 8 oz).              Disposition Plan      Expected Discharge Date: 02/11/2024                The patient's care was discussed with Dr Alex Gregory, [address]  Securely message with StudyCloud (more info)  Text page via [facility] Paging/Directory   See signed in provider for up to date coverage information  ______________________________________________________________________    Chief Complaint   Abdominal pain and distension    History of Present Illness   Antonio Sharpe is a 58 year old male with past medical history SCC of the L mandible/alveolar ridge, and buccal mucosa s/p extensive L mandibular + maxillary surgery with adjuvant chemoradiation c/b extensive metastases (spine, R iliac, now with lesions in liver and spleen) . He presented to the ED with acute abdominal pain and abdominal distension for the past week. He denies fever, chills, n/v but does have decreased appetite. He is currently getting radiation for his R hip SCC metastases    Patient expresses that he is \"over his cancer\". He would very much like to focus on quality of life and comfort and wants to stop " radiation or further chemotherapy.     History is obtained from the patient      Past Medical History    Past Medical History:   Diagnosis Date    Squamous cell carcinoma of mandible (H)     Testicular cancer (H)        Past Surgical History   Past Surgical History:   Procedure Laterality Date    BRONCHOSCOPY RIDID OR FLEXIBLE W/ENDOBRONCHIAL ULTRASOUND GUIDED 1 OR 2 NODE STATIONS N/A 7/19/2023    Procedure: BRONCHOSCOPY, WITH BIOPSY OF 1 OR 2 LYMPH NODE STATIONS WITH ENDOBRONCHIAL ULTRASOUND GUIDANCE;  Surgeon: Abbi Santos MD;  Location: [facility]    BRONCHOSCOPY RIDID OR FLEXIBLE W/ENDOBRONCHIAL ULTRASOUND GUIDED 1 OR 2 NODE STATIONS N/A 8/17/2023    Procedure: BRONCHOSCOPY, WITH BIOPSY OF 1 OR 2 LYMPH NODE STATIONS WITH ENDOBRONCHIAL ULTRASOUND GUIDANCE;  Surgeon: Chris Holm MD;  Location: [facility] OR       Prior to Admission Medications   Prior to Admission Medications   Prescriptions Last Dose Informant Patient Reported? Taking?   Lidocaine (LIDOCARE) 4 % Patch   No Yes   Sig: Place 2 patches onto the skin every 24 hours To prevent lidocaine toxicity, patient should be patch free for 12 hrs daily.   Wound Dressings (MEPILEX BORDER FLEX) PADS   No Yes   Sig: Externally apply 1 Application topically 4 times daily as needed   acetaminophen (TYLENOL) 325 MG tablet Past Week at unknown  No Yes   Sig: Take 2 tablets (650 mg) by mouth every 4 hours as needed for mild pain or fever   celecoxib (CELEBREX) 100 MG capsule 2/7/2024 at am  No Yes   Sig: Take 1 capsule (100 mg) by mouth 2 times daily   chlorhexidine (PERIDEX) 0.12 % solution 2/7/2024 at am  No Yes   Sig: Swish and spit 10 mLs in mouth 4 times daily   dexAMETHasone (DECADRON) 4 MG tablet 2/7/2024 at am  No Yes   Sig: Take 1 tablet (4 mg) by mouth daily (with breakfast)   morphine (MS CONTIN) 30 MG CR tablet 2/7/2024 at am  No Yes   Sig: Take 1 tablet (30 mg) by mouth every 12 hours   oxyCODONE (ROXICODONE) 5 MG/5ML solution 2/7/2024 at am  No Yes   Sig: Take  10-20 mLs (10-20 mg) by mouth every 4 hours as needed for moderate pain      Facility-Administered Medications: None        Physical Exam   Vital Signs: Temp: 98.9  F (37.2  C) Temp src: Oral BP: 133/78 Pulse: 87   Resp: 18 SpO2: 93 % O2 Device: None (Room air)    Weight: 0 lbs 0 oz    General: in mild/moderate distress, sitting in bed  HENT: no conjunctival icterus  Pulm: CTA, no rales/rhonchi  CV: RRR, no m/r/g  Abd: Soft, but distended. Non tender, no guarding, no rebound  Ext: B/l ankle edema  Neuro: alert and awake  Psych: NRIS      Medical Decision Making

## 2024-02-07 NOTE — PROCEDURES
[facility]  CAPS PROCEDURE NOTE  Date of Admission:  2/7/2024  Consult Requested by: Medicine  Reason for Consult: diagnostic evaluation of ascites and management of symptomatic ascites    Indication/HPI: ascites    Pre-Procedure Diagnosis: Ascites    Post-Procedure Diagnosis: Ascites    Risk Assessment: Average risk, Technically straightforward; patient's anticoagulation has been held according to guidelines based on the agent and platelets and coags are within guidelines    Procedure Outcome:  Diagnostic paracentesis performed and Therapeutic paracentesis performed with 5 liters of ascites removed.   See additional procedure details below.    The primary covering service should follow up and address any lab results as appropriate.    Colette Nance MD  [facility]  Securely message with Vocera (more info)  Text page via [employer] Paging/Directory   See signed in provider for up to date coverage information      [facility]    Paracentesis    Date/Time: 2/7/2024 5:26 PM    Performed by: Colette Nance MD  Authorized by: Colette Nance MD    Risks, benefits and alternatives discussed.      UNIVERSAL PROTOCOL   Site Marked: Yes  Prior Images Obtained and Reviewed:  Yes  Required items: Required blood products, implants, devices and special equipment available    Patient identity confirmed:  Verbally with patient and hospital-assigned identification number  NA - No sedation, light sedation, or local anesthesia  Confirmation Checklist:  Patient's identity using two indicators, relevant allergies, procedure was appropriate and matched the consent or emergent situation and correct equipment/implants were available  Time out: Immediately prior to the procedure a time out was called    Universal Protocol: the Joint Commission Universal Protocol was followed    Preparation:  Patient was prepped and draped in usual sterile fashion      PRE-PROCEDURE DETAILS     Procedure purpose:  Diagnostic    ANESTHESIA (see MAR for exact dosages):     Anesthesia method:  Local infiltration    Local anesthetic:  Lidocaine 1% w/o epi    PROCEDURE DETAILS     Needle gauge:  20    Ultrasound guidance: yes      Puncture site:  R lower quadrant    Fluid removed amount:  5000 mL    Fluid appearance:  Serosanguinous and clear    Dressing: glue.      PROCEDURE    Patient Tolerance:  Patient tolerated the procedure well with no immediate complications      POC US GUIDE FOR PARACENTESIS     Impression  US Indication: abdominal distension    Limited abdominal ultrasound was performed and demonstrated an adequate fluid collection on the right side of the abdomen.    Doppler of the skin demonstrated an area at this site without significant vasculature.  A paracentesis at this site was subsequently performed.    Colette Nance MD

## 2024-02-07 NOTE — ED TRIAGE NOTES
BIBA with constipation and abdominal distention. Per EMS was seen here in the ER and sent home with meds.     Hx cancer mets to bone, spleen and testicular     Triage Assessment (Adult)       Row Name 02/07/24 1058          Triage Assessment    Airway WDL WDL        Respiratory WDL    Respiratory WDL WDL        Skin Circulation/Temperature WDL    Skin Circulation/Temperature WDL WDL        Cardiac WDL    Cardiac WDL WDL        Peripheral/Neurovascular WDL    Peripheral Neurovascular WDL WDL        Cognitive/Neuro/Behavioral WDL    Cognitive/Neuro/Behavioral WDL WDL

## 2024-02-07 NOTE — ED PROVIDER NOTES
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 He reports having constipation with worsening abdominal pain and distention for about 10 days. He reports having no bowel movements and passing minimal flatus for the last week. There is nausea but no vomiting. No fevers. No change in urination. Denies history of SBO's.      Past Medical History  Past Medical History:   Diagnosis Date    Squamous cell carcinoma of mandible (H)     Testicular cancer (H)      Past Surgical History:   Procedure Laterality Date    BRONCHOSCOPY RIDID OR FLEXIBLE W/ENDOBRONCHIAL ULTRASOUND GUIDED 1 OR 2 NODE STATIONS N/A 7/19/2023    Procedure: BRONCHOSCOPY, WITH BIOPSY OF 1 OR 2 LYMPH NODE STATIONS WITH ENDOBRONCHIAL ULTRASOUND GUIDANCE;  Surgeon: Abbi Santos MD;  Location: [facility]    BRONCHOSCOPY RIDID OR FLEXIBLE W/ENDOBRONCHIAL ULTRASOUND GUIDED 1 OR 2 NODE STATIONS N/A 8/17/2023    Procedure: BRONCHOSCOPY, WITH BIOPSY OF 1 OR 2 LYMPH NODE STATIONS WITH ENDOBRONCHIAL ULTRASOUND GUIDANCE;  Surgeon: Chris Holm MD;  Location: [facility] OR     acetaminophen (TYLENOL) 325 MG tablet  celecoxib (CELEBREX) 100 MG capsule  chlorhexidine (PERIDEX) 0.12 % solution  dexAMETHasone (DECADRON) 4 MG tablet  Lidocaine (LIDOCARE) 4 % Patch  morphine (MS CONTIN) 30 MG CR tablet  naloxone (NARCAN) 4 MG/0.1ML nasal spray  oxyCODONE (ROXICODONE) 5 MG/5ML solution  polyethylene glycol (MIRALAX) 17 GM/Dose powder  sennosides (SENOKOT) 8.6 MG tablet  Wound Dressings (MEPILEX BORDER FLEX) PADS      No Known Allergies  Family History  No family history on file.  Social History   Social History      Tobacco Use    Smoking status: Every Day     Packs/day: 1     Types: Cigarettes     Start date: 1976    Smokeless tobacco: Never   Substance Use Topics    Alcohol use: Yes     Comment: case a week    Drug use: Not Currently         A medically appropriate review of systems was performed with pertinent positives and negatives noted in the HPI, and all other systems negative.    Physical Exam   BP: (!) 182/78  Pulse: 59  Temp: 98.9  F (37.2  C)  Resp: 18  SpO2: 99 %  Physical Exam  Constitutional:       General: He is not in acute distress.     Appearance: He is not toxic-appearing.      Comments: Chronically ill appearing   HENT:      Head: Atraumatic.      Comments: Part of the jaw is removed from prior surgery     Nose: Nose normal.      Mouth/Throat:      Mouth: Mucous membranes are moist.      Pharynx: Oropharynx is clear.   Cardiovascular:      Rate and Rhythm: Normal rate and regular rhythm.   Pulmonary:      Effort: Pulmonary effort is normal. No respiratory distress.      Breath sounds: No wheezing.   Abdominal:      General: There is distension.      Palpations: Abdomen is soft.      Tenderness: There is abdominal tenderness. There is no guarding or rebound.   Musculoskeletal:         General: Normal range of motion.   Skin:     General: Skin is warm and dry.   Neurological:      General: No focal deficit present.      Mental Status: He is alert and oriented to person, place, and time.           ED Course, Procedures, & Data      Procedures                   Results for orders placed or performed during the hospital encounter of 02/07/24   CT Abdomen Pelvis w Contrast     Status: None (Preliminary result)    Impression    RESIDENT PRELIMINARY INTERPRETATION  IMPRESSION:   1. No evidence of small or large bowel obstruction.  2. Large-volume ascites throughout the abdomen and pelvis causing  centralization crowding of abdominal and pelvic structures.  3. Multiple new and enlarging hypoattenuating hepatic,  splenic, and  lytic osseous lesions consistent with diffuse worsening squamous cell  carcinoma metastases. Peritoneal implants are somewhat difficult to  evaluate due to the extensive ascites.  4. Mild residual right hydronephrosis. Decreased from the previous  examination.  5. Diffuse anasarca.  6. Fusiform dilation of the infrarenal abdominal aorta up to 3.1 cm.   Comprehensive metabolic panel     Status: Abnormal   Result Value Ref Range    Sodium 125 (L) 135 - 145 mmol/L    Potassium 6.0 (H) 3.4 - 5.3 mmol/L    Carbon Dioxide (CO2) 22 22 - 29 mmol/L    Anion Gap 16 (H) 7 - 15 mmol/L    Urea Nitrogen 73.6 (H) 8.0 - 23.0 mg/dL    Creatinine 1.65 (H) 0.67 - 1.17 mg/dL    GFR Estimate 47 (L) >60 mL/min/1.73m2    Calcium 8.6 (L) 8.8 - 10.2 mg/dL    Chloride 87 (L) 98 - 107 mmol/L    Glucose 125 (H) 70 - 99 mg/dL    Alkaline Phosphatase 165 (H) 40 - 150 U/L    AST 56 (H) 0 - 45 U/L    ALT 22 0 - 70 U/L    Protein Total 6.7 6.4 - 8.3 g/dL    Albumin 3.3 (L) 3.5 - 5.2 g/dL    Bilirubin Total 1.2 <=1.2 mg/dL   Lipase     Status: Normal   Result Value Ref Range    Lipase 34 13 - 60 U/L   CBC with platelets and differential     Status: Abnormal   Result Value Ref Range    WBC Count 14.5 (H) 4.0 - 11.0 10e3/uL    RBC Count 4.44 4.40 - 5.90 10e6/uL    Hemoglobin 13.8 13.3 - 17.7 g/dL    Hematocrit 41.3 40.0 - 53.0 %    MCV 93 78 - 100 fL    MCH 31.1 26.5 - 33.0 pg    MCHC 33.4 31.5 - 36.5 g/dL    RDW 13.2 10.0 - 15.0 %    Platelet Count 262 150 - 450 10e3/uL    % Neutrophils 92 %    % Lymphocytes 1 %    % Monocytes 5 %    % Eosinophils 0 %    % Basophils 0 %    % Immature Granulocytes 2 %    NRBCs per 100 WBC 0 <1 /100    Absolute Neutrophils 13.4 (H) 1.6 - 8.3 10e3/uL    Absolute Lymphocytes 0.1 (L) 0.8 - 5.3 10e3/uL    Absolute Monocytes 0.8 0.0 - 1.3 10e3/uL    Absolute Eosinophils 0.0 0.0 - 0.7 10e3/uL    Absolute Basophils 0.0 0.0 - 0.2 10e3/uL    Absolute Immature Granulocytes 0.2 <=0.4 10e3/uL    Absolute NRBCs 0.0  10e3/uL   Partial thromboplastin time     Status: Abnormal   Result Value Ref Range    aPTT 40 (H) 22 - 38 Seconds   INR     Status: Abnormal   Result Value Ref Range    INR 2.12 (H) 0.85 - 1.15   Potassium     Status: Abnormal   Result Value Ref Range    Potassium 5.8 (H) 3.4 - 5.3 mmol/L   Glucose by meter     Status: Abnormal   Result Value Ref Range    GLUCOSE BY METER POCT 113 (H) 70 - 99 mg/dL   Adult Type and Screen     Status: None   Result Value Ref Range    ABO/RH(D) A POS     Antibody Screen Negative Negative    SPECIMEN EXPIRATION DATE [id number]    CBC with platelets differential     Status: Abnormal    Narrative    The following orders were created for panel order CBC with platelets differential.  Procedure                               Abnormality         Status                     ---------                               -----------         ------                     CBC with platelets and d...[[id number]]  Abnormal            Final result                 Please view results for these tests on the individual orders.   ABO/Rh type and screen     Status: None    Narrative    The following orders were created for panel order ABO/Rh type and screen.  Procedure                               Abnormality         Status                     ---------                               -----------         ------                     Adult Type and Screen[[id number]]                            Final result                 Please view results for these tests on the individual orders.     Medications   glucose gel 15-30 g (has no administration in time range)     Or   dextrose 50 % injection 25-50 mL (has no administration in time range)     Or   glucagon injection 1 mg (has no administration in time range)   dextrose 10% BOLUS 300 mL (300 mLs Intravenous $New Bag 2/7/24 1400)   albumin human 25 % injection 25-50 g (has no administration in time range)   sodium chloride 0.9% BOLUS 500 mL (0 mLs Intravenous Stopped  2/7/24 1239)   HYDROmorphone (PF) (DILAUDID) injection 0.5 mg (0.5 mg Intravenous $Given 2/7/24 1126)   ondansetron (ZOFRAN) injection 4 mg (4 mg Intravenous $Given 2/7/24 1125)   iopamidol (ISOVUE-370) solution 115 mL (115 mLs Intravenous $Given 2/7/24 1236)   sodium chloride (PF) 0.9% PF flush 79 mL (79 mLs Intravenous $Given 2/7/24 1236)   dextrose 50 % injection 25 g (25 g Intravenous $Given 2/7/24 1402)   insulin regular 1 unit/mL injection 8.6 Units (8.6 Units Intravenous $Given 2/7/24 1402)   calcium gluconate 1 g in 50 mL in sodium chloride intermittent infusion (1 g Intravenous $Given 2/7/24 1401)     Labs Ordered and Resulted from Time of ED Arrival to Time of ED Departure   COMPREHENSIVE METABOLIC PANEL - Abnormal       Result Value    Sodium 125 (*)     Potassium 6.0 (*)     Carbon Dioxide (CO2) 22      Anion Gap 16 (*)     Urea Nitrogen 73.6 (*)     Creatinine 1.65 (*)     GFR Estimate 47 (*)     Calcium 8.6 (*)     Chloride 87 (*)     Glucose 125 (*)     Alkaline Phosphatase 165 (*)     AST 56 (*)     ALT 22      Protein Total 6.7      Albumin 3.3 (*)     Bilirubin Total 1.2     CBC WITH PLATELETS AND DIFFERENTIAL - Abnormal    WBC Count 14.5 (*)     RBC Count 4.44      Hemoglobin 13.8      Hematocrit 41.3      MCV 93      MCH 31.1      MCHC 33.4      RDW 13.2      Platelet Count 262      % Neutrophils 92      % Lymphocytes 1      % Monocytes 5      % Eosinophils 0      % Basophils 0      % Immature Granulocytes 2      NRBCs per 100 WBC 0      Absolute Neutrophils 13.4 (*)     Absolute Lymphocytes 0.1 (*)     Absolute Monocytes 0.8      Absolute Eosinophils 0.0      Absolute Basophils 0.0      Absolute Immature Granulocytes 0.2      Absolute NRBCs 0.0     PARTIAL THROMBOPLASTIN TIME - Abnormal    aPTT 40 (*)    INR - Abnormal    INR 2.12 (*)    POTASSIUM - Abnormal    Potassium 5.8 (*)    GLUCOSE BY METER - Abnormal    GLUCOSE BY METER POCT 113 (*)    LIPASE - Normal    Lipase 34     POTASSIUM    GLUCOSE MONITOR NURSING POCT   ALBUMIN FLUID   PROTEIN FLUID   FIBRINOGEN ACTIVITY   TYPE AND SCREEN, ADULT    ABO/RH(D) A POS      Antibody Screen Negative      SPECIMEN EXPIRATION DATE [id number]     AEROBIC BACTERIAL CULTURE ROUTINE   ABO/RH TYPE AND SCREEN   CELL COUNT WITH DIFFERENTIAL FLUID     CT Abdomen Pelvis w Contrast   Preliminary Result   RESIDENT PRELIMINARY INTERPRETATION   IMPRESSION:    1. No evidence of small or large bowel obstruction.   2. Large-volume ascites throughout the abdomen and pelvis causing   centralization crowding of abdominal and pelvic structures.   3. Multiple new and enlarging hypoattenuating hepatic, splenic, and   lytic osseous lesions consistent with diffuse worsening squamous cell   carcinoma metastases. Peritoneal implants are somewhat difficult to   evaluate due to the extensive ascites.   4. Mild residual right hydronephrosis. Decreased from the previous   examination.   5. Diffuse anasarca.   6. Fusiform dilation of the infrarenal abdominal aorta up to 3.1 cm.      POC US GUIDE FOR PARACENTESIS    (Results Pending)   POC US Guide for Paracentesis    (Results Pending)          Critical care was not performed.     Medical Decision Making  The patient's presentation was of high complexity (an acute health issue posing potential threat to life or bodily function).    The patient's evaluation involved:  review of external note(s) from 3+ sources (radiation oncology note, ENT note, recent discharge summary)  review of 3+ test result(s) ordered prior to this encounter (CBC, CMP, prior CT scan)  ordering and/or review of 3+ test(s) in this encounter (see separate area of note for details)  discussion of management or test interpretation with another health professional (see separate area of note for details)    The patient's management necessitated high risk (a decision regarding hospitalization).    Assessment & Plan    This is a 58-year-old male with history of metastatic  squamous cell carcinoma presenting with constipation, abdominal distention.  He was afebrile here.  Exam showed some nonfocal abdominal tenderness without peritoneal signs.  Given his presenting symptoms of concern for possible small bowel obstruction versus other acute intra-abdominal process.  I ordered blood work as well as a CT.  He was treated with some fluids and Dilaudid.    CBC showed slight leukocytosis of 14.5 which is stable on review of the EMR.  CMP showed hyperkalemia with significant KATHERINE.  Creatinine was 1 now 1.65 up from approximately 0.8.  I ordered hyperkalemia protocol to shift this.    CT showed no signs of SBO but did show ascites as well as worsening metastatic disease.  Increased liver metastases and liver dysfunction most likely contributing to his ascites.  I placed a CAPS team consult as well for a therapeutic and diagnositc paracentesis.    Given these findings, need for further management of his hyperkalemia and KATHERINE I recommend that he be admitted to the hospital which she was agreeable to.  I discussed the patient with the hospitalist and he is accepted to the medicine service.          I have reviewed the nursing notes. I have reviewed the findings, diagnosis, plan and need for follow up with the patient.    New Prescriptions    No medications on file       Final diagnoses:   Hyperkalemia   KATHERINE (acute kidney injury) (H24)   Malignant ascites (H28)         [facility] EMERGENCY DEPARTMENT  2/7/2024     Krystian Dave MD  02/07/24 9205

## 2024-02-07 NOTE — TELEPHONE ENCOUNTER
"  Nurse Triage SBAR    Is this a 2nd Level Triage? YES, LICENSED PRACTITIONER REVIEW IS REQUIRED    Situation: Pain intolerable with medications, not eating    Background: History of widely metastatic squamous cell cancer. Wife Uma calling in. Patient was unable to get chemo on 2/6 due to how poorly he was feeling. He is able to use restroom and walk some but is in extreme pain. He has not eaten in one week. He is drinking water. Sleeps some at night.   Wife states pain is widespread, rates 10/10 with medication on board. Stomach is bloated.   He is taking morphine every 12 hours, oxycodone every 4 hours, Ibuprofen once a day for inflammation.     Assessment: Pain related cancer    Protocol Recommended Disposition:   Call  Now    Recommendation: I advised wife Uma to call 911 and ask them to transport him to the ED for evaluation. Please call her at [phone] to arrange future appointments. There is a referral in chart.      Routed to provider/team  [facility] PAIN ADULT [facility]  P [facility] ONCOLOGY ADULT [facility]            Xin Garcia RN  [facility] Red Flag Triage/MRT       Reason for Disposition   Sounds like a life-threatening emergency to the triager    Additional Information   Negative: Shock suspected (e.g., cold/pale/clammy skin, too weak to stand, low BP, rapid pulse)   Negative: Passed out (i.e., fainted, collapsed and was not responding)    Answer Assessment - Initial Assessment Questions  1. ONSET: \"When did the pain begin?\"       ongoing  2. LOCATION: \"Where does it hurt?\" (upper, mid or lower back)      widespread  3. SEVERITY: \"How bad is the pain?\"  (e.g., Scale 1-10; mild, moderate, or severe)    - MILD (1-3): Doesn't interfere with normal activities.     - MODERATE (4-7): Interferes with normal activities or awakens from sleep.     - SEVERE (8-10): Excruciating pain, unable to do any normal activities.       10/10  4. PATTERN: \"Is the pain constant?\" (e.g., yes, no; constant, intermittent)       " "constant  5. RADIATION: \"Does the pain shoot into your legs or somewhere else?\"      widespread  6. CAUSE:  \"What do you think is causing the back pain?\"       cancer  7. BACK OVERUSE:  \"Any recent lifting of heavy objects, strenuous work or exercise?\"      na  8. MEDICINES: \"What have you taken so far for the pain?\" (e.g., nothing, acetaminophen, NSAIDS)      All prescribed  9. NEUROLOGIC SYMPTOMS: \"Do you have any weakness, numbness, or problems with bowel/bladder control?\"      weakness  10. OTHER SYMPTOMS: \"Do you have any other symptoms?\" (e.g., fever, abdomen pain, burning with urination, blood in urine)        Not eating  11. PREGNANCY: \"Is there any chance you are pregnant?\" \"When was your last menstrual period?\"        na    Protocols used: Back Pain-A-[place]    "

## 2024-02-08 NOTE — PLAN OF CARE
"/72 (BP Location: Right arm)   Pulse 96   Temp 97.5  F (36.4  C) (Axillary)   Resp 18   Ht 1.725 m (5' 7.91\")   Wt 72.9 kg (160 lb 11.2 oz)   SpO2 93%   BMI 24.50 kg/m      AVSS on RA. A&O x4. Up independently. Tele was discontinued, NSR to ST prior to removal. Complains of pain in right hip and thigh, abdomen, and back. Scheduled pain management modified by palliative to include toradol, tylenol, and a fentanyl patch which is present on his right upper arm. Received iv morphine x3 for breakthrough pain. Complained of nausea and heartburn but declined intervention. PIV was removed and port was accessed. Still no bm for ~7 days, started lactulose. Patient reports worsening dysphagia for the last 1-2 months. Pills switch to iv and oral solutions. Patient wishes to discharge to home with hospice care in the next couple of days. Continue with POC    Problem: Pain Acute  Goal: Optimal Pain Control and Function  Outcome: Not Progressing  Intervention: Develop Pain Management Plan  Recent Flowsheet Documentation  Taken 2/8/2024 0815 by Leoncio Vázquez, RN  Pain Management Interventions:   medication offered but refused   repositioned  Intervention: Prevent or Manage Pain  Recent Flowsheet Documentation  Taken 2/8/2024 1210 by Leoncio Vázquez, RN  Medication Review/Management: medications reviewed  Taken 2/8/2024 0815 by Leoncio Vázquez, RN  Medication Review/Management: medications reviewed     "

## 2024-02-08 NOTE — PROGRESS NOTES
[facility]    Progress Note - Medicine Service, TETO TEAM 4       Date of Admission:  2/7/2024    Assessment & Plan   Antonio Sharpe is a 58 year old male with past medical history of SCC of the L mandible/alveolar ridge, and buccal mucosa s/p extensive L mandibular + maxillary surgery with adjuvant chemoradiation c/b extensive metastases (spine, R iliac, now with new lesions in liver and spleen) admitted 2/7/24 with acute abdominal distention 2/2 malignant ascites s/p large volume paracentesis (27/24), R renal hydronephrosis with subsequent post renal KATHERINE c/b hyperkalemia. Patient has expressed that he wants to pursue home hospice.     SCC of L mandible with metastases to R hip, spine, new evidence of liver, spleen and omental involvement s/p extensive H/N surgery and adjuvant chemoradiation  Acute malignant ascites  Cancer-related chronic pain  Patient has repeatedly and strongly expressed his desire to pursue home hospice. Paracentesis on 2/7/24 with 5L removed, with significant improvement in pain  - Oncology and palliative care following  -  consult for home hospice  - Pain control                 - PTA PO morphine 10-20 mg q6h prn                 - IV morphine 2 mg q4h prn (2nd line)                 - PTA dexamethasone   - Further pain control per pall care  - Depending on the rate of re-accumulation and in context of hospice, consider pleurx catheter for further management of ascites  - Consider port removal     R renal hydronephrosis  Post renal KATHERINE c/b hyperkalemia and AGMA (uremia) - improving  EKG w/o acute ST/QRS changes. Hydronephrosis could have been 2/2 large volume ascites exerting pressure on ureters/bladder.  Shifted with insulin x 1, s/p azalia gluconate x 1 and 1x lokelma  - Recheck K    Chronic hyponatremia   Likely multifactorial with SIADH + low protein intake  - CTM     Elevated alkaline phosphatase  Normal bili; AST elevation could be  related to bone mets  - Trend        Diet: Combination Diet Renal Diet (non-dialysis)  Snacks/Supplements Adult: Ensure Enlive; With Meals    DVT Prophylaxis: will discontinue pharmacologic ppx due to pursing hospice  Salas Catheter: Not present  Fluids: Diet as above  Lines: PRESENT      Port A Cath Single 04/12/22 Right Chest wall-Site Assessment: WDL      Cardiac Monitoring: None  Code Status: No CPR- Do NOT Intubate      Clinically Significant Risk Factors Present on Admission        # Hyperkalemia: Highest K = 6 mmol/L in last 2 days, will monitor as appropriate  # Hyponatremia: Lowest Na = 125 mmol/L in last 2 days, will monitor as appropriate      # Hypoalbuminemia: Lowest albumin = 3 g/dL at 2/8/2024  5:40 AM, will monitor as appropriate  # Coagulation Defect: INR = 2.12 (Ref range: 0.85 - 1.15) and/or PTT = 40 Seconds (Ref range: 22 - 38 Seconds), will monitor for bleeding   # Acute Kidney Injury, unspecified: based on a >150% or 0.3 mg/dL increase in last creatinine compared to past 90 day average, will monitor renal function                  Disposition Plan     Expected Discharge Date: 02/11/2024                Staffed with Dr. Alex Gregory, DO  Medicine Service, [facility] TEAM [address] [place] [address]  Securely message with [employer] (more info)  Text page via [facility] Paging/Directory   See signed in provider for up to date coverage information  ______________________________________________________________________    Interval History   NAEO. He complains of mild chronic dysphagia. Otherwise clearly expressing pursuing hospice.     Physical Exam   Vital Signs: Temp: 97.5  F (36.4  C) Temp src: Axillary BP: 110/72 Pulse: 96   Resp: 18 SpO2: 93 % O2 Device: None (Room air)    Weight: 160 lbs 11.2 oz    General: in mild/moderate distress, sitting in bed  HENT: no conjunctival icterus  Pulm: CTA, no rales/rhonchi  CV: RRR, no m/r/g  Abd: Soft, but distended. Non  tender, no guarding, no rebound  Ext: B/l ankle edema  Neuro: alert and awake  Psych: NRIS

## 2024-02-08 NOTE — PLAN OF CARE
Goal Outcome Evaluation:   Pt ember go home with hospice or home hospice agency information to call at a later time.

## 2024-02-08 NOTE — PROGRESS NOTES
Davida Sepulveda team  Pt doesn't have a diet order and wants to eat. Could you put diet order. Thanks

## 2024-02-08 NOTE — PLAN OF CARE
Hours of care: [id number]    Neuro: A&Ox4.   Cardiac: Afebrile, VSS, On tele SR/ST (hr 90s-100s)   Respiratory: RA, lung sounds clear, denies SOB  GI/: Voiding spontaneously. No BM this shift.  Diet/appetite: Tolerating renal diet, appetite is poor . Denies nausea.   Activity: Up independently     Pain: C/o back pain, on scheduled PO morphine. Gave 1 dose PRN IV morphine   Skin: No new deficits noted.  Lines: R PIV, SL  Drains: none  Replacements: none given    Plan: Continue with current POC. Report changes to primary team.            Goal Outcome Evaluation:      Plan of Care Reviewed With: patient    Overall Patient Progress: no changeOverall Patient Progress: no change

## 2024-02-08 NOTE — CONSULTS
Palliative Care Consultation Note  [facility]      Patient: Antonio Sharpe  Date of Admission:  2/7/2024    Requesting Clinician / Team: Oncology  Reason for consult: Symptom management     Recommendations & Counseling     GOALS OF CARE:   Comfort focused  Plan to go home on hospice.   Discussed hospice with patient/wife today, including goals for the next few days to establish a good pain regimen for Antonio that they can continue at home.  Patient has had a prolonged hx with his cancer. However, they were hoping for more time and his decline was rather abrupt for them.     ADVANCE CARE PLANNING:  No health care directive on file. Per  informed consent policy, next of kin should be involved if patient becomes unable.  There is no POLST form on file, defer to patient and/or next of kin for decisions   Code status: No CPR- Do NOT Intubate    MEDICAL MANAGEMENT:   #Pain,chronic   Started opioids 1/12/2024 hospitalization for worsening lower back pain and right hip pain. Abd pain likely becoming worse in the setting of disease progression, ascites, new hepatic, splenic, osseous and body wall metastases.   Tylenol 1g TID scheduled liquid (done for you)   Toradol 15mg every 8 hours- will schedule 2 doses today/this evening, caution with kidney fxn/electrolytes, and use of steroid (done for you)   Consider scheduled Celebrex 200mg BID at discharge if patient able to tolerate swallowing.   Start Fentanyl 25mcg patch every 72 hours this afternoon   Stopped MS contin (struggling with swallowing)- last dose received 2/7 AM  Therapeutic blood levels with fentanyl patches are not reached for 13-24 hours after patch application - patient may need more breakthrough opioids between 8pm-2am tonight.   Narcan ordered prn.   Morphine IV 2mg every 4 hours prn - discussed trying to use PO as much as possible in the upcoming days since that is what he will go home with likely.   May need  to consider changing to dilaudid depending on kidney function  Oxycodone solution 10-20mg PO q4hrs PRN (done for you)   Continue Decadron 4mg daily AM - changed to oral solution per patient request today (done for you)   Pleurx to help manage symptoms related to fluid accumulation - Discussed today and patient/wife in agreement that this would be something they are interested in.     #Opioid induced constipation   last BM ~7 days ago. Was doing miralax/senna at home - hard to swallow and not working as well recently.   Start lactulose 10mg BID (done for you)   Can increase up to 40mg/day as needed.     #Nausea  Worsening nausea over the last week, likely from constipation vs progression of disease (ascites, increased pressure in abd, new mets).   Continue Decadron 4mg daily   Start Zyprexa 2.5mg solution daily bedtime for nausea (done for you). Qtc 2/7/2024= 450  Could increase to 5mg daily if tolerated.   Prochlorperazine 10mg every 6 hours as needed (1st choice given constipation) (done for you)   Zofran 4mg every 6 hours as needed (done for you)      PSYCHOSOCIAL/SPIRITUAL SUPPORT:  Family wife at bedside, very involved in care. Reports having a strong support system.     Palliative Care will continue to follow. Thank you for the consult and allowing us to aid in the care of Antonio Sharpe.    These recommendations have been discussed with oncology.    YRN Nath CNP  Securely message with [employer] (more info)  Text page via [facility] Paging/Directory     Assessment      Antonio Sharpe is a 58 year old male with past medical history SCC of the L mandible/alveolar ridge, and buccal mucosa s/p extensive L mandibular + maxillary surgery with adjuvant chemoradiation c/b extensive metastases (spine, R iliac, now with lesions in liver and spleen) admitted 2/7/24 with acute abdominal distention found to have large volume ascites (presumed malignant), R renal hydronephrosis with subsequent post renal KAHTERINE c/b  hyperkalemia     2/7 had paracentesis (5L removed with significant improvement in pain).  Oncology reports the fluid is already reflecting.  1/23 oncology visit sent home with MS contin 30mg BID, oxycodone 10-20mg every 4 hours.     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   I introduced our role as an extra layer of support and how we help patients and families dealing with serious, potentially life-limiting illnesses. I explained the composition of the palliative care team.  Palliative care helps patients and families navigate their care while focusing on the whole person; providing emotional, social and spiritual support  Palliative care often assists with symptom management, information sharing about what to expect from the illness, available treatment options and what effect those options may have on the disease course, and provide effective communication and caring support.    Patient reports that he is ready to be done with treatments.  They have elected to go home with hospice in the upcoming days.  They deny any questions regarding hospice, seem to have a clear understanding of this.  Reports he is somewhat resistant to getting a tube placed for the fluid collecting in his abdomen.  States he does not want extra tubes and drains to deal with at home.  His biggest worry is pain control.     Pain: Patient reports that his pain worsening over the last week.  Stating that he could walk 1 week ago, now is unable to walk without significant pain.  He also notes more difficulty in all ADLs.  His pain continues to be in his right hip, low back, and generalized abdomen area.  Pain is sharp, consistent.  At home he was taking Celebrex, although it was hard to swallow at the end.  He does feel that this  "helped some with the pain.  He was also taking his MS Contin twice daily and his oxycodone 20 mg every 4 hours consistently.  His wife notes that he was sleeping well, did not typically need the as needed oxycodone overnight.  Throughout the day, he was taking this every 4 hours.  Reports that this dose controlled his pain well, did not wear off when he got close to the 4-hour alfonzo.    Nausea: Notes he has not struggled with nausea up until around a week ago.  He has not tried anything for this.  He reports that when they remove the fluid, it did help some.  However this morning he still has some mild nausea.    Constipation: Reports he has not had a bowel movement for 1 week.  He was taking his stool softeners at home, senna and MiraLAX.  Reports that the MiraLAX made him gag, was difficult to get down.  He has not taken any of these since being admitted.    Prognosis, Goals, & Planning:    Functional Status just prior to this current hospitalization:  ECOG3 (Capable of only limited self-care; needs help with ADLs; in bed/chair >50% of waking hours)    Prognosis, Goals, and/or Advance Care Planning:  Education provided regarding hospice philosophy, prognostic,and eligibility criteria. Discussed what services are provided and those that are not,  Discussed common misconceptions. We explored the various disposition options where they can receive hospice care (home, residential hospice homes, LTC with hospice) including subsequent financial and familial implications. Discussed typical anticipated timing of discharge.  Per IM note, \"Patient expresses that he is \"over his cancer\". He would very much like to focus on quality of life and comfort and wants to stop radiation or further chemotherapy.\"  1/18/202 oncology note states goal is to control cancer, not cure.     Code Status was addressed today:   No      Patient's decision making preferences: shared with support from loved ones        Patient has decision-making " capacity today for complex decisions:Intact            Coping, Meaning, & Spirituality:   Mood, coping, and/or meaning in the context of serious illness were addressed today: Yes    Social:   Living situation:lives with significant other/spouse  Occupation: Retired about 2 years ago, now feels his days by fixing up their house, painting the inside of the house and in the summer they go up to their cabin and are very active  Areas of fulfillment/delfin: Being active  3 adult sons who live in the area and 5 grandchildren    Medications:  I have reviewed this patient's medication profile and medications from this hospitalization. Notable medications:    Tylenol 650mg every 4 hours prn - has not received.   Decadron - pt refused this AM  Dilaudid 0.5mg - 1x dose ED  MS contin 30mg BID   Morphine 2mg every 4 hours prn - has received 4x since 4pm yesterday     ROS:  Comprehensive ROS is reviewed and is negative except as here & per HPI:     Physical Exam   Vital Signs with Ranges  Temp:  [97.7  F (36.5  C)-98.9  F (37.2  C)] 97.8  F (36.6  C)  Pulse:  [] 101  Resp:  [16-18] 16  BP: (101-182)/(78-96) 124/78  SpO2:  [92 %-100 %] 94 %  160 lbs 11.2 oz    PHYSICAL EXAM:  Constitutional: no distress and cachectic appearing  Psychiatric: mentation appears normal and affect normal/bright  Abdomen: positive findings: distended    Data reviewed:  Recent Labs   Lab 02/08/24  0540 02/07/24  2117 02/07/24  1811 02/07/24  1718 02/07/24  1320 02/07/24  1314 02/07/24  1120   WBC 11.2*  --   --   --   --   --  14.5*   HGB 13.5  --   --   --   --   --  13.8   MCV 92  --   --   --   --   --  93     --   --   --   --   --  262   INR  --   --   --   --   --  2.12*  --    *  127*  --   --   --   --   --  125*   POTASSIUM 5.3  5.3  --   --  5.5*  --  5.8* 6.0*  6.0*   CHLORIDE 89*  89*  --   --   --   --   --  87*   CO2 25  25  --   --   --   --   --  22   BUN 67.3*  --   --   --   --   --  73.6*   CR 1.35*  --   --  1.51*   --   --  1.65*   ANIONGAP 13  13  --   --   --   --   --  16*   PRINCE 8.4*  --   --   --   --   --  8.6*   * 108* 140*  --    < >  --  125*   ALBUMIN 3.0*  --   --   --   --   --  3.3*   PROTTOTAL 6.2*  --   --   --   --   --  6.7   BILITOTAL 1.0  --   --   --   --   --  1.2   ALKPHOS 145  --   --   --   --   --  165*   ALT 20  --   --   --   --   --  22   AST 56*  --   --   --   --   --  56*   LIPASE  --   --   --   --   --   --  34    < > = values in this interval not displayed.     CT A/P w/contrast 2/7/2024 3357  IMPRESSION:   1. No evidence of small or large bowel obstruction.   2. Large-volume ascites throughout the abdomen and pelvis causing   centralization of bowel loops. Ascites is likely malignant given the   peritoneal thickening and increased omental caking.   3. Worsening metastatic disease with multiple new and enlarging   hypoattenuating hepatic, splenic, osseous and body wall metastases.   4. Mild residual right hydronephrosis.   5. Diffuse anasarca.   6. Fusiform dilation of the infrarenal abdominal aorta up to 3.1 cm.     Medical Decision Making       MANAGEMENT DISCUSSED with the following over the past 24 hours: oncology, nursing   NOTE(S)/MEDICAL RECORDS REVIEWED over the past 24 hours: nursing notes, oncology, IM  - EKG tracing showing Qtc 450  Medical complexity over the past 24 hours:  - Prescription DRUG MANAGEMENT performed  80 MINUTES SPENT BY ME on the date of service doing chart review, history, exam, documentation & further activities per the note.

## 2024-02-08 NOTE — CONSULTS
Medical Oncology  Consult Note   Date of Service: 02/08/2024    Patient: Antonio Sharpe  MRN: [id number]  Admission Date: 2/7/2024  Hospital Day # 1    Reason for Consult: Pt with metastatic SCC of the oral cavity, follows with Ina. Wanting help on prognostication.     HISTORY OF PRESENTING ILLNESS:      Antonio Sharpe is a 58-year-old man with SCC L mandible/alveolar ridge and buccal mucosa who presents to ED with abdominal distension.    Symptoms have been present for the past several days and are most notable for abdominal distension and pain. He presented to the ED 01/31 for these symptoms and was discharged to continue radiotherapy. He represents now with abdominal pain and distension. CT A/P showed large volume ascites in abdomen and pelvis and new and enlarging hepatic, splenic, osseous, and body wall metastases.    He was last seen by Dr Buckley's team on 01/23/24; carbo/paclitaxel/pembrolizumab was being arranged when he  again presented to the ED 02/06/2024 and with abdominal concerns and was admitted. He was last treated with systemic therapies in 05/2022. He says he completed palliative radiotherapy to the R hip in late Jan - early Feb. 01/31/2024. Unsigned note from Rad Onc says he was undergoing 2 of 5 fractions (for total intended 20 Gy radiation).    He also has a remote history of L testicular cancer s/p RT + orchiectomy in 1996.    For details on patient's past Oncology history please refer to Outpatient note from 01/23/2024.    Oncologic History:    Oncology History   Squamous cell carcinoma of mandible (H)   9/23/2021 Initial Diagnosis    Squamous cell carcinoma of mandible (H)     10/5/2021 - 10/5/2021 Chemotherapy    OP ONC Head and Neck Pembrolizumab / CARBOplatin / Fluorouracil  Plan Provider: Delores Buckley MD  Treatment goal: Palliative  Line of treatment: [No plan line of treatment]     4/12/2022 - 5/2/2022 Chemotherapy    OP ONC Head and Neck Cancer - CARBOplatin / PACLitaxel  WEEKLY + Radiation  Plan Provider: Delores Buckley MD  Treatment goal: Curative  Line of treatment: [No plan line of treatment]     2/6/2024 -  Chemotherapy    OP ONC Head and Neck Cancer - CARBOplatin / PACLitaxel / Pembrolizumab every 21 days x6 cycles, followed by Pembrolizumab  Plan Provider: Delores Buckley MD  Treatment goal: Palliative  Line of treatment: First Line          Review of Systems:  A comprehensive ROS was performed and found to be negative or non-contributory with the exception of that noted in the HPI above.    PAST MEDICAL HISTORY      Past Medical History:   Diagnosis Date     Squamous cell carcinoma of mandible (H)      Testicular cancer (H)        PAST SURGICAL HISTORY:      Past Surgical History:   Procedure Laterality Date     BRONCHOSCOPY RIDID OR FLEXIBLE W/ENDOBRONCHIAL ULTRASOUND GUIDED 1 OR 2 NODE STATIONS N/A 7/19/2023    Procedure: BRONCHOSCOPY, WITH BIOPSY OF 1 OR 2 LYMPH NODE STATIONS WITH ENDOBRONCHIAL ULTRASOUND GUIDANCE;  Surgeon: Abbi Santos MD;  Location:  GI     BRONCHOSCOPY RIDID OR FLEXIBLE W/ENDOBRONCHIAL ULTRASOUND GUIDED 1 OR 2 NODE STATIONS N/A 8/17/2023    Procedure: BRONCHOSCOPY, WITH BIOPSY OF 1 OR 2 LYMPH NODE STATIONS WITH ENDOBRONCHIAL ULTRASOUND GUIDANCE;  Surgeon: Chris Holm MD;  Location:  OR       SOCIAL HISTORY:      Social History     Socioeconomic History     Marital status:    Tobacco Use     Smoking status: Every Day     Packs/day: 1     Types: Cigarettes     Start date: 1976     Smokeless tobacco: Never   Substance and Sexual Activity     Alcohol use: Yes     Comment: case a week     Drug use: Not Currently        FAMILY HISTORY:      No family history on file.    MEDICATIONS:      No current facility-administered medications on file prior to encounter.  acetaminophen (TYLENOL) 325 MG tablet, Take 2 tablets (650 mg) by mouth every 4 hours as needed for mild pain or fever  celecoxib (CELEBREX) 100 MG capsule, Take 1 capsule (100 mg)  "by mouth 2 times daily  chlorhexidine (PERIDEX) 0.12 % solution, Swish and spit 10 mLs in mouth 4 times daily  dexAMETHasone (DECADRON) 4 MG tablet, Take 1 tablet (4 mg) by mouth daily (with breakfast)  Lidocaine (LIDOCARE) 4 % Patch, Place 2 patches onto the skin every 24 hours To prevent lidocaine toxicity, patient should be patch free for 12 hrs daily.  morphine (MS CONTIN) 30 MG CR tablet, Take 1 tablet (30 mg) by mouth every 12 hours  oxyCODONE (ROXICODONE) 5 MG/5ML solution, Take 10-20 mLs (10-20 mg) by mouth every 4 hours as needed for moderate pain  Wound Dressings (MEPILEX BORDER FLEX) PADS, Externally apply 1 Application topically 4 times daily as needed         PHYSICAL EXAM:      Blood pressure (!) 101/90, pulse 103, temperature 97.8  F (36.6  C), temperature source Oral, resp. rate 16, height 1.725 m (5' 7.91\"), weight 72.9 kg (160 lb 11.2 oz), SpO2 98%.    General: alert and cooperative - sitting in bedside chair in no acute distress  HEENT: absent L mandible; no drainage  CV: RRR, no murmurs  Resp: CTAB, normal respiratory effort on ambient air  GI: distended abdomen is still soft; decreased bowel sounds  MSK: warm and well-perfused, normal tone  Skin: no rashes on limited exam, no jaundice  Neuro: Alert and interactive, moves all extremities equally, no focal deficits    LABS:    I personally reviewed the following studies:  ROUTINE LABS (Last four results):  CBC  Recent Labs   Lab 02/08/24  0540 02/07/24  1120   WBC 11.2* 14.5*   RBC 4.42 4.44   HGB 13.5 13.8   HCT 40.5 41.3   MCV 92 93   MCH 30.5 31.1   MCHC 33.3 33.4   RDW 13.2 13.2    262     CMP  Recent Labs   Lab 02/08/24  0540 02/07/24  2117 02/07/24  1811 02/07/24  1718 02/07/24  1556 02/07/24  1320 02/07/24  1314 02/07/24  1120   *  127*  --   --   --   --   --   --  125*   POTASSIUM 5.3  5.3  --   --  5.5*  --   --  5.8* 6.0*  6.0*   CHLORIDE 89*  89*  --   --   --   --   --   --  87*   CO2 25  25  --   --   --   --   --   " --  22   ANIONGAP 13  13  --   --   --   --   --   --  16*   * 108* 140*  --  151*   < >  --  125*   BUN 67.3*  --   --   --   --   --   --  73.6*   CR 1.35*  --   --  1.51*  --   --   --  1.65*   GFRESTIMATED 60*  --   --  53*  --   --   --  47*   PRINCE 8.4*  --   --   --   --   --   --  8.6*   PROTTOTAL  --   --   --   --   --   --   --  6.7   ALBUMIN  --   --   --   --   --   --   --  3.3*   BILITOTAL  --   --   --   --   --   --   --  1.2   ALKPHOS  --   --   --   --   --   --   --  165*   AST  --   --   --   --   --   --   --  56*   ALT  --   --   --   --   --   --   --  22    < > = values in this interval not displayed.       IMAGING:     CTA 02/08/2024  IMPRESSION:   1. No evidence of small or large bowel obstruction.  2. Large-volume ascites throughout the abdomen and pelvis causing  centralization of bowel loops. Ascites is likely malignant given the  peritoneal thickening and increased omental caking.  3. Worsening metastatic disease with multiple new and enlarging  hypoattenuating hepatic, splenic, osseous and body wall metastases.   4. Mild residual right hydronephrosis.  5. Diffuse anasarca.  6. Fusiform dilation of the infrarenal abdominal aorta up to 3.1 cm.      PATHOLOGY:     No recent studies    ASSESSMENT AND PLAN:      Antonio Sharpe is a 58-year-old man with metastatic SCC L mandible/alveolar ridge and buccal mucosa off systemic therapies since 05/2022 and admitted with abdominal pain and distension concerning for malignant ascites, with imaging also revealing new and enlarging hepatic, splenic, osseous, and body wall metastases.    There has been rapid disease progression in the past few months. While palliative-intent treatment with carboplatin, paclitaxel, and pembrolizumab is considered, patient is clear in his decision to pursue a comfort-centered apporach onwards. This is a reasonable decision, especially in light of his disease's low PD-L1 expression (limiting the potential impact  of immunotherapy), and the anticipated side effects of cytotoxic chemotherapy. Finally, given the decline in his performance status, it is arguable that any benefit anticipated from treatment based on the Keynote-048 phase III trial would be reduced.    The slight abatement of symptoms following paracentesis may be short-lived. Therefore, consideration should be given to an indwelling catheter for symptomatic relief.    1. PD-L1-low, metastatic SCC of L mandible/alveolar ridge s/p resection, adjuvant chemotherapy + RT, with involvement of liver, spleen, T/L/S vertebrae, and R ilium, most recently s/p palliative RT to R hip  2. Large-volume ascites s/p paracentesis -- cytology pending but suspected to be malignant  3. Acute-on-chronic multi-site pain due to above  4. L testicular cancer s/p orchiectomy in 1996 and RT to periaortic and L pelvic nodes    Recommendations:  - input from Palliative Medicine is appreciated for ongoing symptom control and as patient would like to pursue Hospice care    - please consider placement of an indwelling peritoneal catheter         Discussed with patient, his wife Uma, primary team, and Matty Buckley and Devan with the Oncology service.      Aman Saavedra  PGY-4  Hematology, Oncology, and Transplantation  [phone]

## 2024-02-08 NOTE — CONSULTS
Care Management Initial Consult    General Information  Assessment completed with: Patient, Spouse or significant other, Coleen  Type of CM/SW Visit: Initial Assessment    Primary Care Provider verified and updated as needed:  (Doesn't have a PCP)   Readmission within the last 30 days: previous discharge plan unsuccessful   Return Category: Exacerbation of disease  Reason for Consult: discharge planning, end of life/hospice  Advance Care Planning: Advance Care Planning Reviewed: concerns discussed        Communication Assessment  Patient's communication style: spoken language (English or Bilingual)    Hearing Difficulty or Deaf: no   Wear Glasses or Blind: no  Cognitive  Cognitive/Neuro/Behavioral: WDL                      Living Environment:   People in home: spouse     Current living Arrangements: mobile home (4 TEJA)      Able to return to prior arrangements: yes  Family/Social Support:  Care provided by: spouse/significant other, self  Provides care for: no one  Marital Status:   Wife, Children, Neighbor (friends)  Uma       Description of Support System: Supportive, Involved    Support Assessment: Adequate family and caregiver support, Adequate social supports    Current Resources:   Patient receiving home care services: No  Community Resources: None  Equipment currently used at home: none  Supplies currently used at home: Wound Care Supplies (pt is getting bandaids mailed to home. Didn't know the name of company supplying them)    Employment/Financial:  Employment Status:       Financial Concerns:     Referral to Financial Worker: No  Does the patient's insurance plan have a 3 day qualifying hospital stay waiver?  No    Lifestyle & Psychosocial Needs:  Social Determinants of Health     Food Insecurity: Not on file   Depression: Not at risk (8/28/2023)    PHQ-2     PHQ-2 Score: 0   Housing Stability: Not on file   Tobacco Use: High Risk (1/23/2024)    Patient History     Smoking Tobacco  Use: Every Day     Smokeless Tobacco Use: Never     Passive Exposure: Not on file   Financial Resource Strain: Not on file   Alcohol Use: Not on file   Transportation Needs: Not on file   Physical Activity: Not on file   Interpersonal Safety: Not on file   Stress: Not on file   Social Connections: Not on file       Functional Status:  Prior to admission patient needed assistance:   Dependent ADLs:: Independent  Dependent IADLs:: Independent       Mental Health Status:  Mental Health Status: No Current Concerns       Chemical Dependency Status:  Chemical Dependency Status: No Current Concerns             Values/Beliefs:  Spiritual, Cultural Beliefs, Yazidi Practices, Values that affect care: no               Additional Information:  Per md note: Antonio Sharep is a 58 year old male with past medical history of SCC of the L mandible/alveolar ridge, and buccal mucosa s/p extensive L mandibular + maxillary surgery with adjuvant chemoradiation c/b extensive metastases (spine, R iliac, now with new lesions in liver and spleen) admitted 2/7/24 with acute abdominal distention 2/2 malignant ascites s/p large volume paracentesis (27/24), R renal hydronephrosis with subsequent post renal KATHERINE c/b hyperkalemia. Patient has expressed that he wants to pursue home hospice.      MATT met with pt, Jeremi, and wife,Uma, at bedside to complete assessment. SW introduced self and explained the reason for the visit. Pt and pt's wife agreed to speak with SW.     MATT discussed how hospice works and reasons for starting hospice early instead of when pt is closer to death. One of Uma's concerns have been Jeremi's pain and not being able to manage it. SW let the her know that with hospice a nurse is on call 24/7. When MATT mentioned other services like massage therapy, some agencies offer, Jeremi said he would be very interested in massage therapy. MATT went over the medicare.gov list with Uma and Jeremi.  MATT said she had some brochures from  some of the agencies she could drop off. Uma said she would like that. Uma told SW that she would like to so research on hospice before deciding if they want to go home now with hospice or call later.   SW provided emotional support via active and reflective listening and responding to feelings; normalized and validated feelings and experiences;  . No additional questions or concerns were reported. SW provided availability and contact information and excused self from pt's bedside.     SW dropped off 4 brochures for Uma and Jeremi to look at.    SW to follow and assist with any other discharge needs that may arise.  SHANNON Cabral   5A beds:5220-40 & 5C beds 5417-32 (no BMT pt's)     Phone: [phone]  Pager: [phone]

## 2024-02-08 NOTE — PLAN OF CARE
Goal Outcome Evaluation:      Plan of Care Reviewed With: patient    Overall Patient Progress: improving    Pt A&Ox4. Calm and cooperative. Difficult to understand as patient has Hx of mouth surgery r/t cancer. Was hyperkalemic, was given bolus of D10. Next BG check due at 7:30pm. Had a paracentesis done today, after 5 L was removed, albumin was given. Complains of chronic back pain gets scheduled Morphine and PRNs. Potassium lab was redrawn. Continue to monitor labs and manage pain.

## 2024-02-08 NOTE — PROGRESS NOTES
Admission   Diagnosis: Malignant ascites  Admitted from: [facility]   Via: stretcher   Accompanied by: self   Belongings: Placed in closet  Admission paperwork: complete   Teaching: Call don't fall, use of console, meal times, visiting hours, orientation to unit, when to call for the RN (angina/sob/dizzyness, etc.), and the importance of safety.   Access: PIV   Telemetry:Placed on pt   Ht./Wt.: complete     Two nurse head-to-toe skin assessment performed by West Stewart RN and Radha Reddy RN.  Skin issues noted: Paracentesis site on R abdomen, dry/edematous feet.  See PCS for assessment and treatment of wounds and surgical sites.

## 2024-02-09 NOTE — PROGRESS NOTES
Care Management Follow Up    Length of Stay (days): 2    Expected Discharge Date: 02/13/2024     Concerns to be Addressed: discharge planning (Hospice)     Patient plan of care discussed at interdisciplinary rounds: Yes    Anticipated Discharge Disposition: Hospice, Home  Disposition Comments: n/a  Anticipated Discharge Services: None  Anticipated Discharge DME: None    Patient/family educated on Medicare website which has current facility and service quality ratings: yes  Education Provided on the Discharge Plan: Yes  Patient/Family in Agreement with the Plan: yes    Referrals Placed by CM/SW: Hospice  Private pay costs discussed: Not applicable    Additional Information:   stopped into patient's room.  Patient's wife let  know that she had left the materials at the hospital last night and has not had a chance to review them.  She also said that her doctor said he would not be ready to discharge until Monday.  SW said she would ask the weekend  to check in to see if patient and his wife have any more questions about hospice and or if they needed a referral sent.    SW to follow and assist with any other discharge needs that may arise.  SHANNON Cabral   5A beds:5220-40 & 5C beds 5417-32 (no BMT pt's)      Vocera:  Phone: [phone]  Pager: [phone]

## 2024-02-09 NOTE — PROVIDER NOTIFICATION
Lab called with critical potassium of 6.3, provider notified, orders received to shift pt's potassium, being carried out by writer.

## 2024-02-09 NOTE — PROGRESS NOTES
[facility]    Progress Note - Medicine Service, MARTAMIKA TEAM 4       Date of Admission:  2/7/2024    Assessment & Plan   Antonio Sharpe is a 58 year old male with past medical history of SCC of the L mandible/alveolar ridge, and buccal mucosa s/p extensive L mandibular + maxillary surgery with adjuvant chemoradiation c/b extensive metastases (spine, R iliac, now with new lesions in liver and spleen) admitted 2/7/24 with acute abdominal distention 2/2 recurrent malignant ascites s/p large volume paracentesis (27/24), R renal hydronephrosis with subsequent post renal KATHERINE c/b hyperkalemia and hyperphosphatemia. Patient has expressed that he wants to pursue home hospice. PleurX cath scheduled to be placed on 2/12/24 for recurrent ascites.     SCC of L mandible with metastases to R hip, spine, new evidence of liver, spleen and omental involvement s/p extensive H/N surgery and adjuvant chemoradiation  Acute malignant ascites  Cancer-related chronic pain  Patient has repeatedly and strongly expressed his desire to pursue home hospice. Paracentesis on 2/7/24 with 5L removed, with significant improvement in pain; repeat para prn until pleurx can be placed on 2/12/24  - Oncology and palliative care following  - SW consult for home hospice  - Pain control per pall  - IR consulted for pleurx, to be placed 2/12/24    Post renal KATHERINE c/b hyperkalemia, hyperphosphatemia and AGMA (uremia)  R renal hydronephrosis  EKG w/o acute ST/QRS changes. Hydronephrosis could have been 2/2 large volume ascites exerting pressure on ureters/bladder.  Shifted with insulin x 1, s/p azalia gluconate x 1 and 1x lokelma  - recheck Mg and Phos in the PM; may need phos binder    Chronic hyponatremia   Likely multifactorial with SIADH + low protein intake  - CTM     Elevated alkaline phosphatase  Normal bili; AST elevation could be related to bone mets  - Trend    Coagulopathy  Elevated INR  No s/s active  bleeding. Could consider oral vit K if needed          Diet: Regular diet  DVT Prophylaxis: will discontinue pharmacologic ppx due to pursing hospice  Salas Catheter: Not present  Fluids: Diet as above  Lines: PRESENT      Port A Cath Single 04/12/22 Right Chest wall-Site Assessment: WDL      Cardiac Monitoring: None  Code Status: No CPR- Do NOT Intubate      Clinically Significant Risk Factors        # Hyperkalemia: Highest K = 5.8 mmol/L in last 2 days, will monitor as appropriate  # Hyponatremia: Lowest Na = 127 mmol/L in last 2 days, will monitor as appropriate      # Hypoalbuminemia: Lowest albumin = 2.9 g/dL at 2/9/2024  4:26 AM, will monitor as appropriate    # Coagulation Defect: INR = 2.55 (Ref range: 0.85 - 1.15) and/or PTT = 88 Seconds (Ref range: 22 - 38 Seconds), will monitor for bleeding   # Acute Kidney Injury, unspecified: based on a >150% or 0.3 mg/dL increase in last creatinine compared to past 90 day average, will monitor renal function                    Disposition Plan     Expected Discharge Date: 02/12/2024,  3:00 PM    Destination: home with family          Staffed with Dr. Alex Gregory, DO  Med[facility], [facility] TEAM [address] [place] [address]  Securely message with [employer] (more info)  Text page via [facility] Paging/Directory   See signed in provider for up to date coverage information  ______________________________________________________________________    Interval History   NAEO. Requesting another paracentesis today for increased abdominal distension    Physical Exam   Vital Signs: Temp: 97.5  F (36.4  C) Temp src: Axillary BP: 127/79 Pulse: 112   Resp: 16 SpO2: 94 % O2 Device: None (Room air)    Weight: 160 lbs 11.2 oz    General: in mild/moderate distress, sitting in bed, cachectic  HENT: no conjunctival icterus  Pulm: CTA, no rales/rhonchi  CV: RRR, no m/r/g  Abd: Soft, but distended. Non tender, no guarding, no rebound  Ext: B/l ankle  edema  Neuro: alert and awake  Psych: NRIS

## 2024-02-09 NOTE — PROCEDURES
[facility]  CAPS PROCEDURE NOTE  Date of Admission:  2/7/2024  Consult Requested by: Medicine  Reason for Consult: diagnostic evaluation of ascites and management of symptomatic ascites    Indication/HPI: Ascites and Abdominal Pain    Pre-Procedure Diagnosis: Ascites    Post-Procedure Diagnosis: Ascites    Risk Assessment: Average risk, Technically straightforward; patient's anticoagulation has been held according to guidelines based on the agent and platelets and coags are within guidelines    Procedure Outcome:  Diagnostic paracentesis performed and Therapeutic paracentesis performed with 4 liters of ascites removed.     See additional procedure details below.    The primary covering service should follow up and address any lab results as appropriate.    Supervised by Dr. Nance.     Erinn Queen MD  [facility]    Paracentesis    Date/Time: 2/9/2024 11:30 AM    Performed by: Erinn Queen MD  Authorized by: Erinn Queen MD    PRE-PROCEDURE DETAILS  Procedure purpose: therapeutic and diagnostic  Indications: abdominal discomfort secondary to ascites        UNIVERSAL PROTOCOL   Site Marked: Yes  Prior Images Obtained and Reviewed:  Yes  Required items: Required blood products, implants, devices and special equipment available    Patient identity confirmed:  Verbally with patient, arm band and provided demographic data  NA - No sedation, light sedation, or local anesthesia  Confirmation Checklist:  Patient's identity using two indicators, relevant allergies, procedure was appropriate and matched the consent or emergent situation and correct equipment/implants were available  Time out: Immediately prior to the procedure a time out was called    Universal Protocol: the Joint Commission Universal Protocol was followed    Preparation: Patient was  prepped and draped in usual sterile fashion       ANESTHESIA    Local Anesthetic:  Lidocaine 1% without epinephrine  Anesthetic Total (mL):  2      SEDATION    Patient Sedated: No    POST PROCEDURE DETAILS  Needle gauge: 22  Ultrasound guidance: yes  Puncture site: left lower quadrant  Fluid removed: 4(ml)  Fluid appearance: serosanguinous  Dressing: Glue.        PROCEDURE    Patient Tolerance:  Patient tolerated the procedure well with no immediate complications  Length of time physician/provider present for 1:1 monitoring during sedation: 0        POC US GUIDE FOR PARACENTESIS     Impression  US Indication: abdominal distension and abdominal pain    FINDINGS:  Limited abdominal ultrasound was performed and demonstrated an adequate fluid collection on the left of the abdomen. Doppler of the skin demonstrated an area at this site without significant vasculature. A paracentesis at this site was subsequently performed.    Performed by Dr. Queen and Dr. Nance.    Erinn Queen MD

## 2024-02-09 NOTE — PROGRESS NOTES
"PALLIATIVE CARE PROGRESS NOTE  [facility]     Patient Name: Antonio Sharpe  Date of Admission: 2/7/2024   Today the patient was seen for: symptom management     Recommendations & Counseling     On date of discharge, recommend ordering the following for hospice.  - Tylenol 650 mg suppository CT Q4hrs PRN for fever  - Lactulose oral solution 10g/15ml take 10g (15ml) PRN for constipation  - Bisacodyl 10 mg Suppository CT daily to BID PRN constipation   - oxyCODONE (ROXICODONE INTENSOL) 20 mg/mL (HIGH CONC) solution 10-20 mg every 4 hours as needed mod-severe pain  - Decadron (?) - might be beneficial to assess if helpful for pain management  - Olanzapine - still assessing appropriate dose for nausea  - Zofran ODT 4mg every 6 hours as needed nausea  - Fentanyl 25mcg patch every 72 hours scheduled- still assessing appropriate dose     For facility discharges, do not order ranges on medications, ask to place medications in \"bubble packs\", and please order a 3 day supply of comfort medications to be filled at the hospital discharge pharmacy prior to discharge, and ask that.    GOALS OF CARE:   Comfort focused  Plan to go home on hospice ~early next week after pleur-x placed.   Goals for the next few days to establish a good pain regimen for Antonio that they can continue at home with Hospice.   Patient has had a prolonged hx with his cancer. However, they were hoping for more time and his decline was rather abrupt for them.      ADVANCE CARE PLANNING:  No health care directive on file. Per  informed consent policy, next of kin should be involved if patient becomes unable.  There is no POLST form on file, defer to patient and/or next of kin for decisions   Code status: No CPR- Do NOT Intubate     MEDICAL MANAGEMENT:   #Pain,chronic   Started opioids 1/12/2024 for worsening lower back pain and right hip pain. Abd pain likely becoming worse in the setting of disease progression, " ascites, new hepatic, splenic, osseous and body wall metastases.   Tylenol 1g TID scheduled liquid   Fentanyl 25mcg patch every 72 hours (First dose 2/8)  Stopped MS contin (struggling with swallowing)- last dose received 2/7 AM  Oxycodone solution 10-20mg PO q4hrs PRN - encouraged to use this first before IV.   Morphine 2mg every 4 hours as needed --> Change to hydromorphone 0.5-1mg every 4 hours as needed given declining kidney function. Use only for pain refractory to oral oxycodone.   Narcan ordered prn.   Decadron 4mg daily AM   Paracentesis as needed - received 2/7 and 2/9  Planning for Pleur-x drain Monday      #Opioid induced constipation   last BM ~7 days ago. Was doing miralax/senna at home - hard to swallow and not working as well recently.   Lactulose daily (done for you) - decrease freq given good results after 1 dose yesterday.  Can increase back to BID if needed     #Nausea  Worsening nausea over the last week, likely from constipation vs progression of disease (ascites, increased pressure in abd, new mets).   Continue Decadron 4mg daily   Increase Olanzapine to 5mg solution daily bedtime for nausea (done for you). Qtc 2/7/2024= 450  Can increase to 10 mg daily if tolerates  Prochlorperazine 10mg IV every 6 hours as needed (1st choice given constipation)   Zofran 4mg IV every 6 hours as needed   Zofran 4mg ODT every 6 hours as needed (done for you)       PSYCHOSOCIAL/SPIRITUAL SUPPORT:  Family wife at bedside, very involved in care. Reports having a strong support system.      Palliative Care will continue to follow. Thank you for the consult and allowing us to aid in the care of Antonio Sharpe.     These recommendations have been discussed with oncology.    YRN Nath CNP  Securely message with [employer] (more info)  Text page via [employer] Paging/Directory          Interval History:     Multidisciplinary collaboration:  Reviewed notes. Worsening abd pain overnight, more fluid possibly  collecting in abd. Received additional 1x dose morphine.     Patient/family narrative  Patient and wife at bedside.  Wife reports pain was moderate overnight and Antonio struggled with sleep.  She feels it was because he was confused about his oxycodone and refusing to take this.  He was stating he did not like the taste of it, but reports he is now understanding and willing to try the oxycodone.  He did take a dose around 30 minutes ago and reports he feels it is starting to work.  Continues to have low-grade nausea.  Took antiemetic yesterday which helped.  He also notes he had a bowel movement.     Palliative Summary/HPI          Antonio Sharpe is a 58 year old male with past medical history SCC of the L mandible/alveolar ridge, and buccal mucosa s/p extensive L mandibular + maxillary surgery with adjuvant chemoradiation c/b extensive metastases (spine, R iliac, now with lesions in liver and spleen) admitted 2/7/24 with acute abdominal distention found to have large volume ascites (presumed malignant), R renal hydronephrosis with subsequent post renal KATHERINE c/b hyperkalemia      2/7 had paracentesis (5L removed with significant improvement in pain).  Oncology reports the fluid is already reflecting.  1/23 oncology visit sent home with MS contin 30mg BID, oxycodone 10-20mg every 4 hours.      Today, the patient was seen for:  SCC of L mandible with metastases to R. Hip, spine, spleen, omentum  Acute Ascites  Cancer-related chronic pain  Cancer-related nausea  Opioid induced constipation          Review of Systems:     Comprehensive ROS is reviewed and is negative except as here & per HPI:          Medications:      I have reviewed this patient's medication profile and medications during this hospitalization. Pertinent medications today include:      Tylenol 1g scheduled TID  Decadron - Am  Morphine 2mg every 4 hours prn - received 5 doses yesterday, 1 dose this AM  Fentanyl patch 25mcg   Toradol - x2 doses yesterday  scheduled   Lactulose BID  Olanzapine 2.5mg daily  Zofran x1   Compazine  Oxycodone - no doses in last 24 hours         Physical Exam:   Temp:  [97.5  F (36.4  C)-97.8  F (36.6  C)] 97.5  F (36.4  C)  Pulse:  [] 112  Resp:  [16-18] 16  BP: (110-134)/(72-86) 127/79  SpO2:  [93 %-100 %] 94 %  160 lbs 11.2 oz    Physical Exam  General: NAD, lying in bed. Appears comfortable.   Pulmonary: Unlabored. Speaking in full sentences.  Neuro: Speech fluent. Alert and oriented x4.  Mental status/Psych: Asks/answers questions appropriately. Affect is bright            Current Problem List:   Principal Problem:    Malignant ascites (H28)  Active Problems:    Hyperkalemia    KATHERINE (acute kidney injury) (H24)      No Known Allergies         Data Reviewed:     Results for orders placed or performed during the hospital encounter of 02/07/24 (from the past 24 hour(s))   Potassium   Result Value Ref Range    Potassium 5.7 (H) 3.4 - 5.3 mmol/L   CBC with platelets   Result Value Ref Range    WBC Count 6.5 4.0 - 11.0 10e3/uL    RBC Count 4.26 (L) 4.40 - 5.90 10e6/uL    Hemoglobin 13.0 (L) 13.3 - 17.7 g/dL    Hematocrit 38.4 (L) 40.0 - 53.0 %    MCV 90 78 - 100 fL    MCH 30.5 26.5 - 33.0 pg    MCHC 33.9 31.5 - 36.5 g/dL    RDW 13.2 10.0 - 15.0 %    Platelet Count 233 150 - 450 10e3/uL   Comprehensive metabolic panel   Result Value Ref Range    Sodium 130 (L) 135 - 145 mmol/L    Potassium 5.4 (H) 3.4 - 5.3 mmol/L    Carbon Dioxide (CO2) 23 22 - 29 mmol/L    Anion Gap 17 (H) 7 - 15 mmol/L    Urea Nitrogen 78.8 (H) 8.0 - 23.0 mg/dL    Creatinine 1.60 (H) 0.67 - 1.17 mg/dL    GFR Estimate 49 (L) >60 mL/min/1.73m2    Calcium 8.4 (L) 8.8 - 10.2 mg/dL    Chloride 90 (L) 98 - 107 mmol/L    Glucose 149 (H) 70 - 99 mg/dL    Alkaline Phosphatase 151 (H) 40 - 150 U/L    AST 54 (H) 0 - 45 U/L    ALT 21 0 - 70 U/L    Protein Total 6.1 (L) 6.4 - 8.3 g/dL    Albumin 2.9 (L) 3.5 - 5.2 g/dL    Bilirubin Total 0.8 <=1.2 mg/dL   Magnesium   Result Value  Ref Range    Magnesium 3.4 (H) 1.7 - 2.3 mg/dL   Phosphorus   Result Value Ref Range    Phosphorus 5.5 (H) 2.5 - 4.5 mg/dL   INR   Result Value Ref Range    INR 2.55 (H) 0.85 - 1.15   Partial thromboplastin time   Result Value Ref Range    aPTT 88 (H) 22 - 38 Seconds   Fibrinogen activity   Result Value Ref Range    Fibrinogen Activity 465 170 - 490 mg/dL         Medical Decision Making       NOTE(S)/MEDICAL RECORDS REVIEWED over the past 24 hours: nursing notes, IR, oncology  Medical complexity over the past 24 hours:  - Parenteral (IV) CONTROLLED SUBSTANCES ordered  - Prescription DRUG MANAGEMENT performed

## 2024-02-09 NOTE — PLAN OF CARE
Hours of care: [id number]     Neuro: A&Ox4.   Cardiac: Afebrile, VSS,           Respiratory: RA, lung sounds clear, denies SOB  GI/: Voiding spontaneously. 1 BM this shift.  Diet/appetite: Renal diet, appetite is poor, did not eat anything overnight . Denies nausea.   Activity: Up independently     Pain: C/o back pain, hip, and abdominal pain. Gave 2 dose PRN IV morphine, and paged provider for extra 1x dose of morphine.  Skin: No new deficits noted.  Lines: Port, hep locked  Drains: none  Replacements: none given     Plan: Pt having was unable to swallow some of his medications overnight. Complained of increased abdominal pain overnight and stated that he feels like he needs another paracentesis. Abdomen looks more distended than yesterday, provider notified. Continue with current POC. Report changes to primary team.          Goal Outcome Evaluation:      Plan of Care Reviewed With: patient    Overall Patient Progress: no changeOverall Patient Progress: no change

## 2024-02-09 NOTE — CONSULTS
"      [facility] Consult Service Note  Interventional Radiology  02/09/24   9:28 AM    Consult Requested: \"Metastatic SCC with malignant recurrent ascites, pursuing hospice would benefit from pleurX\"    Recommendations/Plan:    Patient is approved for IR intraperitoneal catheter tunnel ascites. We are unable to accommodate this procedure today for procedure. IR recommends that patient has a paracentesis today via CAPS team, then we will plan for tunneled intraperitoneal catheter on 2/12/24.  Timing of procedure is TBD based on IR staffing/schedule and triage.  Please contact the IR charge RN at [phone] for estimated time of procedure.     Labs WNL for procedure.    Orders entered for procedure, NPO status, and pre procedure IV antibiotics.   Medications to be held include: N/A  Informed consent will be completed prior to procedure.     Case and imaging discussed with IR attending Dr. Nicanor Castellano   Recommendations were reviewed with Dr. Gregory.     History of Present Illness:  Antonio Sharpe is a 58 year old male with past medical history SCC of the L mandible/alveolar ridge, and buccal mucosa s/p extensive L mandibular + maxillary surgery with adjuvant chemoradiation c/b extensive metastases (spine, R iliac, now with lesions in liver and spleen) admitted 2/7/24 with acute abdominal distention found to have large volume ascites (presumed malignant), R renal hydronephrosis with subsequent post renal KATHERINE c/b hyperkalemia Para on 2/7/24 with 5L removed. Team reports patient is very uncomfortable today. He is planning to pursue hospice. Last para was on 2/7/24.     Diagnostic labs to be entered by: N/A     Pertinent Imaging Reviewed:   CT Abdomen pelvis w/ contrast 2/7/24    Expected date of discharge:  02/12/2024    Vitals:   /79 (BP Location: Right arm)   Pulse 112   Temp 97.5  F (36.4  C) (Axillary)   Resp 16   Ht 1.725 m (5' 7.91\")   Wt 72.9 kg (160 lb 11.2 oz)   SpO2 94%   BMI " 24.50 kg/m      Pertinent Labs Reviewed:  CBC:  Lab Results   Component Value Date    WBC 6.5 02/09/2024    WBC 11.2 (H) 02/08/2024    WBC 14.5 (H) 02/07/2024     Lab Results   Component Value Date    HGB 13.0 02/09/2024    HGB 13.5 02/08/2024    HGB 13.8 02/07/2024     Lab Results   Component Value Date     02/09/2024     02/08/2024     02/07/2024    INR:  Lab Results   Component Value Date    INR 2.12 (H) 02/07/2024    PTT 40 (H) 02/07/2024          COVID Results:  COVID-19 Antibody Results, Testing for Immunity           No data to display              COVID-19 PCR Results          5/11/2023    18:20   COVID-19 PCR Results   SARS CoV2 PCR Negative     Potassium   Date Value Ref Range Status   02/09/2024 5.4 (H) 3.4 - 5.3 mmol/L Final   05/09/2022 4.5 3.4 - 5.3 mmol/L Final          Keri Rivas PA-C  Interventional Radiology  Pager: [phone]

## 2024-02-10 NOTE — PROGRESS NOTES
[facility]    Progress Note - Medicine Service, TETO TEAM 4       Date of Admission:  2/7/2024    Assessment & Plan   Antonio Sharpe is a 58 year old male with past medical history of SCC of the L mandible/alveolar ridge, and buccal mucosa s/p extensive L mandibular + maxillary surgery with adjuvant chemoradiation c/b extensive metastases (spine, R iliac, now with new lesions in liver and spleen) admitted 2/7/24 with acute abdominal distention 2/2 recurrent malignant ascites s/p large volume paracentesis (27/24), R renal hydronephrosis with subsequent post renal KATHERINE. Patient has expressed that he wants to pursue home hospice. PleurX cath scheduled to be placed on 2/12/24 for recurrent ascites. Course complicated by worsening renal failure with electrolyte derangements    SCC of L mandible with metastases to R hip, spine, new evidence of liver, spleen and omental involvement s/p extensive H/N surgery and adjuvant chemoradiation  Acute recurrent malignant ascites  Cancer-related chronic pain  Patient has repeatedly and strongly expressed his desire to pursue home hospice. Paracentesis on 2/7/24 with 5L removed, with significant improvement in pain; repeat para prn until pleurx can be placed on 2/12/24  - Oncology and palliative care following  - SW consult for home hospice  - Pain control per pall  - IR consulted for pleurx, to be placed 2/12/24    KATHERINE c/b hyperkalemia, hyperphosphatemia, hypermagnesemia and AGMA (uremia)  R renal hydronephrosis presumed 2/2 pressure effect from large volume ascites  EKG w/o acute ST/QRS changes. Shifted with insulin multiple this hospitalization. Discussed with patient and wife, they would not want any more workup at this time and would like temporary measures until he can get the pleurx and can be discharged home.   - Temporize with insulin, calcium gluconate  - Repeat lasix 40 mg    - q8h K    Chronic hyponatremia   Likely  multifactorial with SIADH + low protein intake  - CTM     Elevated alkaline phosphatase  Normal bili; AST elevation could be related to bone mets  - Trend    Coagulopathy  Elevated INR  No s/s active bleeding. Could consider oral vit K if needed          Diet: Regular diet  DVT Prophylaxis: will discontinue pharmacologic ppx due to pursing hospice  Salas Catheter: Not present  Fluids: Diet as above  Lines: PRESENT      Port A Cath Single 04/12/22 Right Chest wall-Site Assessment: WDL      Cardiac Monitoring: None  Code Status: No CPR- Do NOT Intubate      Clinically Significant Risk Factors        # Hyperkalemia: Highest K = 6.3 mmol/L in last 2 days, will monitor as appropriate  # Hyponatremia: Lowest Na = 128 mmol/L in last 2 days, will monitor as appropriate      # Hypoalbuminemia: Lowest albumin = 2.9 g/dL at 2/9/2024  4:26 AM, will monitor as appropriate    # Coagulation Defect: INR = 2.55 (Ref range: 0.85 - 1.15) and/or PTT = 88 Seconds (Ref range: 22 - 38 Seconds), will monitor for bleeding   # Acute Kidney Injury, unspecified: based on a >150% or 0.3 mg/dL increase in last creatinine compared to past 90 day average, will monitor renal function                    Disposition Plan     Expected Discharge Date: 02/13/2024,  3:00 PM    Destination: home with family          Staffed with Dr. Alex Gregory, DO  Medicine Service, [facility] TEAM 4  [facility]  Securely message with [employer] (more info)  Text page via [facility] Paging/Directory   See signed in provider for up to date coverage information  ______________________________________________________________________    Interval History   NAEO. NNR. Discussed with patient and wife, they would not want any more workup at this time and would like temporary measures until he can get the pleurx and can be discharged home. Pain is well controlled at this time.     Physical Exam   Vital Signs: Temp: 97.4  F (36.3   C) Temp src: Axillary BP: 112/71 Pulse: 89   Resp: 16 SpO2: 91 % O2 Device: None (Room air)    Weight: 160 lbs 11.2 oz    General: in mild/moderate distress, sitting in bed, cachectic  HENT: no conjunctival icterus  Pulm: CTA, no rales/rhonchi  CV: RRR, no m/r/g  Abd: Soft, but mildly distended. Non tender, no guarding, no rebound  Ext: B/l ankle edema  Neuro: alert and awake  Psych: NRIS

## 2024-02-10 NOTE — PLAN OF CARE
"/47 (BP Location: Right arm)   Pulse 115   Temp 97.5  F (36.4  C) (Axillary)   Resp 16   Ht 1.725 m (5' 7.91\")   Wt 72.9 kg (160 lb 11.2 oz)   SpO2 97%   BMI 24.50 kg/m     Neuro: A&Ox4.   Cardiac: Afebrile VSS.   Respiratory: Sating >95% on RA.  GI/: Adequate urine output. No BM this shift   Diet/appetite: Pt unable to eat, poor appetite   Activity:  SBA  Pain: At acceptable level on current regimen. Prn oxy x2 with relief. Refused 0400 scheduled tylenol   Skin: No new deficits noted.  LDA's:   Labs: 0300 K+ back at 6.0 after it dropped to 5.5 at 0000. Provider notified. K+ shifted. Checking BG Q 30 min x4 then q1 hr x4. D10 bolus infusing at ordered rate.   Plan: Continue with POC. Notify primary team with changes.   Goal Outcome Evaluation:      Plan of Care Reviewed With: patient    Overall Patient Progress: no changeOverall Patient Progress: no change           "

## 2024-02-10 NOTE — PLAN OF CARE
Goal Outcome Evaluation:      Plan of Care Reviewed With: patient, spouse    Patient afebrile, vitals stable, patient c/o pain rated 5-9 of 10, Roxicodone every 4 hours for pain with relief- encourage oral pain medications before trying IV dilaudid due to goal of going home on home hospice and oral medications. Patient with elevated potassium- shifted, recheck at 2000 and midnight. Continue with current care plan, report changes to provider.

## 2024-02-10 NOTE — PROVIDER NOTIFICATION
Time of notification: 9:04 PM  Provider notified: Chris Chisholmleeann   Patient status:  Pt's K+ is 6.0  Temp:  [97.5  F (36.4  C)] 97.5  F (36.4  C)  Pulse:  [100-115] 115  Resp:  [16] 16  BP: (126-132)/(47-82) 132/47  SpO2:  [94 %-97 %] 97 %  Orders received:  K+ was shifted by writer, ordered by MD

## 2024-02-10 NOTE — PROVIDER NOTIFICATION
Time of notification: 1:02 AM  Provider notified: Chris Leung   Patient status: Pt potassium is now 5.5  Temp:  [97.5  F (36.4  C)] 97.5  F (36.4  C)  Pulse:  [] 100  Resp:  [16] 16  BP: (126-127)/(79-82) 126/82  SpO2:  [94 %-96 %] 95 %  Orders received:  No new order         Time of notification: 4:39 AM  Provider notified: Chris Leung   Patient status: Pt potassium is back at 6.0  Temp:  [97.5  F (36.4  C)] 97.5  F (36.4  C)  Pulse:  [100-115] 115  Resp:  [16] 16  BP: (126-132)/(47-82) 132/47  SpO2:  [94 %-97 %] 97 %  Orders received:  MD ordered to shift K+, K+ is being shifted by writer

## 2024-02-11 NOTE — PLAN OF CARE
Goal Outcome Evaluation:      Plan of Care Reviewed With: patient, spouse, parent, family    Patient today transitioned to comfort cares, [facility] inpatient hospice consulted and will see patient tomorrow. Comfort care orders written by primary team and adjusted by palliative team to improve patient's pain. Pain slightly better this evening. Family at bedside, supportive. Patient alert and able to make needs known. Continue with plan of care, report changes to provider.       Problem: Adult Inpatient Plan of Care  Goal: Optimal Comfort and Wellbeing  Intervention: Monitor Pain and Promote Comfort  Recent Flowsheet Documentation  Taken 2/11/2024 1330 by Ludy Chaney, RN  Pain Management Interventions: medication (see MAR)  Taken 2/11/2024 1130 by Ludy Chaney, RN  Pain Management Interventions: medication (see MAR)  Taken 2/11/2024 0937 by Ludy Chaney, RN  Pain Management Interventions: medication (see MAR)

## 2024-02-11 NOTE — PROGRESS NOTES
[facility]  _________________________________________________________________    [facility] 24/7 Contact Number: [phone]    - Providers: Please contact [facility] with changes in orders or clinical plan of care   - Nursing: Please contact [facility] with significant changes in patient condition    Hospice will notify the care team (including the hospitalist) to confirm date of inpatient hospice (GIP) admission.    New Epic encounter will not be created until hospice completes admission.   _____________________________________________________________________    REFERRAL RECEIVED, THANK YOU FOR THE REFERRAL.   WRITER WILL CONTACT PT/FAMILY TO SCHEDULE AN INFORMATIONAL HOSPICE VISIT.

## 2024-02-11 NOTE — PROGRESS NOTES
PALLIATIVE CARE PROGRESS NOTE  [facility]     Patient Name: Antonio Sharpe  Date of Admission: 2/7/2024   Today the patient was seen for: pain management/cancer related pain, constipation     Recommendations & Counseling       GOALS OF CARE:   Comfort focused, late last week had plan to go home on hospice but has had continued decline; family are concerned they would not be able to manage at home with degree of support that Antonio is presently needing.  GIP consult placed by [facility] for evaluation for EOL care here.    ADVANCE CARE PLANNING:  No health care directive on file. Per  informed consent policy, next of kin should be involved if patient becomes unable.  Wife Uma is surrogate.  There is no POLST form on file, defer to patient and/or next of kin for decisions   Code status: No CPR- Do NOT Intubate    MEDICAL MANAGEMENT:   #Pain,chronic   Started opioids 1/12/2024 for worsening lower back pain and right hip pain. Abd pain likely becoming worse in the setting of disease progression, ascites, new hepatic, splenic, osseous and body wall metastases.   24-hr MME: 5.5mg hydromorphone IV (68.75MME  ) + 100mg oxycodone (125 MME)= 193 MME  Tylenol 1g TID scheduled liquid  (stop when no longer able to swallow)  Fentanyl 25mcg patch every 72 hours (First dose 2/8)- now at 72 hrs.  May be able to increase dose vs change to methadone SL (unclear life expectancy, favor days-short weeks?)  INCREASED TO 37.5mg subcutaneous given robust amount of breakthrough medications in past 48 hrs.  Stopped MS contin (struggling with swallowing)- last dose received 2/7 AM  Had been using Oxycodone solution 10-20mg PO q4hrs PRN - -->changed 2/11 to hydromorphone 2-4mg PO q3H prn given declining renal function.  Morphine 2mg every 4 hours as needed --> was initialy Changed to hydromorphone 0.5-1mg every 4 hours as needed given declining kidney function, then changed to every hours--but  still signficant breakthrough pain.  Getting 1mg doses over past 24 hrs--change to hydromorphone 1mg IV q30 minutes PRN as clearly comfort focused goals/comfort care.      Narcan discontinued with clearly comfort focused goals.   Decadron 4mg daily AM continue  Paracentesis as needed - received 2/7 and 2/9  Planned for Pleur-x drain Monday, but on hold with change in plans to have EOL in hospital possibly w GIP..     #Opioid induced constipation : no BM since 2/8/24  last BM ~7 days ago. Was doing miralax/senna at home - hard to swallow and not working as well recently.   Lactulose daily   - got 3 doses and then stopped 2/9.  Resumed daily.  Added back senna twice daily if can swallow (stop it if too much of a burden)  Bisacodyl supp ordered PRN.     #Nausea  Worsening nausea over the last week, likely from constipation vs progression of disease (ascites, increased pressure in abd, new mets).   Continue Decadron 4mg daily   Continue Olanzapine to 5mg solution daily HS for nausea (done for you). Qtc 2/7/2024= 450  Can increase to 10 mg daily if tolerates  Prochlorperazine 10mg IV every 6 hours as needed (1st choice given constipation)   Zofran 4mg IV every 6 hours as needed   Zofran 4mg ODT every 6 hours as needed (done for you)      PSYCHOSOCIAL/SPIRITUAL:  Family wife at bedside, very involved in care. Reports having a strong support system.      Palliative Care will continue to follow. Thank you for the consult and allowing us to aid in the care of Antonio SID Sharpe.       Michelle Farley MD  Securely message with [employer] (more info)  Text page via [facility] Paging/Directory          Interval History:     Multidisciplinary collaboration:  Has been restless today at times per RN.  Chart reviewed yesterday for chart check, pain was documented as tolerable yesterday.  Chart reviewed including oncology,  HMS, care management notes.  Palliative Performance Score:     20%- 1. Totally bed bound; 2. Unable to do any  activity, extensive disease; 3. Total care; 4. Minimal to sips; 5. Full or drowsy +/- confusion     Patient/family narrative  Jeremi notes cramping pain that persists despite pain meds.  No dyspnea, no nausea or emesis.  Has had some water to drink, some ice cream.    Finds the oxycodone intensol not good tasting.  No BM x3 days; had good result with lactulose.  Harder to get out of bed now.  Wife Allyssa at bedside, noting Jeremi has had a hard time getting comfortable.    They've opted to get information on GIP, as Jeremi is getting weaker there is concern on family part of whether they can provide enough support for home with hospice to work.    Palliative Summary/HPI          Anotnio Sharpe is a 58 year old male with past medical history SCC of the L mandible/alveolar ridge, and buccal mucosa s/p extensive L mandibular + maxillary surgery with adjuvant chemoradiation c/b extensive metastases (spine, R iliac, now with lesions in liver and spleen) admitted 2/7/24 with acute abdominal distention found to have large volume ascites (presumed malignant), R renal hydronephrosis with subsequent post renal KATHERINE c/b hyperkalemia      2/7 had paracentesis (5L removed with significant improvement in pain).  Oncology reports the fluid is already reflecting.  1/23 oncology visit sent home with MS contin 30mg BID, oxycodone 10-20mg every 4 hours.                   Physical Exam:   Temp:  [97.3  F (36.3  C)-97.5  F (36.4  C)] 97.5  F (36.4  C)  Pulse:  [] 66  Resp:  [16] 16  BP: (100-123)/(62-72) 123/70  SpO2:  [86 %-91 %] 86 %  160 lbs 11.2 oz    Physical Exam  Cachectic appearing male, eyes closed.  L half of mandible surgically absent.  Responds to questions, speech dysarthric but comprehendible.  Breathing mildly labored.  Brow furrowed, forehead wrinkled with grimace.  Admits to cramping abdominal pain.  Extremities with 1+ edema both legs.               Current Problem List:   Principal Problem:    Malignant ascites  "(H28)  Active Problems:    Hyperkalemia    KATHERINE (acute kidney injury) (H24)      No Known Allergies         Data Reviewed:     Last Comprehensive Metabolic Panel:  Lab Results   Component Value Date     (L) 02/11/2024    POTASSIUM 5.8 (H) 02/11/2024    CHLORIDE 91 (L) 02/11/2024    CO2 25 02/11/2024    ANIONGAP 14 02/11/2024     (H) 02/11/2024    BUN 88.1 (H) 02/10/2024    CR 1.99 (H) 02/10/2024    GFRESTIMATED 38 (L) 02/10/2024    PRINCE 8.3 (L) 02/10/2024       CBC RESULTS:   Recent Labs   Lab Test 02/09/24  0426   WBC 6.5   RBC 4.26*   HGB 13.0*   HCT 38.4*   MCV 90   MCH 30.5   MCHC 33.9   RDW 13.2        Lab Results   Component Value Date    AST 54 02/09/2024     Lab Results   Component Value Date    ALT 21 02/09/2024     No results found for: \"BILICONJ\"   Lab Results   Component Value Date    BILITOTAL 0.8 02/09/2024     Lab Results   Component Value Date    ALBUMIN 2.9 02/09/2024    ALBUMIN 3.2 05/09/2022     Lab Results   Component Value Date    PROTTOTAL 6.1 02/09/2024      Lab Results   Component Value Date    ALKPHOS 151 02/09/2024     50 minutes spent on the date of the encounter doing chart review, history and exam, documentation and further activities per the note.    Michelle Farley MD  [facility], Palliative Care  Securely message with the Vocera Web Console (learn more here) or  Text page via [facility] Paging/Directory                 "

## 2024-02-11 NOTE — PROGRESS NOTES
Care Management Follow Up    Length of Stay (days): 4    Expected Discharge Date: 02/13/2024     Concerns to be Addressed: discharge planning      Patient plan of care discussed at interdisciplinary rounds: Yes    Anticipated Discharge Disposition: Will remain inpt - GIP consulted  Disposition Comments: pt no longer pursuing home hospice.  Anticipated Discharge Services: None  Anticipated Discharge DME: None    Patient/family educated on Medicare website which has current facility and service quality ratings: yes  Education Provided on the Discharge Plan: Yes  Patient/Family in Agreement with the Plan: yes    Referrals Placed by CM/SW: Hospice  Private pay costs discussed: Not applicable    Additional Information:  MATT met with Jeremi and his wife, Uma, at bedside. Uma informed MATT that they have decided not to pursue home hospice, as the travel and burden of care at home will be too much at this point. They wish to stay in the hospital and asked MATT to consult with MD. MATT paged MD and was informed a GIP consult had already been placed.    Uma stated that they will have family stop by for visits today and tomorrow, as appropriate. MATT asked Uma how she is coping, and she shared that she is doing alright, although this is a challenging time. SW provided emotional support and active listening.     No additional questions or concerns reported. SW provided availability of weekend SW and gave family info that MATT Price would return to the unit on Monday.     KEE Turk  2/11/2024       Social Work and Care Management Department       SEARCHABLE in [employer] - search SOCIAL WORK       [place] ([phone]) Saturday and Sunday     Units: 4A Vocera, 4C Vocera, & 4E Vocera    Pager: [phone]     Units: 5A [id number] Vocera, 5A [id number] Vocera & 5C Vocera Pager: [phone]     Units: 6A Vocera & 6B Vocera  Pager: [phone]     Units: 6C Vocera Pager: [phone]     Units: 7A  Vocera & 7B Vocera  Pager: [phone]     Units: 7C Vocera Pager: [phone]     Unit: [place] ED Vocera & [place] Obs Vocera Pager: [phone]      [facility] ([phone]) Saturday and Sunday      Units: 5 Ortho Vocera, 5 Med Surg Vocera & WB ED Vocera Pager:[phone]     Units: 6 Med Surg Vocera, 8 Med Surg Vocera, & 10 ICU Vocera Pager: [phone]        After hours Vocera [facility] and After Hours Vocera [place]     Saturday & Sunday (1630 - 0000)    Mon-Fri ([phone])     [facility] Recognized Holidays  ([phone])    Units: ALL  Pager: [phone]

## 2024-02-11 NOTE — PROGRESS NOTES
Brief Note    Chart reviewed. Abdominal pleurex planned tomorrow and plan to go home with hospice. We will sign off, please reach out to us if you have any questions.      Wesley Hartman MD    Hematology, Oncology and Transplantation   [phone]

## 2024-02-11 NOTE — PLAN OF CARE
A x O, VSS on RA. Pt is somewhat restless due to pain. PRN oxycodone x 3, IV Dilaudid x 3 given w/ some relief. Wife at bedside. Patient with elevated potassium and Mag. Resting between cares. Continue POC.

## 2024-02-11 NOTE — PLAN OF CARE
"Goal Outcome Evaluation:      Plan of Care Reviewed With: patient, spouse       Blood pressure 108/62, pulse 75, temperature 97.5  F (36.4  C), temperature source Axillary, resp. rate 16, height 1.725 m (5' 7.91\"), weight 72.9 kg (160 lb 11.2 oz), SpO2 91%.    Patient afebrile, vitals stable, pain tolerable with current pain regimen, patient unable to eat but had an ice cream today. Received albumin and lasix, potassium recheck better at 5.3, recheck again at 2000. NPO after midnight tomorrow for Pleurx drain placement in IR Monday. Family here, supportive. Continue with plan of care, report changes to provider.       Problem: Pain Acute  Goal: Optimal Pain Control and Function  Outcome: Not Progressing  Intervention: Develop Pain Management Plan  Recent Flowsheet Documentation  Taken 2/10/2024 1511 by Ludy Chaney RN  Pain Management Interventions: medication (see MAR)  Taken 2/10/2024 1359 by Ludy Chaney RN  Pain Management Interventions: medication (see MAR)  Taken 2/10/2024 1047 by Ludy Chaney RN  Pain Management Interventions: medication (see MAR)  Intervention: Prevent or Manage Pain  Recent Flowsheet Documentation  Taken 2/10/2024 0900 by Ludy Chaney RN  Medication Review/Management: medications reviewed  Intervention: Optimize Psychosocial Wellbeing  Recent Flowsheet Documentation  Taken 2/10/2024 0900 by Luyd Chaney RN  Supportive Measures: active listening utilized     "

## 2024-02-11 NOTE — PROVIDER NOTIFICATION
"Md paged, re \"pt having increased pain and dilaudid not due for another hour. Can we get one time order or adjust frequency of dilaudid? Thanks!\"  "

## 2024-02-11 NOTE — PROGRESS NOTES
[facility]    Progress Note - Medicine Service, TETO TEAM 4       Date of Admission:  2/7/2024    Assessment & Plan   Antonio Sharpe is a 58 year old male with past medical history of SCC of the L mandible/alveolar ridge, and buccal mucosa s/p extensive L mandibular + maxillary surgery with adjuvant chemoradiation c/b extensive metastases (spine, R iliac, now with new lesions in liver and spleen) admitted 2/7/24 with acute abdominal distention 2/2 recurrent malignant ascites s/p large volume paracentesis (27/24), R renal hydronephrosis with subsequent post renal KATHERINE. Patient has expressed that he wants to pursue home hospice. PleurX cath scheduled to be placed on 2/12/24 for recurrent ascites. Course complicated by worsening renal failure with electrolyte derangements    End of life care  Antonio is dying of metastatic SCC which is causing acute renal failure.  His electrolytes have become unstable, he now has recurrent malignant ascites.  On 2/11 had an extensive conversation with him and his wife about end of life cares, and setting.  Given his worsening weakness and need for nursing cares, he and his family cannot adequately meet his needs at home.  Given his instability with his renal failure, he likely has days left before his death.  With this in mind, they are interested in inpatient hospice and transitioning to comfort cares now.  -  Comfort cares -- PRNs ordered for symptom management, stopping lab checks, stopping vitals  -  GIP consult  -  Cancel pleurx, will do intermittent paracentesis PRN    SCC of L mandible with metastases to R hip, spine, new evidence of liver, spleen and omental involvement s/p extensive H/N surgery and adjuvant chemoradiation  Acute recurrent malignant ascites  Cancer-related chronic pain  Patient has repeatedly and strongly expressed his desire to pursue home hospice. Paracentesis on 2/7/24 with 5L removed, with significant  improvement in pain; repeat para prn until pleurx can be placed on 2/12/24  - Oncology and palliative care following, appreciate recs  - Pain control per pall  - Cancelling IR consulted for pleurx given pursuing inpatient hospice    KATHERINE c/b hyperkalemia, hyperphosphatemia, hypermagnesemia and AGMA (uremia)  R renal hydronephrosis presumed 2/2 pressure effect from large volume ascites  EKG w/o acute ST/QRS changes. Shifted with insulin multiple times this hospitalization. Discussed with patient and wife, stopping temporizing measures, stopping lab checks.  - No further inventions to temporize renal failure and electrolyte abnormalities, on comfort cares    Chronic hyponatremia   Likely multifactorial with SIADH + low protein intake  - Stopping lab checks     Elevated alkaline phosphatase  Normal bili; AST elevation could be related to bone mets  - Stopping lab checks    Coagulopathy  Elevated INR  No s/s active bleeding.           Diet: Regular diet  DVT Prophylaxis: will discontinue pharmacologic ppx due to pursing hospice  Salas Catheter: Not present  Fluids: Diet as above  Lines: PRESENT      Port A Cath Single 04/12/22 Right Chest wall-Site Assessment: WDL except      Cardiac Monitoring: None  Code Status: No CPR- Do NOT Intubate      Clinically Significant Risk Factors        # Hyperkalemia: Highest K = 6.3 mmol/L in last 2 days, will monitor as appropriate  # Hyponatremia: Lowest Na = 128 mmol/L in last 2 days, will monitor as appropriate      # Hypoalbuminemia: Lowest albumin = 2.9 g/dL at 2/9/2024  4:26 AM, will monitor as appropriate    # Coagulation Defect: INR = 2.55 (Ref range: 0.85 - 1.15) and/or PTT = 88 Seconds (Ref range: 22 - 38 Seconds), will monitor for bleeding   # Acute Kidney Injury, unspecified: based on a >150% or 0.3 mg/dL increase in last creatinine compared to past 90 day average, will monitor renal function                    Disposition Plan     Expected Discharge Date: 02/13/2024,  3:00  PM    Destination: home with family          Iliana Johnson MD  Internal Medicine Hospitalist  [phone]    Medicine Service, [facility] TEAM 4  [facility]  Securely message with [employer] (more info)  Text page via [employer] Paging/Directory   See signed in provider for up to date coverage information  ______________________________________________________________________    Interval History   No acute events overnight.  Having more pain overnight.  Having heart burn as well.  Wants to pursue inpatient hospice as opposed to home hospice.  Does not want any further life prolonging measures and wants to focus on comfort.    Physical Exam   Vital Signs: Temp: 97.5  F (36.4  C) Temp src: Axillary BP: 123/70 Pulse: 66   Resp: 16 SpO2: (!) 86 % O2 Device: None (Room air)    Weight: 160 lbs 11.2 oz    General: Sitting on the edge of the bed in mild distress  HEENT: EOMI, MMM, exposed hardware along left mandible  Pulm: CTAB  CV: RRR, no m/r/g  Abd: NABS, distended  Neuro: Alert, appropriately interactive

## 2024-02-12 NOTE — PROCEDURES
[facility]  CAPS PROCEDURE NOTE  Date of Admission:  2/7/2024  Consult Requested by: Medicine  Reason for Consult: management of symptomatic ascites    Indication/HPI: ascites    Pre-Procedure Diagnosis: Ascites    Post-Procedure Diagnosis: Ascites    Risk Assessment: Average risk, Technically straightforward; patient's anticoagulation has been held according to guidelines based on the agent and platelets and coags are within guidelines    Procedure Outcome:  Therapeutic paracentesis performed with 1.4 liters of ascites removed.   See additional procedure details below.    The primary covering service should follow up and address any lab results as appropriate.    Colette Nance MD  [facility]  Securely message with Vocera (more info)  Text page via [facility] Paging/Directory   See signed in provider for up to date coverage information      [facility]    Paracentesis    Date/Time: 2/12/2024 12:13 PM    Performed by: Colette Nance MD  Authorized by: Colette Nance MD    PRE-PROCEDURE DETAILS  Initial or subsequent exam: subsequent  Procedure purpose: therapeutic  Indications: abdominal discomfort secondary to ascites        UNIVERSAL PROTOCOL   Site Marked: Yes  Prior Images Obtained and Reviewed:  Yes  Required items: Required blood products, implants, devices and special equipment available    Patient identity confirmed:  Verbally with patient  Patient was reevaluated immediately before administering moderate or deep sedation or anesthesia  Confirmation Checklist:  Patient's identity using two indicators  Time out: Immediately prior to the procedure a time out was called    Universal Protocol: the Joint Commission Universal Protocol was followed    Preparation: Patient was prepped and draped in usual sterile fashion       ANESTHESIA    Anesthesia:  Local  infiltration  Local Anesthetic:  Lidocaine 1% without epinephrine  Anesthetic Total (mL):  6      SEDATION    Patient Sedated: No    POST PROCEDURE DETAILS  Needle gauge: 22  Ultrasound guidance: yes  Puncture site: right lower quadrant  Fluid removed: 1400(ml)  Fluid appearance: bloody (bloody, but not alex blood)  Dressing: glue.        PROCEDURE  Describe Procedure: Sample was sent to the lab  Patient Tolerance:  Patient tolerated the procedure well with no immediate complications  Length of time physician/provider present for 1:1 monitoring during sedation: 0        POC US GUIDE FOR PARACENTESIS     Impression  US Indication: abdominal distension    Limited abdominal ultrasound was performed and demonstrated an adequate fluid collection on the right side of the abdomen.    Doppler of the skin demonstrated an area at this site without significant vasculature.  A paracentesis at this site was subsequently performed.    Colette Nance MD

## 2024-02-12 NOTE — PROGRESS NOTES
PALLIATIVE CARE PROGRESS NOTE  [facility]     Patient Name: Antonio Sharpe  Date of Admission: 2/7/2024   Today the patient was seen for: symptom management     Recommendations & Counseling     GOALS OF CARE:   Comfort focused, late last week had plan to go home on hospice but has had continued decline; family are concerned they would not be able to manage at home with degree of support that Antonio is presently needing. Given his instability with his renal failure, he likely has days left before his death.   GIP consult placed by [facility] for evaluation for EOL care here. Plan is for evaluation 2/13 AM around 9AM per wife.      ADVANCE CARE PLANNING:  No health care directive on file. Per  informed consent policy, next of kin should be involved if patient becomes unable. Wife Uma is surrogate.   There is no POLST form on file, defer to patient and/or next of kin for decisions   Code status: No CPR- Do NOT Intubate     MEDICAL MANAGEMENT:   #Pain,chronic   Started opioids 1/12/2024 for worsening lower back pain and right hip pain. Abd pain likely becoming worse in the setting of disease progression, ascites, new hepatic, splenic, osseous and body wall metastases.   24-hour MME: 40mg PO oxycodone (50 MME) + 15mg PO dilaudid (75 MME) + 14mg dilaudid IV (175 MME) + 37mg fent patch (74 MME) = 374 MME total  Tylenol 1g TID scheduled liquid (stop when no longer able to swallow)  STOP Fentanyl 37.5mg subcutaneous patch every 72 hours (done for you)   STOP prn dilaudid doses (oral and IV) (done for you)   START Dilaudid PCA (done for you)   Continuous 0.4mg/hour   Bolus 0.4 mg every 20 minutes, 1 hour lock-out 1.6mg.   If pain still not well-controlled, would recommend increasing bolus dose first:   If current dose partially effective, could go up to 0.6 mg every 20 minutes.   If current dose not effective, would recommend going up to 0.8mg every 20 minutes  Basal dose may  take ~8 hours to reach steady state.   Decadron 4mg daily AM   Paracentesis as needed - received 2/7, 2/9, and 2/12    #Opioid induced constipation   last BM ~3 days ago. Was doing miralax/senna at home - hard to swallow and not working as well recently.   Lactulose daily   Can increase back to BID if needed  Senna twice daily if can swallow (stop it if too much of a burden)  Bisacodyl supp ordered PRN.      #Nausea  Worsening nausea over the last week, likely from constipation vs progression of disease (ascites, increased pressure in abd, new mets).   Continue Decadron 4mg daily   Maalox prn GERD/nausea  Olanzapine 5mg solution daily bedtime for nausea  Prochlorperazine 10mg IV every 6 hours as needed (1st choice given constipation)   Zofran 4mg IV every 6 hours as needed or Zofran 4mg ODT every 6 hours as needed   Can increase to 8mg if needed     Comfort Care   Below are common symptoms routinely seen in patients with comfort focused goals and at the end of life. This patient may not currently exhibit all of these symptoms; however, if these were to occur our recommendations are as follows:    #Air hunger/Dyspnea   - Bolus hydromorphone through PCA     #Anxiety    - 1st choice: Lorazepam PO/SL q 1 mg  q 3 hour as needed.   - 2nd choice: Lorazepam IV q 1 mg  q 3 hour as needed    #Secretion burden   -Robinul 0.1 mg  q 4 hours as needed. If ineffective, increase up to 0.3 mg   -Consider atropine if Robinul ineffective after dose increase     #Agitation  -Aggressive symptoms control first, then  -1st choice: Zyprexa 5 mg ODT or IM   -2nd choice: Increase dose of Zyprexa to 10 mg or consider switch to Haldol   -3rd choice: Lorazepam as above       PSYCHOSOCIAL/SPIRITUAL SUPPORT:  Family wife at bedside, very involved in care. Reports having a strong support system.      Palliative Care will continue to follow. Thank you for the consult and allowing us to aid in the care of Antonio Sharpe.     These recommendations  have been discussed with oncology.    YRN Nath CNP  Securely message with [employer] (more info)  Text page via [facility] Paging/Directory          Interval History:     Multidisciplinary collaboration:  Reviewed notes. Plan for GIP admission given decline over weekend.     Patient/family narrative  Pain not well-controlled overnight. Receiving dilaudid IV ~every 1 hour overnight. Notes it worked initially, then wore off closer to the hour alfonzo when he was due for more. Nausea has improved since last week. Continues to have constipation - sometimes hesitant to use stool softeners because of his weakness. Notes some SOB that comes and goes. Did receive another paracentesis today, notes after that it did not seem to help any symptoms.     Able to move rooms on unit today to have a better view/sunlight. Family/friends have been visiting intermittently.     Palliative Summary/HPI          Antonio Sharpe is a 58 year old male with past medical history SCC of the L mandible/alveolar ridge, and buccal mucosa s/p extensive L mandibular + maxillary surgery with adjuvant chemoradiation c/b extensive metastases (spine, R iliac, now with lesions in liver and spleen) admitted 2/7/24 with acute abdominal distention found to have large volume ascites (presumed malignant), R renal hydronephrosis with subsequent post renal KATHERINE c/b hyperkalemia      Today, the patient was seen for:  SCC of L mandible with metastases to R. Hip, spine, spleen, omentum  Malignant Ascites  Cancer-related acute on chronic pain  Cancer-related nausea  Opioid induced constipation          Review of Systems:     Comprehensive ROS is reviewed and is negative except as here & per HPI:          Medications:      I have reviewed this patient's medication profile and medications during this hospitalization. Pertinent medications today include:      Tylenol 1g scheduled TID - taking ~1x/day - stopped per primary team(?)   Fentanyl patch 37mcg   Dilaudid  1mg every 30 minutes prn IV - received x7 overnight  Dilaudid PO 2mg prn - received x4 since last evening    Ativan prn - not received   Olanzapine 5mg night - refused last PM    Lactulose daily   Maalox prn - given x1 yesterday   Zofran PRN - no doses in last day  Prochlorperazine as needed   Senna BID         Physical Exam:      160 lbs 11.2 oz    Physical Exam  General: NAD, sitting upright at edge of bed.   Pulmonary: Unlabored.   Neuro: Alert and oriented x4.  Mental status/Psych: Asks/answers questions appropriately.            Current Problem List:   Principal Problem:    Malignant ascites (H28)  Active Problems:    Hyperkalemia    KATHERINE (acute kidney injury) (H24)      No Known Allergies         Data Reviewed:     No results found for this or any previous visit (from the past 24 hour(s)).        Medical Decision Making       MANAGEMENT DISCUSSED with the following over the past 24 hours: IM, nursing notes   NOTE(S)/MEDICAL RECORDS REVIEWED over the past 24 hours: nursing notes, IR, oncology  Medical complexity over the past 24 hours:  - Parenteral (IV) CONTROLLED SUBSTANCES ordered  - Prescription DRUG MANAGEMENT performed  80 MINUTES SPENT BY ME on the date of service doing chart review, history, exam, documentation & further activities per the note.

## 2024-02-12 NOTE — PLAN OF CARE
Goal Outcome Evaluation:    Patient's main issue right now is pain control, started on a dilaudid PCA, continuous and PCA bumps. Took off his fentanyl patches before PCA started. Needs encouragement to press the PCA button.

## 2024-02-12 NOTE — CONSULTS
SPIRITUAL HEALTH SERVICES Consult Note  [facility]    Brief  visit with pt per consult order, staff request. Pt strongly declined  support, said he has his own support. No follow-up indicated at this time. Pt knows he can request  at any time if desired.     Rafi Carbajal) Robin Brito M.Div., [facility]  Staff   Pager [phone]      * [facility] remains available 24/7 for emergent requests/referrals, either by having the switchboard page the on-call  or by entering an ASAP/STAT consult in Epic (this will also page the on-call ). Routine Epic consults receive an initial response within 24 hours.*

## 2024-02-12 NOTE — PLAN OF CARE
A x O, but restless. Pain not well managed overnight. Pt needing IV Dilaudid in short increments. PO Dilaudid also given w/ relief. Wife at bedside. Resting between cares. Continue to monitor pain.

## 2024-02-12 NOTE — PROVIDER NOTIFICATION
Dr. Simon paged due to patient requiring IV pain medication q1 hour or less this shift. PCA may help patient control pain better?

## 2024-02-12 NOTE — PROGRESS NOTES
[facility]    Progress Note - [place] [facility]       Date of Admission:  2/7/2024    Assessment & Plan   Antonio Sharpe is a 58 year old male with past medical history of SCC of the L mandible/alveolar ridge, and buccal mucosa s/p extensive L mandibular + maxillary surgery with adjuvant chemoradiation c/b extensive metastases (spine, R iliac, now with new lesions in liver and spleen) admitted 2/7/24 with acute abdominal distention 2/2 recurrent malignant ascites s/p large volume paracentesis x 2, R renal hydronephrosis with subsequent post renal KATHERINE. Course complicated by worsening renal failure with electrolyte derangements, he has decided to pursue comfort cares and in patient hospice.     End of life care  Antonio is dying of metastatic SCC which is causing acute renal failure.  His electrolytes have become unstable, he now has recurrent malignant ascites.  On 2/11 had an extensive conversation with him and his wife about end of life cares, and setting.  Given his worsening weakness and need for nursing cares, he and his family cannot adequately meet his needs at home.  Given his instability with his renal failure, he likely has days left before his death.  With this in mind, they are interested in inpatient hospice and transitioning to comfort cares now.  -  Comfort cares -- PRNs ordered for symptom management, stopping lab checks, stopping vitals  -  GIP consult  -  Cancel pleurx, will do intermittent paracentesis PRN    SCC of L mandible with metastases to R hip, spine, new evidence of liver, spleen and omental involvement s/p extensive H/N surgery and adjuvant chemoradiation  Acute recurrent malignant ascites  Cancer-related chronic pain  Patient has repeatedly and strongly expressed his desire to pursue home hospice. Paracentesis on 2/7/24 with 5L removed, with significant improvement in pain; repeat para prn until pleurx can be placed on 2/12/24  - Oncology and  palliative care following, appreciate recs  - Pain control per pall  - Cancelling IR consulted for pleurx given pursuing inpatient hospice    KATHERINE c/b hyperkalemia, hyperphosphatemia, hypermagnesemia and AGMA (uremia)  R renal hydronephrosis presumed 2/2 pressure effect from large volume ascites  EKG w/o acute ST/QRS changes. Shifted with insulin multiple times this hospitalization. Discussed with patient and wife, stopping temporizing measures, stopping lab checks.  - No further inventions to temporize renal failure and electrolyte abnormalities, on comfort cares    Chronic hyponatremia   Likely multifactorial with SIADH + low protein intake  - Stopping lab checks     Elevated alkaline phosphatase  Normal bili; AST elevation could be related to bone mets  - Stopping lab checks    Coagulopathy  Elevated INR  No s/s active bleeding.           Diet: Regular diet  DVT Prophylaxis: will discontinue pharmacologic ppx due to pursing hospice  Salas Catheter: Not present  Fluids: Diet as above  Lines: PRESENT      Port A Cath Single 04/12/22 Right Chest wall-Site Assessment: WDL      Cardiac Monitoring: None  Code Status: No CPR- Do NOT Intubate      Clinically Significant Risk Factors        # Hyperkalemia: Highest K = 5.8 mmol/L in last 2 days, will monitor as appropriate  # Hyponatremia: Lowest Na = 128 mmol/L in last 2 days, will monitor as appropriate      # Hypoalbuminemia: Lowest albumin = 2.9 g/dL at 2/9/2024  4:26 AM, will monitor as appropriate                         Disposition Plan     Expected Discharge Date: 02/13/2024,  3:00 PM    Destination: home with family          Adrian Gregory, DO  Internal Medicine PGY-3    Staffed with Dr. Alex Sepulveda [address] [place] [address]  Securely message with [employer] (more info)  Text page via [facility] Paging/Directory   See signed in provider for up to date coverage  information  ______________________________________________________________________    Interval History   No acute events overnight.  Having more pain overnight. And pain does not seem to be under good control with current plan. Plan to initiate PCA    Physical Exam   Vital Signs:                    Weight: 160 lbs 11.2 oz    General: Sitting on the edge of the bed in mild distress  HEENT: EOMI, MMM, exposed hardware along left mandible  Pulm: deferred  CV: deferred  Abd: NABS, distended  Neuro: Alert, appropriately interactive

## 2024-02-13 NOTE — PLAN OF CARE
Pt comfort cares. Pain not well controlled. 1 time dose of dilaudid ordered, did not help with pain. Provider paged again and PCA dose increased. Maalox given for heartburn relief. Continue to monitor.   Problem: Adult Inpatient Plan of Care  Goal: Plan of Care Review  Description: The Plan of Care Review/Shift note should be completed every shift.  The Outcome Evaluation is a brief statement about your assessment that the patient is improving, declining, or no change.  This information will be displayed automatically on your shift  note.  Outcome: Not Progressing  Goal: Optimal Comfort and Wellbeing  Outcome: Not Progressing  Intervention: Monitor Pain and Promote Comfort  Recent Flowsheet Documentation  Taken 2/12/2024 1605 by Vee Alfaro, RN  Pain Management Interventions: medication (see MAR)     Problem: Oncology Care  Goal: Effective Coping  Outcome: Not Progressing   Goal Outcome Evaluation:

## 2024-02-13 NOTE — PROGRESS NOTES
Rounded on pt at 0100, Pt wife came out at 0135 to tell me that he stopped breathing. I went into pt's room and assesses for pulses, respirations and heart sound, but they were absent. Doc notified at 0140 about pt's situation.     MD notified and time of death was announce at 0350. [employer] was called, pt wife declined donation. Pt's belonging sent home with wife. Body bagged/tagged and sent to the [facility] with security.

## 2024-02-13 NOTE — DEATH PRONOUNCEMENT
MD DEATH PRONOUNCEMENT    Called to pronounce Antonio Sharpe dead.    Physical Exam: Unresponsive to noxious stimuli, Spontaneous respirations absent, Breath sounds absent, Carotid pulse absent, Heart sounds absent, Pupillary light reflex absent, and Corneal blink reflex absent    Patient was pronounced dead at 3:50 AM, 2024.    Preliminary Cause of Death: Metastatic SCC of Lt mandible    Principal Problem:    Malignant ascites (H28)  Active Problems:    Hyperkalemia    KATHERINE (acute kidney injury) (H24)       Infectious disease present?: NO    Communicable disease present? (examples: HIV, chicken pox, TB, Ebola, CJD) :  NO    Multi-drug resistant organism present? (example: MRSA): NO    Please consider an autopsy if any of the following exist:  NO Unexpected or unexplained death during or following any dental, medical, or surgical diagnostic treatment procedures.   NO Death of mother at or up to seven days after delivery.     NO All  and pediatric deaths.     NO Death where the cause is sufficiently obscure to delay completion of the death certificate.   NO Deaths in which autopsy would confirm a suspected illness/condition that would affect surviving family members or recipients of transplanted organs.     The following deaths must be reported to the 's Office:  NO A death that may be due entirely or in part to any factors other than natural disease (recent surgery, recent trauma, suspected abuse/neglect).   NO A death that may be an accident, suicide, or homicide.     NO Any sudden, unexpected death in which there is no prior history of significant heart disease or any other condition associated with sudden death.   NO A death under suspicious, unusual, or unexpected circumstances.    NO Any death which is apparently due to natural causes but in which the  does not have a personal physician familiar with the patient s medical history, social, or environmental situation or the  circumstances of the terminal event.   NO Any death apparently due to Sudden Infant Death Syndrome.     NO Deaths that occur during, in association with, or as consequences of a diagnostic, therapeutic, or anesthetic procedure.   NO Any death in which a fracture of a major bone has occurred within the past (6) six months.   NO A death of persons not seen by their physician within 120 days of demise.     NO Any death in which the  was an inmate of a public institution or was in the custody of Law Enforcement personnel.   NO  All unexpected deaths of children   NO Solid organ donors   NO Unidentified bodies   NO Deaths of persons whose bodies are to be cremated or otherwise disposed of so that the bodies will later be unavailable for examination;   NO Deaths unattended by a physician outside of a licensed healthcare facility or licensed residential hospice program   NO Deaths occurring within 24 hours of arrival to a health care facility if death is unexpected.    NO Deaths associated with the decedent s employment.   NO Deaths attributed to acts of terrorism.   NO Any death in which there is uncertainty as to whether it is a medical examiner s care should be discussed with the medical investigator.        Body disposition: Body released to the morgue.

## 2024-02-14 LAB
BACTERIA FLD CULT: NO GROWTH
GRAM STAIN RESULT: NORMAL
GRAM STAIN RESULT: NORMAL

## 2024-02-15 NOTE — DISCHARGE SUMMARY
[facility]  Discharge Summary - Medicine & Pediatrics       Date of Admission:  2024  Date of Discharge:  2024  5:07 AM  Discharging Provider: BOB Gregory MD  Discharge Service: Medicine Service, TETO TEAM 4    Discharge Diagnoses   Death  SCC of L mandible with metastases to R hip, spine, new evidence of liver, spleen and omental involvement s/p extensive H/N surgery and adjuvant chemoradiation  Acute recurrent malignant ascites  Cancer-related chronic pain  KATHERINE c/b hyperkalemia, hyperphosphatemia, hypermagnesemia and AGMA (uremia)  R renal hydronephrosis presumed 2/2 pressure effect from large volume ascites  Chronic hyponatremia   Coagulopathy      Clinically Significant Risk Factors              Unresulted Labs Ordered in the Past 30 Days of this Admission       No orders found from 2024 to 2024.            Discharge Disposition   Patient  during this admission  Condition at discharge:     Hospital Course   Antonio Sahrpe is a 58 year old male with past medical history of SCC of the L mandible/alveolar ridge, and buccal mucosa s/p extensive L mandibular + maxillary surgery with adjuvant chemoradiation c/b extensive metastases (spine, R iliac, now with new lesions in liver, spleen and omentum) admitted 24 with acute abdominal distention 2/2 recurrent malignant ascites s/p multiple large volume paracentesis, R renal hydronephrosis with subsequent post renal KATHERINE. Course complicated by worsening renal failure with electrolyte derangements, patient and family decided to pursue comfort cares and in patient hospice. Patient  on early hours of 24     Consultations This Hospital Stay   INTERNAL MEDICINE PROCEDURE TEAM ADULT IP CONSULT [facility] - PARACENTESIS  ONCOLOGY ADULT IP CONSULT  PALLIATIVE CARE ADULT IP CONSULT  NURSING TO CONSULT FOR VASCULAR ACCESS CARE IP CONSULT  SPEECH LANGUAGE PATH ADULT IP CONSULT  SOCIAL  WORK IP CONSULT  CARE MANAGEMENT / SOCIAL WORK IP CONSULT  INTERNAL MEDICINE PROCEDURE TEAM ADULT IP CONSULT [facility] - PARACENTESIS  INTERVENTIONAL RADIOLOGY ADULT/PEDS IP CONSULT  INTERNAL MEDICINE PROCEDURE TEAM ADULT IP CONSULT [facility] - PARACENTESIS  [facility] INPATIENT HOSPICE ADULT CONSULT  SPIRITUAL HEALTH SERVICES IP CONSULT  CARE MANAGEMENT / SOCIAL WORK IP CONSULT  INTERNAL MEDICINE PROCEDURE TEAM ADULT IP CONSULT [facility] - PARACENTESIS    Code Status   Prior       The patient was discussed with Dr. Alex Gregory, DO  Internal medicine PGY-3  [facility] [address] [facility]  [address] [facility] [address]  Phone: [phone]  Fax: [phone]  ______________________________________________________________________    Physical Exam   Vital Signs:                    Weight: 160 lbs 11.2 oz  Please see death pronouncement note      Primary Care Physician   No primary care provider on file.    Discharge Orders   No discharge procedures on file.        Discharge Medications   Discharge Medication List as of 2/13/2024  5:08 AM        CONTINUE these medications which have NOT CHANGED    Details   acetaminophen (TYLENOL) 325 MG tablet Take 2 tablets (650 mg) by mouth every 4 hours as needed for mild pain or fever, Disp-90 tablet, R-0, E-Prescribe      celecoxib (CELEBREX) 100 MG capsule Take 1 capsule (100 mg) by mouth 2 times daily, Disp-28 capsule, R-0, E-Prescribe      chlorhexidine (PERIDEX) 0.12 % solution Swish and spit 10 mLs in mouth 4 times daily, Disp-1893 mL, R-11, E-Prescribe      dexAMETHasone (DECADRON) 4 MG tablet Take 1 tablet (4 mg) by mouth daily (with breakfast), Disp-10 tablet, R-0, E-Prescribe      Lidocaine (LIDOCARE) 4 % Patch Place 2 patches onto the skin every 24 hours To prevent lidocaine toxicity, patient should be patch free for 12 hrs daily.Disp-20 patch, [id number]      morphine (MS CONTIN) 30 MG CR tablet Take 1 tablet (30 mg) by mouth  every 12 hours, Disp-30 tablet, R-0, E-Prescribe      oxyCODONE (ROXICODONE) 5 MG/5ML solution Take 10-20 mLs (10-20 mg) by mouth every 4 hours as needed for moderate pain, Disp-500 mL, R-0, E-Prescribe      Wound Dressings (MEPILEX BORDER FLEX) PADS Externally apply 1 Application topically 4 times daily as needed, Disp-180 each, R-10, Local Print           Allergies   No Known Allergies

## 2024-02-15 NOTE — PROCEDURES
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     Treatment Summary:  Radiation Oncology - Course: 4 Protocol:   Treatment Site Current Dose Modality From To Elapsed Days Fx.  4 R pelvis  2,000 cGy 18 X  1/30/2024  2024   6  5          Dose per Fraction: 400 cGy  Total Dose: 2000 cGy        COMMENTS:                      Mr. Sharpe is a 58 year old male with history of L testicular cancer s/p remote history of RT and orchiectomy now   with metastatic squamous cell cancer of the head and neck s/p surgery and adjuvant chemoRT & re-irradiation (partially   completed), now with bony mets visible in the right iliac wing as well as his right femur, requiring high-dose opioid pain   medications, warranting palliative radiation therapy with 20 Gy in 5 fractions. He completed RT without any significant   toxicity or missed treatments     ED visits/hospitalizations: Yes, ED visit on 2024 for pain control     Missed treatments: None     Acute Toxicity Profile by CTC v5.0:  Pain: Grade 1: Mild pain  Fatigue: Grade 1: Fatigue relieved by rest     PAIN MANAGEMENT:  Per primary team (oncology), taking M.S. Contin BID                          FOLLOW UP PLAN:  F/u with oncologist OSMAR Lala on 2024 and 2024                       Resident Physician:  Kobi Murguia M.D.   Staff Physician: Courtney Torres M.D.     CC: Navi Simmons MD           Radiation Oncology:  [facility] [address] [place] [address]

## 2024-07-30 NOTE — TELEPHONE ENCOUNTER
[facility] Prior Authorization Team   Phone: [phone]    PA Initiation    Medication: NALOXONE HCL 4 MG/0.1ML NA LIQD  Insurance Company: [employer] - Phone [phone] Fax [phone]  Pharmacy Filling the Rx: [facility] [place], [place] - [address] [place] [address] [phone]  Filling Pharmacy Phone: [phone]  Filling Pharmacy Fax:    Start Date: 1/24/2024       Detail Level: Zone Detail Level: Detailed

## 2024-08-09 PROCEDURE — 81401 MOPATH PROCEDURE LEVEL 2: CPT | Performed by: INTERNAL MEDICINE

## 2024-11-07 NOTE — CONSULTS
"Otolaryngology Consult Note  January 12, 2024      CC: exposed hardware     HPI: Antonio Sharpe is a 58 year old male with a history of SCCa of the left buccal mucosa ([id number]) s/p resection at outside facility in 2020, followed by definitive chemoradiation that could not be completed due to side effects. He unfortunately quickly developed residual/recurrent disease and elected for palliative chemoradiation (10/21-22) after meeting with Dr. Swartz. 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 This summer he unfortunately was found to have widely metastatic disease and has been discussing palliative options with his oncologist. At their last visit in November they decided on proceeding however he has not started yet.     Unfortunately he was admitted 1/10 for worsening pain in his lower back and right hip. CT scan on admission showed multiple metastatic lesions in the liver, adrenal, cervical vertebrae and enlarged pulmonary nodule.     PMHx: [id number] SCC of the buccal mucosa and mandible, testicular cancer   PSHx: a left marginal mandibulectomy, left buccal resection, left posterior maxillectomy, left neck dissection and reconstruction with a right RFF 4/2020 at [facility]     PHYSICAL EXAM:  /73 (BP Location: Left arm)   Pulse 93   Temp 98.8  F (37.1  C) (Oral)   Resp 18   Ht 1.753 m (5' 9\")   Wt 81.1 kg (178 lb 12.7 oz)   SpO2 96%   BMI 26.40 kg/m    Walking hallway, engaged in discussion   Left marginal mandibular nerve paralysis   Body of the left mandible with exposed hardware, surrounding skin is relatively healthy appearing. Minimal edema and erythema. No drainage. Anterior to ramus is small fistula  Breathing comfortably on RA         LABS: WBC 10.1  CULTURES:  cheek abscess on 5/2023 grew strep constellatus, parvimonas micra and prevotella species     Assessment and " "Wilfredo Sharpe is a 57yo with widely metastatic SCC (originally of buccal mucosa & mandible) and ORN of the left mandible w/exposed hardware. His mandible and fistula are quite healthy appearing today, and stable compared to prior exams. No sign of infection that would warrant antibiotics from an ENT perspective. Would continue BID mepilex dressing changes.     Pt and plan d/w Dr. Maxime Antoine MD  ENT Resident     Clinically Significant Risk Factors         # Hyponatremia: Lowest Na = 122 mmol/L in last 2 days, will monitor as appropriate   # Hypercalcemia: Highest Ca = 10.8 mg/dL in last 2 days, will monitor as appropriate               # Overweight: Estimated body mass index is 26.4 kg/m  as calculated from the following:    Height as of this encounter: 1.753 m (5' 9\").    Weight as of this encounter: 81.1 kg (178 lb 12.7 oz)., PRESENT ON ADMISSION               " Initial (On Arrival)

## 2024-11-14 NOTE — LETTER
2/7/2024         RE: Antonio Sharpe  [address] [place] [address]        Dear Colleague,    Thank you for referring your patient, Antonio Sharpe, to the [facility]. Please see a copy of my visit note below.    No notes on file    Again, thank you for allowing me to participate in the care of your patient.        Sincerely,        Courtney Torres   Render Risk Assessment In Note?: no Detail Level: Simple Additional Notes: -phx of bx proven SCC on crown of scalp tx'd w/MOHS 4/7/22\\n-right medial anterior tibia: phx of bx proven SCC tx'd w/MOHS 11/1/23\\n-SCC mohs 2018/2019 at outside clinic per [facility] chart Additional Notes: -1 BCC tx'd at outside clinic

## (undated) DEVICE — KIT ENDO FIRST STEP DISINFECTANT 200ML W/POUCH EP-4

## (undated) DEVICE — SOL NACL 0.9% IRRIG 1000ML BOTTLE 2F7124

## (undated) DEVICE — TUBING SUCTION 10'X3/16" N510

## (undated) DEVICE — NDL ASPIRATION EBUS-VIZISHOT 22G NA-U401SX-4022-A

## (undated) DEVICE — LUBRICANT INST KIT ENDO-LUBE 220-90

## (undated) DEVICE — ENDO VALVE SYR NDL KIT ULTRASOUND BRONCH NA-201SX-4022-A

## (undated) DEVICE — LABEL MEDICATION SYSTEM 3303-P

## (undated) DEVICE — ENDO VALVE BX EVIS MAJ-210

## (undated) DEVICE — ENDO VALVE SUCTION ULTRASOUND BRONCH MAJ-1414

## (undated) DEVICE — PITCHER STERILE 1000ML  SSK9004A

## (undated) DEVICE — SYR 30ML SLIP TIP W/O NDL 302833

## (undated) DEVICE — CUSTOM OLYMPUS KIT

## (undated) DEVICE — ENDO VALVE SUCTION BRONCH EVIS MAJ-209

## (undated) DEVICE — ENDO ADPT BRONCH SWIVEL Y A1002

## (undated) RX ORDER — FENTANYL CITRATE 50 UG/ML
INJECTION, SOLUTION INTRAMUSCULAR; INTRAVENOUS
Status: DISPENSED
Start: 2023-01-01

## (undated) RX ORDER — ESMOLOL HYDROCHLORIDE 10 MG/ML
INJECTION INTRAVENOUS
Status: DISPENSED
Start: 2023-01-01